# Patient Record
Sex: MALE | Race: BLACK OR AFRICAN AMERICAN | Employment: OTHER | URBAN - METROPOLITAN AREA
[De-identification: names, ages, dates, MRNs, and addresses within clinical notes are randomized per-mention and may not be internally consistent; named-entity substitution may affect disease eponyms.]

---

## 2017-10-08 ENCOUNTER — ED HISTORICAL/CONVERTED ENCOUNTER (OUTPATIENT)
Dept: OTHER | Age: 62
End: 2017-10-08

## 2017-10-09 ENCOUNTER — OP HISTORICAL/CONVERTED ENCOUNTER (OUTPATIENT)
Dept: OTHER | Age: 62
End: 2017-10-09

## 2018-03-07 ENCOUNTER — IP HISTORICAL/CONVERTED ENCOUNTER (OUTPATIENT)
Dept: OTHER | Age: 63
End: 2018-03-07

## 2018-03-29 ENCOUNTER — OP HISTORICAL/CONVERTED ENCOUNTER (OUTPATIENT)
Dept: OTHER | Age: 63
End: 2018-03-29

## 2018-04-05 ENCOUNTER — OP HISTORICAL/CONVERTED ENCOUNTER (OUTPATIENT)
Dept: OTHER | Age: 63
End: 2018-04-05

## 2018-04-12 ENCOUNTER — OP HISTORICAL/CONVERTED ENCOUNTER (OUTPATIENT)
Dept: OTHER | Age: 63
End: 2018-04-12

## 2018-04-26 ENCOUNTER — OP HISTORICAL/CONVERTED ENCOUNTER (OUTPATIENT)
Dept: OTHER | Age: 63
End: 2018-04-26

## 2018-05-03 ENCOUNTER — OP HISTORICAL/CONVERTED ENCOUNTER (OUTPATIENT)
Dept: OTHER | Age: 63
End: 2018-05-03

## 2018-05-10 ENCOUNTER — OP HISTORICAL/CONVERTED ENCOUNTER (OUTPATIENT)
Dept: OTHER | Age: 63
End: 2018-05-10

## 2018-05-22 ENCOUNTER — OP HISTORICAL/CONVERTED ENCOUNTER (OUTPATIENT)
Dept: OTHER | Age: 63
End: 2018-05-22

## 2018-05-23 ENCOUNTER — OP HISTORICAL/CONVERTED ENCOUNTER (OUTPATIENT)
Dept: OTHER | Age: 63
End: 2018-05-23

## 2018-05-24 ENCOUNTER — OP HISTORICAL/CONVERTED ENCOUNTER (OUTPATIENT)
Dept: OTHER | Age: 63
End: 2018-05-24

## 2018-06-14 ENCOUNTER — OP HISTORICAL/CONVERTED ENCOUNTER (OUTPATIENT)
Dept: OTHER | Age: 63
End: 2018-06-14

## 2018-07-05 ENCOUNTER — OP HISTORICAL/CONVERTED ENCOUNTER (OUTPATIENT)
Dept: OTHER | Age: 63
End: 2018-07-05

## 2018-07-26 ENCOUNTER — OP HISTORICAL/CONVERTED ENCOUNTER (OUTPATIENT)
Dept: OTHER | Age: 63
End: 2018-07-26

## 2018-08-16 ENCOUNTER — OP HISTORICAL/CONVERTED ENCOUNTER (OUTPATIENT)
Dept: OTHER | Age: 63
End: 2018-08-16

## 2018-08-23 ENCOUNTER — OP HISTORICAL/CONVERTED ENCOUNTER (OUTPATIENT)
Dept: OTHER | Age: 63
End: 2018-08-23

## 2018-09-06 ENCOUNTER — OP HISTORICAL/CONVERTED ENCOUNTER (OUTPATIENT)
Dept: OTHER | Age: 63
End: 2018-09-06

## 2018-09-20 ENCOUNTER — OP HISTORICAL/CONVERTED ENCOUNTER (OUTPATIENT)
Dept: OTHER | Age: 63
End: 2018-09-20

## 2018-10-11 ENCOUNTER — OP HISTORICAL/CONVERTED ENCOUNTER (OUTPATIENT)
Dept: OTHER | Age: 63
End: 2018-10-11

## 2018-10-25 ENCOUNTER — OP HISTORICAL/CONVERTED ENCOUNTER (OUTPATIENT)
Dept: OTHER | Age: 63
End: 2018-10-25

## 2018-11-08 ENCOUNTER — OP HISTORICAL/CONVERTED ENCOUNTER (OUTPATIENT)
Dept: OTHER | Age: 63
End: 2018-11-08

## 2018-11-29 ENCOUNTER — OP HISTORICAL/CONVERTED ENCOUNTER (OUTPATIENT)
Dept: OTHER | Age: 63
End: 2018-11-29

## 2018-12-20 ENCOUNTER — OP HISTORICAL/CONVERTED ENCOUNTER (OUTPATIENT)
Dept: OTHER | Age: 63
End: 2018-12-20

## 2019-01-03 ENCOUNTER — OP HISTORICAL/CONVERTED ENCOUNTER (OUTPATIENT)
Dept: OTHER | Age: 64
End: 2019-01-03

## 2019-01-17 ENCOUNTER — OP HISTORICAL/CONVERTED ENCOUNTER (OUTPATIENT)
Dept: OTHER | Age: 64
End: 2019-01-17

## 2019-01-31 ENCOUNTER — OP HISTORICAL/CONVERTED ENCOUNTER (OUTPATIENT)
Dept: OTHER | Age: 64
End: 2019-01-31

## 2019-02-14 ENCOUNTER — OP HISTORICAL/CONVERTED ENCOUNTER (OUTPATIENT)
Dept: OTHER | Age: 64
End: 2019-02-14

## 2019-03-14 ENCOUNTER — OP HISTORICAL/CONVERTED ENCOUNTER (OUTPATIENT)
Dept: OTHER | Age: 64
End: 2019-03-14

## 2019-03-25 ENCOUNTER — OP HISTORICAL/CONVERTED ENCOUNTER (OUTPATIENT)
Dept: OTHER | Age: 64
End: 2019-03-25

## 2019-03-28 ENCOUNTER — OP HISTORICAL/CONVERTED ENCOUNTER (OUTPATIENT)
Dept: OTHER | Age: 64
End: 2019-03-28

## 2019-04-11 ENCOUNTER — OP HISTORICAL/CONVERTED ENCOUNTER (OUTPATIENT)
Dept: OTHER | Age: 64
End: 2019-04-11

## 2019-04-25 ENCOUNTER — OP HISTORICAL/CONVERTED ENCOUNTER (OUTPATIENT)
Dept: OTHER | Age: 64
End: 2019-04-25

## 2019-05-09 ENCOUNTER — OP HISTORICAL/CONVERTED ENCOUNTER (OUTPATIENT)
Dept: OTHER | Age: 64
End: 2019-05-09

## 2019-05-23 ENCOUNTER — OP HISTORICAL/CONVERTED ENCOUNTER (OUTPATIENT)
Dept: OTHER | Age: 64
End: 2019-05-23

## 2019-06-06 ENCOUNTER — OP HISTORICAL/CONVERTED ENCOUNTER (OUTPATIENT)
Dept: OTHER | Age: 64
End: 2019-06-06

## 2019-06-20 ENCOUNTER — OP HISTORICAL/CONVERTED ENCOUNTER (OUTPATIENT)
Dept: OTHER | Age: 64
End: 2019-06-20

## 2019-07-11 ENCOUNTER — OP HISTORICAL/CONVERTED ENCOUNTER (OUTPATIENT)
Dept: OTHER | Age: 64
End: 2019-07-11

## 2019-08-01 ENCOUNTER — OP HISTORICAL/CONVERTED ENCOUNTER (OUTPATIENT)
Dept: OTHER | Age: 64
End: 2019-08-01

## 2019-08-29 ENCOUNTER — OP HISTORICAL/CONVERTED ENCOUNTER (OUTPATIENT)
Dept: OTHER | Age: 64
End: 2019-08-29

## 2019-09-12 ENCOUNTER — OP HISTORICAL/CONVERTED ENCOUNTER (OUTPATIENT)
Dept: OTHER | Age: 64
End: 2019-09-12

## 2019-09-26 ENCOUNTER — OP HISTORICAL/CONVERTED ENCOUNTER (OUTPATIENT)
Dept: OTHER | Age: 64
End: 2019-09-26

## 2019-10-10 ENCOUNTER — OP HISTORICAL/CONVERTED ENCOUNTER (OUTPATIENT)
Dept: OTHER | Age: 64
End: 2019-10-10

## 2019-10-31 ENCOUNTER — OP HISTORICAL/CONVERTED ENCOUNTER (OUTPATIENT)
Dept: OTHER | Age: 64
End: 2019-10-31

## 2019-12-05 ENCOUNTER — OP HISTORICAL/CONVERTED ENCOUNTER (OUTPATIENT)
Dept: OTHER | Age: 64
End: 2019-12-05

## 2019-12-19 ENCOUNTER — OP HISTORICAL/CONVERTED ENCOUNTER (OUTPATIENT)
Dept: OTHER | Age: 64
End: 2019-12-19

## 2020-01-09 ENCOUNTER — OP HISTORICAL/CONVERTED ENCOUNTER (OUTPATIENT)
Dept: OTHER | Age: 65
End: 2020-01-09

## 2020-01-30 ENCOUNTER — OP HISTORICAL/CONVERTED ENCOUNTER (OUTPATIENT)
Dept: OTHER | Age: 65
End: 2020-01-30

## 2020-02-20 ENCOUNTER — OP HISTORICAL/CONVERTED ENCOUNTER (OUTPATIENT)
Dept: OTHER | Age: 65
End: 2020-02-20

## 2020-02-27 ENCOUNTER — OP HISTORICAL/CONVERTED ENCOUNTER (OUTPATIENT)
Dept: OTHER | Age: 65
End: 2020-02-27

## 2020-03-12 ENCOUNTER — OP HISTORICAL/CONVERTED ENCOUNTER (OUTPATIENT)
Dept: OTHER | Age: 65
End: 2020-03-12

## 2020-03-26 ENCOUNTER — OP HISTORICAL/CONVERTED ENCOUNTER (OUTPATIENT)
Dept: OTHER | Age: 65
End: 2020-03-26

## 2020-04-23 ENCOUNTER — OP HISTORICAL/CONVERTED ENCOUNTER (OUTPATIENT)
Dept: OTHER | Age: 65
End: 2020-04-23

## 2020-05-14 ENCOUNTER — OP HISTORICAL/CONVERTED ENCOUNTER (OUTPATIENT)
Dept: OTHER | Age: 65
End: 2020-05-14

## 2020-06-04 ENCOUNTER — OP HISTORICAL/CONVERTED ENCOUNTER (OUTPATIENT)
Dept: OTHER | Age: 65
End: 2020-06-04

## 2020-06-18 ENCOUNTER — OP HISTORICAL/CONVERTED ENCOUNTER (OUTPATIENT)
Dept: OTHER | Age: 65
End: 2020-06-18

## 2020-07-30 ENCOUNTER — OP HISTORICAL/CONVERTED ENCOUNTER (OUTPATIENT)
Dept: OTHER | Age: 65
End: 2020-07-30

## 2020-08-06 ENCOUNTER — OP HISTORICAL/CONVERTED ENCOUNTER (OUTPATIENT)
Dept: OTHER | Age: 65
End: 2020-08-06

## 2020-08-20 ENCOUNTER — OP HISTORICAL/CONVERTED ENCOUNTER (OUTPATIENT)
Dept: OTHER | Age: 65
End: 2020-08-20

## 2020-09-24 ENCOUNTER — HOSPITAL ENCOUNTER (OUTPATIENT)
Dept: WOUND CARE | Age: 65
Discharge: HOME | End: 2020-09-24
Payer: MEDICARE

## 2020-09-24 VITALS
HEIGHT: 67 IN | SYSTOLIC BLOOD PRESSURE: 164 MMHG | TEMPERATURE: 98.8 F | DIASTOLIC BLOOD PRESSURE: 73 MMHG | HEART RATE: 73 BPM | WEIGHT: 315 LBS | BODY MASS INDEX: 49.44 KG/M2

## 2020-09-24 PROBLEM — I70.242 ATHEROSCLEROSIS OF NATIVE ARTERY OF LEFT LOWER EXTREMITY WITH ULCERATION OF CALF (HCC): Status: ACTIVE | Noted: 2020-09-24

## 2020-09-24 PROBLEM — I26.90 SEPTIC PULMONARY EMBOLISM WITHOUT ACUTE COR PULMONALE (HCC): Status: ACTIVE | Noted: 2020-09-24

## 2020-09-24 PROBLEM — L97.212 NON-PRESSURE CHRONIC ULCER OF RIGHT CALF WITH FAT LAYER EXPOSED (HCC): Status: ACTIVE | Noted: 2020-09-24

## 2020-09-24 PROBLEM — I87.2 PERIPHERAL VENOUS INSUFFICIENCY: Status: ACTIVE | Noted: 2020-09-24

## 2020-09-24 PROBLEM — L97.222 NON-PRESSURE CHRONIC ULCER OF LEFT CALF WITH FAT LAYER EXPOSED (HCC): Status: ACTIVE | Noted: 2020-09-24

## 2020-09-24 PROBLEM — E66.01 MORBID OBESITY (HCC): Status: ACTIVE | Noted: 2020-09-24

## 2020-09-24 PROBLEM — I89.0 LYMPHEDEMA: Status: ACTIVE | Noted: 2020-09-24

## 2020-09-24 PROCEDURE — 11042 DBRDMT SUBQ TIS 1ST 20SQCM/<: CPT

## 2020-09-24 RX ORDER — METOPROLOL SUCCINATE 25 MG/1
25 TABLET, EXTENDED RELEASE ORAL DAILY
COMMUNITY
End: 2021-10-20

## 2020-09-24 RX ORDER — AMLODIPINE BESYLATE 10 MG/1
10 TABLET ORAL DAILY
COMMUNITY
End: 2021-10-20

## 2020-09-24 RX ORDER — FUROSEMIDE 40 MG/1
40 TABLET ORAL DAILY
COMMUNITY
End: 2021-10-12

## 2020-09-24 RX ORDER — BISMUTH SUBSALICYLATE 262 MG
1 TABLET,CHEWABLE ORAL DAILY
COMMUNITY
End: 2021-10-20

## 2020-09-24 RX ORDER — NAPROXEN 250 MG/1
250 TABLET ORAL 2 TIMES DAILY WITH MEALS
COMMUNITY
End: 2021-10-12

## 2020-09-24 NOTE — DISCHARGE INSTRUCTIONS
Discharge Instructions for  [address] [place] [address], [facility]  Telephone: [phone]    NAME:  Stephanie Perla  YOB: 1955  MEDICAL RECORD NUMBER:  [id number]  DATE:  9/24/2020      Return Appointment:  [] Dressing supply provider:   [x] Home Healthcare:[facility]  [x] Return Appointment: 2Week(s)  [] Nurse Visit:  Week(s)    [] Ordered tests:   [] Referrals:     Wound Cleansing:   Do not scrub or use excessive force. Cleanse wound prior to applying a clean dressing with:  [x] Normal Saline   [x] Cleanse wound with Mild Soap & Water    [] Keep Wound Dry in Shower  [] Other:          Dressings:           Wound Location RIGHT AND LEFT LEGS   [x] Apply Primary Dressing:       [] MediHoney Gel    [] MediHoney Alginate               [] Calcium Alginate      [] Calcium Alginate with Silver   [] Collagen                   [] Collagen with Silver                [] Santyl with Moistened saline gauze              [] Hydrofera Blue (cut to size and moistened)  [] Hydrofera Blue Ready (Cut to size)   [] NS wet to dry    [] Betadine wet to dry              [] Hydrogel                [] Mepitel     [] Bactroban/Mupirocin               [x] Other:  MEPLEX TRANFER AG   [] Pack wound loosely with  [] Iodoform   [] Plain Packing       [] Other:     [] Cover and Secure with:     [x] Gauze [x] Tiffany [] Kerlix   [] Foam [] Super Absorbant  [] ABD     [] ACE Wrap            [] Other:    Avoid contact of tape with skin. [] Change dressing: [] Daily    [] Every Other Day [x] Once per week   [] Three times per week [] Do Not Change Dressing   [] Other:          Compression:  Apply: [x] Multilayer Compression Wrap Applied in Clinic: [x]RightLeg [x]Left Leg                                 []Profore  [x]Profore Lite             []Unna     [x] Do not get leg(s) with wrap wet. If wraps become too tight call the center or completely remove the wrap. [x] Elevate leg(s) above the level of the heart when sitting. [x] Avoid prolonged standing in one place. Electronically signed Rasheed Pizarro RN on 9/24/2020 at 0 [facility]: Should you experience any significant changes in your wound(s) or have questions about your wound care, please contact Dane Melgar 26 at [place] [phone] 8:00 am - 4:30. If you need help with your wound outside of these hours and cannot wait until we are again available, contact your PCP or go to the hospital emergency room. PLEASE NOTE: IF YOU ARE UNABLE TO OBTAIN WOUND SUPPLIES, CONTINUE TO USE THE SUPPLIES YOU HAVE AVAILABLE UNTIL YOU ARE ABLE TO REACH US. IT IS MOST IMPORTANT TO KEEP THE WOUND COVERED AT ALL TIMES.     [] Dr. Benjy Zelaya   [] Dr. Zhou Milian  [x] Wesley Tsai [facility]  [] Dr. Neymar Moses

## 2020-09-24 NOTE — WOUND CARE
NAME:  Raine Woodall YOB: 1955 MEDICAL RECORD NUMBER:  [id number] DATE:  9/24/2020 
  
  
Return Appointment: 
[]? Dressing supply provider:  
[x]? Home Healthcare:[facility] [x]? Return Appointment: 2Week(s) 
[]? Nurse Visit:  Week(s) 
  
[]? Ordered tests:  
[]? Referrals:  
  
Wound Cleansing: Do not scrub or use excessive force. Cleanse wound prior to applying a clean dressing with: 
[x]? Normal Saline [x]? Cleanse wound with Mild Soap & Water   
[]? Keep Wound Dry in Shower []? Other:   
  
             
Dressings:                  Wound Location RIGHT AND LEFT LEGS [x]? Apply Primary Dressing:                             
            []? MediHoney Gel         []? MediHoney Alginate        
            []? Calcium Alginate      []? Calcium Alginate with Silver 
            []? Collagen                   []? Collagen with Silver   
            []? Santyl with Moistened saline gauze 
            []? Hydrofera Blue (cut to size and moistened)  []? Hydrofera Blue Ready (Cut to size) []? NS wet to dry            []? Betadine wet to dry 
            []? Hydrogel                   []? Mepitel      
            []? Bactroban/Mupirocin          
            [x]? Other:  [address]  
[]? Pack wound loosely with   []? Iodoform   []? Plain Packing       []? Other:  
  
[]? Cover and Secure with:      
            [x]? Gauze        [x]? Stehekin Friedman           []? Kerlix []? Foam          []? Super Absorbant    []? ABD                         
            []? ACE Wrap            []? Other:  
            Avoid contact of tape with skin. 
  
[]? Change dressing:  []? Daily           []? Every Other Day    [x]? Once per week 
            []? Three times per week        []? Do Not Change Dressing    []? Other: 
             
             
  
Compression: 
Apply:  [x]? Multilayer Compression Wrap Applied in Clinic:   [x]? RightLeg      [x]? Left Leg 
 []?Profore            [x]? Profore Lite             []?Unna 
  
            [x]? Do not get leg(s) with wrap wet. If wraps become too tight call the center or completely remove the wrap. [x]? Elevate leg(s) above the level of the heart when sitting. [x]? Avoid prolonged standing in one place. 
  
  
             
         
  
 Electronically signed Florencio Merino RN on 9/24/2020 at [address]: Should you experience any significant changes in your wound(s) or have questions about your wound care, please contact Dane Melgar 26 at [place] [phone] 8:00 am - 4:30. If you need help with your wound outside of these hours and cannot wait until we are again available, contact your PCP or go to the hospital emergency room.  
  
PLEASE NOTE: IF YOU ARE UNABLE TO [address], CONTINUE TO USE THE SUPPLIES YOU HAVE AVAILABLE UNTIL YOU ARE ABLE TO [facility]. IT IS MOST IMPORTANT TO KEEP THE WOUND COVERED AT ALL TIMES. 
  
[]? Dr. Herson Cisse  
[]? Dr. Harsha Magallanes [x]? Toni Lucas [facility] 
[]?  Dr. Sandra Day

## 2020-09-24 NOTE — WOUND CARE
Multilayer Compression Wrap 
 (Not Unna) Below the Knee NAME:  Isaiah Snider YOB: 1955 MEDICAL RECORD NUMBER:  [id number] DATE:  9/24/2020 Applied primary and secondary dressing as ordered. Applied multilayered dressing below the knee to right lower leg. Applied multilayered dressing below the knee to left lower leg. Instructed patient/caregiver not to remove dressing and to keep it clean and dry. Instructed patient/caregiver on complications to report to provider, such as pain, numbness in toes, heavy drainage, and slippage of dressing. Instructed patient on purpose of compression dressing and on activity and exercise recommendations.  
 
 
Electronically signed by Emir Ledesma RN on 9/24/2020 at 12:48 PM

## 2020-09-24 NOTE — PROGRESS NOTES
[address]   Progress Note and Procedure Note     [facility] RECORD NUMBER:  [id number]  AGE: 77 y.o. RACE WHITE OR   BLACK OR   GENDER: male  : 1955  EPISODE DATE:  2020    Subjective: Patient states he has been doing much better. He states his PCP gave his some vitamins to help with his energy. He states he has no new complaints today      Chief Complaint   Patient presents with    Wound Check     BLE         HISTORY of PRESENT ILLNESS HPI    Regine Yu is a 77 y.o. male who presents today for wound/ulcer evaluation. History of Wound Context: bilateral lower extremity ulcerations  Wound/Ulcer Pain Timing/Severity: none  Quality of pain: none  Severity:  0 / 10   Modifying Factors: None  Associated Signs/Symptoms: edema    Ulcer Identification:  Ulcer Type: venous, diabetic and lymphedema    Contributing Factors: edema, venous stasis, lymphedema, diabetes and poor glucose control    Wound: to bilateral lower extremities          PAST MEDICAL HISTORY    Past Medical History:   Diagnosis Date    Arthritis     Chronic obstructive pulmonary disease (HCC)     Hypertension     Sleep apnea         PAST SURGICAL HISTORY    Past Surgical History:   Procedure Laterality Date    HX GI         FAMILY HISTORY    Family History   Problem Relation Age of Onset    Hypertension Child        SOCIAL HISTORY    Social History     Tobacco Use    Smoking status: Never Smoker    Smokeless tobacco: Never Used   Substance Use Topics    Alcohol use: Never     Frequency: Never    Drug use: Never       ALLERGIES    No Known Allergies    MEDICATIONS    Current Outpatient Medications on File Prior to Encounter   Medication Sig Dispense Refill    multivitamin (ONE A DAY) tablet Take 1 Tab by mouth daily.  amLODIPine (NORVASC) 10 mg tablet Take 10 mg by mouth daily.  metoprolol succinate (TOPROL-XL) 25 mg XL tablet Take 25 mg by mouth daily.  furosemide (LASIX) 40 mg tablet Take 40 mg by mouth daily.  naproxen (NAPROSYN) 250 mg tablet Take 250 mg by mouth two (2) times daily (with meals). No current facility-administered medications on file prior to encounter. REVIEW OF SYSTEMS    Pertinent items are noted in HPI. Objective:     Visit Vitals  BP (!) 164/73 (BP 1 Location: Left arm, BP Patient Position: Sitting)   Pulse 73   Temp 98.8 °F (37.1 °C)   Ht 5' 7\" (1.702 m)   Wt (!) 181.4 kg (400 lb)   BMI 62.65 kg/m²       Wt Readings from Last 3 Encounters:   09/24/20 (!) 181.4 kg (400 lb)       PHYSICAL EXAM    Constitutional: Patient is awake, ambulating with cane devices, no acute distress  Head: normocephalic, atraumatic. Musculoskeletal: bilateral lower extremity edema   Wound: to the left leg mild slough. No erythema, improving. Swelling improving wound to the right leg mild slough. Mild swelling. Improving. 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     Performed by: Li Cobian PA-C    Consent obtained: Yes    Time out taken: Yes    Debridement: Excisional    Using curette the wound(s)/ulcer(s) was/were sharply debrided down through and including the removal of    epidermis, dermis and subcutaneous tissue    Devitalized Tissue Debrided: biofilm and slough    Pre Debridement Measurements:  Are located in the [place]  Documentation Flow Sheet    Wound/Ulcer #: 1    Post Debridement Measurements:  Wound/Ulcer Descriptions are Pre Debridement except measurements:Non-Pressure ulcer, fat layer exposed    Wound Leg lower Left #1 LLL circumfirential (Active)   Dressing Status Removed 09/24/20 1157   Non-staged Wound Description Full thickness 09/24/20 1157   Wound Length (cm) 12 cm 09/24/20 1157   Wound Width (cm) 7 cm 09/24/20 1157   Wound Depth (cm) 0.1 cm 09/24/20 1157   Wound Surface Area (cm^2) 84 cm^2 09/24/20 1157   Wound Volume (cm^3) 8.4 cm^3 09/24/20 1157   Condition of Base Adipose exposed 09/24/20 1157   Condition of Edges Open 09/24/20 1157   Tissue Type Percent Pink 100 09/24/20 1157   Drainage Amount Moderate 09/24/20 1157   Drainage Color Serosanguinous 09/24/20 1157   Wound Odor None 09/24/20 1157   Betty-wound Assessment Dry 09/24/20 1157   Margins Defined edges 09/24/20 1157   Cleansing and Cleansing Agents  Normal saline 09/24/20 1157   Number of days: 0       Wound Leg lower Right #2 RLE circumfirential (Active)   Dressing Status Removed 09/24/20 1202   Non-staged Wound Description Full thickness 09/24/20 1202   Wound Length (cm) 5 cm 09/24/20 1202   Wound Width (cm) 2 cm 09/24/20 1202   Wound Depth (cm) 0.1 cm 09/24/20 1202   Wound Surface Area (cm^2) 10 cm^2 09/24/20 1202   Wound Volume (cm^3) 1 cm^3 09/24/20 1202   Condition of Base Adipose exposed 09/24/20 1202   Condition of Edges Open 09/24/20 1202   Assessment Dry 09/24/20 1202   Tissue Type Percent Pink 100 09/24/20 1202   Drainage Amount Moderate 09/24/20 1202   Drainage Color Serosanguinous 09/24/20 1202   Wound Odor None 09/24/20 1202   Betty-wound Assessment Dry 09/24/20 1202   Margins Defined edges 09/24/20 1202   Cleansing and Cleansing Agents  Normal saline 09/24/20 1202   Number of days: 0        Percent of Wound(s)/Ulcer(s) Debrided: 20%    Total Surface Area Debrided:  0.5 sq cm     Diabetic/Pressure/Non Pressure Ulcers only:  Ulcer:     Estimated Blood Loss:  Minimal Hemostasis Achieved: Pressure    Procedural Pain: 0 / 10     Post Procedural Pain: 0 / 10     Response to treatment: Well tolerated by patient       Debridement Wound Care        Problem List Items Addressed This Visit     None          Procedure Note  Indications:  Based on my examination of this patient's wound(s)/ulcer(s) today, debridement is required to promote healing and evaluate the wound base.     Performed by: Toin Lucas PA-C    Consent obtained: Yes    Time out taken: Yes    Debridement: Excisional    Using curette the wound(s)/ulcer(s) was/were sharply debrided down through and including the removal of    epidermis, dermis and subcutaneous tissue    Devitalized Tissue Debrided: biofilm and slough    Pre Debridement Measurements:  Are located in the Wound/Ulcer Documentation Flow Sheet    Wound/Ulcer #: 2    Post Debridement Measurements:  Wound/Ulcer Descriptions are Pre Debridement except measurements:Non-Pressure ulcer, fat layer exposed    Wound Leg lower Left #1 LLL circumfirential (Active)   Dressing Status Removed 09/24/20 1157   Non-staged Wound Description Full thickness 09/24/20 1157   Wound Length (cm) 12 cm 09/24/20 1157   Wound Width (cm) 7 cm 09/24/20 1157   Wound Depth (cm) 0.1 cm 09/24/20 1157   Wound Surface Area (cm^2) 84 cm^2 09/24/20 1157   Wound Volume (cm^3) 8.4 cm^3 09/24/20 1157   Condition of Base Adipose exposed 09/24/20 1157   Condition of Edges Open 09/24/20 1157   Tissue Type Percent Pink 100 09/24/20 1157   Drainage Amount Moderate 09/24/20 1157   Drainage Color Serosanguinous 09/24/20 1157   Wound Odor None 09/24/20 1157   Betty-wound Assessment Dry 09/24/20 1157   Margins Defined edges 09/24/20 1157   Cleansing and Cleansing Agents  Normal saline 09/24/20 1157   Number of days: 0       Wound Leg lower Right #2 RLE circumfirential (Active)   Dressing Status Removed 09/24/20 1202   Non-staged Wound Description Full thickness 09/24/20 1202   Wound Length (cm) 5 cm 09/24/20 1202   Wound Width (cm) 2 cm 09/24/20 1202   Wound Depth (cm) 0.1 cm 09/24/20 1202   Wound Surface Area (cm^2) 10 cm^2 09/24/20 1202   Wound Volume (cm^3) 1 cm^3 09/24/20 1202   Condition of Base Adipose exposed 09/24/20 1202   Condition of Edges Open 09/24/20 1202   Assessment Dry 09/24/20 1202   Tissue Type Percent Pink 100 09/24/20 1202   Drainage Amount Moderate 09/24/20 1202   Drainage Color Serosanguinous 09/24/20 1202   Wound Odor None 09/24/20 1202   Betty-wound Assessment Dry 09/24/20 1202   Margins Defined edges 09/24/20 1202   Cleansing and Cleansing Agents  Normal saline 09/24/20 1202   Number of days: 0        Percent of Wound(s)/Ulcer(s) Debrided: 20%    Total Surface Area Debrided:  0.6 sq cm     Diabetic/Pressure/Non Pressure Ulcers only:  Ulcer:     Estimated Blood Loss:  Minimal     Hemostasis Achieved: Pressure    Procedural Pain: 0 / 10     Post Procedural Pain: 0 / 10     Response to treatment: Well tolerated by patient         Plan:     Patient was instructed to continue elevating his legs and using his lymphedema pumps. Return Appointment:  []?? Dressing supply provider:   [x]? ? [facility] Healthcare:[facility]  [x]? ? Return Appointment: 2Week(s)  []? ? Nurse Visit:  Week(s)     []? ? Ordered tests:   []?? Referrals:      Wound Cleansing:   Do not scrub or use excessive force. Cleanse wound prior to applying a clean dressing with:  [x]? ? Normal Saline          [x]? ? Cleanse wound with Mild Soap & Water    []? ? Keep Wound Dry in Shower  []? ? Other:                     Dressings:                  Wound Location RIGHT AND LEFT LEGS              [x]? ? Apply Primary Dressing:                                          []? ? MediHoney Gel         []? ? MediHoney Alginate                     []? ? Calcium Alginate      []? ? Calcium Alginate with Silver              []? ?Mady Morales                   []? ? Collagen with Silver                []? ? Santyl with Moistened saline gauze              []? ?[facility] Blue (cut to size and moistened)  []?? Hydrofera Blue Ready (Cut to size)              []?? NS wet to dry            []? ? Betadine wet to dry              []? ? Hydrogel                   []? ? Mepitel                   []? ? Bactroban/Mupirocin                       [x]? ? Other: Aniya Goodpasture   []? ? Pack wound loosely with   []? ? Iodoform   []? ? Plain Packing       []? ? Other:      []? ? Cover and Secure with:                   [x]? ? Gauze        [x]? ? Tiffany           []? ? Kerlix              []? ? Foam          []? ? Super Absorbant    []? ? ABD                                      []? ? ACE Wrap            []? ? Other:               Avoid contact of tape with skin.     []? ? Change dressing:  []?? Daily           []? ? Every Other Day    [x]? ? Once per week              []? ? Three times per week        []? ? Do Not Change Dressing    []? ? Other:                                 Compression:  Apply:  [x]? ? Multilayer Compression Wrap Applied in Clinic:   [x]? ? RightLeg      [x]? ? Left Leg                                 []? ? Profore            [x]? ? Profore Lite             []? ? Unna                 [x]? ? Do not get leg(s) with wrap wet.  If wraps become too tight call the center or completely remove the wrap.                     [x]? ? Elevate leg(s) above the level of the heart when sitting.                [x]? ? Avoid prolonged standing in one place.       Treatment Note please see attached Discharge Instructions    Written patient dismissal instructions given to patient or POA.          Electronically signed by Kulwinder Adams PA-C on 9/24/2020 at 12:10 PM

## 2020-10-08 ENCOUNTER — HOSPITAL ENCOUNTER (OUTPATIENT)
Dept: WOUND CARE | Age: 65
Discharge: HOME | End: 2020-10-08
Payer: MEDICARE

## 2020-10-08 VITALS
HEART RATE: 79 BPM | DIASTOLIC BLOOD PRESSURE: 72 MMHG | SYSTOLIC BLOOD PRESSURE: 141 MMHG | RESPIRATION RATE: 18 BRPM | TEMPERATURE: 99 F

## 2020-10-08 DIAGNOSIS — L97.212 NON-PRESSURE CHRONIC ULCER OF RIGHT CALF WITH FAT LAYER EXPOSED (HCC): ICD-10-CM

## 2020-10-08 DIAGNOSIS — I70.242 ATHEROSCLEROSIS OF NATIVE ARTERY OF LEFT LOWER EXTREMITY WITH ULCERATION OF CALF (HCC): ICD-10-CM

## 2020-10-08 DIAGNOSIS — L97.222 NON-PRESSURE CHRONIC ULCER OF LEFT CALF WITH FAT LAYER EXPOSED (HCC): ICD-10-CM

## 2020-10-08 DIAGNOSIS — I87.2 PERIPHERAL VENOUS INSUFFICIENCY: ICD-10-CM

## 2020-10-08 DIAGNOSIS — E66.01 MORBID OBESITY (HCC): ICD-10-CM

## 2020-10-08 DIAGNOSIS — I89.0 LYMPHEDEMA: ICD-10-CM

## 2020-10-08 PROCEDURE — 11042 DBRDMT SUBQ TIS 1ST 20SQCM/<: CPT

## 2020-10-08 RX ORDER — LIDOCAINE HYDROCHLORIDE 20 MG/ML
15 SOLUTION OROPHARYNGEAL AS NEEDED
Status: DISCONTINUED | OUTPATIENT
Start: 2020-10-08 | End: 2020-10-10 | Stop reason: HOSPADM

## 2020-10-08 NOTE — PROGRESS NOTES
[address]   Progress Note and Procedure Note     12 [facility] RECORD NUMBER:  [id number]  AGE: 77 y.o. RACE WHITE OR   BLACK OR   GENDER: male  : 1955  EPISODE DATE:  10/8/2020    Subjective: Patient states he has been doing well at this time. He states his bilateral leg swelling is improving. Chief Complaint   Patient presents with    Wound Check     Right and left leg         HISTORY of PRESENT ILLNESS HPI    Bailey Tenorio is a 77 y.o. male who presents today for wound/ulcer evaluation. History of Wound Context: bilateral lower extremity ulcerations  Wound/Ulcer Pain Timing/Severity: none  Quality of pain: none  Severity:  0 / 10   Modifying Factors: None  Associated Signs/Symptoms: edema    Ulcer Identification:  Ulcer Type: venous, diabetic and lymphedema    Contributing Factors: edema, venous stasis, lymphedema, diabetes and poor glucose control    Wound: to bilateral lower extremities          PAST MEDICAL HISTORY    Past Medical History:   Diagnosis Date    Arthritis     Chronic obstructive pulmonary disease (HCC)     Hypertension     Sleep apnea         PAST SURGICAL HISTORY    Past Surgical History:   Procedure Laterality Date    HX GI         FAMILY HISTORY    Family History   Problem Relation Age of Onset    Hypertension Child        SOCIAL HISTORY    Social History     Tobacco Use    Smoking status: Never Smoker    Smokeless tobacco: Never Used   Substance Use Topics    Alcohol use: Never     Frequency: Never    Drug use: Never       ALLERGIES    No Known Allergies    MEDICATIONS    Current Outpatient Medications on File Prior to Encounter   Medication Sig Dispense Refill    multivitamin (ONE A DAY) tablet Take 1 Tab by mouth daily.  amLODIPine (NORVASC) 10 mg tablet Take 10 mg by mouth daily.  metoprolol succinate (TOPROL-XL) 25 mg XL tablet Take 25 mg by mouth daily.       furosemide (LASIX) 40 mg tablet Take 40 mg by mouth daily.  naproxen (NAPROSYN) 250 mg tablet Take 250 mg by mouth two (2) times daily (with meals). No current facility-administered medications on file prior to encounter. REVIEW OF SYSTEMS    Pertinent items are noted in HPI. Objective:     Visit Vitals  BP (!) 141/72 (BP 1 Location: Right arm, BP Patient Position: Sitting)   Pulse 79   Temp 99 °F (37.2 °C)   Resp 18       Wt Readings from Last 3 Encounters:   09/24/20 (!) 181.4 kg (400 lb)       PHYSICAL EXAM    Constitutional: Patient is awake, ambulating with cane devices, no acute distress  Head: normocephalic, atraumatic. Musculoskeletal: bilateral lower extremity edema but significantly improved from previous visit   Wound: to left lower extremity no periwound erythema, no odor mild drainage. Right lower extremity no periwound erythema no odor mild drainage.   Behavioral/Psych: patient is pleasant, cooperative, awake and alert    Assessment:      Problem List Items Addressed This Visit        Circulatory    Peripheral venous insufficiency    Atherosclerosis of native artery of left lower extremity with ulceration of calf (HCC)       Other    Morbid obesity (HCC)    Non-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 exposed ([address]    Non-pressure chronic ulcer of right calf with fat layer exposed ([address]    Lymphedema          Debridement Wound Care        Problem List Items Addressed This Visit        Circulatory    Peripheral venous insufficiency    Atherosclerosis of native artery of left lower extremity with ulceration of calf (HCC)       Other    Morbid obesity (HCC)    Non-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 exposed ([address]    Non-pressure chronic ulcer of right calf with fat layer exposed ([address]    Lymphedema              Debridement Wound Care        Problem List Items Addressed This Visit        Circulatory    Peripheral venous insufficiency    Atherosclerosis of native artery of left lower extremity with ulceration of calf (HCC)       Other    Morbid obesity ([address]    Non-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 exposed ([address]    Non-pressure chronic ulcer of right calf with fat layer exposed ([address]    Lymphedema          Debridement Wound Care        Problem List Items Addressed This Visit        Circulatory    Peripheral venous insufficiency    Atherosclerosis of native artery of left lower extremity with ulceration of calf (HCC)       Other    Morbid obesity (HCC)    Non-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 exposed ([address]    Non-pressure chronic ulcer of right calf with fat layer exposed ([address]    Lymphedema          Procedure Note  Indications:  Based on my examination of this patient's wound(s)/ulcer(s) today, debridement is required to promote healing and evaluate the wound base.     Performed by: Dion Richard PA-C    Consent obtained: Yes    Time out taken: Yes    Debridement: Excisional    Using curette the wound(s)/ulcer(s) was/were sharply debrided down through and including the removal of    epidermis, dermis and subcutaneous tissue    Devitalized Tissue Debrided: fibrin, biofilm and slough    Pre Debridement Measurements:  Are located in the [place]  Documentation Flow Sheet    Wound/Ulcer #: 1    Post Debridement Measurements:  Wound/Ulcer Descriptions are Pre Debridement except measurements:Diabetic ulcer, fat layer exposed    Wound Leg lower Left #1 LLL circumfirential (Active)   Dressing Status Removed 09/24/20 1157   Non-staged Wound Description Full thickness 10/08/20 1058   Wound Length (cm) 5 cm 10/08/20 1058   Wound Width (cm) 7 cm 10/08/20 1058   Wound Depth (cm) 0.1 cm 10/08/20 1058   Wound Surface Area (cm^2) 35 cm^2 10/08/20 1058   Wound Volume (cm^3) 3.5 cm^3 10/08/20 1058   Change in Wound Size % 58.33 10/08/20 1058   Condition of Base Granulation;Slough 10/08/20 1058   Condition of Edges Open 10/08/20 1058   Tissue Type Percent Pink 100 09/24/20 1157 Drainage Amount Moderate 09/24/20 1157   Drainage Color Serosanguinous 09/24/20 1157   Wound Odor None 10/08/20 1058   Betty-wound Assessment Dry 10/08/20 1058   Margins Defined edges 10/08/20 1058   Cleansing and Cleansing Agents  Normal saline 10/08/20 1058   Dressing Changed Changed/New 09/24/20 1228   Number of days: 14       Wound Leg lower Right #2 RLE circumfirential (Active)   Dressing Status Removed 09/24/20 1202   Non-staged Wound Description Full thickness 10/08/20 1058   Wound Length (cm) 0.6 cm 10/08/20 1058   Wound Width (cm) 0.5 cm 10/08/20 1058   Wound Depth (cm) 0.1 cm 10/08/20 1058   Wound Surface Area (cm^2) 0.3 cm^2 10/08/20 1058   Wound Volume (cm^3) 0.03 cm^3 10/08/20 1058   Change in Wound Size % 97 10/08/20 1058   Condition of Base Granulation;Slough 10/08/20 1058   Condition of Edges Closed 10/08/20 1058   Assessment Dry 09/24/20 1202   Tissue Type Percent Pink 100 09/24/20 1202   Drainage Amount Moderate 09/24/20 1202   Drainage Color Serosanguinous 09/24/20 1202   Wound Odor None 09/24/20 1202   Betty-wound Assessment Dry 10/08/20 1058   Margins Attached edges 10/08/20 1058   Cleansing and Cleansing Agents  Normal saline 10/08/20 1058   Dressing Changed Changed/New 09/24/20 1229   Number of days: 14        Percent of Wound(s)/Ulcer(s) Debrided: 60%    Total Surface Area Debrided:  4.5 sq cm     Diabetic/Pressure/Non Pressure Ulcers only:  Ulcer:     Estimated Blood Loss:  Minimal     Hemostasis Achieved: Pressure    Procedural Pain: 0 / 10     Post Procedural Pain: 0 / 10     Response to treatment: Well tolerated by patient         Debridement Wound Care        Problem List Items Addressed This Visit        Circulatory    Peripheral venous insufficiency    Atherosclerosis of native artery of left lower extremity with ulceration of calf (HCC)       Other    Morbid obesity (HCC)    Non-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 exposed ([address]    Non-pressure chronic ulcer of right calf with fat layer exposed [facility]    Lymphedema          Procedure Note  Indications:  Based on my examination of this patient's wound(s)/ulcer(s) today, debridement is required to promote healing and evaluate the wound base.     Performed by: Amanda Lara PA-C    Consent obtained: Yes    Time out taken: Yes    Debridement: Excisional    Using curette the wound(s)/ulcer(s) was/were sharply debrided down through and including the removal of    epidermis, dermis and subcutaneous tissue    Devitalized Tissue Debrided: fibrin, biofilm and slough    Pre Debridement Measurements:  Are located in the Wound/Ulcer Documentation Flow Sheet    Wound/Ulcer #: 2    Post Debridement Measurements:  Wound/Ulcer Descriptions are Pre Debridement except measurements:Diabetic ulcer, fat layer exposed    Wound Leg lower Left #1 LLL circumfirential (Active)   Dressing Status Removed 09/24/20 1157   Non-staged Wound Description Full thickness 10/08/20 1058   Wound Length (cm) 5 cm 10/08/20 1058   Wound Width (cm) 7 cm 10/08/20 1058   Wound Depth (cm) 0.1 cm 10/08/20 1058   Wound Surface Area (cm^2) 35 cm^2 10/08/20 1058   Wound Volume (cm^3) 3.5 cm^3 10/08/20 1058   Change in Wound Size % 58.33 10/08/20 1058   Condition of Base Granulation;Slough 10/08/20 1058   Condition of Edges Open 10/08/20 1058   Tissue Type Percent Pink 100 09/24/20 1157   Drainage Amount Moderate 09/24/20 1157   Drainage Color Serosanguinous 09/24/20 1157   Wound Odor None 10/08/20 1058   Betty-wound Assessment Dry 10/08/20 1058   Margins Defined edges 10/08/20 1058   Cleansing and Cleansing Agents  Normal saline 10/08/20 1058   Dressing Changed Changed/New 09/24/20 1228   Number of days: 14       Wound Leg lower Right #2 RLE circumfirential (Active)   Dressing Status Removed 09/24/20 1202   Non-staged Wound Description Full thickness 10/08/20 1058   Wound Length (cm) 0.6 cm 10/08/20 1058   Wound Width (cm) 0.5 cm 10/08/20 1058   Wound Depth (cm) 0.1 cm 10/08/20 1058   Wound Surface Area (cm^2) 0.3 cm^2 10/08/20 1058   Wound Volume (cm^3) 0.03 cm^3 10/08/20 1058   Change in Wound Size % 97 10/08/20 1058   Condition of Base Granulation;Slough 10/08/20 1058   Condition of Edges Closed 10/08/20 1058   Assessment Dry 09/24/20 1202   Tissue Type Percent Pink 100 09/24/20 1202   Drainage Amount Moderate 09/24/20 1202   Drainage Color Serosanguinous 09/24/20 1202   Wound Odor None 09/24/20 1202   Betty-wound Assessment Dry 10/08/20 1058   Margins Attached edges 10/08/20 1058   Cleansing and Cleansing Agents  Normal saline 10/08/20 1058   Dressing Changed Changed/New 09/24/20 1229   Number of days: 14        Percent of Wound(s)/Ulcer(s) Debrided: 100%    Total Surface Area Debrided:  0.56 sq cm     Diabetic/Pressure/Non Pressure Ulcers only:  Ulcer:     Estimated Blood Loss:  Minimal     Hemostasis Achieved: Pressure    Procedural Pain: 0 / 10     Post Procedural Pain: 0 / 10     Response to treatment: Well tolerated by patient         Plan:     Patient was instructed to continue elevating his legs and using his lymphedema pumps.      Return Appointment:  []?? Dressing supply provider:   [x]? ?[address] [facility]: [facility] -   [x]? ? Return Appointment: 2 Week(s)  []? ? Nurse Visit:   Week(s)  []? ? Discharge from [facility]     []? ? Ordered tests:   []?? Referrals:      Wound Cleansing:   Do not scrub or use excessive force. Cleanse wound prior to applying a clean dressing with:  [x]? ? Normal Saline          [x]? ?[address] [place] [address] & Water    []? ? Keep Wound Dry in Shower  []? ? Other:                     Dressings:                  Wound Location RIGHT AND LEFT LEG     [x]? ? Apply Primary Dressing:                                          []? ? MediHoney Gel         []? ? MediHoney Alginate                     []? ? Calcium Alginate      []? ? Calcium Alginate with Silver              []? ?Manuelita Loge                   []? ? Collagen with Silver                []? ? Santyl with Moistened saline gauze              []? ? Hydrofera Blue (cut to size and moistened)  []?? Hydrofera Blue Ready (Cut to size)              []?? NS wet to dry            []? ? Betadine wet to dry              []? ? Hydrogel                   []? ? Mepitel                   []? ? Bactroban/Mupirocin                       [x]? ? Other:  MEPILEX TRANSFER     []? ? Pack wound loosely with   []? ? Iodoform   []? ? Plain Packing       []? ? Other:      []? ? Cover and Secure with:                   []? ? Gauze        []? ? Tiffany           []? ? Kerlix              []? ? Foam          []? ? Super Absorbant    []? ? ABD                                      []? ? ACE Wrap            []? ? Other:               Avoid contact of tape with skin.     [x]? ? Change dressing:  []?? Daily           []? ? Every Other Day    []? ? Once per week              []? ? Three times per week        []? ? Do Not Change Dressing    [x]? ? Other: TWICE A WEEK EXPECT THE ON THE WEEK PATIENT HAS AN APPOINTMENT AT THE WOUND CENTER          Compression:  Apply:  [x]? ? Multilayer Compression Wrap:      [x]? ? RightLeg      [x]? ? Left Leg                                 []? ? Profore            [x]? ? Profore Lite             []? ? Unna                 [x]? ? Do not get leg(s) with wrap wet.  If wraps become too tight call the center or completely remove the wrap.                     [x]? ? Elevate leg(s) above the level of the heart when sitting.                [x]? ? Avoid prolonged standing in one place.         Activity:  [x]? ? Activity as tolerated:    []? ? Patient has no activity restrictions       []? ? Strict Bedrest:          []? ? Remain off Work:       []? ? May return to full duty work:                                               []? ? Return to work with restrictions:                  Treatment Note please see attached Discharge Instructions    Written patient dismissal instructions given to patient or POA.          Electronically signed by Rosanne Regan PA-C on 10/8/2020 at 12:10 PM

## 2020-10-08 NOTE — DISCHARGE INSTRUCTIONS
Discharge Instructions for  [address] [place] [address], [facility]  Telephone: [phone]    NAME:  Molly Patel  YOB: 1955  MEDICAL RECORD NUMBER:  [id number]  DATE:  10/8/2020      Return Appointment:  [] Dressing supply provider:   [x] Home Healthcare: [facility] -   [x] Return Appointment: 2 Week(s)  [] Nurse Visit:   Week(s)  [] Discharge from [facility]    [] Ordered tests:   [] Referrals:     Wound Cleansing:   Do not scrub or use excessive force. Cleanse wound prior to applying a clean dressing with:  [x] Normal Saline   [x] Mild Soap & Water    [] Keep Wound Dry in Shower  [] Other:      Topical Treatments:  Do not apply lotions, creams, or ointments to wound bed unless directed. [] Apply moisturizing lotion to skin surrounding the wound prior to dressing change.  [] Apply antifungal ointment to skin surrounding the wound prior to dressing change.  [] Apply thin film of moisture barrier ointment to skin immediately around wound. [] Other:       Dressings:           Wound Location RIGHT AND LEFT LEG   [x] Apply Primary Dressing:       [] MediHoney Gel    [] MediHoney Alginate               [] Calcium Alginate      [] Calcium Alginate with Silver   [] Collagen                   [] Collagen with Silver                [] Santyl with Moistened saline gauze              [] Hydrofera Blue (cut to size and moistened)  [] Hydrofera Blue Ready (Cut to size)   [] NS wet to dry    [] Betadine wet to dry              [] Hydrogel                [] Mepitel     [] Bactroban/Mupirocin               [x] Other:  MEPILEX TRANSFER    [] Pack wound loosely with  [] Iodoform   [] Plain Packing       [] Other:     [] Cover and Secure with:     [] Gauze [] Tiffany [] Kerlix   [] Foam [] Super Absorbant [] ABD     [] ACE Wrap            [] Other:    Avoid contact of tape with skin.     [x] Change dressing: [] Daily    [] Every Other Day [] Once per week   [] Three times per week [] Do Not Change Dressing   [x] Other: TWICE A WEEK EXPECT THE ON THE WEEK PATIENT HAS AN APPOINTMENT AT THE WOUND CENTER     Negative Pressure:           Wound Location:   [] Pressure @           mm/Hg  []Continuous []Intermittent   [] Black  [] White Foam              [] Bridge:        Pressure Relief:  [] When sitting, shift position or do seat lifts every 15 minutes.  [] Wheelchair cushion [] Specialty Bed/Mattress  [] Turn every 2 hours when in bed. Avoid position directing pressure on wound site. Limit side lying to 30 degree tilt. Limit HOB elevation to 30 degrees. Edema Control:  Apply: [] Compression Stocking []Right Leg []Left Leg   [] Tubigrip []Right Leg Double Layer []Left Leg Double Layer      []Right Leg Single Layer []Left Leg Single Layer     [] Elevate leg(s) above the level of the heart when sitting. [] Avoid prolonged standing in one place. Compression:  Apply: [x] Multilayer Compression Wrap: [x]RightLeg [x]Left Leg                                 []Profore  [x]Profore Lite             []Unna     [x] Do not get leg(s) with wrap wet. If wraps become too tight call the center or completely remove the wrap. [x] Elevate leg(s) above the level of the heart when sitting. [x] Avoid prolonged standing in one place. Off-Loading:   [] Off-loading when [] walking  [] in bed [] sitting  [] Total non-weight bearing  [] Right Leg  [] Left Leg   [] Assistive Device [] Walker [] Cane      [] Wheelchair  [] Crutches   [] Surgical shoe    [] Podus Boot(s)   [] Foam Boot(s)  [] Roll About    [] Cast Boot [] CROW Boot  [] Other:    Contact Cast:  Apply: [] Total Contact Cast Applied in Clinic []RightLeg []Left Leg   [] Do not get cast wet. Contact center or go to emergency room if there is a foul odor or becomes uncomfortable due to feeling tight or swelling. Do not use objects inside of cast to scratch.       Dietary:  [] Diet as tolerated: [] Calorie Diabetic Diet: [x] No Added Salt:  [] Increase Protein: [] Other:     Activity:  [x] Activity as tolerated:    [] Patient has no activity restrictions       [] Strict Bedrest:   [] Remain off Work:       [] May return to full duty work:                                     [] Return to work with restrictions:                Electronically signed Gigi Adams RN on 10/8/2020 at 11:29 AM     [address]: Should you experience any significant changes in your wound(s) or have questions about your wound care, please contact Dane [facility] [address] at [place] [phone] 8:00 am - 4:30. If you need help with your wound outside of these hours and cannot wait until we are again available, contact your PCP or go to the hospital emergency room. PLEASE NOTE: IF YOU ARE UNABLE TO OBTAIN WOUND SUPPLIES, CONTINUE TO USE THE SUPPLIES YOU HAVE AVAILABLE UNTIL YOU ARE ABLE TO REACH US. IT IS MOST IMPORTANT TO KEEP THE WOUND COVERED AT ALL TIMES.     [] Dr. Patrice Carson   [] Dr. Annie Donaldson  [x] Amanda [facility]  [] Dr. Brisa Siegel

## 2020-10-08 NOTE — WOUND CARE
Multilayer Compression Wrap 
 (Not Unna) Below the Knee NAME:  Kory Castro YOB: 1955 MEDICAL RECORD NUMBER:  [id number] DATE:  10/8/2020 Removed old Multilayer wrap if indicated and wash leg with mild soap/water. Applied moisturizing agent to dry skin as needed. Applied primary and secondary dressing as ordered. Applied multilayered dressing below the knee to right lower leg. Applied multilayered dressing below the knee to left lower leg. Instructed patient/caregiver not to remove dressing and to keep it clean and dry. Instructed patient/caregiver on complications to report to provider, such as pain, numbness in toes, heavy drainage, and slippage of dressing. Instructed patient on purpose of compression dressing and on activity and exercise recommendations. Profore Lite BLE Electronically signed by Sharad Marcus RN on 10/8/2020 at 2:22 PM

## 2020-10-08 NOTE — WOUND CARE
Discharge Instructions for [facility] [address] [facility], [address] [facility] Telephone: [phone] 
  
NAME:  Joy Ulloa YOB: 1955 MEDICAL RECORD NUMBER:  [id number] DATE:  10/8/2020 
  
  
Return Appointment: 
[]? Dressing supply provider:  
[x]? Home Healthcare: [facility] -  
[x]? Return Appointment: 2 Week(s) 
[]? Nurse Visit:   Week(s) 
[]? Discharge from [facility] 
  
[]? Ordered tests:  
[]? Referrals:  
  
Wound Cleansing: Do not scrub or use excessive force. Cleanse wound prior to applying a clean dressing with: 
[x]? Normal Saline [x]? Mild Soap & Water   
[]? Keep Wound Dry in Shower []? Other:   
  
Topical Treatments: Do not apply lotions, creams, or ointments to wound bed unless directed. []? Apply moisturizing lotion to skin surrounding the wound prior to dressing change. []? Apply antifungal ointment to skin surrounding the wound prior to dressing change. []? Apply thin film of moisture barrier ointment to skin immediately around wound. []? Other:   
             
Dressings:                  Wound Location RIGHT AND LEFT LEG [x]? Apply Primary Dressing:                             
            []? MediHoney Gel         []? MediHoney Alginate        
            []? Calcium Alginate      []? Calcium Alginate with Silver 
            []? Collagen                   []? Collagen with Silver   
            []? Santyl with Moistened saline gauze 
            []? Hydrofera Blue (cut to size and moistened)  []? Hydrofera Blue Ready (Cut to size) []? NS wet to dry            []? Betadine wet to dry 
            []? Hydrogel                   []? Mepitel      
            []? Bactroban/Mupirocin          
            [x]? Other:  MEPILEX TRANSFER 
  
[]? Pack wound loosely with   []? Iodoform   []? Plain Packing       []? Other:  
  
[]? Cover and Secure with:      
            []? Gauze        []? Deshiraa Cj           []? Kerlix []? Foam          []? Super Absorbant    []? ABD                         
            []? ACE Wrap            []? Other:  
            Avoid contact of tape with skin. 
  
[x]? Change dressing:  []? Daily           []? Every Other Day    []? Once per week 
            []? Three times per week        []? Do Not Change Dressing    [x]? Other: TWICE A WEEK EXPECT THE ON THE WEEK PATIENT HAS AN APPOINTMENT AT THE WOUND CENTER   
  
Negative Pressure:           Wound Location:  
[]? Pressure @                      mm/Hg              []?Continuous  []? Intermittent []? Black          []? White Foam 
            []? Bridge:        
  
  
Pressure Relief: 
[]? When sitting, shift position or do seat lifts every 15 minutes. []? Wheelchair cushion           []? Specialty Bed/Mattress 
[]? Turn every 2 hours when in bed. Avoid position directing pressure on wound site. Limit side lying to 30 degree tilt. Limit HOB elevation to 30 degrees. Edema Control: 
Apply:  []? Compression Stocking      []? Right Leg     []? Left Leg 
            []? Tubigrip      []? Right Leg Double Layer      []? Left Leg Double Layer []?Right Leg Single Layer       []? Left Leg Single Layer 
  
            []? Elevate leg(s) above the level of the heart when sitting. []? Avoid prolonged standing in one place.     
  
Compression: 
Apply:  [x]? Multilayer Compression Wrap:      [x]? RightLeg      [x]? Left Leg 
                               []?Profore            [x]? Profore Lite             []?Unna 
  
            [x]? Do not get leg(s) with wrap wet. If wraps become too tight call the center or completely remove the wrap. [x]? Elevate leg(s) above the level of the heart when sitting. [x]? Avoid prolonged standing in one place. 
  
Off-Loading:  
[]? Off-loading when   []? walking       []?  in bed []? sitting 
[]? Total non-weight bearing  []? Right Leg  []? Left Leg        
[]? Assistive Device    []? Ferna Meo        []? Cane      []? Wheelchair      []? Crutches 
            []? Surgical shoe    []? Podus Boot(s)   []? Foam Boot(s)  []? Roll About 
            []? Cast Boot   []? CROW Boot  []? Other: 
  
Contact Cast: Apply:  []? Total Contact Cast Applied in Clinic          []? RightLeg      []? Left Leg 
            []? Do not get cast wet. Contact center or go to emergency room if there is a foul odor or becomes uncomfortable due to feeling tight or swelling. Do not use objects inside of cast to scratch.              
  
Dietary: 
[]? Diet as tolerated:   []? Calorie Diabetic Diet:         [x]? No Added Salt: 
[]? Increase Protein:   []? Other:          
  
Activity: 
[x]? Activity as tolerated:   
[]? Patient has no activity restrictions []? Strict Bedrest:         
[]? Remain off Work:      
[]? May return to full duty work:                                              
[]? Return to work with restrictions:     
[address] [place] [address] Information: Should you experience any significant changes in your wound(s) or have questions about your wound care, please contact Dane Melgar 26 at [place] [id number] 8:00 am - 4:30. If you need help with your wound outside of these hours and cannot wait until we are again available, contact your PCP or go to the hospital emergency room.  
  
PLEASE NOTE: IF [facility], CONTINUE TO USE THE SUPPLIES YOU HAVE AVAILABLE UNTIL YOU ARE ABLE TO [facility]. IT IS MOST IMPORTANT TO KEEP THE WOUND COVERED AT ALL TIMES. 
  
[]? Dr. Je Castorena  
[]? Dr. Jocy Hernández [x]? [facility] 
[]? Dr. Esa Mayer

## 2020-10-29 ENCOUNTER — HOSPITAL ENCOUNTER (OUTPATIENT)
Dept: WOUND CARE | Age: 65
Discharge: HOME | End: 2020-10-29
Payer: MEDICARE

## 2020-10-29 VITALS
TEMPERATURE: 98.9 F | SYSTOLIC BLOOD PRESSURE: 157 MMHG | DIASTOLIC BLOOD PRESSURE: 97 MMHG | HEART RATE: 73 BPM | RESPIRATION RATE: 18 BRPM

## 2020-10-29 DIAGNOSIS — E66.01 MORBID OBESITY (HCC): ICD-10-CM

## 2020-10-29 DIAGNOSIS — L97.212 NON-PRESSURE CHRONIC ULCER OF RIGHT CALF WITH FAT LAYER EXPOSED (HCC): ICD-10-CM

## 2020-10-29 DIAGNOSIS — I87.2 PERIPHERAL VENOUS INSUFFICIENCY: ICD-10-CM

## 2020-10-29 DIAGNOSIS — I89.0 LYMPHEDEMA: ICD-10-CM

## 2020-10-29 DIAGNOSIS — L97.222 NON-PRESSURE CHRONIC ULCER OF LEFT CALF WITH FAT LAYER EXPOSED (HCC): ICD-10-CM

## 2020-10-29 DIAGNOSIS — I70.242 ATHEROSCLEROSIS OF NATIVE ARTERY OF LEFT LOWER EXTREMITY WITH ULCERATION OF CALF (HCC): ICD-10-CM

## 2020-10-29 PROCEDURE — 74011000250 HC RX REV CODE- 250: Performed by: PHYSICIAN ASSISTANT

## 2020-10-29 PROCEDURE — 11042 DBRDMT SUBQ TIS 1ST 20SQCM/<: CPT

## 2020-10-29 RX ORDER — LIDOCAINE HYDROCHLORIDE 20 MG/ML
15 SOLUTION OROPHARYNGEAL AS NEEDED
Status: DISCONTINUED | OUTPATIENT
Start: 2020-10-29 | End: 2020-10-31 | Stop reason: HOSPADM

## 2020-10-29 NOTE — DISCHARGE INSTRUCTIONS
Discharge Instructions for  [address] [facility], [address] [facility]  Telephone: [phone]     FAX [phone]     NAME: Fadia Lovelace OF BIRTH:  1955  MEDICAL RECORD NUMBER:  [id number]  DATE:  10/29/2020        Return Appointment:  []?? Dressing supply provider:   [x]? ?[address] [facility]: [facility] -   [x]? ? Return Appointment: 2 Week(s)  []? ? Nurse Visit:   Week(s)  []? ? Discharge from [facility]     []? ? Ordered tests:   []?? Referrals:      Wound Cleansing:   Do not scrub or use excessive force. Cleanse wound prior to applying a clean dressing with:  [x]? ? Normal Saline          [x]? ?[address] [place] [address] & Water    []? ? Keep Wound Dry in Shower  []? ? Other:       Topical Treatments:  Do not apply lotions, creams, or ointments to wound bed unless directed. []?? Apply moisturizing lotion to skin surrounding the wound prior to dressing change. []?? Apply antifungal ointment to skin surrounding the wound prior to dressing change. []?? Apply thin film of moisture barrier ointment to skin immediately around wound. []?? Other:                  Dressings:                  Wound Location RIGHT AND LEFT LEG     [x]? ? Apply Primary Dressing:                                          []? ? MediHoney Gel         []? ? MediHoney Alginate                     []? ? Calcium Alginate      []? ? Calcium Alginate with Silver              []? ?Redia Jasen                   []? ? Collagen with Silver                []? ? Santyl with Moistened saline gauze              []? ? Hydrofera Blue (cut to size and moistened)  []?? Hydrofera Blue Ready (Cut to size)              []?? NS wet to dry            []? ? Betadine wet to dry              []? ? Hydrogel                   []? ? Mepitel                   []? ? Bactroban/Mupirocin                       [x]? ? Other:  MEPILEX TRANSFER     []? ? Pack wound loosely with   []? ? Iodoform   []? ? Plain Packing       []? ? Other:      []? ? Cover and Secure with:                   []? ? Gauze        []? ? Tiffany           []? ? Kerlix              []? ? Foam          []? ? Super Absorbant    []? ? ABD                                      []? ? ACE Wrap            []? ? Other:               Avoid contact of tape with skin.     [x]? ? Change dressing:  []?? Daily           []? ? Every Other Day    []? ? Once per week              []? ? Three times per week        []? ? Do Not Change Dressing    [x]? ? Other: TWICE A WEEK EXPECT THE ON THE WEEK PATIENT HAS AN APPOINTMENT AT THE WOUND CENTER       Negative Pressure:           Wound Location:   []?? Pressure @                      mm/Hg              []? ? Continuous  []? ?Intermittent              []? ? Black          []? ? White Foam              []? ? Bridge:               Pressure Relief:  []?? When sitting, shift position or do seat lifts every 15 minutes. []?? Wheelchair cushion           []? ? Specialty Bed/Mattress  []? ? Turn every 2 hours when in bed.  Avoid position directing pressure on wound site.  Limit side lying to 30 degree tilt.  Limit HOB elevation to 30 degrees.               Edema Control:  Apply:  []?? Compression Stocking      []? ? Right Leg     []? ? Left Leg              []? ? Tubigrip      []? ? Right Leg Double Layer      []? ? Left Leg Double Layer                                      []? ? Right Leg Single Layer       []? ? Left Leg Single Layer                 []? ? Elevate leg(s) above the level of the heart when sitting.                []? ? Avoid prolonged standing in one place.         Compression:  Apply:  [x]? ? Multilayer Compression Wrap:      [x]? ? RightLeg      [x]? ? Left Leg                                 []? ? Profore            [x]? ? Profore Lite             []? ? Unna                 [x]? ? Do not get leg(s) with wrap wet.  If wraps become too tight call the center or completely remove the wrap.                     [x]? ? Elevate leg(s) above the level of the heart when sitting.                [x]? ? Avoid prolonged standing in one place.     Off-Loading:   []?? Off-loading when   []? ? walking       []? ? in bed         []? ? sitting  []? ? Total non-weight bearing  []? ? Right Leg  []? ? Left Leg         []? ?Yaz Mtz Device    []? ? Walker        []? ? Cane      []? ? Wheelchair      []? ? Crutches              []? ? Surgical shoe    []? ? Podus Boot(s)   []? ? Foam Boot(s)  []? ? Roll About              []? ? Cast Boot   []? ?[employer]  []? ? Other:     Contact Cast:  Apply:  []? ? Total Contact Cast Applied in Clinic          []? ? RightLeg      []? ? Left Leg              []? ? Do not get cast wet.  Contact center or go to emergency room if there is a foul odor or becomes uncomfortable due to feeling tight or swelling.  Do not use objects inside of cast to scratch.                  Dietary:  []?? Diet as tolerated:   []? ? Calorie Diabetic Diet:         [x]? ? No Added Salt:  []?? Increase Protein:   []? ? Other:              Activity:  [x]? ? Activity as tolerated:    []? ? Patient has no activity restrictions       []? ? Strict Bedrest:          []? ? Remain off Work:       []? ? May return to full duty work:                                               []? ? Return to work with [address] Dominguez Said: Should you experience any significant changes in your wound(s) or have questions about your wound care, please contact Dane Melgar 26 at [place] [address] 8:00 am - 4:30.  If you need help with your wound outside of these hours and cannot wait until we are again available, contact your PCP or go to the hospital emergency room.      PLEASE NOTE: IF YOU ARE UNABLE TO OBTAIN WOUND SUPPLIES, CONTINUE TO USE THE SUPPLIES YOU HAVE AVAILABLE UNTIL YOU ARE ABLE TO [facility]. IT IS MOST IMPORTANT TO KEEP THE WOUND COVERED AT ALL TIMES.     []?? Dr. Ibrahim Coil   []?? Dr. Annie Donaldson  [x]? ?Amanda Lara PAC  []?? Dr. Brisa Siegel

## 2020-10-29 NOTE — WOUND CARE
Discharge Instructions for [place] [address] [facility], [facility] Telephone: [phone] 
  
NAME:  Rosalia Engel YOB: 1955 MEDICAL RECORD NUMBER:  [id number] DATE:  10/29/2020 
  
  
Return Appointment: 
[]? Dressing supply provider:  
[x]? Home Healthcare: [facility] -  
[x]? Return Appointment: 2 Week(s) 
[]? Nurse Visit:   Week(s) 
[]? Discharge from [facility] 
  
[]? Ordered tests:  
[]? Referrals:  
  
Wound Cleansing: Do not scrub or use excessive force. Cleanse wound prior to applying a clean dressing with: 
[x]? Normal Saline [x]? Mild Soap & Water   
[]? Keep Wound Dry in Shower []? Other:   
  
Topical Treatments: Do not apply lotions, creams, or ointments to wound bed unless directed. []? Apply moisturizing lotion to skin surrounding the wound prior to dressing change. []? Apply antifungal ointment to skin surrounding the wound prior to dressing change. []? Apply thin film of moisture barrier ointment to skin immediately around wound. []? Other:   
             
Dressings:                  Wound Location RIGHT AND LEFT LEG [x]? Apply Primary Dressing:                             
            []? MediHoney Gel         []? MediHoney Alginate        
            []? Calcium Alginate      []? Calcium Alginate with Silver 
            []? Collagen                   []? Collagen with Silver   
            []? Santyl with Moistened saline gauze 
            []? Hydrofera Blue (cut to size and moistened)  []? Hydrofera Blue Ready (Cut to size) []? NS wet to dry            []? Betadine wet to dry 
            []? Hydrogel                   []? Mepitel      
            []? Bactroban/Mupirocin          
            [x]? Other:  MEPILEX TRANSFER 
  
[]? Pack wound loosely with   []? Iodoform   []? Plain Packing       []? Other:  
  
[]? Cover and Secure with:      
            []? Gauze        []? Gala Jeff           []? Kerlix []? Foam          []? Super Absorbant    []? ABD                         
            []? ACE Wrap            []? Other:  
            Avoid contact of tape with skin. 
  
[x]? Change dressing:  []? Daily           []? Every Other Day    []? Once per week 
            []? Three times per week        []? Do Not Change Dressing    [x]? Other: TWICE A WEEK EXPECT THE ON THE WEEK PATIENT HAS AN APPOINTMENT AT THE WOUND CENTER   
  
Negative Pressure:           Wound Location:  
[]? Pressure @                      mm/Hg              []?Continuous  []? Intermittent []? Black          []? White Foam 
            []? Bridge:        
  
  
Pressure Relief: 
[]? When sitting, shift position or do seat lifts every 15 minutes. []? Wheelchair cushion           []? Specialty Bed/Mattress 
[]? Turn every 2 hours when in bed. Avoid position directing pressure on wound site. Limit side lying to 30 degree tilt. Limit HOB elevation to 30 degrees. Edema Control: 
Apply:  []? Compression Stocking      []? Right Leg     []? Left Leg 
            []? Tubigrip      []? Right Leg Double Layer      []? Left Leg Double Layer []?Right Leg Single Layer       []? Left Leg Single Layer 
  
            []? Elevate leg(s) above the level of the heart when sitting. []? Avoid prolonged standing in one place.     
  
Compression: 
Apply:  [x]? Multilayer Compression Wrap:      [x]? RightLeg      [x]? Left Leg 
                               []?Profore            [x]? Profore Lite             []?Unna 
  
            [x]? Do not get leg(s) with wrap wet. If wraps become too tight call the center or completely remove the wrap. [x]? Elevate leg(s) above the level of the heart when sitting. [x]? Avoid prolonged standing in one place. 
  
Off-Loading:  
[]? Off-loading when   []? walking       []?  in bed []? sitting 
[]? Total non-weight bearing  []? Right Leg  []? Left Leg        
[]? Assistive Device    []? Lorilee Pipes        []? Cane      []? Wheelchair      []? Crutches 
            []? Surgical shoe    []? Podus Boot(s)   []? Foam Boot(s)  []? Roll About 
            []? Cast Boot   []? CROW Boot  []? Other: 
  
Contact Cast: Apply:  []? Total Contact Cast Applied in Clinic          []? RightLeg      []? Left Leg 
            []? Do not get cast wet. Contact center or go to emergency room if there is a foul odor or becomes uncomfortable due to feeling tight or swelling. Do not use objects inside of cast to scratch.              
  
Dietary: 
[]? Diet as tolerated:   []? Calorie Diabetic Diet:         [x]? No Added Salt: 
[]? Increase Protein:   []? Other:          
  
Activity: 
[x]? Activity as tolerated:   
[]? Patient has no activity restrictions []? Strict Bedrest:         
[]? Remain off Work:      
[]? May return to full duty work:                                              
[]? Return to work with restrictions:     
[address] [place] [address] Information: Should you experience any significant changes in your wound(s) or have questions about your wound care, please contact Dane Melgar 26 at [place] [address] 8:00 am - 4:30. If you need help with your wound outside of these hours and cannot wait until we are again available, contact your PCP or go to the hospital emergency room.  
  
PLEASE NOTE: [facility], CONTINUE TO USE THE SUPPLIES YOU HAVE AVAILABLE UNTIL YOU ARE ABLE TO [facility]. IT IS MOST IMPORTANT TO KEEP THE WOUND COVERED AT ALL TIMES. 
  
[]? Dr. Aaron York  
[]? Dr. Miguel Angel Magana [x]? [facility] 
[]? Dr. Cisco Buerger

## 2020-10-29 NOTE — WOUND CARE
Multilayer Compression Wrap 
 (Not Unna) Below the Knee NAME:  Lindy Waller YOB: 1955 MEDICAL RECORD NUMBER:  [id number] DATE:  10/29/2020 Removed old Multilayer wrap if indicated and wash leg with mild soap/water. Applied moisturizing agent to dry skin as needed. Applied primary and secondary dressing as ordered. Applied multilayered dressing below the knee to right lower leg. Applied multilayered dressing below the knee to left lower leg. Instructed patient/caregiver not to remove dressing and to keep it clean and dry. Instructed patient/caregiver on complications to report to provider, such as pain, numbness in toes, heavy drainage, and slippage of dressing. Instructed patient on purpose of compression dressing and on activity and exercise recommendations.  
 
 
Electronically signed by Amy Duong RN on 10/29/2020 at 11:56 AM

## 2020-10-29 NOTE — PROGRESS NOTES
[address]   Progress Note and Procedure Note     12 [facility] RECORD NUMBER:  [id number]  AGE: 77 y.o. RACE WHITE OR   BLACK OR   GENDER: male  : 1955  EPISODE DATE:  10/29/2020    Subjective: Patient states he has been doing well at this time. He states he has no issues. He states his swelling has improved. Chief Complaint   Patient presents with    Wound Check     BLE         HISTORY of PRESENT ILLNESS HPI    Paramjit Foster is a 77 y.o. male who presents today for wound/ulcer evaluation. History of Wound Context: bilateral lower extremity ulcerations  Wound/Ulcer Pain Timing/Severity: none  Quality of pain: none  Severity:  0 / 10   Modifying Factors: None  Associated Signs/Symptoms: edema    Ulcer Identification:  Ulcer Type: venous, diabetic and lymphedema    Contributing Factors: edema, venous stasis, lymphedema, diabetes and poor glucose control    Wound: to bilateral lower extremities          PAST MEDICAL HISTORY    Past Medical History:   Diagnosis Date    Arthritis     Chronic obstructive pulmonary disease (HCC)     Hypertension     Sleep apnea         PAST SURGICAL HISTORY    Past Surgical History:   Procedure Laterality Date    HX GI         FAMILY HISTORY    Family History   Problem Relation Age of Onset    Hypertension Child        SOCIAL HISTORY    Social History     Tobacco Use    Smoking status: Never Smoker    Smokeless tobacco: Never Used   Substance Use Topics    Alcohol use: Never     Frequency: Never    Drug use: Never       ALLERGIES    No Known Allergies    MEDICATIONS    Current Outpatient Medications on File Prior to Encounter   Medication Sig Dispense Refill    multivitamin (ONE A DAY) tablet Take 1 Tab by mouth daily.  amLODIPine (NORVASC) 10 mg tablet Take 10 mg by mouth daily.  metoprolol succinate (TOPROL-XL) 25 mg XL tablet Take 25 mg by mouth daily.       furosemide (LASIX) 40 mg tablet Take 40 mg by mouth daily.  naproxen (NAPROSYN) 250 mg tablet Take 250 mg by mouth two (2) times daily (with meals). No current facility-administered medications on file prior to encounter. REVIEW OF SYSTEMS    Pertinent items are noted in HPI. Objective: There were no vitals taken for this visit. Wt Readings from Last 3 Encounters:   09/24/20 (!) 181.4 kg (400 lb)       PHYSICAL EXAM    Constitutional: Patient is awake, ambulating with cane devices, no acute distress  Head: normocephalic, atraumatic. Musculoskeletal: bilateral edema note but improved from previous visit  Wound: to the posterior aspect of bilateral legs. No periwound erythema, no odor, mild drainage. Behavioral/Psych: patient is pleasant, cooperative, awake and alert    Assessment:      Problem List Items Addressed This Visit        Circulatory    Peripheral venous insufficiency    Atherosclerosis of native artery of left lower extremity with ulceration of calf (HCC)       Other    Morbid obesity (HCC)    Non-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 exposed ([address]    Non-pressure chronic ulcer of right calf with fat layer exposed ([address]    Lymphedema          Debridement Wound Care        Problem List Items Addressed This Visit        Circulatory    Peripheral venous insufficiency    Atherosclerosis of native artery of left lower extremity with ulceration of calf (HCC)       Other    Morbid obesity (HCC)    Non-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 exposed ([address]    Non-pressure chronic ulcer of right calf with fat layer exposed ([address]    Lymphedema          Procedure Note  Indications:  Based on my examination of this patient's wound(s)/ulcer(s) today, debridement is required to promote healing and evaluate the wound base.     Performed by: Aruna Avalos PA-C    Consent obtained: Yes    Time out taken: Yes    Debridement: Excisional    Using curette the wound(s)/ulcer(s) was/were sharply debrided down through and including the removal of    epidermis, dermis and subcutaneous tissue    Devitalized Tissue Debrided: fibrin, biofilm and slough    Pre Debridement Measurements:  Are located in the Wound/Ulcer Documentation Flow Sheet    Wound/Ulcer #: 1    Post Debridement Measurements:  Wound/Ulcer Descriptions are Pre Debridement except measurements:Non-Pressure ulcer, fat layer exposed    Wound Leg lower Left #1 LLL circumfirential (Active)   Dressing Status Removed 09/24/20 1157   Non-staged Wound Description Full thickness 10/29/20 1110   Wound Length (cm) 0.6 cm 10/29/20 1110   Wound Width (cm) 0.5 cm 10/29/20 1110   Wound Depth (cm) 0.1 cm 10/29/20 1110   Wound Surface Area (cm^2) 0.3 cm^2 10/29/20 1110   Wound Volume (cm^3) 0.03 cm^3 10/29/20 1110   Change in Wound Size % 99.64 10/29/20 1110   Condition of Base Granulation 10/29/20 1110   Condition of Edges Open 10/29/20 1110   Tissue Type Percent Pink 100 09/24/20 1157   Drainage Amount Small 10/29/20 1110   Drainage Color Serosanguinous 10/29/20 1110   Wound Odor None 10/29/20 1110   Betty-wound Assessment Intact;Dry;Clean 10/29/20 1110   Margins Defined edges 10/29/20 1110   Cleansing and Cleansing Agents  Normal saline 10/29/20 1110   Dressing Changed Changed/New 10/29/20 1122   Post-Procedure Length (cm) 5.2 cm 10/08/20 1134   Post-Procedure Width (cm) 7.2 cm 10/08/20 1134   Post-Procedure Depth (cm) 0.2 cm 10/08/20 1134   Post-Procedure Volume (cm^3) 7.49 cm^3 10/08/20 1134   Post-Procedure Surface Area (cm^2) 37.44 cm^2 10/08/20 1134   Number of days: 35       Wound Leg lower Right #2 RLE circumfirential (Active)   Dressing Status Removed 09/24/20 1202   Non-staged Wound Description Full thickness 10/29/20 1114   Wound Length (cm) 0 cm 10/29/20 1114   Wound Width (cm) 0 cm 10/29/20 1114   Wound Depth (cm) 0 cm 10/29/20 1114   Wound Surface Area (cm^2) 0 cm^2 10/29/20 1114   Wound Volume (cm^3) 0 cm^3 10/29/20 1114   Change in Wound Size % 100 10/29/20 1114   Condition of Base Epithelializing 10/29/20 1114   Condition of Edges Closed 10/29/20 1114   Assessment Dry 10/29/20 1114   Tissue Type Percent Pink 100 09/24/20 1202   Drainage Amount Moderate 09/24/20 1202   Drainage Color Serosanguinous 09/24/20 1202   Wound Odor None 09/24/20 1202   Betty-wound Assessment Dry 10/29/20 1114   Margins Attached edges 10/08/20 1058   Cleansing and Cleansing Agents  Normal saline 10/08/20 1058   Dressing Changed Changed/New 10/29/20 1123   Post-Procedure Length (cm) 0.8 cm 10/08/20 1134   Post-Procedure Width (cm) 0.7 cm 10/08/20 1134   Post-Procedure Depth (cm) 0.2 cm 10/08/20 1134   Post-Procedure Volume (cm^3) 0.11 cm^3 10/08/20 1134   Post-Procedure Surface Area (cm^2) 0.56 cm^2 10/08/20 1134   Number of days: 35        Percent of Wound(s)/Ulcer(s) Debrided: 100%    Total Surface Area Debrided:  0.56 sq cm     Diabetic/Pressure/Non Pressure Ulcers only:  Ulcer:     Estimated Blood Loss:  Minimal     Hemostasis Achieved: Pressure    Procedural Pain: 0 / 10     Post Procedural Pain: 0 / 10     Response to treatment: Well tolerated by patient       Debridement Wound Care        Problem List Items Addressed This Visit        Circulatory    Peripheral venous insufficiency    Atherosclerosis of native artery of left lower extremity with ulceration of calf (HCC)       Other    Morbid obesity (HCC)    Non-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 exposed ([address]    Non-pressure chronic ulcer of right calf with fat layer exposed ([address]    Lymphedema          Procedure Note  Indications:  Based on my examination of this patient's wound(s)/ulcer(s) today, debridement is required to promote healing and evaluate the wound base.     Performed by: Isabel Avila PA-C    Consent obtained: Yes    Time out taken: Yes    Debridement: Excisional    Using curette the wound(s)/ulcer(s) was/were sharply debrided down through and including the removal of    epidermis, dermis and subcutaneous tissue    Devitalized Tissue Debrided: fibrin, biofilm and slough    Pre Debridement Measurements:  Are located in the Wound/Ulcer Documentation Flow Sheet    Wound/Ulcer #: 2    Post Debridement Measurements:  Wound/Ulcer Descriptions are Pre Debridement except measurements:Non-Pressure ulcer, fat layer exposed    Wound Leg lower Left #1 LLL circumfirential (Active)   Dressing Status Removed 09/24/20 1157   Non-staged Wound Description Full thickness 10/29/20 1110   Wound Length (cm) 0.6 cm 10/29/20 1110   Wound Width (cm) 0.5 cm 10/29/20 1110   Wound Depth (cm) 0.1 cm 10/29/20 1110   Wound Surface Area (cm^2) 0.3 cm^2 10/29/20 1110   Wound Volume (cm^3) 0.03 cm^3 10/29/20 1110   Change in Wound Size % 99.64 10/29/20 1110   Condition of Base Granulation 10/29/20 1110   Condition of Edges Open 10/29/20 1110   Tissue Type Percent Pink 100 09/24/20 1157   Drainage Amount Small 10/29/20 1110   Drainage Color Serosanguinous 10/29/20 1110   Wound Odor None 10/29/20 1110   Betty-wound Assessment Intact;Dry;Clean 10/29/20 1110   Margins Defined edges 10/29/20 1110   Cleansing and Cleansing Agents  Normal saline 10/29/20 1110   Dressing Changed Changed/New 10/29/20 1122   Post-Procedure Length (cm) 5.2 cm 10/08/20 1134   Post-Procedure Width (cm) 7.2 cm 10/08/20 1134   Post-Procedure Depth (cm) 0.2 cm 10/08/20 1134   Post-Procedure Volume (cm^3) 7.49 cm^3 10/08/20 1134   Post-Procedure Surface Area (cm^2) 37.44 cm^2 10/08/20 1134   Number of days: 35       Wound Leg lower Right #2 RLE circumfirential (Active)   Dressing Status Removed 09/24/20 1202   Non-staged Wound Description Full thickness 10/29/20 1114   Wound Length (cm) 0 cm 10/29/20 1114   Wound Width (cm) 0 cm 10/29/20 1114   Wound Depth (cm) 0 cm 10/29/20 1114   Wound Surface Area (cm^2) 0 cm^2 10/29/20 1114   Wound Volume (cm^3) 0 cm^3 10/29/20 1114   Change in Wound Size % 100 10/29/20 1114   Condition of Base Epithelializing 10/29/20 1114 Condition of Edges Closed 10/29/20 1114   Assessment Dry 10/29/20 1114   Tissue Type Percent Pink 100 09/24/20 1202   Drainage Amount Moderate 09/24/20 1202   Drainage Color Serosanguinous 09/24/20 1202   Wound Odor None 09/24/20 1202   Betty-wound Assessment Dry 10/29/20 1114   Margins Attached edges 10/08/20 1058   Cleansing and Cleansing Agents  Normal saline 10/08/20 1058   Dressing Changed Changed/New 10/29/20 1123   Post-Procedure Length (cm) 0.8 cm 10/08/20 1134   Post-Procedure Width (cm) 0.7 cm 10/08/20 1134   Post-Procedure Depth (cm) 0.2 cm 10/08/20 1134   Post-Procedure Volume (cm^3) 0.11 cm^3 10/08/20 1134   Post-Procedure Surface Area (cm^2) 0.56 cm^2 10/08/20 1134   Number of days: 35        Percent of Wound(s)/Ulcer(s) Debrided: 100%    Total Surface Area Debrided:  1.0 sq cm     Diabetic/Pressure/Non Pressure Ulcers only:  Ulcer:     Estimated Blood Loss:  Minimal     Hemostasis Achieved: Pressure    Procedural Pain: 0 / 10     Post Procedural Pain: 0 / 10     Response to treatment: Well tolerated by patient         Plan:     Patient was instructed to continue elevating his legs and using his lymphedema pumps.           Return Appointment:  []?? Dressing supply provider:   [x]? ?[address] [facility]: [facility] -   [x]? ? Return Appointment: 2 Week(s)  []? ? Nurse Visit:   Week(s)  []? ? Discharge from [facility]     []? ? Ordered tests:   []?? Referrals:      Wound Cleansing:   Do not scrub or use excessive force. Cleanse wound prior to applying a clean dressing with:  [x]? ? Normal Saline          [x]? ?[address] [facility] & Water    []? ? Keep Wound Dry in Shower  []? ? Other:                    Dressings:                  Wound Location RIGHT AND LEFT LEG     [x]? ? Apply Primary Dressing:                                          []? ? MediHoney Gel         []? ? MediHoney Alginate                     []? ? Calcium Alginate      []? ? Calcium Alginate with Silver              []? ?Andrea Henriquez                   []? ? Collagen with Silver                []? ? Santyl with Moistened saline gauze              []? ? Hydrofera Blue (cut to size and moistened)  []?? Hydrofera Blue Ready (Cut to size)              []?? NS wet to dry            []? ? Betadine wet to dry              []? ? Hydrogel                   []? ? Mepitel                   []? ? Bactroban/Mupirocin                       [x]? ? Other:  MEPILEX TRANSFER     []? ? Pack wound loosely with   []? ? Iodoform   []? ? Plain Packing       []? ? Other:      []? ? Cover and Secure with:                   []? ? Gauze        []? ? Tiffany           []? ? Kerlix              []? ? Foam          []? ? Super Absorbant    []? ? ABD                                      []? ? ACE Wrap            []? ? Other:               Avoid contact of tape with skin.     [x]? ? Change dressing:  []?? Daily           []? ? Every Other Day    []? ? Once per week              []? ? Three times per week        []? ? Do Not Change Dressing    [x]? ? Other: TWICE A WEEK EXPECT THE ON THE WEEK PATIENT HAS AN APPOINTMENT AT THE WOUND CENTER          Compression:  Apply:  [x]? ? Multilayer Compression Wrap:      [x]? ? RightLeg      [x]? ? Left Leg                                 []? ? Profore            [x]? ? Profore Lite             []? ? Unna                 [x]? ? Do not get leg(s) with wrap wet.  If wraps become too tight call the center or completely remove the wrap.                     [x]? ? Elevate leg(s) above the level of the heart when sitting.                [x]? ? Avoid prolonged standing in one place.                      Dietary:  []?? Diet as tolerated:   []? ? Calorie Diabetic Diet:         [x]? ? No Added Salt:  []?? Increase Protein:   []? ? Other:              Activity:  [x]? ? Activity as tolerated:    []? ? Patient has no activity restrictions       []? ? Strict Bedrest:          []? ? Remain off Work:       []? ? May return to full duty work:                                               []? ? Return to work with restrictions:                  Treatment Note please see attached Discharge Instructions    Written patient dismissal instructions given to patient or POA.          Electronically signed by Alesia Key PA-C on 10/29/2020 at 12:10 PM

## 2020-11-12 ENCOUNTER — HOSPITAL ENCOUNTER (OUTPATIENT)
Dept: WOUND CARE | Age: 65
Discharge: HOME | End: 2020-11-12
Payer: MEDICARE

## 2020-11-12 VITALS
DIASTOLIC BLOOD PRESSURE: 78 MMHG | HEART RATE: 74 BPM | TEMPERATURE: 98.4 F | SYSTOLIC BLOOD PRESSURE: 151 MMHG | RESPIRATION RATE: 18 BRPM

## 2020-11-12 DIAGNOSIS — I89.0 LYMPHEDEMA: ICD-10-CM

## 2020-11-12 DIAGNOSIS — I70.242 ATHEROSCLEROSIS OF NATIVE ARTERY OF LEFT LOWER EXTREMITY WITH ULCERATION OF CALF (HCC): ICD-10-CM

## 2020-11-12 DIAGNOSIS — E66.01 MORBID OBESITY (HCC): ICD-10-CM

## 2020-11-12 DIAGNOSIS — L97.212 NON-PRESSURE CHRONIC ULCER OF RIGHT CALF WITH FAT LAYER EXPOSED (HCC): ICD-10-CM

## 2020-11-12 DIAGNOSIS — L97.222 NON-PRESSURE CHRONIC ULCER OF LEFT CALF WITH FAT LAYER EXPOSED (HCC): ICD-10-CM

## 2020-11-12 DIAGNOSIS — I87.2 PERIPHERAL VENOUS INSUFFICIENCY: ICD-10-CM

## 2020-11-12 PROCEDURE — 11042 DBRDMT SUBQ TIS 1ST 20SQCM/<: CPT

## 2020-11-12 NOTE — PROGRESS NOTES
[address]   Progress Note and Procedure Note     12 [facility] RECORD NUMBER:  [id number]  AGE: 77 y.o. RACE WHITE OR   BLACK OR   GENDER: male  : 1955  EPISODE DATE:  2020     Collaborating Physician: Jose Underwood MD    Subjective: Patient states he has been doing well at this time. He states he hit his leg on the car door and has a new wound. He has no other complaints at this time. Chief Complaint   Patient presents with    Wound Check     BLE         HISTORY of PRESENT ILLNESS HPI    Jake Carter is a 77 y.o. male who presents today for wound/ulcer evaluation. History of Wound Context: bilateral lower extremity ulcerations  Wound/Ulcer Pain Timing/Severity: none  Quality of pain: none  Severity:  0 / 10   Modifying Factors: None  Associated Signs/Symptoms: edema    Ulcer Identification:  Ulcer Type: venous, diabetic and lymphedema    Contributing Factors: edema, venous stasis, lymphedema, diabetes and poor glucose control    Wound: to bilateral lower extremities          PAST MEDICAL HISTORY    Past Medical History:   Diagnosis Date    Arthritis     Chronic obstructive pulmonary disease (HCC)     Hypertension     Sleep apnea         PAST SURGICAL HISTORY    Past Surgical History:   Procedure Laterality Date    HX GI         FAMILY HISTORY    Family History   Problem Relation Age of Onset    Hypertension Child        SOCIAL HISTORY    Social History     Tobacco Use    Smoking status: Never Smoker    Smokeless tobacco: Never Used   Substance Use Topics    Alcohol use: Never     Frequency: Never    Drug use: Never       ALLERGIES    No Known Allergies    MEDICATIONS    Current Outpatient Medications on File Prior to Encounter   Medication Sig Dispense Refill    multivitamin (ONE A DAY) tablet Take 1 Tab by mouth daily.  amLODIPine (NORVASC) 10 mg tablet Take 10 mg by mouth daily.       metoprolol succinate (TOPROL-XL) 25 mg XL tablet Take 25 mg by mouth daily.  furosemide (LASIX) 40 mg tablet Take 40 mg by mouth daily.  naproxen (NAPROSYN) 250 mg tablet Take 250 mg by mouth two (2) times daily (with meals). No current facility-administered medications on file prior to encounter. REVIEW OF SYSTEMS    Pertinent items are noted in HPI. Objective:     Visit Vitals  BP (!) 151/78   Pulse 74   Temp 98.4 °F (36.9 °C)   Resp 18       Wt Readings from Last 3 Encounters:   09/24/20 (!) 181.4 kg (400 lb)       PHYSICAL EXAM    Constitutional: Patient is awake, ambulating with cane devices, no acute distress  Head: normocephalic, atraumatic. Musculoskeletal: bilateral edema noted but improved from previous visit  Wound: to the right leg healed. Left leg new wound. Mild slough. . No periwound erythema. No odor, mild drainage. Swelling. Behavioral/Psych: patient is pleasant, cooperative, awake and alert    Assessment:      Problem List Items Addressed This Visit        Circulatory    Peripheral venous insufficiency    Atherosclerosis of native artery of left lower extremity with ulceration of calf (HCC)       Other    Morbid obesity (HCC)    Non-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 exposed ([address]    Non-pressure chronic ulcer of right calf with fat layer exposed ([address]    Lymphedema          Debridement Wound Care        Problem List Items Addressed This Visit        Circulatory    Peripheral venous insufficiency    Atherosclerosis of native artery of left lower extremity with ulceration of calf (HCC)       Other    Morbid obesity (HCC)    Non-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 exposed ([address]    Non-pressure chronic ulcer of right calf with fat layer exposed ([address]    Lymphedema          Procedure Note  Indications:  Based on my examination of this patient's wound(s)/ulcer(s) today, debridement is required to promote healing and evaluate the wound base.     Performed by: Eual Soulier LATESHA Fox    Consent obtained: Yes    Time out taken: Yes    Debridement: Excisional    Using curette the wound(s)/ulcer(s) was/were sharply debrided down through and including the removal of    epidermis, dermis and subcutaneous tissue    Devitalized Tissue Debrided: fibrin, biofilm and slough    Pre Debridement Measurements:  Are located in the Wound/Ulcer Documentation Flow Sheet    Wound/Ulcer #: 1    Post Debridement Measurements:  Wound/Ulcer Descriptions are Pre Debridement except measurements:Non-Pressure ulcer, fat layer exposed    Wound Leg lower Left #1 LLL circumfirential (Active)   Wound Etiology Venous 11/12/20 1123   Dressing Status Other (Comment) 11/12/20 1123   Wound Length (cm) 12 cm 11/12/20 1123   Wound Width (cm) 4.2 cm 11/12/20 1123   Wound Depth (cm) 0.2 cm 11/12/20 1123   Wound Surface Area (cm^2) 50.4 cm^2 11/12/20 1123   Change in Wound Size % 40 11/12/20 1123   Wound Volume (cm^3) 10.08 cm^3 11/12/20 1123   Wound Healing % -20 11/12/20 1123   Post-Procedure Length (cm) 12 cm 11/12/20 1152   Post-Procedure Width (cm) 4.2 cm 11/12/20 1152   Post-Procedure Depth (cm) 0.2 cm 11/12/20 1152   Post-Procedure Surface Area (cm^2) 50.4 cm^2 11/12/20 1152   Post-Procedure Volume (cm^3) 10.08 cm^3 11/12/20 1152   Drainage Amount Small 10/29/20 1110   Drainage Description Serosanguinous 10/29/20 1110   Wound Odor None 10/29/20 1110   Betty-Wound/Incision Assessment Intact;Dry;Clean 10/29/20 1110   Edges Attached edges 11/12/20 1123   Wound Thickness Description Full thickness 11/12/20 1123   Read-Only, Retired. Condition of Base Granulation 10/29/20 1110   Read-Only, Retired. Condition of Edges Open 10/29/20 1110   Read-Only, Retired. Tissue Type Percent Pink 100 09/24/20 1157   Read-Only, Retired.  Cleansing and Cleansing Agents Normal saline 10/29/20 1110   Number of days: 49        Percent of Wound(s)/Ulcer(s) Debrided: 30%    Total Surface Area Debrided:  12.6 sq cm     Diabetic/Pressure/Non Pressure Ulcers only:  Ulcer:     Estimated Blood Loss:  Minimal     Hemostasis Achieved: Pressure    Procedural Pain: 0 / 10     Post Procedural Pain: 0 / 10     Response to treatment: Well tolerated by patient         Plan:     Patient was instructed to continue elevating his legs and using his lymphedema pumps.      Return Appointment:  []?? Dressing supply provider:   [x]? ?[address]: [facility]  [x]? ?[address] Appointment: 3 Week(s)  []? ? Nurse Visit:   Week(s)  []? ? Discharge from [facility]     []? ? Ordered tests:   []?? Referrals:      Wound Cleansing:   Do not scrub or use excessive force. Cleanse wound prior to applying a clean dressing with:  []?? Normal Saline          [x]? ?[address] [place] [address] & Water    []? ? Keep Wound Dry in Shower  []? ? Other:       Topical Treatments:  Do not apply lotions, creams, or ointments to wound bed unless directed. [x]? ? Apply moisturizing lotion to skin surrounding the wound prior to dressing change. []?? Apply antifungal ointment to skin surrounding the wound prior to dressing change. []?? Apply thin film of moisture barrier ointment to skin immediately around wound. []?? Other:                  Dressings:                  Wound Location Left leg       [x]? ? Apply Primary Dressing:                                          []? ? MediHoney Gel         []? ? MediHoney Alginate                     []? ? Calcium Alginate      []? ? Calcium Alginate with Silver              []? ?Milbert Maha                   []? ? Collagen with Silver                []? ? Santyl with Moistened saline gauze              []? ? Hydrofera Blue (cut to size and moistened)  []?? Hydrofera Blue Ready (Cut to size)              []?? NS wet to dry            []? ? Betadine wet to dry              []? ? Hydrogel                   []? ? Mepitel                   []? ? Bactroban/Mupirocin                       [x]? ? Other:  Mepilex transfer AG     [x]? ? Cover and Secure with:                   [x]? ? Gauze        []? ? Tiffany           []? ? Kerlix              []? ? Foam          []? ? Super Absorbant    [x]? ? ABD                                      []? ? ACE Wrap            []? ? Other:               Avoid contact of tape with skin.     [x]? ? Change dressing:  []?? Daily           []? ? Every Other Day    [x]? ? Twice per week              []? ? Three times per week        []? ? Do Not Change Dressing    []? ? Other:                               Compression:  Apply:  [x]? ? Multilayer Compression Wrap:      []? ? RightLeg      [x]? ? Left Leg                                 []? ? Profore            [x]? ? Profore Lite             []? ? Unna                 [x]? ? Do not get leg(s) with wrap wet.  If wraps become too tight call the center or completely remove the wrap.                     [x]? ? Elevate leg(s) above the level of the heart when sitting.                [x]? ? Avoid prolonged standing in one place.        Dietary:  [x]? ? Diet as tolerated:   []? ? Calorie Diabetic Diet:         [x]? ? No Added Salt:  []?? Increase Protein:   []? ? Other:              Activity:  [x]? ? Activity as tolerated:    []? ? Patient has no activity restrictions       []? ? Strict Bedrest:          []? ? Remain off Work:       []? ? May return to full duty work:                                               []? ? Return to work with restrictions:                   Treatment Note please see attached Discharge Instructions    Written patient dismissal instructions given to patient or POA.          Electronically signed by Parul Wheatley PA-C on 11/12/2020 at 12:10 PM

## 2020-11-12 NOTE — WOUND CARE
Multilayer Compression Wrap 
 (Not Unna) Below the Knee NAME:  Ileana Arana YOB: 1955 MEDICAL RECORD NUMBER:  [id number] DATE:  11/12/2020 Removed old Multilayer wrap if indicated and wash leg with mild soap/water. Applied moisturizing agent to dry skin as needed. Applied primary and secondary dressing as ordered. Applied multilayered dressing below the knee to left lower leg. Instructed patient/caregiver not to remove dressing and to keep it clean and dry. Instructed patient/caregiver on complications to report to provider, such as pain, numbness in toes, heavy drainage, and slippage of dressing. Instructed patient on purpose of compression dressing and on activity and exercise recommendations. Response to treatment: Well tolerated by patient Electronically signed by Saranya Jeong RN on 11/12/2020 at 11:57 AM

## 2020-11-12 NOTE — WOUND CARE
Discharge Instructions for [facility], [address] [place] [address] Telephone: [phone] 
  
NAME:  Jeffery Flores YOB: 1955 MEDICAL RECORD NUMBER:  [id number] DATE:  11/12/2020 
  
  
Return Appointment: 
[]? Dressing supply provider:  
[x]? Home Healthcare: [place] [id number] [x]? Return Appointment: 3 Week(s) 
[]? Nurse Visit:   Week(s) 
[]? Discharge from [facility] 
  
[]? Ordered tests:  
[]? Referrals:  
  
Wound Cleansing: Do not scrub or use excessive force. Cleanse wound prior to applying a clean dressing with: 
[]? Normal Saline [x]? Mild Soap & Water   
[]? Keep Wound Dry in Shower []? Other:   
  
Topical Treatments: Do not apply lotions, creams, or ointments to wound bed unless directed. [x]? Apply moisturizing lotion to skin surrounding the wound prior to dressing change. []? Apply antifungal ointment to skin surrounding the wound prior to dressing change. []? Apply thin film of moisture barrier ointment to skin immediately around wound. []? Other:   
             
Dressings:                  Wound Location Left leg [x]? Apply Primary Dressing:                             
            []? MediHoney Gel         []? MediHoney Alginate        
            []? Calcium Alginate      []? Calcium Alginate with Silver 
            []? Collagen                   []? Collagen with Silver   
            []? Santyl with Moistened saline gauze 
            []? Hydrofera Blue (cut to size and moistened)  []? Hydrofera Blue Ready (Cut to size) []? NS wet to dry            []? Betadine wet to dry 
            []? Hydrogel                   []? Mepitel      
            []? Bactroban/Mupirocin          
            [x]? Other:  Mepilex transfer AG 
  
[x]? Cover and Secure with:      
            [x]? Gauze        []? Norma Norton           []? Kerlix []? Foam          []? Super Absorbant    [x]? ABD                         
            []? ACE Wrap            []? Other:  
            Avoid contact of tape with skin. 
  
[x]? Change dressing:  []? Daily           []? Every Other Day    [x]? Twice per week 
            []? Three times per week        []? Do Not Change Dressing    []? Other: 
                         
  
Compression: 
Apply:  [x]? Multilayer Compression Wrap:      []? RightLeg      [x]? Left Leg 
                               []?Profore            [x]? Profore Lite             []?Unna 
  
            [x]? Do not get leg(s) with wrap wet. If wraps become too tight call the center or completely remove the wrap. [x]? Elevate leg(s) above the level of the heart when sitting. [x]? Avoid prolonged standing in one place. 
  
  
Dietary: 
[x]? Diet as tolerated:   []? Calorie Diabetic Diet:         [x]? No Added Salt: 
[]? Increase Protein:   []? Other:          
  
Activity: 
[x]? Activity as tolerated:   
[]? Patient has no activity restrictions []? Strict Bedrest:         
[]? Remain off Work:      
[]? May return to full duty work:                                              
[]? Return to work with restrictions:     
[address] [place] [address] Information: Should you experience any significant changes in your wound(s) or have questions about your wound care, please contact Dane Melgar 26 at [place] [address] 8:00 am - 4:30. If you need help with your wound outside of these hours and cannot wait until we are again available, contact your PCP or go to the hospital emergency room.  
  
PLEASE NOTE: IF YOU ARE [place] [address], CONTINUE TO USE THE SUPPLIES YOU HAVE AVAILABLE UNTIL YOU ARE ABLE TO [facility]. IT IS MOST IMPORTANT TO KEEP THE WOUND COVERED AT ALL TIMES. 
  
[x]? Dr. Adam Silverman  
[]? Dr. Clarisse Boland []? Fan Cheney [facility] 
[]? Dr. Angelic Alex

## 2020-11-12 NOTE — DISCHARGE INSTRUCTIONS
Discharge Instructions for  [address] [place] [address], [address] [place] [address]  Telephone: [phone]    NAME:  Tessy Alba  YOB: 1955  MEDICAL RECORD NUMBER:  [id number]  DATE:  11/12/2020      Return Appointment:  [] Dressing supply provider:   [x] Home Healthcare: [facility]  [x] Return Appointment: 3 Week(s)  [] Nurse Visit: Darnell Nuno  [] Discharge from [facility]    [] Ordered tests:   [] Referrals:     Wound Cleansing:   Do not scrub or use excessive force. Cleanse wound prior to applying a clean dressing with:  [] Normal Saline   [x] Mild Soap & Water    [] Keep Wound Dry in Shower  [] Other:      Topical Treatments:  Do not apply lotions, creams, or ointments to wound bed unless directed. [x] Apply moisturizing lotion to skin surrounding the wound prior to dressing change.  [] Apply antifungal ointment to skin surrounding the wound prior to dressing change.  [] Apply thin film of moisture barrier ointment to skin immediately around wound. [] Other:       Dressings:           Wound Location Left leg   [x] Apply Primary Dressing:       [] MediHoney Gel    [] MediHoney Alginate               [] Calcium Alginate      [] Calcium Alginate with Silver   [] Collagen                   [] Collagen with Silver                [] Santyl with Moistened saline gauze              [] Hydrofera Blue (cut to size and moistened)  [] Hydrofera Blue Ready (Cut to size)   [] NS wet to dry    [] Betadine wet to dry              [] Hydrogel                [] Mepitel     [] Bactroban/Mupirocin               [x] Other:  Mepilex transfer AG    [x] Cover and Secure with:     [x] Gauze [] Tiffany [] Kerlix   [] Foam [] Super Absorbant [x] ABD     [] ACE Wrap            [] Other:    Avoid contact of tape with skin.     [x] Change dressing: [] Daily    [] Every Other Day [x] Twice per week   [] Three times per week [] Do Not Change Dressing   [] Other:        Compression:  Apply: [x] Multilayer Compression Wrap: []RightLeg [x]Left Leg                                 []Profore  [x]Profore Lite             []Unna     [x] Do not get leg(s) with wrap wet. If wraps become too tight call the center or completely remove the wrap. [x] Elevate leg(s) above the level of the heart when sitting. [x] Avoid prolonged standing in one place. Dietary:  [x] Diet as tolerated: [] Calorie Diabetic Diet: [x] No Added Salt:  [] Increase Protein: [] Other:     Activity:  [x] Activity as tolerated:    [] Patient has no activity restrictions       [] Strict Bedrest:   [] Remain off Work:       [] May return to full duty work:                                     [] Return to work with restrictions:                Electronically signed Wesley Milton RN on 11/12/2020 at 11:50 [address]: Should you experience any significant changes in your wound(s) or have questions about your wound care, please contact Dane Melgar 26 at [place] [address] 8:00 am - 4:30. If you need help with your wound outside of these hours and cannot wait until we are again available, contact your PCP or go to the hospital emergency room. PLEASE NOTE: IF YOU ARE UNABLE TO OBTAIN WOUND SUPPLIES, CONTINUE TO USE THE SUPPLIES YOU HAVE AVAILABLE UNTIL YOU ARE ABLE TO REACH US. IT IS MOST IMPORTANT TO KEEP THE WOUND COVERED AT ALL TIMES.     [x] Dr. Morales Weeks   [] Dr. Eusebia Cleveland  [] [facility]  [] Dr. Sanjeev Florence

## 2020-11-17 ENCOUNTER — HOSPITAL ENCOUNTER (EMERGENCY)
Age: 65
Discharge: HOME | End: 2020-11-17
Attending: FAMILY MEDICINE
Payer: MEDICARE

## 2020-11-17 VITALS
BODY MASS INDEX: 47.74 KG/M2 | HEART RATE: 76 BPM | SYSTOLIC BLOOD PRESSURE: 168 MMHG | WEIGHT: 315 LBS | RESPIRATION RATE: 20 BRPM | OXYGEN SATURATION: 96 % | DIASTOLIC BLOOD PRESSURE: 98 MMHG | HEIGHT: 68 IN | TEMPERATURE: 98.6 F

## 2020-11-17 DIAGNOSIS — S61.411A LACERATION OF RIGHT HAND WITHOUT FOREIGN BODY, INITIAL ENCOUNTER: Primary | ICD-10-CM

## 2020-11-17 PROCEDURE — 99283 EMERGENCY DEPT VISIT LOW MDM: CPT

## 2020-11-17 PROCEDURE — 96372 THER/PROPH/DIAG INJ SC/IM: CPT

## 2020-11-17 PROCEDURE — 75810000293 HC SIMP/SUPERF WND  RPR

## 2020-11-17 PROCEDURE — 74011250636 HC RX REV CODE- 250/636: Performed by: FAMILY MEDICINE

## 2020-11-17 PROCEDURE — 74011000250 HC RX REV CODE- 250: Performed by: FAMILY MEDICINE

## 2020-11-17 RX ORDER — BACITRACIN 500 UNIT/G
1 PACKET (EA) TOPICAL
Status: COMPLETED | OUTPATIENT
Start: 2020-11-17 | End: 2020-11-17

## 2020-11-17 RX ORDER — LIDOCAINE HYDROCHLORIDE AND EPINEPHRINE 10; 10 MG/ML; UG/ML
4.5 INJECTION, SOLUTION INFILTRATION; PERINEURAL ONCE
Status: COMPLETED | OUTPATIENT
Start: 2020-11-17 | End: 2020-11-17

## 2020-11-17 RX ORDER — CEPHALEXIN 500 MG/1
500 CAPSULE ORAL 2 TIMES DAILY
Qty: 14 CAP | Refills: 0 | Status: SHIPPED | OUTPATIENT
Start: 2020-11-17 | End: 2020-11-24

## 2020-11-17 RX ORDER — BACITRACIN 500 UNIT/G
1 PACKET (EA) TOPICAL
Status: DISCONTINUED | OUTPATIENT
Start: 2020-11-17 | End: 2020-11-17 | Stop reason: HOSPADM

## 2020-11-17 RX ADMIN — BACITRACIN 1 PACKET: 500 OINTMENT TOPICAL at 19:03

## 2020-11-17 RX ADMIN — LIDOCAINE HYDROCHLORIDE 250 MG: 10 INJECTION, SOLUTION INFILTRATION; PERINEURAL at 18:15

## 2020-11-17 RX ADMIN — LIDOCAINE HYDROCHLORIDE AND EPINEPHRINE 45 MG: 10; 10 INJECTION, SOLUTION INFILTRATION; PERINEURAL at 18:15

## 2020-11-17 NOTE — ED PROVIDER NOTES
EMERGENCY DEPARTMENT HISTORY AND PHYSICAL EXAM      Date: 11/17/2020  Patient Name: Elmer Lion    History of Presenting Illness     Chief Complaint   Patient presents with    Laceration       History Provided By: Patient    HPI: Elmer Lion, 77 y.o. male with a past medical history significant Extensive medical history presents to the ED with cc of right hand laceration. Occurred this morning. Patient was trying to grab onto the counter but he accidentally slammed his hand at the sharp edge of the counter causing a big laceration between the right thumb and right index finger. He kept the wound clean with hydrogen peroxide and covered however family member said that the laceration was too large to heal on its own and would need stitches. He last had a tetanus shot a year ago. He has full range of motion and sensation in the hand. No other complaints at this time. No head injury. There are no other complaints, changes, or physical findings at this time. PCP: Unknown, Provider    No current facility-administered medications on file prior to encounter. Current Outpatient Medications on File Prior to Encounter   Medication Sig Dispense Refill    multivitamin (ONE A DAY) tablet Take 1 Tab by mouth daily.  amLODIPine (NORVASC) 10 mg tablet Take 10 mg by mouth daily.  metoprolol succinate (TOPROL-XL) 25 mg XL tablet Take 25 mg by mouth daily.  furosemide (LASIX) 40 mg tablet Take 40 mg by mouth daily.  naproxen (NAPROSYN) 250 mg tablet Take 250 mg by mouth two (2) times daily (with meals).          Past History     Past Medical History:  Past Medical History:   Diagnosis Date    Arthritis     Chronic obstructive pulmonary disease ([facility] [address]     Hypertension     Sleep apnea        Past Surgical History:  Past Surgical History:   Procedure Laterality Date    HX GI         Family History:  Family History   Problem Relation Age of Onset    Hypertension Child        Social History:  Social History     Tobacco Use    Smoking status: Never Smoker    Smokeless tobacco: Never Used   Substance Use Topics    Alcohol use: Never     Frequency: Never    Drug use: Never       Allergies:  No Known Allergies      Review of Systems     Review of Systems   Constitutional: Negative for appetite change, fatigue and fever. HENT: Negative for congestion and sore throat. Respiratory: Negative for cough and shortness of breath. Cardiovascular: Negative for chest pain and palpitations. Gastrointestinal: Negative for abdominal pain, nausea and vomiting. Genitourinary: Negative for dysuria and flank pain. Musculoskeletal: Negative for arthralgias, gait problem and joint swelling. Skin: Positive for wound (right hand laceration). Negative for rash. Allergic/Immunologic: Negative for environmental allergies and food allergies. Neurological: Negative for dizziness, light-headedness and headaches. Physical Exam     Physical Exam  Constitutional:       Appearance: Normal appearance. He is obese. Cardiovascular:      Rate and Rhythm: Normal rate and regular rhythm. Pulses: Normal pulses. Heart sounds: Normal heart sounds. Pulmonary:      Effort: Pulmonary effort is normal.      Breath sounds: Normal breath sounds. Musculoskeletal: Normal range of motion. General: No swelling. Hands:       Comments: Tissue is healed on the edges of the laceration. Skin:     General: Skin is warm. Capillary Refill: Capillary refill takes less than 2 seconds. Neurological:      General: No focal deficit present. Mental Status: He is alert and oriented to person, place, and time. Psychiatric:         Mood and Affect: Mood normal.         Behavior: Behavior normal.         Lab and Diagnostic Study Results     Labs -   No results found for this or any previous visit (from the past 12 hour(s)).     Radiologic Studies -   @lastxrresult@  CT Results  (Last 50 hours)    None        CXR Results  (Last 48 hours)    None            Medical Decision Making   - I am the first provider for this patient. - I reviewed the vital signs, available nursing notes, past medical history, past surgical history, family history and social history. - Initial assessment performed. The patients presenting problems have been discussed, and they are in agreement with the care plan formulated and outlined with them. I have encouraged them to ask questions as they arise throughout their visit. Vital Signs-Reviewed the patient's vital signs. Patient Vitals for the past 12 hrs:   Temp Pulse Resp BP SpO2   11/17/20 1811 98.6 °F (37 °C) 76 20 (!) 168/98 96 %       Records Reviewed: Nursing Notes and Old Medical Records      ED Course:          Provider Notes (Medical Decision Making):     MDM  Number of Diagnoses or Management Options  Laceration of right hand without foreign body, initial encounter:   Diagnosis management comments: This is a laceration caused by a mechanical fall. Results reviewed with the patient. Wound was cleaned and dressed with bacitracin and dry gauze. Tetanus vaccine is up to date. Instructed to keep the wound covered while outdoors. I answered all questions, and there are no apparent barriers to comprehension or communication. The patient verbalizes agreement with the  diagnosis, treatment plan, and understanding of the follow-up instructions. The patient is appropriate for discharge; leaves the Emergency Department walking with a stable gait. Patient understands to return to the ED for any new or worsening symptoms or does not get expected timely resolution of symptoms on mediations prescribed.          Procedures     Medical Decision Makingedical Decision Making  PROCEDURES:  Wound Repair    Date/Time: 11/17/2020 7:02 PM  Performed by: Nav Gutierrez provider: Manuel Nguyen  Preparation: skin prepped with ChloraPrep  Pre-procedure re-eval: Immediately prior to the procedure, the patient was reevaluated and found suitable for the planned procedure and any planned medications. Location details: right hand  Wound length:12.6 - 20 cm  Anesthesia: local infiltration    Anesthesia:  Local Anesthetic: lidocaine 1% with epinephrine  Anesthetic total: 10 mL  Foreign bodies: no foreign bodies  Irrigation solution: saline  Irrigation method: tap  Debridement: none  Wound skin closure material used: 3-0 prolene. Number of sutures: 11  Technique: simple and horizontal mattress  Approximation: close  Dressing: 4x4, antibiotic ointment and gauze roll  Patient tolerance: Patient tolerated the procedure well with no immediate complications  My total time at bedside, performing this procedure was 46-60 minutes. Disposition   Disposition: Condition stable  DC- Adult Discharges: All of the diagnostic tests were reviewed and questions answered. Diagnosis, care plan and treatment options were discussed. The patient understands the instructions and will follow up as directed. The patients results have been reviewed with them. They have been counseled regarding their diagnosis. The patient verbally convey understanding and agreement of the signs, symptoms, diagnosis, treatment and prognosis and additionally agrees to follow up as recommended with their PCP in 24 - 48 hours. They also agree with the care-plan and convey that all of their questions have been answered. I have also put together some discharge instructions for them that include: 1) educational information regarding their diagnosis, 2) how to care for their diagnosis at home, as well a 3) list of reasons why they would want to return to the ED prior to their follow-up appointment, should their condition change. DISCHARGE PLAN:  1. Current Discharge Medication List      CONTINUE these medications which have NOT CHANGED    Details   multivitamin (ONE A DAY) tablet Take 1 Tab by mouth daily.       amLODIPine (NORVASC) 10 mg tablet Take 10 mg by mouth daily. metoprolol succinate (TOPROL-XL) 25 mg XL tablet Take 25 mg by mouth daily. furosemide (LASIX) 40 mg tablet Take 40 mg by mouth daily. naproxen (NAPROSYN) 250 mg tablet Take 250 mg by mouth two (2) times daily (with meals). 2.   Follow-up Information     Follow up With Specialties Details Why Contact Info    [facility] Emergency Medicine In 2 days For wound re-check [address] [place] [id number] [phone]        3. Return to ED if worse   4. Current Discharge Medication List      START taking these medications    Details   cephALEXin (Keflex) 500 mg capsule Take 1 Cap by mouth two (2) times a day for 7 days. Qty: 14 Cap, Refills: 0               Diagnosis     Clinical Impression:   1. Laceration of right hand without foreign body, initial encounter        Attestations:    Huber Duvall, DO    Please note that this dictation was completed with [employer], the computer voice recognition software. Quite often unanticipated grammatical, syntax, homophones, and other interpretive errors are inadvertently transcribed by the computer software. Please disregard these errors. Please excuse any errors that have escaped final proofreading. Thank you.

## 2020-11-20 ENCOUNTER — HOSPITAL ENCOUNTER (EMERGENCY)
Age: 65
Discharge: HOME | End: 2020-11-20
Attending: FAMILY MEDICINE
Payer: MEDICARE

## 2020-11-20 VITALS
RESPIRATION RATE: 16 BRPM | OXYGEN SATURATION: 96 % | HEIGHT: 67 IN | BODY MASS INDEX: 49.44 KG/M2 | TEMPERATURE: 98.3 F | DIASTOLIC BLOOD PRESSURE: 62 MMHG | SYSTOLIC BLOOD PRESSURE: 116 MMHG | WEIGHT: 315 LBS | HEART RATE: 80 BPM

## 2020-11-20 DIAGNOSIS — Z51.89 VISIT FOR WOUND CHECK: Primary | ICD-10-CM

## 2020-11-20 PROCEDURE — 99283 EMERGENCY DEPT VISIT LOW MDM: CPT

## 2020-11-20 RX ORDER — BACITRACIN 500 UNIT/G
1 PACKET (EA) TOPICAL
Status: DISCONTINUED | OUTPATIENT
Start: 2020-11-20 | End: 2020-11-20 | Stop reason: HOSPADM

## 2020-11-20 NOTE — ED PROVIDER NOTES
EMERGENCY DEPARTMENT HISTORY AND PHYSICAL EXAM      Date: 11/20/2020  Patient Name: Molly Patel    History of Presenting Illness     Chief Complaint   Patient presents with    Wound Check       History Provided By: Patient    HPI: Molly Patel, 77 y.o. male with a past medical history significant hypertension presents to the ED with cc of right hand wound check. He had a laceration on the right hand between the thumb and index finger that occurred 3 days ago. Suture that same day. He has no complications with the wound. He follows wound care as described and is still taking antibiotics as directed. No other complaints at this time. There are no other complaints, changes, or physical findings at this time. PCP: Unknown, Provider    No current facility-administered medications on file prior to encounter. Current Outpatient Medications on File Prior to Encounter   Medication Sig Dispense Refill    cephALEXin (Keflex) 500 mg capsule Take 1 Cap by mouth two (2) times a day for 7 days. 14 Cap 0    multivitamin (ONE A DAY) tablet Take 1 Tab by mouth daily.  amLODIPine (NORVASC) 10 mg tablet Take 10 mg by mouth daily.  metoprolol succinate (TOPROL-XL) 25 mg XL tablet Take 25 mg by mouth daily.  furosemide (LASIX) 40 mg tablet Take 40 mg by mouth daily.  naproxen (NAPROSYN) 250 mg tablet Take 250 mg by mouth two (2) times daily (with meals).          Past History     Past Medical History:  Past Medical History:   Diagnosis Date    Arthritis     Chronic obstructive pulmonary disease ([address]     Hypertension     Sleep apnea        Past Surgical History:  Past Surgical History:   Procedure Laterality Date    HX GI         Family History:  Family History   Problem Relation Age of Onset    Hypertension Child        Social History:  Social History     Tobacco Use    Smoking status: Never Smoker    Smokeless tobacco: Never Used   Substance Use Topics    Alcohol use: Never Frequency: Never    Drug use: Never       Allergies:  No Known Allergies      Review of Systems     Review of Systems   Constitutional: Negative for appetite change and fever. HENT: Negative for congestion and sore throat. Eyes: Negative for photophobia and visual disturbance. Respiratory: Negative for cough and shortness of breath. Cardiovascular: Negative for chest pain and palpitations. Gastrointestinal: Negative for nausea and vomiting. Genitourinary: Negative for dysuria and flank pain. Musculoskeletal: Negative for arthralgias, back pain and myalgias. Skin: Positive for wound (sutures in right hand). Negative for rash. Allergic/Immunologic: Negative for environmental allergies and food allergies. Neurological: Negative for light-headedness and headaches. All other systems reviewed and are negative. Physical Exam     Physical Exam  Vitals signs and nursing note reviewed. Constitutional:       Appearance: Normal appearance. Cardiovascular:      Rate and Rhythm: Normal rate and regular rhythm. Pulses: Normal pulses. Heart sounds: Normal heart sounds. Musculoskeletal:      Right hand: He exhibits tenderness (at the site of the wound) and laceration. He exhibits normal range of motion, no bony tenderness, normal capillary refill and no swelling. Normal sensation noted. Normal strength noted. Hands:    Neurological:      Mental Status: He is alert. Lab and Diagnostic Study Results     Labs -   No results found for this or any previous visit (from the past 12 hour(s)). Radiologic Studies -   @lastxrresult@  CT Results  (Last 48 hours)    None        CXR Results  (Last 48 hours)    None            Medical Decision Making   - I am the first provider for this patient. - I reviewed the vital signs, available nursing notes, past medical history, past surgical history, family history and social history. - Initial assessment performed.  The patients presenting problems have been discussed, and they are in agreement with the care plan formulated and outlined with them. I have encouraged them to ask questions as they arise throughout their visit. Vital Signs-Reviewed the patient's vital signs. Patient Vitals for the past 12 hrs:   Temp Pulse Resp BP SpO2   11/20/20 1510  80 16  96 %   11/20/20 1459 98.3 °F (36.8 °C)  18 116/62 94 %       Records Reviewed: Nursing Notes and Old Medical Records      ED Course:          Provider Notes (Medical Decision Making):     MDM  Number of Diagnoses or Management Options  Visit for wound check:   Diagnosis management comments: Patient is stable with no marked toxicity or distress. No significant findings on physical exam. Results reviewed with the patient. Instructed to return in 7 days for suture removal.  Provided wound care instructions. All questions were answered and there are no apparent barriers to comprehension or communication. The patient verbalizes agreement with the diagnosis, treatment plan, and understanding of the follow-up instructions. The patient is appropriate for discharge; leaves the Emergency Department walking with a stable gait. Patient understands to return to the ED in 12-24 hours for any new or worsening symptoms or no  expected timely resolution of symptoms on mediations prescribed. Disposition   Disposition: Condition stable  DC- Adult Discharges: All of the diagnostic tests were reviewed and questions answered. Diagnosis, care plan and treatment options were discussed. The patient understands the instructions and will follow up as directed. The patients results have been reviewed with them. They have been counseled regarding their diagnosis. The patient verbally convey understanding and agreement of the signs, symptoms, diagnosis, treatment and prognosis and additionally agrees to follow up as recommended with their PCP in 24 - 48 hours.   They also agree with the care-plan and convey that all of their questions have been answered. I have also put together some discharge instructions for them that include: 1) educational information regarding their diagnosis, 2) how to care for their diagnosis at home, as well a 3) list of reasons why they would want to return to the ED prior to their follow-up appointment, should their condition change. Discharged    DISCHARGE PLAN:  1. Current Discharge Medication List      CONTINUE these medications which have NOT CHANGED    Details   cephALEXin (Keflex) 500 mg capsule Take 1 Cap by mouth two (2) times a day for 7 days. Qty: 14 Cap, Refills: 0      multivitamin (ONE A DAY) tablet Take 1 Tab by mouth daily. amLODIPine (NORVASC) 10 mg tablet Take 10 mg by mouth daily. metoprolol succinate (TOPROL-XL) 25 mg XL tablet Take 25 mg by mouth daily. furosemide (LASIX) 40 mg tablet Take 40 mg by mouth daily. naproxen (NAPROSYN) 250 mg tablet Take 250 mg by mouth two (2) times daily (with meals). 2.   Follow-up Information     Follow up With Specialties Details Why Contact Info    [facility] Emergency Medicine In 1 week For suture removal [address] [place] [id number] [phone]        3. Return to ED if worse   4. Current Discharge Medication List            Diagnosis     Clinical Impression:   1. Visit for wound check        Attestations:    Kamlesh Mireles, DO    Please note that this dictation was completed with [employer], the computer voice recognition software. Quite often unanticipated grammatical, syntax, homophones, and other interpretive errors are inadvertently transcribed by the computer software. Please disregard these errors. Please excuse any errors that have escaped final proofreading. Thank you.

## 2020-11-27 ENCOUNTER — HOSPITAL ENCOUNTER (EMERGENCY)
Age: 65
Discharge: HOME | End: 2020-11-27
Attending: FAMILY MEDICINE
Payer: MEDICARE

## 2020-11-27 VITALS
HEART RATE: 83 BPM | SYSTOLIC BLOOD PRESSURE: 171 MMHG | RESPIRATION RATE: 18 BRPM | DIASTOLIC BLOOD PRESSURE: 78 MMHG | TEMPERATURE: 98.1 F | OXYGEN SATURATION: 95 %

## 2020-11-27 DIAGNOSIS — Z48.02 VISIT FOR SUTURE REMOVAL: ICD-10-CM

## 2020-11-27 DIAGNOSIS — Z51.89 VISIT FOR WOUND CHECK: Primary | ICD-10-CM

## 2020-11-27 PROCEDURE — 99282 EMERGENCY DEPT VISIT SF MDM: CPT

## 2020-11-27 RX ORDER — ASPIRIN 81 MG/1
TABLET ORAL
COMMUNITY
Start: 2020-11-13

## 2020-11-27 NOTE — ED TRIAGE NOTES
Pt here for suture removal from R hand s/p placement 10 days ago. Area is well healing and free of signs of infection.

## 2020-11-27 NOTE — ED PROVIDER NOTES
EMERGENCY DEPARTMENT HISTORY AND PHYSICAL EXAM      Date: 11/27/2020  Patient Name: Louis Martinez    History of Presenting Illness     Chief Complaint   Patient presents with    Suture Removal       History Provided By: Patient    HPI: Louis Martinez, 77 y.o. male with a past medical history significant as documented below presents to the ED with cc of suture removal.  11 sutures was placed 10 days ago by me. He followed up for wound check 2 days later that was also evaluated by me and wound was healing well. Since that last visit, he has been following wound care direction as directed and has no complaints of the wound. He is here today to have the sutures removed. There are no other complaints, changes, or physical findings at this time. PCP: Unknown, Provider    No current facility-administered medications on file prior to encounter. Current Outpatient Medications on File Prior to Encounter   Medication Sig Dispense Refill    aspirin delayed-release 81 mg tablet TAKE ONE TABLET BY MOUTH ONE TIME DAILY      multivitamin (ONE A DAY) tablet Take 1 Tab by mouth daily.  amLODIPine (NORVASC) 10 mg tablet Take 10 mg by mouth daily.  metoprolol succinate (TOPROL-XL) 25 mg XL tablet Take 25 mg by mouth daily.  furosemide (LASIX) 40 mg tablet Take 40 mg by mouth daily.  naproxen (NAPROSYN) 250 mg tablet Take 250 mg by mouth two (2) times daily (with meals).          Past History     Past Medical History:  Past Medical History:   Diagnosis Date    Arthritis     Chronic obstructive pulmonary disease ([address]     Hypertension     Sleep apnea        Past Surgical History:  Past Surgical History:   Procedure Laterality Date    HX GI         Family History:  Family History   Problem Relation Age of Onset    Hypertension Child        Social History:  Social History     Tobacco Use    Smoking status: Never Smoker    Smokeless tobacco: Never Used   Substance Use Topics    Alcohol use: Never     Frequency: Never    Drug use: Never       Allergies:  No Known Allergies      Review of Systems     Review of Systems   Constitutional: Negative for chills and fever. HENT: Negative for congestion and sore throat. Eyes: Negative for photophobia and visual disturbance. Respiratory: Negative for cough and shortness of breath. Cardiovascular: Negative for chest pain and palpitations. Gastrointestinal: Negative for nausea and vomiting. Genitourinary: Negative for dysuria and flank pain. Musculoskeletal: Negative for arthralgias, back pain and myalgias. Skin: Positive for wound (suture in right hand). Negative for rash. Allergic/Immunologic: Negative for environmental allergies and food allergies. Neurological: Negative for light-headedness and headaches. All other systems reviewed and are negative. Physical Exam     Physical Exam  Vitals signs and nursing note reviewed. Constitutional:       Appearance: Normal appearance. HENT:      Head: Normocephalic and atraumatic. Skin:     General: Skin is warm. Capillary Refill: Capillary refill takes less than 2 seconds. Comments: 11 sutures in place in the right hand. No active bleeding or signs of infection. Neurological:      General: No focal deficit present. Mental Status: He is alert and oriented to person, place, and time. Psychiatric:         Mood and Affect: Mood normal.         Behavior: Behavior normal.         Lab and Diagnostic Study Results     Labs -   No results found for this or any previous visit (from the past 12 hour(s)). Radiologic Studies -   @lastxrresult@  CT Results  (Last 48 hours)    None        CXR Results  (Last 48 hours)    None            Medical Decision Making   - I am the first provider for this patient. - I reviewed the vital signs, available nursing notes, past medical history, past surgical history, family history and social history. - Initial assessment performed.  The patients presenting problems have been discussed, and they are in agreement with the care plan formulated and outlined with them. I have encouraged them to ask questions as they arise throughout their visit. Vital Signs-Reviewed the patient's vital signs. Patient Vitals for the past 12 hrs:   Temp Pulse Resp BP SpO2   11/27/20 1153 98.1 °F (36.7 °C) 83 18 (!) 171/78 95 %       Records Reviewed: Nursing Notes and Old Medical Records      ED Course:          Provider Notes (Medical Decision Making):     MDM  Number of Diagnoses or Management Options  Visit for suture removal:   Visit for wound check:   Diagnosis management comments: The wound has not fully closed. 5 sutures were left in place in the areas where there is still an opening. Discussed with patient to return in 3 to 4 days for wound check and the possibility of the rest of the suture to be removed. Instructed to keep the wound covered while at work. I answered all questions, and there are no apparent barriers to comprehension or communication. The patient verbalizes agreement with the  diagnosis, treatment plan, and understanding of the follow-up instructions. The patient is appropriate for discharge; leaves the Emergency Department walking with a stable gait. Patient understands to return to the ED for any new or worsening symptoms. Procedures     Medical Decision Makingedical Decision Making  PROCEDURES:  Suture/Staple Removal    Date/Time: 11/27/2020 12:09 PM  Performed by: Ovidio Velazquez DO  Authorized by: Ovidio Velazquez DO     Consent:     Consent obtained:  Verbal    Consent given by:  Patient    Risks discussed:  Wound separation    Alternatives discussed:  Delayed treatment  Location:     Location:  Upper extremity    Upper extremity location:  Hand    Hand location: right hand in between thumb and index.   Procedure details:     Wound appearance:  No signs of infection, good wound healing and clean    Number of sutures removed: 6 (5 remain)  Post-procedure details:     Post-removal:  No dressing applied    Patient tolerance of procedure: Tolerated well, no immediate complications           Disposition   Disposition: Condition stable  DC- Adult Discharges: All of the diagnostic tests were reviewed and questions answered. Diagnosis, care plan and treatment options were discussed. The patient understands the instructions and will follow up as directed. The patients results have been reviewed with them. They have been counseled regarding their diagnosis. The patient verbally convey understanding and agreement of the signs, symptoms, diagnosis, treatment and prognosis and additionally agrees to follow up as recommended with their PCP in 24 - 48 hours. They also agree with the care-plan and convey that all of their questions have been answered. I have also put together some discharge instructions for them that include: 1) educational information regarding their diagnosis, 2) how to care for their diagnosis at home, as well a 3) list of reasons why they would want to return to the ED prior to their follow-up appointment, should their condition change. Discharged    DISCHARGE PLAN:  1. Current Discharge Medication List      CONTINUE these medications which have NOT CHANGED    Details   multivitamin (ONE A DAY) tablet Take 1 Tab by mouth daily. amLODIPine (NORVASC) 10 mg tablet Take 10 mg by mouth daily. metoprolol succinate (TOPROL-XL) 25 mg XL tablet Take 25 mg by mouth daily. furosemide (LASIX) 40 mg tablet Take 40 mg by mouth daily. naproxen (NAPROSYN) 250 mg tablet Take 250 mg by mouth two (2) times daily (with meals). 2.   Follow-up Information     Follow up With Specialties Details Why Contact Info    [address] [place] [address] Emergency Medicine In 3 days For wound re-check; possible suture removal [address] [place] [id number] [phone]        3.   Return to ED if worse 4.   Current Discharge Medication List            Diagnosis     Clinical Impression:   1. Visit for wound check    2. Visit for suture removal        Attestations:    Jessenia Persaud, DO    Please note that this dictation was completed with [employer], the computer voice recognition software. Quite often unanticipated grammatical, syntax, homophones, and other interpretive errors are inadvertently transcribed by the computer software. Please disregard these errors. Please excuse any errors that have escaped final proofreading. Thank you.

## 2020-12-03 ENCOUNTER — HOSPITAL ENCOUNTER (OUTPATIENT)
Dept: WOUND CARE | Age: 65
Discharge: HOME | End: 2020-12-03
Payer: MEDICARE

## 2020-12-03 DIAGNOSIS — L97.212 NON-PRESSURE CHRONIC ULCER OF RIGHT CALF WITH FAT LAYER EXPOSED (HCC): ICD-10-CM

## 2020-12-03 DIAGNOSIS — I87.2 PERIPHERAL VENOUS INSUFFICIENCY: ICD-10-CM

## 2020-12-03 DIAGNOSIS — I89.0 LYMPHEDEMA: ICD-10-CM

## 2020-12-03 DIAGNOSIS — E66.01 MORBID OBESITY (HCC): ICD-10-CM

## 2020-12-03 DIAGNOSIS — I70.242 ATHEROSCLEROSIS OF NATIVE ARTERY OF LEFT LOWER EXTREMITY WITH ULCERATION OF CALF (HCC): ICD-10-CM

## 2020-12-03 DIAGNOSIS — L97.222 NON-PRESSURE CHRONIC ULCER OF LEFT CALF WITH FAT LAYER EXPOSED (HCC): ICD-10-CM

## 2020-12-03 PROCEDURE — 99212 OFFICE O/P EST SF 10 MIN: CPT

## 2020-12-03 NOTE — DISCHARGE INSTRUCTIONS
Discharge Instructions for  [address] [place] [address]  [address], [address] [facility]  Telephone: [phone]     FAX [phone]     NAME: Albino Peabody  DATE OF BIRTH:  1955  MEDICAL RECORD NUMBER:  [id number]  DATE:  12/3/2020        Return Appointment:  []?? Dressing supply provider:   [x]? ?[address]: [address]  []? ?[address] Appointment: 3 Week(s)  []? ? Nurse Visit:   Week(s)  [x]? ? Discharge from [facility]     []? ? Ordered tests:   [x]? ? Referrals: Use lymphedema pumps for 60 minutes three times a day   Wound Cleansing:   Do not scrub or use excessive force. Cleanse wound prior to applying a clean dressing with:  []?? Normal Saline          []? ?[address] [place] [address] & Water    []? ? Keep Wound Dry in Shower  []? ? Other:       Topical Treatments:  Do not apply lotions, creams, or ointments to wound bed unless directed. []?? Apply moisturizing lotion to skin surrounding the wound prior to dressing change. []?? Apply antifungal ointment to skin surrounding the wound prior to dressing change. []?? Apply thin film of moisture barrier ointment to skin immediately around wound. []?? Other:                  Dressings:                  Wound Location Left leg       []? ? Apply Primary Dressing:                                          []? ? MediHoney Gel         []? ? MediHoney Alginate                     []? ? Calcium Alginate      []? ? Calcium Alginate with Silver              []? ?Hany Hearing                   []? ? Collagen with Silver                []? ? Santyl with Moistened saline gauze              []? ? Hydrofera Blue (cut to size and moistened)  []?? Hydrofera Blue Ready (Cut to size)              []?? NS wet to dry            []? ? Betadine wet to dry              []? ? Hydrogel                   []? ? Mepitel                   []? ? Bactroban/Mupirocin                       []? ? Other:  Mepilex transfer AG     []? ? Cover and Secure with:                   []? ? Gauze        []? ? Tiffany           []? ? Kerlix              []? ? Foam          []? ? Super Absorbant    [x]? ? ABD                                      []? ? ACE Wrap            []? ? Other:               Avoid contact of tape with skin.     []? ? Change dressing:  []?? Daily           []? ? Every Other Day    []? ? Twice per week              []? ? Three times per week        []? ? Do Not Change Dressing    []? ? Other:                               Compression:  Apply:  []?? Multilayer Compression Wrap:      []? ? RightLeg      []? ? Left Leg                                 []? ? Profore            []? ? Profore Lite             []? ? Unna                 []? ? Do not get leg(s) with wrap wet.  If wraps become too tight call the center or completely remove the wrap.                     [x]? ? Elevate leg(s) above the level of the heart when sitting.                [x]? ? Avoid prolonged standing in one place.        Dietary:  []?? Diet as tolerated:   []? ? Calorie Diabetic Diet:         [x]? ? No Added Salt:  []?? Increase Protein:   []? ? Other:              Activity:  [x]? ? Activity as tolerated:    []? ? Patient has no activity restrictions       []? ? Strict Bedrest:          []? ? Remain off Work:       []? ? May return to full duty work:                                               []? ? [facility] Information: Should you experience any significant changes in your wound(s) or have questions about your wound care, please [facility] 8:00 am - 4:30.  If you need help with your wound outside of these hours and cannot wait until we are again available, contact your PCP or go to the hospital emergency room.      PLEASE NOTE: IF YOU ARE UNABLE TO OBTAIN WOUND SUPPLIES, CONTINUE TO USE THE SUPPLIES YOU HAVE AVAILABLE UNTIL YOU ARE ABLE TO [address].  IT IS MOST IMPORTANT TO KEEP THE WOUND COVERED AT ALL TIMES.     []?? Dr. Radonna Fabry   []?? Dr. Darryl Giraldo  [x]? ?Loulou Celaya [facility]  []?? Dr. Max Wright

## 2020-12-03 NOTE — WOUND CARE
Discharge Instructions for  [address] [place] [address], [address] [place] [address]  Telephone: [phone]     NAME:  Raeann Mathias  YOB: 1955  MEDICAL RECORD NUMBER:  [id number]  DATE:  12/3/2020        Return Appointment:  []? Dressing supply provider:   [x]? Home Healthcare: [facility]  []? Return Appointment: 3 Week(s)  []? Nurse Visit:   Week(s)  [x]? Discharge from [facility] ([facility]     []? Ordered tests:   [x]? Referrals: Use lymphedema pumps for 60 minutes three times a day   Wound Cleansing:   Do not scrub or use excessive force. Cleanse wound prior to applying a clean dressing with:  []? Normal Saline          []? Mild Soap & Water    []? Keep Wound Dry in Shower  []? Other:       Topical Treatments:  Do not apply lotions, creams, or ointments to wound bed unless directed. []? Apply moisturizing lotion to skin surrounding the wound prior to dressing change. []? Apply antifungal ointment to skin surrounding the wound prior to dressing change. []? Apply thin film of moisture barrier ointment to skin immediately around wound. []? Other:                  Dressings:                  Wound Location Left leg       []? Apply Primary Dressing:                                          []? MediHoney Gel         []? MediHoney Alginate                     []? Calcium Alginate      []? Calcium Alginate with Silver              []? Collagen                   []? Collagen with Silver                []? Santyl with Moistened saline gauze              []? Hydrofera Blue (cut to size and moistened)  []? Hydrofera Blue Ready (Cut to size)              []? NS wet to dry            []? Betadine wet to dry              []? Hydrogel                   []? Mepitel                   []? Bactroban/Mupirocin                       []? Other:  Mepilex transfer AG     []? Cover and Secure with:                   []? Gauze        []? Ocampo Prophet           []?  Kerlix              []? Foam          []? Super Absorbant    [x]? ABD                                      []? ACE Wrap            []? Other:               Avoid contact of tape with skin.     []? Change dressing:  []? Daily           []? Every Other Day    []? Twice per week              []? Three times per week        []? Do Not Change Dressing    []? Other:                               Compression:  Apply:  []? Multilayer Compression Wrap:      []? RightLeg      []? Left Leg                                 []?Profore            []? Profore Lite             []?Unna                 []? Do not get leg(s) with wrap wet. If wraps become too tight call the center or completely remove the wrap. [x]? Elevate leg(s) above the level of the heart when sitting. [x]? Avoid prolonged standing in one place.        Dietary:  []? Diet as tolerated:   []? Calorie Diabetic Diet:         [x]? No Added Salt:  []? Increase Protein:   []? Other:              Activity:  [x]? Activity as tolerated:    []? Patient has no activity restrictions       []? Strict Bedrest:          []? Remain off Work:       []? May return to full duty work:                                               []? Return to work with restrictions:      Susan Jones [address] Information: Should you experience any significant changes in your wound(s) or have questions about your wound care, please contact Dane Melgar 26 at [place] [address] 8:00 am - 4:30. If you need help with your wound outside of these hours and cannot wait until we are again available, contact your PCP or go to the hospital emergency room.      PLEASE NOTE: IF [facility], CONTINUE TO USE THE SUPPLIES YOU HAVE AVAILABLE UNTIL YOU ARE ABLE TO [facility]. IT IS MOST IMPORTANT TO KEEP THE WOUND COVERED AT ALL TIMES.     [x]? Dr. Bethany Meier   []? Dr. Jo Parker  []? Shanda Foster [facility]  []?  Dr. Magdi Tony

## 2020-12-03 NOTE — PROGRESS NOTES
[address]   Progress Note and Procedure Note     [facility] RECORD NUMBER:  [id number]  AGE: 77 y.o. RACE WHITE OR   BLACK OR   GENDER: male  : 1955  EPISODE DATE:  12/3/2020     Collaborating Physician: Tori Fan MD    Subjective: Patient states he has been doing well at this time. He states he has no complaints. Chief Complaint   Patient presents with    Wound Check     LT Lower leg         HISTORY of PRESENT ILLNESS HPI    Nahed Faria is a 77 y.o. male who presents today for wound/ulcer evaluation. History of Wound Context: bilateral lower extremity ulcerations. h  Wound/Ulcer Pain Timing/Severity: none  Quality of pain: none  Severity:  0 / 10   Modifying Factors: None  Associated Signs/Symptoms: edema    Ulcer Identification:  Ulcer Type: venous, diabetic and lymphedema    Contributing Factors: edema, venous stasis, lymphedema, diabetes and poor glucose control    Wound: healed at this time. PAST MEDICAL HISTORY    Past Medical History:   Diagnosis Date    Arthritis     Chronic obstructive pulmonary disease ([address]     Hypertension     Sleep apnea         PAST SURGICAL HISTORY    Past Surgical History:   Procedure Laterality Date    HX GI         FAMILY HISTORY    Family History   Problem Relation Age of Onset    Hypertension Child        SOCIAL HISTORY    Social History     Tobacco Use    Smoking status: Never Smoker    Smokeless tobacco: Never Used   Substance Use Topics    Alcohol use: Never     Frequency: Never    Drug use: Never       ALLERGIES    No Known Allergies    MEDICATIONS    Current Outpatient Medications on File Prior to Encounter   Medication Sig Dispense Refill    aspirin delayed-release 81 mg tablet TAKE ONE TABLET BY MOUTH ONE TIME DAILY      multivitamin (ONE A DAY) tablet Take 1 Tab by mouth daily.  amLODIPine (NORVASC) 10 mg tablet Take 10 mg by mouth daily.       metoprolol succinate (TOPROL-XL) 25 mg XL tablet Take 25 mg by mouth daily.  furosemide (LASIX) 40 mg tablet Take 40 mg by mouth daily.  naproxen (NAPROSYN) 250 mg tablet Take 250 mg by mouth two (2) times daily (with meals). No current facility-administered medications on file prior to encounter. REVIEW OF SYSTEMS    Pertinent items are noted in HPI. Objective: There were no vitals taken for this visit. Wt Readings from Last 3 Encounters:   11/20/20 (!) 181.4 kg (400 lb)   11/17/20 (!) 181.4 kg (400 lb)   09/24/20 (!) 181.4 kg (400 lb)       PHYSICAL EXAM    Constitutional: Patient is awake, ambulating with cane devices, no acute distress  Head: normocephalic, atraumatic. Musculoskeletal: bilateral edema noted but improved from previous visit  Wound: to the right leg healed. Left leg healed. Behavioral/Psych: patient is pleasant, cooperative, awake and alert    Assessment:      Problem List Items Addressed This Visit        Circulatory    Peripheral venous insufficiency    Atherosclerosis of native artery of left lower extremity with ulceration of calf (HCC)       Other    Morbid obesity (HCC)    Lymphedema    RESOLVED: Non-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 exposed ([address]    RESOLVED: Non-pressure chronic ulcer of right calf with fat layer exposed ([address]            Plan:     Patient was instructed to continue elevating his legs and using his lymphedema pumps. Patient discharged from the wound healing center at this time. He was instructed to do daily foot and leg checks. He was instructed to call if any new wounds arise. All questions were answered at this visit.      Treatment Note please see attached Discharge Instructions    Written patient dismissal instructions given to patient or POA.          Electronically signed by Andreia Clayton PA-C on 12/3/2020 at 12:10 PM

## 2020-12-09 ENCOUNTER — HOSPITAL ENCOUNTER (EMERGENCY)
Age: 65
Discharge: HOME | End: 2020-12-09
Attending: EMERGENCY MEDICINE
Payer: MEDICARE

## 2020-12-09 VITALS
HEIGHT: 67 IN | SYSTOLIC BLOOD PRESSURE: 139 MMHG | DIASTOLIC BLOOD PRESSURE: 87 MMHG | WEIGHT: 315 LBS | TEMPERATURE: 97.8 F | HEART RATE: 89 BPM | BODY MASS INDEX: 49.44 KG/M2 | OXYGEN SATURATION: 96 % | RESPIRATION RATE: 22 BRPM

## 2020-12-09 DIAGNOSIS — Z48.02 ENCOUNTER FOR REMOVAL OF SUTURES: Primary | ICD-10-CM

## 2020-12-09 PROCEDURE — 75810000275 HC EMERGENCY DEPT VISIT NO LEVEL OF CARE

## 2020-12-09 NOTE — ED TRIAGE NOTES
Here for suture removal for sutures in the right hand; states that he had transportation issues and was to have them out 3 days ago

## 2020-12-09 NOTE — DISCHARGE INSTRUCTIONS
Return to emergency room for any new or worsening symptoms.   Apply some Neosporin to the area after the sutures have been removed for the next 5 days

## 2020-12-09 NOTE — ED PROVIDER NOTES
EMERGENCY DEPARTMENT HISTORY AND PHYSICAL EXAM      Date: 12/9/2020  Patient Name: Colton Teixeira    History of Presenting Illness     Chief Complaint   Patient presents with    Suture Removal       History Provided By: Patient    HPI: Colton Teixeira, 77 y.o. male with a past medical history significant hypertension, obesity, osteoarthritis and COPD presents to the ED with cc of suture removal from right palm of his hand. No complications. Sutures have been been present for approximately 10 days. There are no other complaints, changes, or physical findings at this time. PCP: Other, MD Palomo    No current facility-administered medications on file prior to encounter. Current Outpatient Medications on File Prior to Encounter   Medication Sig Dispense Refill    aspirin delayed-release 81 mg tablet TAKE ONE TABLET BY MOUTH ONE TIME DAILY      multivitamin (ONE A DAY) tablet Take 1 Tab by mouth daily.  amLODIPine (NORVASC) 10 mg tablet Take 10 mg by mouth daily.  metoprolol succinate (TOPROL-XL) 25 mg XL tablet Take 25 mg by mouth daily.  furosemide (LASIX) 40 mg tablet Take 40 mg by mouth daily.  naproxen (NAPROSYN) 250 mg tablet Take 250 mg by mouth two (2) times daily (with meals). Past History     Past Medical History:  Past Medical History:   Diagnosis Date    Arthritis     Chronic obstructive pulmonary disease ([address]     Hypertension     Sleep apnea        Past Surgical History:  Past Surgical History:   Procedure Laterality Date    HX GI         Family History:  Family History   Problem Relation Age of Onset    Hypertension Child        Social History:  Social History     Tobacco Use    Smoking status: Never Smoker    Smokeless tobacco: Never Used   Substance Use Topics    Alcohol use: Never     Frequency: Never    Drug use: Never       Allergies:  No Known Allergies      Review of Systems     Review of Systems   Constitutional: Negative. HENT: Negative. Eyes: Negative. Respiratory: Negative. Cardiovascular: Negative. Gastrointestinal: Negative. Endocrine: Negative. Genitourinary: Negative. Musculoskeletal: Negative. Skin:        Healing wound palm of right hand   Allergic/Immunologic: Negative. Neurological: Negative. Hematological: Negative. Psychiatric/Behavioral: Negative. All other systems reviewed and are negative. Physical Exam     Physical Exam  Vitals signs and nursing note reviewed. Constitutional:       General: He is not in acute distress. Appearance: He is well-developed. He is not toxic-appearing or diaphoretic. HENT:      Head: Normocephalic and atraumatic. Nose: Nose normal.      Mouth/Throat:      Mouth: Mucous membranes are moist.      Pharynx: Oropharynx is clear. Eyes:      Extraocular Movements: Extraocular movements intact. Pupils: Pupils are equal, round, and reactive to light. Neck:      Musculoskeletal: Normal range of motion and neck supple. Cardiovascular:      Rate and Rhythm: Normal rate and regular rhythm. Heart sounds: Normal heart sounds. Pulmonary:      Effort: Pulmonary effort is normal. No respiratory distress. Breath sounds: Normal breath sounds. Chest:      Chest wall: No mass or tenderness. Abdominal:      General: Bowel sounds are normal. There is no abdominal bruit. Palpations: Abdomen is soft. There is no hepatomegaly. Tenderness: There is no abdominal tenderness. There is no rebound. Musculoskeletal: Normal range of motion. Right lower leg: He exhibits no tenderness. No edema. Left lower leg: He exhibits no tenderness. No edema. Skin:     General: Skin is warm and dry. Capillary Refill: Capillary refill takes less than 2 seconds. Comments: Healing wound right hand, with sutures present. Neurological:      General: No focal deficit present. Mental Status: He is alert and oriented to person, place, and time. Psychiatric:         Mood and Affect: Mood normal.         Behavior: Behavior normal.         Lab and Diagnostic Study Results     Labs -   No results found for this or any previous visit (from the past 12 hour(s)). Radiologic Studies -     CT Results  (Last 48 hours)    None        CXR Results  (Last 48 hours)    None            Medical Decision Making     - I am the first provider for this patient. - I reviewed the vital signs, available nursing notes, past medical history, past surgical history, family history and social history. - Initial assessment performed. The patients presenting problems have been discussed, and they are in agreement with the care plan formulated and outlined with them. I have encouraged them to ask questions as they arise throughout their visit. Vital Signs-Reviewed the patient's vital signs. Patient Vitals for the past 12 hrs:   Temp Pulse Resp BP SpO2   12/09/20 1601 97.8 °F (36.6 °C) 89 22 139/87 96 %       Records Reviewed: Nursing Notes    ED Course/Provider Notes (Medical Decision Making): Uneventful ED course, clinical improvement with therapy, patient will be discharged to followup with PCP as directed    Disposition     Disposition: Condition stable and improved  DC- Adult Discharges: All of the diagnostic tests were reviewed and questions answered. Diagnosis, care plan and treatment options were discussed. The patient understands the instructions and will follow up as directed. The patients results have been reviewed with them. They have been counseled regarding their diagnosis. The patient verbally convey understanding and agreement of the signs, symptoms, diagnosis, treatment and prognosis and additionally agrees to follow up as recommended with their PCP in 24 - 48 hours. They also agree with the care-plan and convey that all of their questions have been answered.   I have also put together some discharge instructions for them that include: 1) educational information regarding their diagnosis, 2) how to care for their diagnosis at home, as well a 3) list of reasons why they would want to return to the ED prior to their follow-up appointment, should their condition change. Discharged    DISCHARGE PLAN:  1. Current Discharge Medication List      CONTINUE these medications which have NOT CHANGED    Details   aspirin delayed-release 81 mg tablet TAKE ONE TABLET BY MOUTH ONE TIME DAILY      multivitamin (ONE A DAY) tablet Take 1 Tab by mouth daily. amLODIPine (NORVASC) 10 mg tablet Take 10 mg by mouth daily. metoprolol succinate (TOPROL-XL) 25 mg XL tablet Take 25 mg by mouth daily. furosemide (LASIX) 40 mg tablet Take 40 mg by mouth daily. naproxen (NAPROSYN) 250 mg tablet Take 250 mg by mouth two (2) times daily (with meals). 2.   Follow-up Information     Follow up With Specialties Details Why Contact Info    Follow-up with PCP of your choice as needed   As needed         3. Return to ED if worse   4. Discharge Medication List as of 12/9/2020  4:27 PM            Diagnosis     Clinical Impression:   1. Encounter for removal of sutures        Attestations:    Mike Lopez MD    Please note that this dictation was completed with [employer], the [employer] voice recognition software. Quite often unanticipated grammatical, syntax, homophones, and other interpretive errors are inadvertently transcribed by the computer software. Please disregard these errors. Please excuse any errors that have escaped final proofreading. Thank you.

## 2021-10-11 ENCOUNTER — APPOINTMENT (OUTPATIENT)
Dept: CT IMAGING | Age: 66
DRG: 871 | End: 2021-10-11
Attending: FAMILY MEDICINE
Payer: MEDICARE

## 2021-10-11 ENCOUNTER — APPOINTMENT (OUTPATIENT)
Dept: GENERAL RADIOLOGY | Age: 66
DRG: 871 | End: 2021-10-11
Attending: FAMILY MEDICINE
Payer: MEDICARE

## 2021-10-11 ENCOUNTER — HOSPITAL ENCOUNTER (INPATIENT)
Age: 66
LOS: 8 days | Discharge: NURSING, REHAB OR LONG-TERM CARE | DRG: 871 | End: 2021-10-20
Attending: FAMILY MEDICINE | Admitting: HOSPITALIST
Payer: MEDICARE

## 2021-10-11 DIAGNOSIS — I48.91 ATRIAL FIBRILLATION WITH RVR (HCC): ICD-10-CM

## 2021-10-11 DIAGNOSIS — R78.81 COAG NEGATIVE STAPHYLOCOCCUS BACTEREMIA: ICD-10-CM

## 2021-10-11 DIAGNOSIS — J96.01 ACUTE RESPIRATORY FAILURE WITH HYPOXIA (HCC): ICD-10-CM

## 2021-10-11 DIAGNOSIS — R65.10 SIRS (SYSTEMIC INFLAMMATORY RESPONSE SYNDROME) (HCC): ICD-10-CM

## 2021-10-11 DIAGNOSIS — L03.115 BILATERAL LOWER LEG CELLULITIS: ICD-10-CM

## 2021-10-11 DIAGNOSIS — B95.7 COAG NEGATIVE STAPHYLOCOCCUS BACTEREMIA: ICD-10-CM

## 2021-10-11 DIAGNOSIS — A41.9 SEPSIS, DUE TO UNSPECIFIED ORGANISM, UNSPECIFIED WHETHER ACUTE ORGAN DYSFUNCTION PRESENT (HCC): Primary | ICD-10-CM

## 2021-10-11 DIAGNOSIS — L03.116 BILATERAL LOWER LEG CELLULITIS: ICD-10-CM

## 2021-10-11 DIAGNOSIS — E87.6 HYPOKALEMIA: ICD-10-CM

## 2021-10-11 DIAGNOSIS — E83.42 HYPOMAGNESEMIA: ICD-10-CM

## 2021-10-11 DIAGNOSIS — R41.82 ALTERED MENTAL STATUS, UNSPECIFIED ALTERED MENTAL STATUS TYPE: ICD-10-CM

## 2021-10-11 DIAGNOSIS — D72.829 LEUKOCYTOSIS, UNSPECIFIED TYPE: ICD-10-CM

## 2021-10-11 DIAGNOSIS — I21.4 NSTEMI (NON-ST ELEVATED MYOCARDIAL INFARCTION) (HCC): ICD-10-CM

## 2021-10-11 LAB
ALBUMIN SERPL-MCNC: 2.6 G/DL (ref 3.5–5)
ALBUMIN/GLOB SERPL: 0.5 {RATIO} (ref 1.1–2.2)
ALP SERPL-CCNC: 82 U/L (ref 45–117)
ALT SERPL-CCNC: 27 U/L (ref 12–78)
ANION GAP SERPL CALC-SCNC: 14 MMOL/L (ref 5–15)
AST SERPL W P-5'-P-CCNC: 32 U/L (ref 15–37)
BILIRUB SERPL-MCNC: 0.7 MG/DL (ref 0.2–1)
BUN SERPL-MCNC: 17 MG/DL (ref 6–20)
BUN/CREAT SERPL: 13 (ref 12–20)
CA-I BLD-MCNC: 8.2 MG/DL (ref 8.5–10.1)
CHLORIDE SERPL-SCNC: 95 MMOL/L (ref 97–108)
CO2 SERPL-SCNC: 24 MMOL/L (ref 21–32)
CREAT SERPL-MCNC: 1.32 MG/DL (ref 0.7–1.3)
ETHANOL SERPL-MCNC: <4 MG/DL
GLOBULIN SER CALC-MCNC: 5.1 G/DL (ref 2–4)
GLUCOSE SERPL-MCNC: 146 MG/DL (ref 65–100)
LACTATE SERPL-SCNC: 8 MMOL/L (ref 0.4–2)
MAGNESIUM SERPL-MCNC: 1.2 MG/DL (ref 1.6–2.4)
POTASSIUM SERPL-SCNC: 2.9 MMOL/L (ref 3.5–5.1)
PROT SERPL-MCNC: 7.7 G/DL (ref 6.4–8.2)
SODIUM SERPL-SCNC: 133 MMOL/L (ref 136–145)

## 2021-10-11 PROCEDURE — 72125 CT NECK SPINE W/O DYE: CPT

## 2021-10-11 PROCEDURE — 84484 ASSAY OF TROPONIN QUANT: CPT

## 2021-10-11 PROCEDURE — 93005 ELECTROCARDIOGRAM TRACING: CPT

## 2021-10-11 PROCEDURE — 87040 BLOOD CULTURE FOR BACTERIA: CPT

## 2021-10-11 PROCEDURE — 83605 ASSAY OF LACTIC ACID: CPT

## 2021-10-11 PROCEDURE — 84145 PROCALCITONIN (PCT): CPT

## 2021-10-11 PROCEDURE — 83735 ASSAY OF MAGNESIUM: CPT

## 2021-10-11 PROCEDURE — 74011250636 HC RX REV CODE- 250/636: Performed by: FAMILY MEDICINE

## 2021-10-11 PROCEDURE — 74011250637 HC RX REV CODE- 250/637: Performed by: FAMILY MEDICINE

## 2021-10-11 PROCEDURE — 82077 ASSAY SPEC XCP UR&BREATH IA: CPT

## 2021-10-11 PROCEDURE — 83880 ASSAY OF NATRIURETIC PEPTIDE: CPT

## 2021-10-11 PROCEDURE — 82803 BLOOD GASES ANY COMBINATION: CPT

## 2021-10-11 PROCEDURE — 70450 CT HEAD/BRAIN W/O DYE: CPT

## 2021-10-11 PROCEDURE — 96361 HYDRATE IV INFUSION ADD-ON: CPT

## 2021-10-11 PROCEDURE — 51702 INSERT TEMP BLADDER CATH: CPT

## 2021-10-11 PROCEDURE — 99285 EMERGENCY DEPT VISIT HI MDM: CPT

## 2021-10-11 PROCEDURE — 96372 THER/PROPH/DIAG INJ SC/IM: CPT

## 2021-10-11 PROCEDURE — 80053 COMPREHEN METABOLIC PANEL: CPT

## 2021-10-11 PROCEDURE — 71045 X-RAY EXAM CHEST 1 VIEW: CPT

## 2021-10-11 PROCEDURE — 85025 COMPLETE CBC W/AUTO DIFF WBC: CPT

## 2021-10-11 PROCEDURE — 96374 THER/PROPH/DIAG INJ IV PUSH: CPT

## 2021-10-11 PROCEDURE — 36415 COLL VENOUS BLD VENIPUNCTURE: CPT

## 2021-10-11 RX ORDER — ACETAMINOPHEN 500 MG
1000 TABLET ORAL
Status: COMPLETED | OUTPATIENT
Start: 2021-10-11 | End: 2021-10-11

## 2021-10-11 RX ORDER — SODIUM CHLORIDE 9 MG/ML
1000 INJECTION, SOLUTION INTRAVENOUS CONTINUOUS
Status: DISCONTINUED | OUTPATIENT
Start: 2021-10-11 | End: 2021-10-12

## 2021-10-11 RX ORDER — LORAZEPAM 2 MG/ML
2 INJECTION INTRAMUSCULAR ONCE
Status: COMPLETED | OUTPATIENT
Start: 2021-10-11 | End: 2021-10-11

## 2021-10-11 RX ORDER — HALOPERIDOL 5 MG/ML
5 INJECTION INTRAMUSCULAR ONCE
Status: COMPLETED | OUTPATIENT
Start: 2021-10-11 | End: 2021-10-11

## 2021-10-11 RX ORDER — ACETAMINOPHEN 650 MG/1
975 SUPPOSITORY RECTAL
Status: DISCONTINUED | OUTPATIENT
Start: 2021-10-11 | End: 2021-10-11

## 2021-10-11 RX ORDER — ACETAMINOPHEN 500 MG
1000 TABLET ORAL
Status: DISCONTINUED | OUTPATIENT
Start: 2021-10-11 | End: 2021-10-11

## 2021-10-11 RX ORDER — ACETAMINOPHEN 650 MG/1
650 SUPPOSITORY RECTAL
Status: DISCONTINUED | OUTPATIENT
Start: 2021-10-11 | End: 2021-10-11

## 2021-10-11 RX ADMIN — ACETAMINOPHEN 1000 MG: 500 TABLET ORAL at 22:57

## 2021-10-11 RX ADMIN — HALOPERIDOL LACTATE 5 MG: 5 INJECTION, SOLUTION INTRAMUSCULAR at 22:49

## 2021-10-11 RX ADMIN — LORAZEPAM 2 MG: 2 INJECTION INTRAMUSCULAR; INTRAVENOUS at 22:29

## 2021-10-11 RX ADMIN — SODIUM CHLORIDE 1000 ML/HR: 9 INJECTION, SOLUTION INTRAVENOUS at 22:40

## 2021-10-11 NOTE — Clinical Note
TRANSFER - OUT REPORT:     Verbal report given to: Camilo Sigala, (at bedside). Report consisted of patient's Situation, Background, Assessment and   Recommendations(SBAR). Opportunity for questions and clarification was provided. Patient transported with a Registered Nurse. Patient transported to: PACU.

## 2021-10-11 NOTE — Clinical Note
Sheath #1: Sheath: inserted. Sheath inserted/placed in the right radial  artery.  6fr glidesheath slender

## 2021-10-11 NOTE — Clinical Note
Single view of the obtained using power injection. Total volume = 25 mL. Rate = 10 mL/sec. Pressure = 500 PSI.  LV gram

## 2021-10-12 ENCOUNTER — APPOINTMENT (OUTPATIENT)
Dept: CT IMAGING | Age: 66
DRG: 871 | End: 2021-10-12
Attending: HOSPITALIST
Payer: MEDICARE

## 2021-10-12 PROBLEM — G93.41 METABOLIC ENCEPHALOPATHY: Status: ACTIVE | Noted: 2021-10-12

## 2021-10-12 PROBLEM — J18.9 PNEUMONIA: Status: ACTIVE | Noted: 2021-10-12

## 2021-10-12 PROBLEM — A41.9 SEPSIS (HCC): Status: ACTIVE | Noted: 2021-10-12

## 2021-10-12 LAB
ALBUMIN SERPL-MCNC: 2.5 G/DL (ref 3.5–5)
ALBUMIN/GLOB SERPL: 0.5 {RATIO} (ref 1.1–2.2)
ALP SERPL-CCNC: 71 U/L (ref 45–117)
ALT SERPL-CCNC: 42 U/L (ref 12–78)
ANION GAP SERPL CALC-SCNC: 11 MMOL/L (ref 5–15)
APPEARANCE UR: ABNORMAL
APTT PPP: 30.8 SEC (ref 21.2–34.1)
APTT PPP: 39 SEC (ref 21.2–34.1)
AST SERPL W P-5'-P-CCNC: 132 U/L (ref 15–37)
ATRIAL RATE: 120 BPM
BACTERIA URNS QL MICRO: NEGATIVE /HPF
BACTERIA URNS QL MICRO: NEGATIVE /HPF
BASE EXCESS BLDA CALC-SCNC: 2 MMOL/L (ref 0–2)
BASOPHILS # BLD: 0 K/UL (ref 0–0.1)
BASOPHILS # BLD: 0 K/UL (ref 0–0.1)
BASOPHILS NFR BLD: 0 % (ref 0–1)
BASOPHILS NFR BLD: 0 % (ref 0–1)
BILIRUB SERPL-MCNC: 0.7 MG/DL (ref 0.2–1)
BILIRUB UR QL: NEGATIVE
BNP SERPL-MCNC: 1812 PG/ML
BUN SERPL-MCNC: 17 MG/DL (ref 6–20)
BUN/CREAT SERPL: 15 (ref 12–20)
CA-I BLD-MCNC: 7.9 MG/DL (ref 8.5–10.1)
CALCULATED R AXIS, ECG10: 69 DEGREES
CALCULATED T AXIS, ECG11: 13 DEGREES
CHLORIDE SERPL-SCNC: 100 MMOL/L (ref 97–108)
CHOLEST SERPL-MCNC: 104 MG/DL
CK SERPL-CCNC: 5590 U/L (ref 39–308)
CO2 SERPL-SCNC: 25 MMOL/L (ref 21–32)
COLOR UR: ABNORMAL
COVID-19 RAPID TEST, COVR: NOT DETECTED
CREAT SERPL-MCNC: 1.17 MG/DL (ref 0.7–1.3)
CRP SERPL-MCNC: 10.1 MG/DL (ref 0–0.6)
DIAGNOSIS, 93000: NORMAL
DIFFERENTIAL METHOD BLD: ABNORMAL
DIFFERENTIAL METHOD BLD: ABNORMAL
EOSINOPHIL # BLD: 0 K/UL (ref 0–0.4)
EOSINOPHIL # BLD: 0 K/UL (ref 0–0.4)
EOSINOPHIL NFR BLD: 0 % (ref 0–7)
EOSINOPHIL NFR BLD: 0 % (ref 0–7)
ERYTHROCYTE [DISTWIDTH] IN BLOOD BY AUTOMATED COUNT: 15.2 % (ref 11.5–14.5)
ERYTHROCYTE [DISTWIDTH] IN BLOOD BY AUTOMATED COUNT: 15.5 % (ref 11.5–14.5)
FIBRINOGEN PPP-MCNC: 733 MG/DL (ref 220–535)
FIO2 ON VENT: 28 %
GLOBULIN SER CALC-MCNC: 4.6 G/DL (ref 2–4)
GLUCOSE BLD STRIP.AUTO-MCNC: 106 MG/DL (ref 65–117)
GLUCOSE BLD STRIP.AUTO-MCNC: 118 MG/DL (ref 65–117)
GLUCOSE BLD STRIP.AUTO-MCNC: 122 MG/DL (ref 65–117)
GLUCOSE BLD STRIP.AUTO-MCNC: 129 MG/DL (ref 65–117)
GLUCOSE SERPL-MCNC: 122 MG/DL (ref 65–100)
GLUCOSE UR STRIP.AUTO-MCNC: NEGATIVE MG/DL
HCO3 BLDA-SCNC: 26 MMOL/L (ref 22–26)
HCT VFR BLD AUTO: 36.4 % (ref 36.6–50.3)
HCT VFR BLD AUTO: 36.4 % (ref 36.6–50.3)
HDLC SERPL-MCNC: 54 MG/DL
HDLC SERPL: 1.9 {RATIO} (ref 0–5)
HGB BLD-MCNC: 12.6 G/DL (ref 12.1–17)
HGB BLD-MCNC: 12.6 G/DL (ref 12.1–17)
HGB UR QL STRIP: ABNORMAL
IMM GRANULOCYTES # BLD AUTO: 0 K/UL
IMM GRANULOCYTES # BLD AUTO: 0 K/UL
IMM GRANULOCYTES NFR BLD AUTO: 0 %
IMM GRANULOCYTES NFR BLD AUTO: 0 %
INR PPP: 1.5 (ref 0.9–1.1)
KETONES UR QL STRIP.AUTO: 20 MG/DL
LACTATE SERPL-SCNC: 2.4 MMOL/L (ref 0.4–2)
LACTATE SERPL-SCNC: 3.2 MMOL/L (ref 0.4–2)
LDLC SERPL CALC-MCNC: 35.8 MG/DL (ref 0–100)
LEUKOCYTE ESTERASE UR QL STRIP.AUTO: NEGATIVE
LIPID PROFILE,FLP: NORMAL
LYMPHOCYTES # BLD: 0.3 K/UL (ref 0.8–3.5)
LYMPHOCYTES # BLD: 1.2 K/UL (ref 0.8–3.5)
LYMPHOCYTES NFR BLD: 1 % (ref 12–49)
LYMPHOCYTES NFR BLD: 4 % (ref 12–49)
MAGNESIUM SERPL-MCNC: 1.8 MG/DL (ref 1.6–2.4)
MCH RBC QN AUTO: 26.4 PG (ref 26–34)
MCH RBC QN AUTO: 26.6 PG (ref 26–34)
MCHC RBC AUTO-ENTMCNC: 34.6 G/DL (ref 30–36.5)
MCHC RBC AUTO-ENTMCNC: 34.6 G/DL (ref 30–36.5)
MCV RBC AUTO: 76.3 FL (ref 80–99)
MCV RBC AUTO: 77 FL (ref 80–99)
MONOCYTES # BLD: 0.9 K/UL (ref 0–1)
MONOCYTES # BLD: 2.3 K/UL (ref 0–1)
MONOCYTES NFR BLD: 3 % (ref 5–13)
MONOCYTES NFR BLD: 8 % (ref 5–13)
MUCOUS THREADS URNS QL MICRO: ABNORMAL /LPF
NEUTS BAND NFR BLD MANUAL: 14 % (ref 0–6)
NEUTS BAND NFR BLD MANUAL: 2 % (ref 0–6)
NEUTS SEG # BLD: 26.2 K/UL (ref 1.8–8)
NEUTS SEG # BLD: 28.9 K/UL (ref 1.8–8)
NEUTS SEG NFR BLD: 77 % (ref 32–75)
NEUTS SEG NFR BLD: 91 % (ref 32–75)
NITRITE UR QL STRIP.AUTO: NEGATIVE
NRBC # BLD: 0 K/UL (ref 0–0.01)
NRBC # BLD: 0 K/UL (ref 0–0.01)
NRBC BLD-RTO: 0 PER 100 WBC
NRBC BLD-RTO: 0 PER 100 WBC
PCO2 BLDA: 39 MMHG (ref 35–45)
PERFORMED BY, TECHID: ABNORMAL
PERFORMED BY, TECHID: NORMAL
PH BLDA: 7.43 [PH] (ref 7.35–7.45)
PH UR STRIP: 5 [PH] (ref 5–8)
PHOSPHATE SERPL-MCNC: 2.9 MG/DL (ref 2.6–4.7)
PLATELET # BLD AUTO: 206 K/UL (ref 150–400)
PLATELET # BLD AUTO: 249 K/UL (ref 150–400)
PMV BLD AUTO: 9.8 FL (ref 8.9–12.9)
PMV BLD AUTO: 9.9 FL (ref 8.9–12.9)
PO2 BLDA: 91 MMHG (ref 75–100)
POTASSIUM SERPL-SCNC: 3.3 MMOL/L (ref 3.5–5.1)
PROCALCITONIN SERPL-MCNC: 15.16 NG/ML
PROT SERPL-MCNC: 7.1 G/DL (ref 6.4–8.2)
PROT UR STRIP-MCNC: 100 MG/DL
PROTHROMBIN TIME: 17.3 SEC (ref 11.9–14.7)
Q-T INTERVAL, ECG07: 352 MS
QRS DURATION, ECG06: 106 MS
QTC CALCULATION (BEZET), ECG08: 525 MS
RBC # BLD AUTO: 4.73 M/UL (ref 4.1–5.7)
RBC # BLD AUTO: 4.77 M/UL (ref 4.1–5.7)
RBC #/AREA URNS HPF: ABNORMAL /HPF (ref 0–5)
RBC #/AREA URNS HPF: NORMAL /HPF (ref 0–5)
RBC MORPH BLD: ABNORMAL
RBC MORPH BLD: ABNORMAL
SAO2 % BLD: 97 %
SODIUM SERPL-SCNC: 136 MMOL/L (ref 136–145)
SP GR UR REFRACTOMETRY: 1.02 (ref 1–1.03)
SPECIMEN SOURCE: NORMAL
THERAPEUTIC RANGE,PTTT: ABNORMAL SEC (ref 82–109)
THERAPEUTIC RANGE,PTTT: NORMAL SEC (ref 82–109)
TRIGL SERPL-MCNC: 71 MG/DL (ref ?–150)
TROPONIN-HIGH SENSITIVITY: 1774 NG/L (ref 0–76)
TROPONIN-HIGH SENSITIVITY: 2325 NG/L (ref 0–76)
TROPONIN-HIGH SENSITIVITY: 263 NG/L (ref 0–76)
TSH SERPL DL<=0.05 MIU/L-ACNC: 2.01 UIU/ML (ref 0.36–3.74)
URATE SERPL-MCNC: 5.6 MG/DL (ref 3.5–7.2)
UROBILINOGEN UR QL STRIP.AUTO: 0.1 EU/DL (ref 0.1–1)
VENTRICULAR RATE, ECG03: 134 BPM
VLDLC SERPL CALC-MCNC: 14.2 MG/DL
WBC # BLD AUTO: 28.8 K/UL (ref 4.1–11.1)
WBC # BLD AUTO: 31 K/UL (ref 4.1–11.1)
WBC URNS QL MICRO: ABNORMAL /HPF (ref 0–4)
WBC URNS QL MICRO: NORMAL /HPF (ref 0–4)

## 2021-10-12 PROCEDURE — 82550 ASSAY OF CK (CPK): CPT

## 2021-10-12 PROCEDURE — 86140 C-REACTIVE PROTEIN: CPT

## 2021-10-12 PROCEDURE — 85610 PROTHROMBIN TIME: CPT

## 2021-10-12 PROCEDURE — 74011000258 HC RX REV CODE- 258: Performed by: FAMILY MEDICINE

## 2021-10-12 PROCEDURE — 85730 THROMBOPLASTIN TIME PARTIAL: CPT

## 2021-10-12 PROCEDURE — 83605 ASSAY OF LACTIC ACID: CPT

## 2021-10-12 PROCEDURE — 84100 ASSAY OF PHOSPHORUS: CPT

## 2021-10-12 PROCEDURE — 85384 FIBRINOGEN ACTIVITY: CPT

## 2021-10-12 PROCEDURE — 87641 MR-STAPH DNA AMP PROBE: CPT

## 2021-10-12 PROCEDURE — 84443 ASSAY THYROID STIM HORMONE: CPT

## 2021-10-12 PROCEDURE — 74011250637 HC RX REV CODE- 250/637: Performed by: HOSPITALIST

## 2021-10-12 PROCEDURE — 85025 COMPLETE CBC W/AUTO DIFF WBC: CPT

## 2021-10-12 PROCEDURE — 71250 CT THORAX DX C-: CPT

## 2021-10-12 PROCEDURE — 87635 SARS-COV-2 COVID-19 AMP PRB: CPT

## 2021-10-12 PROCEDURE — 83735 ASSAY OF MAGNESIUM: CPT

## 2021-10-12 PROCEDURE — 65610000006 HC RM INTENSIVE CARE

## 2021-10-12 PROCEDURE — 77010033678 HC OXYGEN DAILY

## 2021-10-12 PROCEDURE — 74011250636 HC RX REV CODE- 250/636: Performed by: INTERNAL MEDICINE

## 2021-10-12 PROCEDURE — 74011250636 HC RX REV CODE- 250/636: Performed by: HOSPITALIST

## 2021-10-12 PROCEDURE — 82962 GLUCOSE BLOOD TEST: CPT

## 2021-10-12 PROCEDURE — 81001 URINALYSIS AUTO W/SCOPE: CPT

## 2021-10-12 PROCEDURE — 84550 ASSAY OF BLOOD/URIC ACID: CPT

## 2021-10-12 PROCEDURE — 74011250636 HC RX REV CODE- 250/636: Performed by: FAMILY MEDICINE

## 2021-10-12 PROCEDURE — 74011000258 HC RX REV CODE- 258: Performed by: HOSPITALIST

## 2021-10-12 PROCEDURE — 36415 COLL VENOUS BLD VENIPUNCTURE: CPT

## 2021-10-12 PROCEDURE — 80061 LIPID PANEL: CPT

## 2021-10-12 PROCEDURE — 74011250637 HC RX REV CODE- 250/637: Performed by: INTERNAL MEDICINE

## 2021-10-12 PROCEDURE — 80053 COMPREHEN METABOLIC PANEL: CPT

## 2021-10-12 RX ORDER — POTASSIUM CHLORIDE 7.45 MG/ML
10 INJECTION INTRAVENOUS ONCE
Status: COMPLETED | OUTPATIENT
Start: 2021-10-12 | End: 2021-10-12

## 2021-10-12 RX ORDER — INSULIN LISPRO 100 [IU]/ML
INJECTION, SOLUTION INTRAVENOUS; SUBCUTANEOUS
Status: DISCONTINUED | OUTPATIENT
Start: 2021-10-12 | End: 2021-10-20 | Stop reason: HOSPADM

## 2021-10-12 RX ORDER — MAGNESIUM SULFATE 100 %
4 CRYSTALS MISCELLANEOUS AS NEEDED
Status: DISCONTINUED | OUTPATIENT
Start: 2021-10-12 | End: 2021-10-20 | Stop reason: HOSPADM

## 2021-10-12 RX ORDER — HEPARIN SODIUM 10000 [USP'U]/100ML
12-25 INJECTION, SOLUTION INTRAVENOUS
Status: DISCONTINUED | OUTPATIENT
Start: 2021-10-12 | End: 2021-10-14

## 2021-10-12 RX ORDER — ATORVASTATIN CALCIUM 40 MG/1
80 TABLET, FILM COATED ORAL DAILY
Status: DISCONTINUED | OUTPATIENT
Start: 2021-10-12 | End: 2021-10-20 | Stop reason: HOSPADM

## 2021-10-12 RX ORDER — ATORVASTATIN CALCIUM 20 MG/1
20 TABLET, FILM COATED ORAL
COMMUNITY
Start: 2021-08-09

## 2021-10-12 RX ORDER — DAPAGLIFLOZIN 10 MG/1
10 TABLET, FILM COATED ORAL DAILY
COMMUNITY
Start: 2021-08-09 | End: 2021-10-20

## 2021-10-12 RX ORDER — HEPARIN SODIUM 1000 [USP'U]/ML
4000 INJECTION, SOLUTION INTRAVENOUS; SUBCUTANEOUS AS NEEDED
Status: DISCONTINUED | OUTPATIENT
Start: 2021-10-12 | End: 2021-10-14

## 2021-10-12 RX ORDER — MAGNESIUM SULFATE HEPTAHYDRATE 40 MG/ML
2 INJECTION, SOLUTION INTRAVENOUS
Status: COMPLETED | OUTPATIENT
Start: 2021-10-12 | End: 2021-10-12

## 2021-10-12 RX ORDER — ENOXAPARIN SODIUM 100 MG/ML
40 INJECTION SUBCUTANEOUS DAILY
Status: DISCONTINUED | OUTPATIENT
Start: 2021-10-12 | End: 2021-10-12

## 2021-10-12 RX ORDER — ACETAMINOPHEN 650 MG/1
650 SUPPOSITORY RECTAL
Status: DISCONTINUED | OUTPATIENT
Start: 2021-10-12 | End: 2021-10-20 | Stop reason: HOSPADM

## 2021-10-12 RX ORDER — DEXTROSE 50 % IN WATER (D50W) INTRAVENOUS SYRINGE
25-50 AS NEEDED
Status: DISCONTINUED | OUTPATIENT
Start: 2021-10-12 | End: 2021-10-20 | Stop reason: HOSPADM

## 2021-10-12 RX ORDER — ONDANSETRON 4 MG/1
4 TABLET, ORALLY DISINTEGRATING ORAL
Status: DISCONTINUED | OUTPATIENT
Start: 2021-10-12 | End: 2021-10-20 | Stop reason: HOSPADM

## 2021-10-12 RX ORDER — HEPARIN SODIUM 5000 [USP'U]/ML
4000 INJECTION, SOLUTION INTRAVENOUS; SUBCUTANEOUS ONCE
Status: COMPLETED | OUTPATIENT
Start: 2021-10-12 | End: 2021-10-12

## 2021-10-12 RX ORDER — HEPARIN SODIUM 1000 [USP'U]/ML
2000 INJECTION, SOLUTION INTRAVENOUS; SUBCUTANEOUS AS NEEDED
Status: DISCONTINUED | OUTPATIENT
Start: 2021-10-12 | End: 2021-10-14

## 2021-10-12 RX ORDER — PANTOPRAZOLE SODIUM 40 MG/1
40 TABLET, DELAYED RELEASE ORAL
Status: DISCONTINUED | OUTPATIENT
Start: 2021-10-12 | End: 2021-10-20 | Stop reason: HOSPADM

## 2021-10-12 RX ORDER — SODIUM CHLORIDE 0.9 % (FLUSH) 0.9 %
5-40 SYRINGE (ML) INJECTION AS NEEDED
Status: DISCONTINUED | OUTPATIENT
Start: 2021-10-12 | End: 2021-10-20 | Stop reason: HOSPADM

## 2021-10-12 RX ORDER — ASPIRIN 325 MG
325 TABLET ORAL ONCE
Status: COMPLETED | OUTPATIENT
Start: 2021-10-12 | End: 2021-10-12

## 2021-10-12 RX ORDER — ACETAMINOPHEN 325 MG/1
650 TABLET ORAL
Status: DISCONTINUED | OUTPATIENT
Start: 2021-10-12 | End: 2021-10-20 | Stop reason: HOSPADM

## 2021-10-12 RX ORDER — ASPIRIN 81 MG/1
81 TABLET ORAL DAILY
Status: DISCONTINUED | OUTPATIENT
Start: 2021-10-12 | End: 2021-10-20 | Stop reason: HOSPADM

## 2021-10-12 RX ORDER — ONDANSETRON 2 MG/ML
4 INJECTION INTRAMUSCULAR; INTRAVENOUS
Status: DISCONTINUED | OUTPATIENT
Start: 2021-10-12 | End: 2021-10-20 | Stop reason: HOSPADM

## 2021-10-12 RX ORDER — SODIUM CHLORIDE, SODIUM LACTATE, POTASSIUM CHLORIDE, CALCIUM CHLORIDE 600; 310; 30; 20 MG/100ML; MG/100ML; MG/100ML; MG/100ML
125 INJECTION, SOLUTION INTRAVENOUS CONTINUOUS
Status: DISPENSED | OUTPATIENT
Start: 2021-10-12 | End: 2021-10-12

## 2021-10-12 RX ORDER — HYDROCHLOROTHIAZIDE 25 MG/1
25 TABLET ORAL DAILY
COMMUNITY
Start: 2021-07-09 | End: 2021-10-20

## 2021-10-12 RX ORDER — SODIUM CHLORIDE 0.9 % (FLUSH) 0.9 %
5-40 SYRINGE (ML) INJECTION EVERY 8 HOURS
Status: DISCONTINUED | OUTPATIENT
Start: 2021-10-12 | End: 2021-10-20 | Stop reason: HOSPADM

## 2021-10-12 RX ADMIN — ACETAMINOPHEN 650 MG: 325 TABLET ORAL at 10:03

## 2021-10-12 RX ADMIN — POTASSIUM CHLORIDE 10 MEQ: 10 INJECTION, SOLUTION INTRAVENOUS at 03:36

## 2021-10-12 RX ADMIN — PIPERACILLIN AND TAZOBACTAM 3.38 G: 3; .375 INJECTION, POWDER, LYOPHILIZED, FOR SOLUTION INTRAVENOUS at 13:26

## 2021-10-12 RX ADMIN — SODIUM CHLORIDE, POTASSIUM CHLORIDE, SODIUM LACTATE AND CALCIUM CHLORIDE 1000 ML: 600; 310; 30; 20 INJECTION, SOLUTION INTRAVENOUS at 01:49

## 2021-10-12 RX ADMIN — PANTOPRAZOLE SODIUM 40 MG: 40 TABLET, DELAYED RELEASE ORAL at 08:19

## 2021-10-12 RX ADMIN — SODIUM CHLORIDE, POTASSIUM CHLORIDE, SODIUM LACTATE AND CALCIUM CHLORIDE 125 ML/HR: 600; 310; 30; 20 INJECTION, SOLUTION INTRAVENOUS at 09:42

## 2021-10-12 RX ADMIN — Medication 10 ML: at 08:21

## 2021-10-12 RX ADMIN — PIPERACILLIN SODIUM AND TAZOBACTAM SODIUM 4.5 G: 4; .5 INJECTION, POWDER, LYOPHILIZED, FOR SOLUTION INTRAVENOUS at 01:41

## 2021-10-12 RX ADMIN — HEPARIN SODIUM 2000 UNITS: 1000 INJECTION, SOLUTION INTRAVENOUS; SUBCUTANEOUS at 18:05

## 2021-10-12 RX ADMIN — ASPIRIN 325 MG ORAL TABLET 325 MG: 325 PILL ORAL at 06:30

## 2021-10-12 RX ADMIN — MAGNESIUM SULFATE HEPTAHYDRATE 2 G: 40 INJECTION, SOLUTION INTRAVENOUS at 01:44

## 2021-10-12 RX ADMIN — SODIUM CHLORIDE 1000 ML/HR: 9 INJECTION, SOLUTION INTRAVENOUS at 00:07

## 2021-10-12 RX ADMIN — SODIUM CHLORIDE, POTASSIUM CHLORIDE, SODIUM LACTATE AND CALCIUM CHLORIDE 125 ML/HR: 600; 310; 30; 20 INJECTION, SOLUTION INTRAVENOUS at 16:39

## 2021-10-12 RX ADMIN — POTASSIUM CHLORIDE 10 MEQ: 10 INJECTION, SOLUTION INTRAVENOUS at 01:44

## 2021-10-12 RX ADMIN — HEPARIN SODIUM 4000 UNITS: 5000 INJECTION INTRAVENOUS; SUBCUTANEOUS at 06:06

## 2021-10-12 RX ADMIN — ASPIRIN 81 MG: 81 TABLET, COATED ORAL at 08:19

## 2021-10-12 RX ADMIN — HEPARIN SODIUM AND DEXTROSE 12 UNITS/KG/HR: 10000; 5 INJECTION INTRAVENOUS at 09:53

## 2021-10-12 RX ADMIN — VANCOMYCIN HYDROCHLORIDE 1500 MG: 10 INJECTION, POWDER, LYOPHILIZED, FOR SOLUTION INTRAVENOUS at 06:31

## 2021-10-12 RX ADMIN — PIPERACILLIN AND TAZOBACTAM 3.38 G: 3; .375 INJECTION, POWDER, LYOPHILIZED, FOR SOLUTION INTRAVENOUS at 20:42

## 2021-10-12 RX ADMIN — Medication 10 ML: at 13:22

## 2021-10-12 RX ADMIN — ATORVASTATIN CALCIUM 80 MG: 40 TABLET, FILM COATED ORAL at 08:20

## 2021-10-12 NOTE — PROGRESS NOTES
10/12/21. PCP is Dr. Nina Dykes, X - seen a few mos ago. D/C Plan @ this time is home & a family member will transport home upon discharge.

## 2021-10-12 NOTE — PROGRESS NOTES
Consult for Vancomycin Dosing by Pharmacy by Dr. Beverley Gunderson provided for this 74y.o. year old male , for indication of Pneumonia (CAP)    Day of Therapy: 1    Goal of Level(s): 15 - 20 mcg/dL    Other Current Antibiotics: Zosyn      Serum Creatinine Creatinine   Date Value Ref Range Status   10/11/2021 1.32 (H) 0.70 - 1.30 mg/dL Final   10/29/2009 0.8 0.6 - 1.3 MG/DL Final      Creatinine Clearance Estimated Creatinine Clearance: 85 mL/min (A) (based on SCr of 1.32 mg/dL (H)). BUN Lab Results   Component Value Date/Time    BUN 17 10/11/2021 11:07 PM    BUN (POC) 18 10/28/2009 03:19 PM      WBC Lab Results   Component Value Date/Time    WBC 31.0 (H) 10/11/2021 11:07 PM      Temp Temp Readings from Last 1 Encounters:   10/12/21 (!) 100.8 °F (38.2 °C)        Last Level: No results found for: VANCT No results found for: VANCR    Ht Readings from Last 1 Encounters:   10/12/21 170.2 cm (67\")        Wt Readings from Last 1 Encounters:   10/12/21 (!) 181.4 kg (400 lb)     Ideal body weight: 66.1 kg (145 lb 11.6 oz)  Adjusted ideal body weight: 112.2 kg (247 lb 7 oz)     Previous Regimen: New Start    New Regimen:     Start Vancomycin therapy, with a loading dose of 1500 mg iv x 1 and follow up with a maintenance dose of 1000 mg iv q 18 hrs. A trough will be ordered prior to the 3rd dose    Pharmacy to follow daily and will make changes to dose and/or frequency based on clinical status.   _________________________________     Pharmacist [address] [place] [address], PHARMD

## 2021-10-12 NOTE — ED NOTES
TRANSFER - OUT REPORT:    Verbal report given to Joanne Gowers on Cooper Sosa  being transferred to room 277 for routine progression of care       Report consisted of patients Situation, Background, Assessment and   Recommendations(SBAR). Information from the following report(s) SBAR, ED Summary, Intake/Output, MAR and Recent Results was reviewed with the receiving nurse. Lines:   Peripheral IV 10/11/21 Right;Upper Forearm (Active)       Peripheral IV 10/12/21 Posterior;Right Hand (Active)   Site Assessment Clean, dry, & intact 10/12/21 0725   Phlebitis Assessment 0 10/12/21 0725   Infiltration Assessment 0 10/12/21 0725   Dressing Status Clean, dry, & intact 10/12/21 0725   Dressing Type Transparent 10/12/21 0725   Hub Color/Line Status Flushed;Patent 10/12/21 0725       Peripheral IV 10/12/21 Right Antecubital (Active)   Site Assessment Clean, dry, & intact 10/12/21 0727   Phlebitis Assessment 0 10/12/21 0727   Infiltration Assessment 0 10/12/21 0727   Dressing Status Clean, dry, & intact 10/12/21 0727   Dressing Type Transparent 10/12/21 0727   Hub Color/Line Status Pink;Flushed;Patent 10/12/21 0380        Opportunity for questions and clarification was provided.       Patient transported with:   Monitor  O2 @ 2 liters  Tech

## 2021-10-12 NOTE — PROGRESS NOTES
Reason for Admission:  NSTEMI/Sepsis                     RUR Score:  15%                   Plan for utilizing home health:    None @ this time/uses cane. PCP: First and Last name:  Nat Melissa     Name of Practice:    Are you a current patient: Yes/No: Yes   Approximate date of last visit: Seen a few mos ago. Can you participate in a virtual visit with your PCP: Yes/Call                    Current Advanced Directive/Advance Care Plan: Full Code      Healthcare Decision Maker:                Primary Decision Maker: Christina Villareal - [facility] - [phone]                  Transition of Care Plan:  D/C Plan is home with family & a family member will transport home upon discharge.

## 2021-10-12 NOTE — PROGRESS NOTES
TRANSFER - IN REPORT:    Verbal report received from [facility], [address] on [facility]  being received from ED for routine progression of care      Report consisted of patients Situation, Background, Assessment and   Recommendations(SBAR). Information from the following report(s) SBAR, Kardex, ED Summary, Intake/Output, MAR, Recent Results and Cardiac Rhythm NSR was reviewed with the receiving nurse. Opportunity for questions and clarification was provided. Assessment completed upon patients arrival to unit and care assumed.

## 2021-10-12 NOTE — ED PROVIDER NOTES
HPI     61-year-old with past medical history of arthritis, COPD, hypertension, sleep apnea who is here today with chief complaint of altered mental status and respiratory distress per EMS. EMS got a call for a fall. Apparently patient was at his baseline mental status before the fall. When they arrived his O2 saturation was in the high 80s on room air, he was in significant respiratory depression, he was on the floor with altered mental status. He has not been following instructions and has been combative. Rate was approximately 120 on route. End-tidal 41 on route. Blood sugar 237.     Past Medical History:   Diagnosis Date    Arthritis     Chronic obstructive pulmonary disease ([facility] [address]     Hypertension     Sleep apnea        Past Surgical History:   Procedure Laterality Date    HX GI           Family History:   Problem Relation Age of Onset    Hypertension Child        Social History     Socioeconomic History    Marital status:      Spouse name: Not on file    Number of children: Not on file    Years of education: Not on file    Highest education level: Not on file   Occupational History    Not on file   Tobacco Use    Smoking status: Never Smoker    Smokeless tobacco: Never Used   Substance and Sexual Activity    Alcohol use: Never    Drug use: Never    Sexual activity: Not on file   Other Topics Concern     Service Not Asked    Blood Transfusions Not Asked    Caffeine Concern Not Asked    Occupational Exposure Not Asked    Hobby Hazards Not Asked    Sleep Concern Not Asked    Stress Concern Not Asked    Weight Concern Not Asked    Special Diet Not Asked    Back Care Not Asked    Exercise Not Asked    Bike Helmet Not Asked   [address] Not Asked    Self-Exams Not Asked   Social History Narrative    Not on file     Social Determinants of Health     Financial Resource Strain:     Difficulty of Paying Living Expenses:    Food Insecurity:     Worried About Running Out of Food in the Last Year:    [address] in the Last Year:    Transportation Needs:     Lack of Transportation (Medical):  Lack of Transportation (Non-Medical):    Physical Activity:     Days of Exercise per Week:     Minutes of Exercise per Session:    Stress:     Feeling of Stress :    Social Connections:     Frequency of Communication with Friends and Family:     Frequency of Social Gatherings with Friends and Family:     Attends Rastafarian Services:     Active Member of Clubs or Organizations:     Attends Club or Organization Meetings:     Marital Status:    Intimate Partner Violence:     Fear of Current or Ex-Partner:     Emotionally Abused:     Physically Abused:     Sexually Abused: ALLERGIES: Patient has no known allergies. Review of Systems   Unable to perform ROS: Mental status change       Vitals:    10/11/21 2207 10/11/21 2221   BP:  126/74   Temp: (!) 102.1 °F (38.9 °C) (!) 103 °F (39.4 °C)   SpO2:  92%            Physical Exam  Vitals and nursing note reviewed. Constitutional:       Appearance: Normal appearance. Comments: Elderly obese, chronically and acutely ill-appearing, nonverbal, not following instructions, mildly combative, mild respiratory distress   HENT:      Head: Normocephalic and atraumatic. Right Ear: External ear normal.      Left Ear: External ear normal.      Nose: Nose normal. No congestion or rhinorrhea. Mouth/Throat:      Mouth: Mucous membranes are moist.      Pharynx: No oropharyngeal exudate or posterior oropharyngeal erythema. Eyes:      Extraocular Movements: Extraocular movements intact. Conjunctiva/sclera: Conjunctivae normal.      Pupils: Pupils are equal, round, and reactive to light. Neck:      Comments: Appears atraumatic, no midline tenderness  Cardiovascular:      Rate and Rhythm: Tachycardia present. Rhythm irregular. Pulses: Normal pulses. Heart sounds: Normal heart sounds. No murmur heard. No friction rub. No gallop. Pulmonary:      Effort: Pulmonary effort is normal. No respiratory distress. Breath sounds: Normal breath sounds. No wheezing, rhonchi or rales. Abdominal:      General: Abdomen is flat. Bowel sounds are normal.      Palpations: Abdomen is soft. Musculoskeletal:         General: No deformity or signs of injury. Cervical back: No tenderness. Comments: +1 edema below the knees bilaterally which is symmetrical   Skin:     Capillary Refill: Capillary refill takes less than 2 seconds. Comments: Hot To touch, diaphoretic   Neurological:      General: No focal deficit present. Mental Status: He is alert. He is confused. Comments: moving limbs symmetrically, nonverbal          EKG: A. fib with RVR, normal axis, incomplete right bundle branch block, heart rate 134. Reevaluation 0015: Improved. Admitting to hospitalist at this time.

## 2021-10-12 NOTE — H&P
History and Physical              Subjective :   Chief Complaint : Altered mental status and respiratory distress. Source of information : Patient, ED provider. History of present illness:   58-year-old male with history of diabetes, hypertension, COPD is brought to the emergency room as he was found with altered mental status and lethargy by spouse at home. EMS was called and they found patient in significant respiratory distress and not responding. He is with a temperature of 102.1, on nasal cannula saturating fairly well. He is started on IV fluids, showing much improvement in his alertness, he is more alert asking what happened and how he come here. He is not able to give any information of how long he been sick or if any complaints. Admits cough, but cannot tell if there is a sputum production. Chest x-ray with suspected pneumonia bilateral, and he is in significant distress on arrival which is much better with the treatment. He continued to have elevated temperature started on blood cultures and treatment for sepsis. Past Medical History:   Diagnosis Date    Arthritis     [facility] obstructive pulmonary disease ([facility] [address]     Diabetes ([facility] [address]     Hypertension     Sleep apnea      Past Surgical History:   Procedure Laterality Date    HX GI      HX HERNIA REPAIR       Family History   Problem Relation Age of Onset    Hypertension Child       Social History     Tobacco Use    Smoking status: Never Smoker    Smokeless tobacco: Never Used   Substance Use Topics    Alcohol use: Never       Prior to Admission medications    Medication Sig Start Date End Date Taking? Authorizing Provider   Farxiga 10 mg tab tablet Take 10 mg by mouth daily. 8/9/21   Provider, Historical   atorvastatin (LIPITOR) 20 mg tablet Take 20 mg by mouth nightly. 8/9/21   Provider, Historical   hydroCHLOROthiazide (HYDRODIURIL) 25 mg tablet Take 25 mg by mouth daily.  7/9/21   Provider, Historical   aspirin delayed-release 81 mg tablet TAKE ONE TABLET BY MOUTH ONE TIME DAILY 11/13/20   Other, MD Palomo   multivitamin (ONE A DAY) tablet Take 1 Tab by mouth daily. Provider, Historical   amLODIPine (NORVASC) 10 mg tablet Take 10 mg by mouth daily. Provider, Historical   metoprolol succinate (TOPROL-XL) 25 mg XL tablet Take 25 mg by mouth daily. Provider, Historical     No Known Allergies          Review of Systems:  Constitutional: Appetite is good, denies weight loss. Eye: No recent visual disturbances, no discharge, no double vision. Ear/nose/mouth/throat : No hearing disturbance, no ear pain, no nasal congestion, no sore throat, no trouble swallowing. Respiratory : ++ trouble breathing, ++ cough, +++ shortness of breath, no hemoptysis, no wheezing. Cardiovascular : No chest pain, no palpitation,  no orthopnea,  no peripheral edema. Gastrointestinal : ++ nausea, no vomiting,  No abdominal pain. Genitourinary : + dysuria, no hematuria,   Lymphatics : He is unable to answer   Endocrine : He is unable to answer  Immunologic : No hives, urticaria, No seasonal allergies. Musculoskeletal : Admits arthritis and knee joint. Integumentary : Unable to answer  Hematology : Unable to answer  Neurology : He is with altered mental status on arrival, denies any headache now. Psychiatric : He is unable to answer    Vitals:     Patient Vitals for the past 12 hrs:   Temp Pulse Resp BP SpO2   10/12/21 0010 (!) 102.2 °F (39 °C) (!) 110 17 92/64 97 %   10/11/21 2225 (!) 102.9 °F (39.4 °C) (!) 132 21 126/74 100 %   10/11/21 2221 (!) 103 °F (39.4 °C)   126/74 92 %   10/11/21 2207 (!) 102.1 °F (38.9 °C)           Physical Exam:   General : Morbidly obese male, appears very tired. HEENT : PERRLA, normal oral mucosa, atraumatic normocephalic, Normal ear and nose. Neck : Supple, no JVD, no masses noted, no carotid bruit. Lungs : Breath sounds with moderate air entry bilaterally, rhonchi present.   Not taking deep breaths and not using accessory muscles to breathe. CVS : Rhythm rate regular, S1+, S2+, no murmur or gallop. Abdomen : Soft, nontender, morbidly obese abdomen. Bowel sounds active. Extremities : 3+ chronic edema noted bilateral lower extremity. Musculoskeletal : Decreased range of motion, no joint swelling or effusion, muscle tone and power appears fair. Skin : Dry, poor skin turgor. No pathological rash. Lymphatic : No cervical lymphadenopathy. Neurological : Awake, alert, oriented to place person. No focal deficits on gross examination. Psychiatric : Mood and affect appears appropriate to the situation. Data Review:   Recent Results (from the past 24 hour(s))   CBC WITH AUTOMATED DIFF    Collection Time: 10/11/21 11:07 PM   Result Value Ref Range    WBC 31.0 (H) 4.1 - 11.1 K/uL    RBC 4.77 4.10 - 5.70 M/uL    HGB 12.6 12.1 - 17.0 g/dL    HCT 36.4 (L) 36.6 - 50.3 %    MCV 76.3 (L) 80.0 - 99.0 FL    MCH 26.4 26.0 - 34.0 PG    MCHC 34.6 30.0 - 36.5 g/dL    RDW 15.2 (H) 11.5 - 14.5 %    PLATELET 304 579 - 983 K/uL    MPV 9.9 8.9 - 12.9 FL    NRBC 0.0 0.0  WBC    ABSOLUTE NRBC 0.00 0.00 - 0.01 K/uL    NEUTROPHILS PENDING %    LYMPHOCYTES PENDING %    MONOCYTES PENDING %    EOSINOPHILS PENDING %    BASOPHILS PENDING %    IMMATURE GRANULOCYTES PENDING %    ABS. NEUTROPHILS PENDING K/UL    ABS. LYMPHOCYTES PENDING K/UL    ABS. MONOCYTES PENDING K/UL    ABS. EOSINOPHILS PENDING K/UL    ABS. BASOPHILS PENDING K/UL    ABS. IMM. GRANS.  PENDING K/UL    DF PENDING    METABOLIC PANEL, COMPREHENSIVE    Collection Time: 10/11/21 11:07 PM   Result Value Ref Range    Sodium 133 (L) 136 - 145 mmol/L    Potassium 2.9 (L) 3.5 - 5.1 mmol/L    Chloride 95 (L) 97 - 108 mmol/L    CO2 24 21 - 32 mmol/L    Anion gap 14 5 - 15 mmol/L    Glucose 146 (H) 65 - 100 mg/dL    BUN 17 6 - 20 mg/dL    Creatinine 1.32 (H) 0.70 - 1.30 mg/dL    BUN/Creatinine ratio 13 12 - 20      GFR est AA >60 >60 ml/min/1.73m2    GFR est non-AA 54 (L) >60 ml/min/1.73m2    Calcium 8.2 (L) 8.5 - 10.1 mg/dL    Bilirubin, total 0.7 0.2 - 1.0 mg/dL    AST (SGOT) 32 15 - 37 U/L    ALT (SGPT) 27 12 - 78 U/L    Alk. phosphatase 82 45 - 117 U/L    Protein, total 7.7 6.4 - 8.2 g/dL    Albumin 2.6 (L) 3.5 - 5.0 g/dL    Globulin 5.1 (H) 2.0 - 4.0 g/dL    A-G Ratio 0.5 (L) 1.1 - 2.2     LACTIC ACID    Collection Time: 10/11/21 11:07 PM   Result Value Ref Range    Lactic acid 8.0 (HH) 0.4 - 2.0 mmol/L   ETHYL ALCOHOL    Collection Time: 10/11/21 11:07 PM   Result Value Ref Range    ALCOHOL(ETHYL),SERUM <4 <10 mg/dL   MAGNESIUM    Collection Time: 10/11/21 11:07 PM   Result Value Ref Range    Magnesium 1.2 (L) 1.6 - 2.4 mg/dL   NT-PRO BNP    Collection Time: 10/11/21 11:07 PM   Result Value Ref Range    NT pro-BNP 1,812 (H) <125 pg/mL       Radiologic Studies :   CT Results  (Last 48 hours)               10/11/21 2345  CT HEAD WO CONT Final result    Impression:  1. No acute intracranial hemorrhage or mass effect. 2.  No acute fracture or subluxation of the cervical spine. Narrative:  CT HEAD WITHOUT IV CONTRAST   CT CERVICAL SPINE WITHOUT IV CONTRAST       CLINICAL INDICATION: Altered mental status       TECHNIQUE: Routine axial images were obtained through the brain and cervical   spine without the use of IV contrast. Sagittal and coronal reformatted images   were performed at the CT console. Dose reduction: Per department policy, all CT   scans at this facility are performed using dose reduction optimization   techniques as appropriate to a performed examination including the following:   Automated exposure control, adjustments of the mA and/or KV according to patient   size, or use of iterative reconstruction technique. COMPARISON: None       FINDINGS:    No evidence of acute intracranial hemorrhage or mass effect. Parenchymal   attenuation is normal for patient age. Ventricles and extra-axial spaces are   normal in size and configuration. No hydrocephalus. No abnormal extra-axial   fluid collections. Paranasal sinuses and tympanomastoid cavities are   predominantly clear. Calcified intracranial atherosclerosis. No acute calvarial   abnormality. Study of the cervical spine is degraded by motion artifact. Vertebral body   heights are normal. Intervertebral disc height loss and degenerative endplate   changes at O6-6. Prominent anterolateral osteophytes at C4-5 and C5-6. Significant left facet arthrosis at C3-4. No acute fracture or subluxation. No   aggressive osseous lesions. Included lung apices are clear. Soft tissues of the   neck are grossly unremarkable. 10/11/21 2345  CT SPINE CERV WO CONT Final result    Impression:  1. No acute intracranial hemorrhage or mass effect. 2.  No acute fracture or subluxation of the cervical spine. Narrative:  CT HEAD WITHOUT IV CONTRAST   CT CERVICAL SPINE WITHOUT IV CONTRAST       CLINICAL INDICATION: Altered mental status       TECHNIQUE: Routine axial images were obtained through the brain and cervical   spine without the use of IV contrast. Sagittal and coronal reformatted images   were performed at the CT console. Dose reduction: Per department policy, all CT   scans at this facility are performed using dose reduction optimization   techniques as appropriate to a performed examination including the following:   Automated exposure control, adjustments of the mA and/or KV according to patient   size, or use of iterative reconstruction technique. COMPARISON: None       FINDINGS:    No evidence of acute intracranial hemorrhage or mass effect. Parenchymal   attenuation is normal for patient age. Ventricles and extra-axial spaces are   normal in size and configuration. No hydrocephalus. No abnormal extra-axial   fluid collections. Paranasal sinuses and tympanomastoid cavities are   predominantly clear. Calcified intracranial atherosclerosis. No acute calvarial   abnormality.        Study of the cervical spine is degraded by motion artifact. Vertebral body   heights are normal. Intervertebral disc height loss and degenerative endplate   changes at V2-4. Prominent anterolateral osteophytes at C4-5 and C5-6. Significant left facet arthrosis at C3-4. No acute fracture or subluxation. No   aggressive osseous lesions. Included lung apices are clear. Soft tissues of the   neck are grossly unremarkable. CXR Results  (Last 48 hours)               10/11/21 2310  XR CHEST PORT Final result    Impression:  Bilateral perihilar airspace disease, suggestive of edema however pneumonia or   atelectasis are not excluded in the appropriate clinical setting. Enlarged   cardiopericardial silhouette. Narrative:  Radiograph of the chest, one view       INDICATION: Shortness breath       COMPARISON: None       FINDINGS:   Significant bilateral perihilar opacities are present in an edema pattern. Prominent vascular congestion is noted. Left lung base is poorly visualized, may   be from overlying soft tissues. Superimposed left basilar airspace disease would   be difficult to exclude. Cardiopericardial silhouette appears mildly enlarged. There is no pneumothorax or definite pleural effusion however left costophrenic   sulcus has been excluded from the field-of-view. Assessment and Plan :     Sepsis syndrome: Looks like infectious etiology, seems pneumonia bilateral.    Extensive pneumonia bilateral but mostly perihilar, we will order CT of the chest for further evaluation when he is stable. Elevated troponin: Most likely demand ischemia from sepsis, we will repeat troponin again and follow-up on it. If continues to elevate we will consult cardiology. Hypokalemia: Supplementation ordered, will follow up results in the morning    Urine analysis shows large blood with only 20 RBCm , Likely Hematuria. Will order CK to check if has any rhabdomyolysis.      Benign essential hypertension: Currently with hypertension, will hold all the medications, restart as condition dictates    Diabetes mellitus type 2: He is only on [place], will hold it at this time. Keep him on Accu-Cheks with a sliding scale insulin    Morbid obesity    Admitted to ICU as he seems very unstable at this time due to sepsis with mild hypotension, elevated troponin. He is full CODE STATUS, home medications reviewed with external Rx history unable to verify. Start on DVT prophylaxis with Lovenox. Has no advance medical directives. CC : Matheus, MD Palomo  Signed By: Christie Puentes MD     October 12, 2021      This dictation was done by dragon, computer voice recognition software. Often unanticipated grammatical, syntax, [place] phones and other interpretive errors are inadvertently transcribed. Please excuse errors that have escaped final proofreading.

## 2021-10-12 NOTE — PROGRESS NOTES
Hospitalist Progress Note               Daily Progress Note: 10/12/2021  53-year-old male with history of diabetes, hypertension, COPD is brought to the emergency room as he was found with altered mental status and lethargy by spouse at home. EMS was called and they found patient in significant respiratory distress and not responding. Temp to 103 in ED. CXR suspect b/l pna. Sepsitic, wbc 31K, lactic acid 8.0-->3.2, crp 10.10. troponin hs elevated and ck elevated c/w rhabdomyolysis. Mentation improved with hydration. Cardio following 2/2 nstemi, echo pending, heparin low dose protocol and trending troponin. Subjective: The patient is seen for follow  up. He is easily awoken. Appears in nad.   Denies recent chest pain but has had exertional sob for a few days though states acutely ill this am.  Tm 103      Problem List:  Problem List as of 10/12/2021 Date Reviewed: 10/12/2021        Codes Class Noted - Resolved    Sepsis ([facility] ICD-10-CM: A41.9  ICD-9-CM: 038.9, 995.91  10/12/2021 - Present        Metabolic encephalopathy RJL-39-AH: G93.41  ICD-9-CM: 348.31  10/12/2021 - Present        Pneumonia ICD-10-CM: J18.9  ICD-9-CM: 211  10/12/2021 - Present        Peripheral venous insufficiency ICD-10-CM: I87.2  ICD-9-CM: 459.81  9/24/2020 - Present        Morbid obesity ([facility] ICD-10-CM: E66.01  ICD-9-CM: 278.01  9/24/2020 - Present        Atherosclerosis of native artery of left lower extremity with ulceration of calf ([facility] ICD-10-CM: Y82.911  ICD-9-CM: 440.23, 707.12  9/24/2020 - Present        Septic pulmonary embolism without acute cor pulmonale ([facility] ICD-10-CM: I26.90  ICD-9-CM: 415.12  9/24/2020 - Present        Lymphedema ICD-10-CM: I89.0  ICD-9-CM: 457.1  9/24/2020 - Present        RESOLVED: Non-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 exposed ([facility] ICD-10-CM: K30.124  ICD-9-CM: 707.12  9/24/2020 - 12/3/2020        RESOLVED: Non-pressure chronic ulcer of right calf with fat layer exposed ([address] ICD-10-CM: O24.353  ICD-9-CM: 707.12  9/24/2020 - 12/3/2020              Medications reviewed  Current Facility-Administered Medications   Medication Dose Route Frequency    aspirin delayed-release tablet 81 mg  81 mg Oral DAILY    insulin lispro (HUMALOG) injection   SubCUTAneous AC&HS    glucose chewable tablet 16 g  4 Tablet Oral PRN    dextrose (D50W) injection syrg 12.5-25 g  25-50 mL IntraVENous PRN    glucagon (GLUCAGEN) injection 1 mg  1 mg IntraMUSCular PRN    sodium chloride (NS) flush 5-40 mL  5-40 mL IntraVENous Q8H    sodium chloride (NS) flush 5-40 mL  5-40 mL IntraVENous PRN    acetaminophen (TYLENOL) tablet 650 mg  650 mg Oral Q6H PRN    Or    acetaminophen (TYLENOL) suppository 650 mg  650 mg Rectal Q6H PRN    ondansetron (ZOFRAN ODT) tablet 4 mg  4 mg Oral Q8H PRN    Or    ondansetron (ZOFRAN) injection 4 mg  4 mg IntraVENous Q6H PRN    piperacillin-tazobactam (ZOSYN) 3.375 g in 0.9% sodium chloride (MBP/ADV) 100 mL MBP  3.375 g IntraVENous Q8H    lactated Ringers infusion  125 mL/hr IntraVENous CONTINUOUS    VANCOMYCIN INFORMATION NOTE 1 Each  1 Each Other Rx Dosing/Monitoring    pantoprazole (PROTONIX) tablet 40 mg  40 mg Oral ACB    heparin (porcine) injection 4,000 Units  4,000 Units IntraVENous ONCE    heparin 25,000 units in D5W 250 ml infusion  12-25 Units/kg/hr (Adjusted) IntraVENous TITRATE    heparin (porcine) 1,000 unit/mL injection 4,000 Units  4,000 Units IntraVENous PRN    Or    heparin (porcine) 1,000 unit/mL injection 2,000 Units  2,000 Units IntraVENous PRN    [START ON 10/13/2021] vancomycin (VANCOCIN) 1,000 mg in 0.9% sodium chloride 250 mL (VIAL-MATE)  1,000 mg IntraVENous Q18H    [START ON 10/13/2021] VANCOMYCIN INFORMATION NOTE   Other ONCE    atorvastatin (LIPITOR) tablet 80 mg  80 mg Oral DAILY     Current Outpatient Medications   Medication Sig    Farxiga 10 mg tab tablet Take 10 mg by mouth daily.     atorvastatin (LIPITOR) 20 mg tablet Take 20 mg by mouth nightly.  hydroCHLOROthiazide (HYDRODIURIL) 25 mg tablet Take 25 mg by mouth daily.  aspirin delayed-release 81 mg tablet TAKE ONE TABLET BY MOUTH ONE TIME DAILY    multivitamin (ONE A DAY) tablet Take 1 Tab by mouth daily.  amLODIPine (NORVASC) 10 mg tablet Take 10 mg by mouth daily.  metoprolol succinate (TOPROL-XL) 25 mg XL tablet Take 25 mg by mouth daily. Review of Systems:   Review of Systems   Constitutional: Positive for chills, fever and malaise/fatigue. HENT: Negative. Eyes: Negative. Respiratory: Positive for shortness of breath. Negative for cough, hemoptysis, sputum production and wheezing. Cardiovascular: Negative for chest pain, palpitations and leg swelling. Gastrointestinal: Negative for abdominal pain, blood in stool, constipation, diarrhea, heartburn, melena, nausea and vomiting. Genitourinary: Negative. Musculoskeletal: Negative. Negative for falls. Skin: Negative. Neurological: Negative. Endo/Heme/Allergies: Negative. Psychiatric/Behavioral: Negative. Objective:   Physical Exam:     Visit Vitals  /76 (BP Patient Position: Semi fowlers)   Pulse 97   Temp 99.2 °F (37.3 °C)   Resp 24   Ht 5' 7\" (1.702 m)   Wt (!) 181.4 kg (400 lb)   SpO2 94%   BMI 62.65 kg/m²    O2 Flow Rate (L/min): 2 l/min O2 Device: None (Room air)    Temp (24hrs), Av.6 °F (38.7 °C), Min:99.2 °F (37.3 °C), Max:103 °F (39.4 °C)    No intake/output data recorded. 10/10 1901 - 10/12 0700  In: -   Out: 100 [Urine:100]    General:  Alert, cooperative, no distress, weak appearing, aao x 3   Lungs:   diminished diffusely, not labored, distant creps at bases. Chest wall:  No tenderness or deformity. Heart:  Regular rate and rhythm, S1, S2 normal, no murmur, click, rub or gallop. Abdomen:   Soft, non-tender. Bowel sounds normal. No masses,  No organomegaly. Extremities: Extremities normal, atraumatic, no cyanosis. 2+edema.    Pulses: 2+ and symmetric all extremities. Skin: Warm/dry, decreased turgor   Neurologic: CNII-XII intact. No gross sensory or motor deficits     Data Review:       Recent Days:  Recent Labs     10/12/21  0501 10/11/21  2307   WBC 28.8* 31.0*   HGB 12.6 12.6   HCT 36.4* 36.4*    249     Recent Labs     10/12/21  0501 10/11/21  2307    133*   K 3.3* 2.9*    95*   CO2 25 24   * 146*   BUN 17 17   CREA 1.17 1.32*   CA 7.9* 8.2*   MG 1.8 1.2*   PHOS 2.9  --    ALB 2.5* 2.6*   TBILI 0.7 0.7   ALT 42 27   INR 1.5*  --      Recent Labs     10/11/21  2215   PH 7.43   PCO2 39   PO2 91   HCO3 26   FIO2 28       24 Hour Results:  Recent Results (from the past 24 hour(s))   BLOOD GAS, ARTERIAL    Collection Time: 10/11/21 10:15 PM   Result Value Ref Range    pH 7.43 7.35 - 7.45      PCO2 39 35 - 45 mmHg    PO2 91 75 - 100 mmHg    O2 SAT 97 >95 %    BICARBONATE 26 22 - 26 mmol/L    BASE EXCESS 2.0 0 - 2 mmol/L    FIO2 28 %   CBC WITH AUTOMATED DIFF    Collection Time: 10/11/21 11:07 PM   Result Value Ref Range    WBC 31.0 (H) 4.1 - 11.1 K/uL    RBC 4.77 4.10 - 5.70 M/uL    HGB 12.6 12.1 - 17.0 g/dL    HCT 36.4 (L) 36.6 - 50.3 %    MCV 76.3 (L) 80.0 - 99.0 FL    MCH 26.4 26.0 - 34.0 PG    MCHC 34.6 30.0 - 36.5 g/dL    RDW 15.2 (H) 11.5 - 14.5 %    PLATELET 099 465 - 410 K/uL    MPV 9.9 8.9 - 12.9 FL    NRBC 0.0 0.0  WBC    ABSOLUTE NRBC 0.00 0.00 - 0.01 K/uL    NEUTROPHILS 91 (H) 32 - 75 %    BAND NEUTROPHILS 2 0 - 6 %    LYMPHOCYTES 4 (L) 12 - 49 %    MONOCYTES 3 (L) 5 - 13 %    EOSINOPHILS 0 0 - 7 %    BASOPHILS 0 0 - 1 %    IMMATURE GRANULOCYTES 0 %    ABS. NEUTROPHILS 28.9 (H) 1.8 - 8.0 K/UL    ABS. LYMPHOCYTES 1.2 0.8 - 3.5 K/UL    ABS. MONOCYTES 0.9 0.0 - 1.0 K/UL    ABS. EOSINOPHILS 0.0 0.0 - 0.4 K/UL    ABS. BASOPHILS 0.0 0.0 - 0.1 K/UL    ABS. IMM.  GRANS. 0.0 K/UL    DF Manual      RBC COMMENTS Normocytic, Normochromic     METABOLIC PANEL, COMPREHENSIVE    Collection Time: 10/11/21 11:07 PM   Result Value Ref Range Sodium 133 (L) 136 - 145 mmol/L    Potassium 2.9 (L) 3.5 - 5.1 mmol/L    Chloride 95 (L) 97 - 108 mmol/L    CO2 24 21 - 32 mmol/L    Anion gap 14 5 - 15 mmol/L    Glucose 146 (H) 65 - 100 mg/dL    BUN 17 6 - 20 mg/dL    Creatinine 1.32 (H) 0.70 - 1.30 mg/dL    BUN/Creatinine ratio 13 12 - 20      GFR est AA >60 >60 ml/min/1.73m2    GFR est non-AA 54 (L) >60 ml/min/1.73m2    Calcium 8.2 (L) 8.5 - 10.1 mg/dL    Bilirubin, total 0.7 0.2 - 1.0 mg/dL    AST (SGOT) 32 15 - 37 U/L    ALT (SGPT) 27 12 - 78 U/L    Alk.  phosphatase 82 45 - 117 U/L    Protein, total 7.7 6.4 - 8.2 g/dL    Albumin 2.6 (L) 3.5 - 5.0 g/dL    Globulin 5.1 (H) 2.0 - 4.0 g/dL    A-G Ratio 0.5 (L) 1.1 - 2.2     LACTIC ACID    Collection Time: 10/11/21 11:07 PM   Result Value Ref Range    Lactic acid 8.0 (HH) 0.4 - 2.0 mmol/L   TROPONIN-HIGH SENSITIVITY    Collection Time: 10/11/21 11:07 PM   Result Value Ref Range    Troponin-High Sensitivity 263 (HH) 0 - 76 ng/L   ETHYL ALCOHOL    Collection Time: 10/11/21 11:07 PM   Result Value Ref Range    ALCOHOL(ETHYL),SERUM <4 <10 mg/dL   MAGNESIUM    Collection Time: 10/11/21 11:07 PM   Result Value Ref Range    Magnesium 1.2 (L) 1.6 - 2.4 mg/dL   NT-PRO BNP    Collection Time: 10/11/21 11:07 PM   Result Value Ref Range    NT pro-BNP 1,812 (H) <125 pg/mL   PROCALCITONIN    Collection Time: 10/11/21 11:07 PM   Result Value Ref Range    Procalcitonin 15.16 (H) 0 ng/mL   URINALYSIS W/ RFLX MICROSCOPIC    Collection Time: 10/12/21 12:38 AM   Result Value Ref Range    Color Yellow/Straw      Appearance Turbid (A) Clear      Specific gravity 1.018 1.003 - 1.030      pH (UA) 5.0 5.0 - 8.0      Protein 100 (A) Negative mg/dL    Glucose Negative Negative mg/dL    Ketone 20 (A) Negative mg/dL    Bilirubin Negative Negative      Blood Large (A) Negative      Urobilinogen 0.1 0.1 - 1.0 EU/dL    Nitrites Negative Negative      Leukocyte Esterase Negative Negative      WBC 10-20 0 - 4 /hpf    RBC 20-50 0 - 5 /hpf Bacteria Negative Negative /hpf    Mucus Trace /lpf   URINE MICROSCOPIC    Collection Time: 10/12/21 12:38 AM   Result Value Ref Range    WBC 10-20 0 - 4 /hpf    RBC 20-50 0 - 5 /hpf    Bacteria Negative Negative /hpf   COVID-19 RAPID TEST    Collection Time: 10/12/21  2:02 AM   Result Value Ref Range    Specimen source Nasopharyngeal      COVID-19 rapid test Not Detected Not Detected     PROTHROMBIN TIME + INR    Collection Time: 10/12/21  5:01 AM   Result Value Ref Range    Prothrombin time 17.3 (H) 11.9 - 14.7 sec    INR 1.5 (H) 0.9 - 1.1     TROPONIN-HIGH SENSITIVITY    Collection Time: 10/12/21  5:01 AM   Result Value Ref Range    Troponin-High Sensitivity 2,325 (HH) 0 - 76 ng/L   PTT    Collection Time: 10/12/21  5:01 AM   Result Value Ref Range    aPTT 30.8 21.2 - 34.1 sec    aPTT, therapeutic range   82 - 109 sec   CBC WITH AUTOMATED DIFF    Collection Time: 10/12/21  5:01 AM   Result Value Ref Range    WBC 28.8 (H) 4.1 - 11.1 K/uL    RBC 4.73 4.10 - 5.70 M/uL    HGB 12.6 12.1 - 17.0 g/dL    HCT 36.4 (L) 36.6 - 50.3 %    MCV 77.0 (L) 80.0 - 99.0 FL    MCH 26.6 26.0 - 34.0 PG    MCHC 34.6 30.0 - 36.5 g/dL    RDW 15.5 (H) 11.5 - 14.5 %    PLATELET 888 183 - 818 K/uL    MPV 9.8 8.9 - 12.9 FL    NRBC 0.0 0.0  WBC    ABSOLUTE NRBC 0.00 0.00 - 0.01 K/uL    NEUTROPHILS 77 (H) 32 - 75 %    BAND NEUTROPHILS 14 (H) 0 - 6 %    LYMPHOCYTES 1 (L) 12 - 49 %    MONOCYTES 8 5 - 13 %    EOSINOPHILS 0 0 - 7 %    BASOPHILS 0 0 - 1 %    IMMATURE GRANULOCYTES 0 %    ABS. NEUTROPHILS 26.2 (H) 1.8 - 8.0 K/UL    ABS. LYMPHOCYTES 0.3 (L) 0.8 - 3.5 K/UL    ABS. MONOCYTES 2.3 (H) 0.0 - 1.0 K/UL    ABS. EOSINOPHILS 0.0 0.0 - 0.4 K/UL    ABS. BASOPHILS 0.0 0.0 - 0.1 K/UL    ABS. IMM.  GRANS. 0.0 K/UL    DF Manual      RBC COMMENTS Normocytic, Normochromic     TSH 3RD GENERATION    Collection Time: 10/12/21  5:01 AM   Result Value Ref Range    TSH 2.01 0.36 - 3.74 uIU/mL   URIC ACID    Collection Time: 10/12/21  5:01 AM   Result Value Ref Range    Uric acid 5.6 3.5 - 7.2 mg/dL   C REACTIVE PROTEIN, QT    Collection Time: 10/12/21  5:01 AM   Result Value Ref Range    C-Reactive protein 10.10 (H) 0.00 - 0.60 mg/dL   MAGNESIUM    Collection Time: 10/12/21  5:01 AM   Result Value Ref Range    Magnesium 1.8 1.6 - 2.4 mg/dL   METABOLIC PANEL, COMPREHENSIVE    Collection Time: 10/12/21  5:01 AM   Result Value Ref Range    Sodium 136 136 - 145 mmol/L    Potassium 3.3 (L) 3.5 - 5.1 mmol/L    Chloride 100 97 - 108 mmol/L    CO2 25 21 - 32 mmol/L    Anion gap 11 5 - 15 mmol/L    Glucose 122 (H) 65 - 100 mg/dL    BUN 17 6 - 20 mg/dL    Creatinine 1.17 0.70 - 1.30 mg/dL    BUN/Creatinine ratio 15 12 - 20      GFR est AA >60 >60 ml/min/1.73m2    GFR est non-AA >60 >60 ml/min/1.73m2    Calcium 7.9 (L) 8.5 - 10.1 mg/dL    Bilirubin, total 0.7 0.2 - 1.0 mg/dL    AST (SGOT) 132 (H) 15 - 37 U/L    ALT (SGPT) 42 12 - 78 U/L    Alk.  phosphatase 71 45 - 117 U/L    Protein, total 7.1 6.4 - 8.2 g/dL    Albumin 2.5 (L) 3.5 - 5.0 g/dL    Globulin 4.6 (H) 2.0 - 4.0 g/dL    A-G Ratio 0.5 (L) 1.1 - 2.2     PHOSPHORUS    Collection Time: 10/12/21  5:01 AM   Result Value Ref Range    Phosphorus 2.9 2.6 - 4.7 mg/dL   FIBRINOGEN    Collection Time: 10/12/21  5:01 AM   Result Value Ref Range    Fibrinogen 733 (H) 220 - 535 mg/dL   CK    Collection Time: 10/12/21  5:01 AM   Result Value Ref Range    CK 5,590 (H) 39 - 308 U/L   LACTIC ACID    Collection Time: 10/12/21  5:01 AM   Result Value Ref Range    Lactic acid 3.2 (HH) 0.4 - 2.0 mmol/L   GLUCOSE, POC    Collection Time: 10/12/21  8:02 AM   Result Value Ref Range    Glucose (POC) 129 (H) 65 - 117 mg/dL    Performed by Markos Salgado            Assessment/     Patient Active Problem List   Diagnosis Code    Peripheral venous insufficiency I87.2    Morbid obesity ([facility]) E66.01    Atherosclerosis of native artery of left lower extremity with ulceration of calf ([facility]) I70.242    Septic pulmonary embolism without acute cor pulmonale (HCC) I26.90    Lymphedema I89.0    Sepsis ([facility]) I35.0    Metabolic encephalopathy H42.52    Pneumonia J18.9         NSTEMI  Sepsis  Presumed pneumonia/ cap  Rhabdomyolysis  DM2  HPTN      Plan:  Sepsis syndrome: wbc 31-->28K, lactic acidosis, tm 103, cxr suggests pna. procalcitonin ~15, crp ~ 10. Vanco/zosyn started empirically.      Presumed pneumonia bilateral but mostly perihilar,though ct suggests atelectasis vs early pna.     NSTEMI:asa, statin, low dose heparin, follow troponin trend. Echo pending. Cardio appreciated.      Hypokalemia: replete and follow     Rhabdomyolysis: ck ~5500. Renal fxn preserved, continue judicious hydration. Benign essential hypertension: Low nl, holding hctz and amlodipine, resume metoprolol.     Diabetes mellitus type 2: He is only on Farxiga, holding. Continue ssi/hypoglycemia protocol  Keep him on Accu-Cheks with a sliding scale insulin     Morbid obesity     Vtep: heparin low dose protocol 2/2 nstemi. Full code  Gip: ppi  nok:  Name Relation Home Work [place] Spouse [phone]     Dispo: pending course. Care Plan discussed with: Patient/Family, Nurse and Consultant . Total time spent with patient: 30 minutes. This dictation was done by dragon, computer voice recognition software. Often unanticipated grammatical, syntax, phones and other interpretive errors are inadvertently transcribed. Please excuse errors that have escaped final proofreading.     Kandis Bolivar MD

## 2021-10-12 NOTE — ED TRIAGE NOTES
EMS called for a fall; when arrived patient in resp distress and altered pupils dilated and non responsive

## 2021-10-12 NOTE — CONSULTS
Consult    Patient: Custer Barthel MRN: [id number]  SSN: [id number]    YOB: 1955  Age: 74 y.o. Sex: male      Subjective:      Custer Barthel is a 74 y.o. male who is being seen for non-STEMI. Patient is a 66-year-old -American male with history of diabetes mellitus, hypertension, COPD, obesity who was brought by EMS with altered mental status. Patient had significant shortness of breath upon EMS arrival.  Temperature was 102. 1. He is not a good historian. Anyhow at this point denied having any chest pain or shortness of breath or nausea or vomiting or excessive sweating. He feels cold. His lower extremities swell up sometimes. His weight has been steady recently. Denies any previous cardiac history. He received 2 Covid vaccination and he is awaiting his booster dose. His troponin was elevated hence cardiology was consulted.     Past Medical History:   Diagnosis Date    Arthritis     [facility] ([facility]     Diabetes ([facility]     Hypertension     Sleep apnea      Past Surgical History:   Procedure Laterality Date    HX GI      HX HERNIA REPAIR        Family History   Problem Relation Age of Onset    Hypertension Child      Social History     Tobacco Use    Smoking status: Never Smoker    Smokeless tobacco: Never Used   Substance Use Topics    Alcohol use: Never      Current Facility-Administered Medications   Medication Dose Route Frequency Provider Last Rate Last Admin    aspirin delayed-release tablet 81 mg  81 mg Oral DAILY Mary Christian MD        insulin lispro (HUMALOG) injection   SubCUTAneous AC&HS Mary Christian MD        glucose chewable tablet 16 g  4 Tablet Oral PRN Mary Christian MD        dextrose (D50W) injection syrg 12.5-25 g  25-50 mL IntraVENous PRN Mary Christian MD        glucagon (GLUCAGEN) injection 1 mg  1 mg IntraMUSCular PRN Mary Christian MD        sodium chloride (NS) flush 5-40 mL  5-40 mL IntraVENous Q8H Jessica Morales MD        sodium chloride (NS) flush 5-40 mL  5-40 mL IntraVENous PRN Jessica Morales MD        acetaminophen (TYLENOL) tablet 650 mg  650 mg Oral Q6H PRN Jessica Morales MD        Or   [facility] acetaminophen (TYLENOL) suppository 650 mg  650 mg Rectal Q6H PRN Jessica Morales MD        ondansetron (ZOFRAN ODT) tablet 4 mg  4 mg Oral Q8H PRN Jessica Morales MD        Or    [facility] injection 4 mg  4 mg IntraVENous Q6H PRN Jessica Morales MD        piperacillin-tazobactam (ZOSYN) 3.375 g in 0.9% sodium chloride (MBP/ADV) 100 mL MBP  3.375 g IntraVENous Q8H Jessica Morales MD        lactated Ringers infusion  125 mL/hr IntraVENous CONTINUOUS Jessica Morales MD        vancomycin (VANCOCIN) 1,500 mg in 0.9% sodium chloride 500 mL IVPB  1,500 mg IntraVENous Jessica Needle, Madeline Day .3 mL/hr at 10/12/21 0631 1,500 mg at 10/12/21 0631    [START ON 10/13/2021] VANCOMYCIN INFORMATION NOTE   Other Kin Jessica MD        VANCOMYCIN INFORMATION NOTE 1 Each  1 Each Other Rx Dosing/Monitoring Jessica Morales MD        pantoprazole (PROTONIX) tablet 40 mg  40 mg Oral ACB Jessica Morales MD        heparin (porcine) injection 4,000 Units  4,000 Units IntraVENous ONCE Bin Salinas MD        heparin 25,000 units in D5W 250 ml infusion  12-25 Units/kg/hr (Adjusted) IntraVENous TITRATE Nathalia Rahman MD        heparin (porcine) 1,000 unit/mL injection 4,000 Units  4,000 Units IntraVENous PRN Nathalia Rahman MD        Or    heparin (porcine) 1,000 unit/mL injection 2,000 Units  2,000 Units IntraVENous PRN Nathalia Rahman MD        [START ON 10/13/2021] vancomycin (VANCOCIN) 1,000 mg in 0.9% sodium chloride 250 mL (VIAL-MATE)  1,000 mg IntraVENous Q18H Jessica Morales MD         Current Outpatient Medications   Medication Sig Dispense Refill    Farxiga 10 mg tab tablet Take 10 mg by mouth daily.  atorvastatin (LIPITOR) 20 mg tablet Take 20 mg by mouth nightly.  hydroCHLOROthiazide (HYDRODIURIL) 25 mg tablet Take 25 mg by mouth daily.  aspirin delayed-release 81 mg tablet TAKE ONE TABLET BY MOUTH ONE TIME DAILY      multivitamin (ONE A DAY) tablet Take 1 Tab by mouth daily.  amLODIPine (NORVASC) 10 mg tablet Take 10 mg by mouth daily.  metoprolol succinate (TOPROL-XL) 25 mg XL tablet Take 25 mg by mouth daily. No Known Allergies    Review of Systems:  A comprehensive review of systems was negative except for that written in the History of Present Illness. Objective:     Vitals:    10/11/21 2225 10/12/21 0010 10/12/21 0203 10/12/21 0242   BP: 126/74 92/64     Pulse: (!) 132 (!) 110     Resp: 21 17     Temp: (!) 102.9 °F (39.4 °C) (!) 102.2 °F (39 °C) (!) 100.8 °F (38.2 °C)    SpO2: 100% 97%     Weight:    (!) 181.4 kg (400 lb)   Height:    5' 7\" (1.702 m)        Physical Exam:  General:  Alert, cooperative, no distress, appears stated age. Eyes:  Conjunctivae/corneas clear. PERRL, EOMs intact. Fundi benign   Ears:  Normal TMs and external ear canals both ears. Nose: Nares normal. Septum midline. Mucosa normal. No drainage or sinus tenderness. Mouth/Throat: Lips, mucosa, and tongue normal. Teeth and gums normal.   Neck: Supple, symmetrical, trachea midline, no adenopathy, thyroid: no enlargment/tenderness/nodules, no carotid bruit and no JVD. Back:   Symmetric, no curvature. ROM normal. No CVA tenderness. Lungs:    Reduced bilateral air entry. Heart:   Tachycardic. Abdomen:    Obese. Extremities: Extremities normal, atraumatic, no cyanosis or edema. Pulses: 2+ and symmetric all extremities. Skin: Skin color, texture, turgor normal. No rashes or lesions   Lymph nodes: Cervical, supraclavicular, and axillary nodes normal.   Neurologic: CNII-XII intact. Normal strength, sensation and reflexes throughout. Assessment:     Hospital Problems  Date Reviewed: 10/12/2021        Codes Class Noted POA    Sepsis ([facility] [address] ICD-10-CM: A41.9  ICD-9-CM: 038.9, 995.91  10/12/2021 Yes        Metabolic encephalopathy YBP-88-SQ: G93.41  ICD-9-CM: 348.31  10/12/2021 Unknown        Pneumonia ICD-10-CM: J18.9  ICD-9-CM: 779  10/12/2021 Unknown            Patient is a 26-year-old -American male with:  1. Non-STEMI  2. Sepsis  3. Morbid obesity  4. Diabetes mellitus  5. Hypertension  6. Hypokalemia  7. Elevated AST  Plan:     EKG showed sinus tachycardia, nonspecific ST-T wave changes without ischemic changes. Denied having any chest pain. His white count were 29,000. Hemoglobin 12.6, platelet 526. INR was 1.5. Fibrinogen was 733. Potassium 3.3, sodium 136, BUN 17, creatinine 1.1. ALT and AST are elevated. CPK was greater than 5500. High-sensitivity troponin was greater than 2300. Denied having any chest pain. CT chest is pending. CT head was nonacute. Chest x-ray showed bilateral perihilar airspace disease. Differential includes type II non-STEMI, myocarditis. Anyhow he is satting 96% on 1 to 2 L per nasal cannula. His breathing is back to normal and denied having any chest pain or nausea or any anginal equivalent symptoms at this point. 1.  Replete potassium  2. Obtain echocardiogram  3. Continue to trend troponin  4. Follow-up on CT scan results  5. Patient currently on aspirin, pantoprazole. 6.  Start high-dose atorvastatin and check lipid profile  7. Low-dose heparin protocol  8. His CPK is elevated and I wonder if there is some component of rhabdomyolysis. Thank you  For involving me in Mr. Carin rivas. I will follow.     Signed By: Heide Merritt MD     October 12, 2021

## 2021-10-13 ENCOUNTER — APPOINTMENT (OUTPATIENT)
Dept: NON INVASIVE DIAGNOSTICS | Age: 66
DRG: 871 | End: 2021-10-13
Attending: HOSPITALIST
Payer: MEDICARE

## 2021-10-13 ENCOUNTER — APPOINTMENT (OUTPATIENT)
Dept: GENERAL RADIOLOGY | Age: 66
DRG: 871 | End: 2021-10-13
Attending: INTERNAL MEDICINE
Payer: MEDICARE

## 2021-10-13 LAB
ALBUMIN SERPL-MCNC: 2.1 G/DL (ref 3.5–5)
ALBUMIN/GLOB SERPL: 0.6 {RATIO} (ref 1.1–2.2)
ALP SERPL-CCNC: 66 U/L (ref 45–117)
ALT SERPL-CCNC: 65 U/L (ref 12–78)
ANION GAP SERPL CALC-SCNC: 6 MMOL/L (ref 5–15)
APTT PPP: 49.8 SEC (ref 21.2–34.1)
APTT PPP: 57.6 SEC (ref 21.2–34.1)
APTT PPP: 67.4 SEC (ref 21.2–34.1)
APTT PPP: 92 SEC (ref 21.2–34.1)
AST SERPL W P-5'-P-CCNC: 217 U/L (ref 15–37)
BASOPHILS # BLD: 0.1 K/UL (ref 0–0.1)
BASOPHILS NFR BLD: 0 % (ref 0–1)
BILIRUB SERPL-MCNC: 0.5 MG/DL (ref 0.2–1)
BUN SERPL-MCNC: 12 MG/DL (ref 6–20)
BUN/CREAT SERPL: 15 (ref 12–20)
CA-I BLD-MCNC: 7.5 MG/DL (ref 8.5–10.1)
CHLORIDE SERPL-SCNC: 102 MMOL/L (ref 97–108)
CK SERPL-CCNC: 6947 U/L (ref 39–308)
CO2 SERPL-SCNC: 29 MMOL/L (ref 21–32)
CREAT SERPL-MCNC: 0.78 MG/DL (ref 0.7–1.3)
CRP SERPL-MCNC: 11.5 MG/DL (ref 0–0.6)
CRP SERPL-MCNC: 11.7 MG/DL (ref 0–0.6)
DATE LAST DOSE: ABNORMAL
DIFFERENTIAL METHOD BLD: ABNORMAL
ECHO AO ROOT DIAM: 3.4 CM
ECHO AV PEAK GRADIENT: 9 MMHG
ECHO AV PEAK VELOCITY: 154 CM/S
ECHO EST RA PRESSURE: 15 MMHG
ECHO LA AREA 4C: 19.36 CM2
ECHO LA MAJOR AXIS: 3.5 CM
ECHO LA MINOR AXIS: 1.29 CM
ECHO LV E' SEPTAL VELOCITY: 7.12 CM/S
ECHO LV EDV A2C: 91.1 CM3
ECHO LV EDV A4C: 103 CM3
ECHO LV EJECTION FRACTION A4C: 54 %
ECHO LV EJECTION FRACTION BIPLANE: 61.5 % (ref 55–100)
ECHO LV ESV A2C: 27.5 CM3
ECHO LV ESV A4C: 47 CM3
ECHO LV INTERNAL DIMENSION DIASTOLIC: 4.5 CM (ref 4.2–5.9)
ECHO LV POSTERIOR WALL DIASTOLIC: 1.49 CM (ref 0.6–1)
ECHO LVOT PEAK GRADIENT: 3 MMHG
ECHO LVOT PEAK VELOCITY: 83.3 CM/S
ECHO MV A VELOCITY: 91.9 CM/S
ECHO MV AREA PHT: 3.1 CM2
ECHO MV E DECELERATION TIME (DT): 261 MS
ECHO MV E VELOCITY: 77.1 CM/S
ECHO MV E/A RATIO: 0.84
ECHO MV E/E' SEPTAL: 10.83
ECHO MV PRESSURE HALF TIME (PHT): 71 MS
ECHO PV MAX VELOCITY: 127 CM/S
ECHO PV PEAK INSTANTANEOUS GRADIENT SYSTOLIC: 6 MMHG
ECHO RA AREA 4C: 18.63 CM2
ECHO RIGHT VENTRICULAR SYSTOLIC PRESSURE (RVSP): 44 MMHG
ECHO RV INTERNAL DIMENSION: 4.04 CM
ECHO TV MAX VELOCITY: 271 CM/S
ECHO TV REGURGITANT PEAK GRADIENT: 29 MMHG
EOSINOPHIL # BLD: 0 K/UL (ref 0–0.4)
EOSINOPHIL NFR BLD: 0 % (ref 0–7)
ERYTHROCYTE [DISTWIDTH] IN BLOOD BY AUTOMATED COUNT: 15.7 % (ref 11.5–14.5)
GLOBULIN SER CALC-MCNC: 3.8 G/DL (ref 2–4)
GLUCOSE BLD STRIP.AUTO-MCNC: 124 MG/DL (ref 65–117)
GLUCOSE BLD STRIP.AUTO-MCNC: 124 MG/DL (ref 65–117)
GLUCOSE BLD STRIP.AUTO-MCNC: 134 MG/DL (ref 65–117)
GLUCOSE BLD STRIP.AUTO-MCNC: 140 MG/DL (ref 65–117)
GLUCOSE SERPL-MCNC: 104 MG/DL (ref 65–100)
HCT VFR BLD AUTO: 32.7 % (ref 36.6–50.3)
HGB BLD-MCNC: 11.2 G/DL (ref 12.1–17)
IMM GRANULOCYTES # BLD AUTO: 0.1 K/UL (ref 0–0.04)
IMM GRANULOCYTES NFR BLD AUTO: 1 % (ref 0–0.5)
LYMPHOCYTES # BLD: 0.9 K/UL (ref 0.8–3.5)
LYMPHOCYTES NFR BLD: 4 % (ref 12–49)
MAGNESIUM SERPL-MCNC: 1.8 MG/DL (ref 1.6–2.4)
MCH RBC QN AUTO: 26.2 PG (ref 26–34)
MCHC RBC AUTO-ENTMCNC: 34.3 G/DL (ref 30–36.5)
MCV RBC AUTO: 76.6 FL (ref 80–99)
MONOCYTES # BLD: 1.1 K/UL (ref 0–1)
MONOCYTES NFR BLD: 6 % (ref 5–13)
MRSA DNA SPEC QL NAA+PROBE: NOT DETECTED
MV DEC SLOPE: 3670 MM/S2
MV DEC SLOPE: 3670 MM/S2
NEUTS SEG # BLD: 17.9 K/UL (ref 1.8–8)
NEUTS SEG NFR BLD: 89 % (ref 32–75)
NRBC # BLD: 0 K/UL (ref 0–0.01)
NRBC BLD-RTO: 0 PER 100 WBC
PERFORMED BY, TECHID: ABNORMAL
PHOSPHATE SERPL-MCNC: 2.1 MG/DL (ref 2.6–4.7)
PLATELET # BLD AUTO: 185 K/UL (ref 150–400)
PMV BLD AUTO: 9.9 FL (ref 8.9–12.9)
POTASSIUM SERPL-SCNC: 3 MMOL/L (ref 3.5–5.1)
PROCALCITONIN SERPL-MCNC: 19.26 NG/ML
PROCALCITONIN SERPL-MCNC: 21.49 NG/ML
PROT SERPL-MCNC: 5.9 G/DL (ref 6.4–8.2)
RBC # BLD AUTO: 4.27 M/UL (ref 4.1–5.7)
REPORTED DOSE,DOSE: ABNORMAL UNITS
SODIUM SERPL-SCNC: 137 MMOL/L (ref 136–145)
THERAPEUTIC RANGE,PTTT: ABNORMAL SEC (ref 82–109)
TROPONIN-HIGH SENSITIVITY: 996 NG/L (ref 0–76)
VANCOMYCIN TROUGH SERPL-MCNC: 2.5 UG/ML (ref 5–10)
WBC # BLD AUTO: 20.1 K/UL (ref 4.1–11.1)

## 2021-10-13 PROCEDURE — 65610000006 HC RM INTENSIVE CARE

## 2021-10-13 PROCEDURE — 85730 THROMBOPLASTIN TIME PARTIAL: CPT

## 2021-10-13 PROCEDURE — 71045 X-RAY EXAM CHEST 1 VIEW: CPT

## 2021-10-13 PROCEDURE — 77010033678 HC OXYGEN DAILY

## 2021-10-13 PROCEDURE — 83735 ASSAY OF MAGNESIUM: CPT

## 2021-10-13 PROCEDURE — 84145 PROCALCITONIN (PCT): CPT

## 2021-10-13 PROCEDURE — 87070 CULTURE OTHR SPECIMN AEROBIC: CPT

## 2021-10-13 PROCEDURE — 74011250636 HC RX REV CODE- 250/636: Performed by: INTERNAL MEDICINE

## 2021-10-13 PROCEDURE — 82962 GLUCOSE BLOOD TEST: CPT

## 2021-10-13 PROCEDURE — 93306 TTE W/DOPPLER COMPLETE: CPT

## 2021-10-13 PROCEDURE — 84100 ASSAY OF PHOSPHORUS: CPT

## 2021-10-13 PROCEDURE — 80053 COMPREHEN METABOLIC PANEL: CPT

## 2021-10-13 PROCEDURE — 80202 ASSAY OF VANCOMYCIN: CPT

## 2021-10-13 PROCEDURE — 74011636637 HC RX REV CODE- 636/637: Performed by: HOSPITALIST

## 2021-10-13 PROCEDURE — 99222 1ST HOSP IP/OBS MODERATE 55: CPT | Performed by: INTERNAL MEDICINE

## 2021-10-13 PROCEDURE — 74011000258 HC RX REV CODE- 258: Performed by: HOSPITALIST

## 2021-10-13 PROCEDURE — 86140 C-REACTIVE PROTEIN: CPT

## 2021-10-13 PROCEDURE — 74011250637 HC RX REV CODE- 250/637: Performed by: INTERNAL MEDICINE

## 2021-10-13 PROCEDURE — 36415 COLL VENOUS BLD VENIPUNCTURE: CPT

## 2021-10-13 PROCEDURE — 74011250637 HC RX REV CODE- 250/637: Performed by: HOSPITALIST

## 2021-10-13 PROCEDURE — 84484 ASSAY OF TROPONIN QUANT: CPT

## 2021-10-13 PROCEDURE — 74011250636 HC RX REV CODE- 250/636

## 2021-10-13 PROCEDURE — 82550 ASSAY OF CK (CPK): CPT

## 2021-10-13 PROCEDURE — 74011250636 HC RX REV CODE- 250/636: Performed by: HOSPITALIST

## 2021-10-13 PROCEDURE — 87040 BLOOD CULTURE FOR BACTERIA: CPT

## 2021-10-13 PROCEDURE — 36410 VNPNXR 3YR/> PHY/QHP DX/THER: CPT

## 2021-10-13 PROCEDURE — 85025 COMPLETE CBC W/AUTO DIFF WBC: CPT

## 2021-10-13 RX ORDER — POTASSIUM CHLORIDE AND SODIUM CHLORIDE 450; 150 MG/100ML; MG/100ML
INJECTION, SOLUTION INTRAVENOUS CONTINUOUS
Status: DISCONTINUED | OUTPATIENT
Start: 2021-10-13 | End: 2021-10-14

## 2021-10-13 RX ORDER — POTASSIUM CHLORIDE 750 MG/1
40 TABLET, FILM COATED, EXTENDED RELEASE ORAL EVERY 4 HOURS
Status: COMPLETED | OUTPATIENT
Start: 2021-10-13 | End: 2021-10-13

## 2021-10-13 RX ADMIN — ASPIRIN 81 MG: 81 TABLET, COATED ORAL at 08:11

## 2021-10-13 RX ADMIN — POTASSIUM CHLORIDE AND SODIUM CHLORIDE: 450; 150 INJECTION, SOLUTION INTRAVENOUS at 11:15

## 2021-10-13 RX ADMIN — POTASSIUM CHLORIDE AND SODIUM CHLORIDE: 450; 150 INJECTION, SOLUTION INTRAVENOUS at 23:34

## 2021-10-13 RX ADMIN — HEPARIN SODIUM 2000 UNITS: 1000 INJECTION, SOLUTION INTRAVENOUS; SUBCUTANEOUS at 16:36

## 2021-10-13 RX ADMIN — PIPERACILLIN AND TAZOBACTAM 3.38 G: 3; .375 INJECTION, POWDER, LYOPHILIZED, FOR SOLUTION INTRAVENOUS at 21:11

## 2021-10-13 RX ADMIN — POTASSIUM CHLORIDE 40 MEQ: 750 TABLET, FILM COATED, EXTENDED RELEASE ORAL at 15:12

## 2021-10-13 RX ADMIN — Medication 10 ML: at 00:23

## 2021-10-13 RX ADMIN — VANCOMYCIN HYDROCHLORIDE 1000 MG: 1 INJECTION, POWDER, LYOPHILIZED, FOR SOLUTION INTRAVENOUS at 17:00

## 2021-10-13 RX ADMIN — HEPARIN SODIUM AND DEXTROSE 20 UNITS/KG/HR: 10000; 5 INJECTION INTRAVENOUS at 16:36

## 2021-10-13 RX ADMIN — INSULIN LISPRO 3 UNITS: 100 INJECTION, SOLUTION INTRAVENOUS; SUBCUTANEOUS at 11:26

## 2021-10-13 RX ADMIN — PERFLUTREN: 6.52 INJECTION, SUSPENSION INTRAVENOUS at 13:00

## 2021-10-13 RX ADMIN — Medication 10 ML: at 05:22

## 2021-10-13 RX ADMIN — HEPARIN SODIUM 2000 UNITS: 1000 INJECTION, SOLUTION INTRAVENOUS; SUBCUTANEOUS at 09:12

## 2021-10-13 RX ADMIN — VANCOMYCIN HYDROCHLORIDE 1000 MG: 1 INJECTION, POWDER, LYOPHILIZED, FOR SOLUTION INTRAVENOUS at 00:23

## 2021-10-13 RX ADMIN — PANTOPRAZOLE SODIUM 40 MG: 40 TABLET, DELAYED RELEASE ORAL at 08:10

## 2021-10-13 RX ADMIN — Medication 10 ML: at 21:11

## 2021-10-13 RX ADMIN — PIPERACILLIN AND TAZOBACTAM 3.38 G: 3; .375 INJECTION, POWDER, LYOPHILIZED, FOR SOLUTION INTRAVENOUS at 05:22

## 2021-10-13 RX ADMIN — ATORVASTATIN CALCIUM 80 MG: 40 TABLET, FILM COATED ORAL at 08:11

## 2021-10-13 RX ADMIN — HEPARIN SODIUM 2000 UNITS: 1000 INJECTION, SOLUTION INTRAVENOUS; SUBCUTANEOUS at 01:58

## 2021-10-13 RX ADMIN — POTASSIUM CHLORIDE 40 MEQ: 750 TABLET, FILM COATED, EXTENDED RELEASE ORAL at 11:15

## 2021-10-13 RX ADMIN — Medication 10 ML: at 13:25

## 2021-10-13 RX ADMIN — HEPARIN SODIUM AND DEXTROSE 16 UNITS/KG/HR: 10000; 5 INJECTION INTRAVENOUS at 01:58

## 2021-10-13 RX ADMIN — PIPERACILLIN AND TAZOBACTAM 3.38 G: 3; .375 INJECTION, POWDER, LYOPHILIZED, FOR SOLUTION INTRAVENOUS at 13:25

## 2021-10-13 NOTE — PROGRESS NOTES
Day #2 of Vancomycin  Consult provided for this 74 y.o. male for indication of pneumonia, bacteremia. Current regimen:  1000 mg q18h  Abx regimen:  Vancomycin + Zosyn  Concomitant nephrotoxic drugs: None  Frequency of BMP: Not scheduled    Recent Labs     10/13/21  0320 10/12/21  0501 10/11/21  2307   WBC 20.1* 28.8* 31.0*   CREA 0.78 1.17 1.32*   BUN 12 17 17     Est CrCl: >100 ml/min; UO: 0.8 ml/kg/hr  Temp (24hrs), Av.7 °F (37.1 °C), Min:97.9 °F (36.6 °C), Max:99.4 °F (37.4 °C)    Cultures:   10/11 Blood: coagulase-negative Staphylococcus sp.  In 2/4 bottles, GN bacillus in the anaerobic bottle, preliminary  10/13 Blood: NGTD, preliminary    MRSA Swab: Not detected    Target range: AUC/BEVERLEY 400-600    Recent level history:  Date/Time Dose & Interval Measured Level (mcg/mL) Associated AUC/BEVERLEY   10/13 @ 1550 1500 mg x 1, 1000 mg q18h 2.5 194        Impression/Plan:   SCr improved, lactic  acid, procal, CRP elevated, leukocytosis improving, last febrile morning of 10/12  Level significantly subtherapeutic, will increase dose to 1250 mg q8h for predicted AUC and trough of 546 and 12.4, respectively  Will check new trough prior to dose 10/15 @ 0900  Antimicrobial stop date TBD

## 2021-10-13 NOTE — CONSULTS
Infectious Disease Consult Note    Reason for Consult: Bacteremia    Date of Consultation: October 13, 2021  Date of Admission: 10/11/2021  Referring Physician: Hospitalist     HPI:  66-y.o BM admitted with AMS and respiratory distress on 10/12, ID consulted for positive blood Cx. His medical history is significant for COPD, DM, HTN, Ventral hernia and arthritis. He was reportedly found to be in severe respiratory distress and unresponsive by EMS PTP. He was febrile in the ED but has been afebrile since admission, currently maintaining O2 sats on 2 L. His admission CXR revealed hydrostatic edema and no focal infiltrates. CT chest on 10/12 showed RLLL infiltrates and atelectasis. His COVID Ag test on 10/12 was neg. Coag neg staph was isolated from 1/4 blts of blood Cx collected in the ED, repeat on 10/13 is pending. He had a leukocytosis of 31.0 initial labs which is trended down to 20.1 on today's labs, CRP is elevated 11.50, and Procol 19.20. He is on Vanc and Zosyn. Pt reported feeling much better since admission.      Review of Systems:     Gen: Negative for chills, fevers, weight loss, weight gain   CV:  Negative for chest pain, dyspnea on exertion, leg edema   Lungs:  shortness of breath, cough, wheezing   Abdomen: Negative for abdominal pain, nausea, vomiting, diarrhea, constipation   Genitourinary: Negative for genital pain or genital discharge     Neuro: Negative for headache, numbness, tingling, extremity weakness   Skin: Negative for rash, sores/open wounds   Musculoskeletal: Negative for joint pain, joint swelling, joint erythema    Psych: Negative for manic behavior       Past Medical History:  Past Medical History:   Diagnosis Date    Arthritis     Chronic obstructive [facility] disease ([facility]     Diabetes ([facility]     Hypertension     Sleep apnea        Past surgical history   Past Surgical History:   Procedure Laterality Date    HX GI      HX HERNIA REPAIR          Social History   Social History     Tobacco Use    Smoking status: Never Smoker    Smokeless tobacco: Never Used   Substance Use Topics    Alcohol use: Never    Drug use: Never        Family history   Family History   Problem Relation Age of Onset    Hypertension Child           Allergies:  No Known Allergies      Medications:  No current facility-administered medications on file prior to encounter. Current Outpatient Medications on File Prior to Encounter   Medication Sig Dispense Refill    atorvastatin (LIPITOR) 20 mg tablet Take 20 mg by mouth nightly.  hydroCHLOROthiazide (HYDRODIURIL) 25 mg tablet Take 25 mg by mouth daily.  multivitamin (ONE A DAY) tablet Take 1 Tab by mouth daily.  amLODIPine (NORVASC) 10 mg tablet Take 10 mg by mouth daily.  metoprolol succinate (TOPROL-XL) 25 mg XL tablet Take 25 mg by mouth daily.  Farxiga 10 mg tab tablet Take 10 mg by mouth daily.  (Patient not taking: Reported on 10/12/2021)      aspirin delayed-release 81 mg tablet TAKE ONE TABLET BY MOUTH ONE TIME DAILY (Patient not taking: Reported on 10/12/2021)             Physical Exam:    Vitals:   Patient Vitals for the past 24 hrs:   Temp Pulse Resp BP SpO2   10/13/21 1500  93 26 129/78 94 %   10/13/21 1400  90 23 122/80 95 %   10/13/21 1300  96 25 (!) 144/87 94 %   10/13/21 1200  97 26 127/73 94 %   10/13/21 1157  96      10/13/21 1100 97.9 °F (36.6 °C) 92 24 139/62 94 %   10/13/21 1000  91 22 120/70 98 %   10/13/21 0928     97 %   10/13/21 0900  92 24 136/71 95 %   10/13/21 0800  94 25 131/76 95 %   10/13/21 0751  95      10/13/21 0700 98.7 °F (37.1 °C) 89 23 (!) 143/53 97 %   10/13/21 0600  88 19 124/77 98 %   10/13/21 0510  83 22 117/76 96 %   10/13/21 0400  87  116/69 96 %   10/13/21 0300 98.7 °F (37.1 °C) 88  122/71 97 %   10/13/21 0200  88 23 119/67 97 %   10/13/21 0100  95   97 %   10/13/21 0000  97 28 118/63 94 %   10/12/21 2300 99.4 °F (37.4 °C) (!) 106 30 130/72 96 %   10/12/21 2200  98 26 136/74 95 %   10/12/21 2100  97 29 133/69 95 %   10/12/21 2039     94 %   10/12/21 2000  (!) 102 19 (!) 157/89 95 %   10/12/21 1932 99.2 °F (37.3 °C) (!) 108 29 98/81 95 %   10/12/21 1807  (!) 105 28 122/64 94 %   10/12/21 1703  (!) 105 28 (!) 128/91 96 %   10/12/21 1607  84 23 126/70 98 %   ·   · GEN: NAD, AAO x 4  · HEENT: NCAT, PERRLA  · HEART: S1, S2+, RRR, No murmur   · Lungs: CTA B/l, decreased at the bases, no wheeze/rhonchi   · Abdomen: soft, distended, healed mid abdominal scar, ventral hernia, reducible   · Genitourinary: no genital discharge, no ball  · Extremities: b/l leg edema, R>L   · Skin: No open wounds or ulcers     Labs:   Recent Labs     10/13/21  0320 10/12/21  0501 10/11/21  2307   WBC 20.1* 28.8* 31.0*   HGB 11.2* 12.6 12.6   HCT 32.7* 36.4* 36.4*    206 249     Recent Labs     10/13/21  0320 10/12/21  0501 10/11/21  2307   BUN 12 17 17   CREA 0.78 1.17 1.32*       Lab Results   Component Value Date/Time    C-Reactive protein 11.50 (H) 10/13/2021 09:05 AM      No results found for:        Microbiology Data:  - Blood Cx 10/11: Coag neg staph     Imaging:   CXR 10/13: Hydrostatic edema. No focal airspace consolidation definitively visualized. Assessment / Plan:     1. Coag neg staph bacteremia: Unclear significance but doubt reason for admission       Afebrile, sig leukocytosis on initial labs       Continue on Vanc pending repeat BCx       Routine labs in the morning     2. SIRS, febrile w a sig leukocytosis on admission, ? From hemoconcentration      Pt denies being on chronic steroid therapy as out pt       Not requiring pressor support, coag neg staph isolated from Bcx       WBC trending down on empiric Vanc and Zosyn     3. Hypoxic respiratory failure on admission: Edema and no focal airspace consolidation on admission CXR       COVID Ag neg, maintaining O2 sats on 2L. Underlying COPD       Denies productive cough. On Vanc and Zosyn     4.  Acute renal failure: Resolved, Cr at baseline     5.  Right leg swelling, some induration, chronic per pt      No warmth or erythema suggestive of acute infection     Radha Lee MD     10/13/2021

## 2021-10-13 NOTE — PROGRESS NOTES
Progress Note    Patient: Kalpana Metcalf MRN: [id number]  SSN: [id number]    YOB: 1955  Age: 74 y.o. Sex: male      Admit Date: 10/11/2021    LOS: 1 day     Subjective:     No acute events overnight  Objective:     Vitals:    10/13/21 0900 10/13/21 0928 10/13/21 1000 10/13/21 1157   BP: 136/71  120/70    Pulse: 92  91 96   Resp: 24  22    Temp:       SpO2: 95% 97% 98%    Weight:       Height:            Intake and Output:  Current Shift: 10/13 0701 - 10/13 1900  In: 691 [P.O.:600; I.V.:91]  Out: 730 [Urine:730]  Last three shifts: 10/11 1901 - 10/13 0700  In: 2489.4 [I.V.:2489.4]  Out: 3241 [Urine:3750]    Physical Exam:   General:  Alert, cooperative, no distress, appears stated age. Eyes:  Conjunctivae/corneas clear. PERRL, EOMs intact. Fundi benign   Ears:  Normal TMs and external ear canals both ears. Nose: Nares normal. Septum midline. Mucosa normal. No drainage or sinus tenderness. Mouth/Throat: Lips, mucosa, and tongue normal. Teeth and gums normal.   Neck: Supple, symmetrical, trachea midline, no adenopathy, thyroid: no enlargment/tenderness/nodules, no carotid bruit and no JVD. Back:   Symmetric, no curvature. ROM normal. No CVA tenderness. Lungs:   Clear to auscultation bilaterally. Heart:  Regular rate and rhythm, S1, S2 normal, no murmur, click, rub or gallop. Abdomen:   Soft, non-tender. Bowel sounds normal. No masses,  No organomegaly. Extremities: Extremities normal, atraumatic, no cyanosis or edema. Pulses: 2+ and symmetric all extremities. Skin: Skin color, texture, turgor normal. No rashes or lesions   Lymph nodes: Cervical, supraclavicular, and axillary nodes normal.   Neurologic: CNII-XII intact. Normal strength, sensation and reflexes throughout. Lab/Data Review: All lab results for the last 24 hours reviewed.          Assessment:     Active Problems:    Sepsis ([address] (82/45/8349)      Metabolic encephalopathy (63/69/9510)      Pneumonia (10/12/2021)    Patient is a 68-year-old -American male with:  1. Non-STEMI  2. Sepsis  3. Morbid obesity  4. Diabetes mellitus  5. Hypertension  6. Hypokalemia  7. Elevated AST    Plan:     He denied having any chest pain or shortness of breath. He is having his lunch. Okay to transfer out of the ICU from cardiac standpoint. Troponin is trending down. Echocardiogram pending. Currently on aspirin, atorvastatin.   Further recommendation depending on the results of the above-mentioned test    Signed By: Cristela Washington MD     October 13, 2021

## 2021-10-13 NOTE — PROGRESS NOTES
Hospitalist Progress Note               Daily Progress Note: 10/13/2021  78-year-old male with history of diabetes, hypertension, COPD is brought to the emergency room as he was found with altered mental status and lethargy by spouse at home. EMS was called and they found patient in significant respiratory distress and not responding. Temp to 103 in ED. CXR suspect b/l pna. Sepsitic, wbc 31K, lactic acid 8.0-->3.2, crp 10.10. troponin hs elevated and ck elevated c/w rhabdomyolysis. Mentation improved with hydration. Cardio following 2/2 nstemi, echo pending, heparin low dose protocol and trending troponin peaked ~2300, descending. Subjective: The patient is seen for follow up in icu. Relates feeling improved. Appears NAD. Afebrile/24 hrs. Remains on 2 l/m tolerating well spo2>94%  C/o weakness, no chest pain, minimal cough. Procal up 15-->20.4 and crp 10-->11.5. Wbc descending to 20K (28.8<--31)  K 3.0  Blcs: 2 of 4 growing gp clusters and 1 of 4 growing gram variable rods. Continues zosyn/vanco.  mrsa nasal swab pending as is sputum cs. ID consulted.      Trop descending, heparin drip continues,  Ck 5590-->6947, renal fxn preserved      Problem List:  Problem List as of 10/13/2021 Date Reviewed: 10/12/2021        Codes Class Noted - Resolved    Sepsis ([facility] ICD-10-CM: A41.9  ICD-9-CM: 038.9, 995.91  10/12/2021 - Present        Metabolic encephalopathy UXS-82-SF: G93.41  ICD-9-CM: 348.31  10/12/2021 - Present        Pneumonia ICD-10-CM: J18.9  ICD-9-CM: 486  10/12/2021 - Present        Peripheral venous insufficiency ICD-10-CM: I87.2  ICD-9-CM: 459.81  9/24/2020 - Present        Morbid obesity ([facility] ICD-10-CM: E66.01  ICD-9-CM: 278.01  9/24/2020 - Present        Atherosclerosis of native artery of left lower extremity with ulceration of calf ([facility] ICD-10-CM: X11.344  ICD-9-CM: 440.23, 707.12  9/24/2020 - Present        Septic pulmonary embolism without acute cor pulmonale ([facility] ICD-10-CM: I26.90  ICD-9-CM: 415.12  9/24/2020 - Present        Lymphedema ICD-10-CM: I89.0  ICD-9-CM: 457.1  9/24/2020 - Present        RESOLVED: Non-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 exposed ([facility] ICD-10-CM: B37.196  ICD-9-CM: 707.12  9/24/2020 - 12/3/2020        RESOLVED: Non-pressure chronic ulcer of right calf with fat layer exposed ([facility] ICD-10-CM: S04.067  ICD-9-CM: 707.12  9/24/2020 - 12/3/2020              Medications reviewed  Current Facility-Administered Medications   Medication Dose Route Frequency    aspirin delayed-release tablet 81 mg  81 mg Oral DAILY    insulin lispro (HUMALOG) injection   SubCUTAneous AC&HS    glucose chewable tablet 16 g  4 Tablet Oral PRN    dextrose (D50W) injection syrg 12.5-25 g  25-50 mL IntraVENous PRN    glucagon (GLUCAGEN) injection 1 mg  1 mg IntraMUSCular PRN    sodium chloride (NS) flush 5-40 mL  5-40 mL IntraVENous Q8H    sodium chloride (NS) flush 5-40 mL  5-40 mL IntraVENous PRN    acetaminophen (TYLENOL) tablet 650 mg  650 mg Oral Q6H PRN    Or    acetaminophen (TYLENOL) suppository 650 mg  650 mg Rectal Q6H PRN    ondansetron (ZOFRAN ODT) tablet 4 mg  4 mg Oral Q8H PRN    Or    ondansetron (ZOFRAN) injection 4 mg  4 mg IntraVENous Q6H PRN    piperacillin-tazobactam (ZOSYN) 3.375 g in 0.9% sodium chloride (MBP/ADV) 100 mL MBP  3.375 g IntraVENous Q8H    VANCOMYCIN INFORMATION NOTE 1 Each  1 Each Other Rx Dosing/Monitoring    pantoprazole (PROTONIX) tablet 40 mg  40 mg Oral ACB    heparin 25,000 units in D5W 250 ml infusion  12-25 Units/kg/hr (Adjusted) IntraVENous TITRATE    heparin (porcine) 1,000 unit/mL injection 4,000 Units  4,000 Units IntraVENous PRN    Or    heparin (porcine) 1,000 unit/mL injection 2,000 Units  2,000 Units IntraVENous PRN    vancomycin (VANCOCIN) 1,000 mg in 0.9% sodium chloride 250 mL (VIAL-MATE)  1,000 mg IntraVENous Q18H    VANCOMYCIN INFORMATION NOTE   Other ONCE    atorvastatin (LIPITOR) tablet 80 mg  80 mg Oral DAILY       Review of Systems:   Review of Systems   Constitutional: Positive for malaise/fatigue. Negative for chills and fever. HENT: Negative. Eyes: Negative. Respiratory: Positive for shortness of breath. Negative for cough, hemoptysis, sputum production and wheezing. Cardiovascular: Negative for chest pain, palpitations and leg swelling. Gastrointestinal: Negative for abdominal pain, blood in stool, constipation, diarrhea, heartburn, melena, nausea and vomiting. Genitourinary: Negative. Musculoskeletal: Negative. Negative for falls. Skin: Negative. Neurological: Positive for weakness. Endo/Heme/Allergies: Negative. Psychiatric/Behavioral: Negative. Objective:   Physical Exam:     Visit Vitals  /70   Pulse 91   Temp 98.7 °F (37.1 °C)   Resp 22   Ht 5' 7\" (1.702 m)   Wt (!) 181.4 kg (400 lb)   SpO2 98%   BMI 62.65 kg/m²    O2 Flow Rate (L/min): 1.5 l/min O2 Device: Nasal cannula    Temp (24hrs), Av.7 °F (37.1 °C), Min:98.1 °F (36.7 °C), Max:99.4 °F (37.4 °C)    10/13 0701 - 10/13 1900  In: 600 [P.O.:600]  Out: 200 [Urine:200]   10/11 1901 - 10/13 0700  In: 2489.4 [I.V.:2489.4]  Out:  [GQAMO:1517]    General:  Alert, cooperative, no distress, weak appearing, aao x 3, morbidly obese. Lungs:   diminished diffusely, not labored, distant creps at bases L>R   Chest wall:  No tenderness or deformity. Heart:  Regular rate and rhythm, S1, S2 normal, no murmur, click, rub or gallop. Abdomen:   Soft, non-tender. Bowel sounds normal. No masses,  No organomegaly. Extremities: Extremities normal, atraumatic, no cyanosis. 2+edema. Pulses: 2+ and symmetric all extremities. Skin: Warm/dry, decreased turgor   Neurologic: CNII-XII intact.  No gross sensory or motor deficits     Data Review:       Recent Days:  Recent Labs     10/13/21  0320 10/12/21  0501 10/11/21  2307   WBC 20.1* 28.8* 31.0*   HGB 11.2* 12.6 12.6   HCT 32.7* 36.4* 36.4*    206 249 Recent Labs     10/13/21  0320 10/12/21  0501 10/11/21  2307    136 133*   K 3.0* 3.3* 2.9*    100 95*   CO2 29 25 24   * 122* 146*   BUN 12 17 17   CREA 0.78 1.17 1.32*   CA 7.5* 7.9* 8.2*   MG 1.8 1.8 1.2*   PHOS 2.1* 2.9  --    ALB 2.1* 2.5* 2.6*   TBILI 0.5 0.7 0.7   ALT 65 42 27   INR  --  1.5*  --      Recent Labs     10/11/21  2215   PH 7.43   PCO2 39   PO2 91   HCO3 26   FIO2 28       24 Hour Results:  Recent Results (from the past 24 hour(s))   GLUCOSE, POC    Collection Time: 10/12/21  1:05 PM   Result Value Ref Range    Glucose (POC) 118 (H) 65 - 117 mg/dL    Performed by Madiha Luz    TROPONIN-HIGH SENSITIVITY    Collection Time: 10/12/21  1:12 PM   Result Value Ref Range    Troponin-High Sensitivity 1,774 (HH) 0 - 76 ng/L   LACTIC ACID    Collection Time: 10/12/21  1:13 PM   Result Value Ref Range    Lactic acid 2.4 (HH) 0.4 - 2.0 mmol/L   GLUCOSE, POC    Collection Time: 10/12/21  3:44 PM   Result Value Ref Range    Glucose (POC) 106 65 - 117 mg/dL    Performed by Madiha Luz    PTT    Collection Time: 10/12/21  4:33 PM   Result Value Ref Range    aPTT 39.0 (H) 21.2 - 34.1 sec    aPTT, therapeutic range   82 - 109 sec   GLUCOSE, POC    Collection Time: 10/12/21  8:23 PM   Result Value Ref Range    Glucose (POC) 122 (H) 65 - 117 mg/dL    Performed by [facility] MELISSA    PTT    Collection Time: 10/12/21 11:26 PM   Result Value Ref Range    aPTT 49.8 (H) 21.2 - 34.1 sec    aPTT, therapeutic range   82 - 023 sec   METABOLIC PANEL, COMPREHENSIVE    Collection Time: 10/13/21  3:20 AM   Result Value Ref Range    Sodium 137 136 - 145 mmol/L    Potassium 3.0 (L) 3.5 - 5.1 mmol/L    Chloride 102 97 - 108 mmol/L    CO2 29 21 - 32 mmol/L    Anion gap 6 5 - 15 mmol/L    Glucose 104 (H) 65 - 100 mg/dL    BUN 12 6 - 20 mg/dL    Creatinine 0.78 0.70 - 1.30 mg/dL    BUN/Creatinine ratio 15 12 - 20      GFR est AA >60 >60 ml/min/1.73m2    GFR est non-AA >60 >60 ml/min/1.73m2    Calcium 7.5 (L) 8.5 - 10.1 mg/dL    Bilirubin, total 0.5 0.2 - 1.0 mg/dL    AST (SGOT) 217 (H) 15 - 37 U/L    ALT (SGPT) 65 12 - 78 U/L    Alk. phosphatase 66 45 - 117 U/L    Protein, total 5.9 (L) 6.4 - 8.2 g/dL    Albumin 2.1 (L) 3.5 - 5.0 g/dL    Globulin 3.8 2.0 - 4.0 g/dL    A-G Ratio 0.6 (L) 1.1 - 2.2     MAGNESIUM    Collection Time: 10/13/21  3:20 AM   Result Value Ref Range    Magnesium 1.8 1.6 - 2.4 mg/dL   PHOSPHORUS    Collection Time: 10/13/21  3:20 AM   Result Value Ref Range    Phosphorus 2.1 (L) 2.6 - 4.7 mg/dL   CBC WITH AUTOMATED DIFF    Collection Time: 10/13/21  3:20 AM   Result Value Ref Range    WBC 20.1 (H) 4.1 - 11.1 K/uL    RBC 4.27 4. 10 - 5.70 M/uL    HGB 11.2 (L) 12.1 - 17.0 g/dL    HCT 32.7 (L) 36.6 - 50.3 %    MCV 76.6 (L) 80.0 - 99.0 FL    MCH 26.2 26.0 - 34.0 PG    MCHC 34.3 30.0 - 36.5 g/dL    RDW 15.7 (H) 11.5 - 14.5 %    PLATELET 716 634 - 938 K/uL    MPV 9.9 8.9 - 12.9 FL    NRBC 0.0 0.0  WBC    ABSOLUTE NRBC 0.00 0.00 - 0.01 K/uL    NEUTROPHILS 89 (H) 32 - 75 %    LYMPHOCYTES 4 (L) 12 - 49 %    MONOCYTES 6 5 - 13 %    EOSINOPHILS 0 0 - 7 %    BASOPHILS 0 0 - 1 %    IMMATURE GRANULOCYTES 1 (H) 0 - 0.5 %    ABS. NEUTROPHILS 17.9 (H) 1.8 - 8.0 K/UL    ABS. LYMPHOCYTES 0.9 0.8 - 3.5 K/UL    ABS. MONOCYTES 1.1 (H) 0.0 - 1.0 K/UL    ABS. EOSINOPHILS 0.0 0.0 - 0.4 K/UL    ABS. BASOPHILS 0.1 0.0 - 0.1 K/UL    ABS. IMM.  GRANS. 0.1 (H) 0.00 - 0.04 K/UL    DF AUTOMATED     CK    Collection Time: 10/13/21  3:20 AM   Result Value Ref Range    CK 6,947 (H) 39 - 308 U/L   C REACTIVE PROTEIN, QT    Collection Time: 10/13/21  3:20 AM   Result Value Ref Range    C-Reactive protein 11.70 (H) 0.00 - 0.60 mg/dL   TROPONIN-HIGH SENSITIVITY    Collection Time: 10/13/21  4:00 AM   Result Value Ref Range    Troponin-High Sensitivity 996 (HH) 0 - 76 ng/L   PROCALCITONIN    Collection Time: 10/13/21  5:50 AM   Result Value Ref Range    Procalcitonin 21.49 (H) 0 ng/mL   GLUCOSE, POC    Collection Time: 10/13/21  7:28 AM Result Value Ref Range    Glucose (POC) 134 (H) 65 - 117 mg/dL    Performed by Milena Christianson    PTT    Collection Time: 10/13/21  8:15 AM   Result Value Ref Range    aPTT 57.6 (H) 21.2 - 34.1 sec    aPTT, therapeutic range   82 - 109 sec   C REACTIVE PROTEIN, QT    Collection Time: 10/13/21  9:05 AM   Result Value Ref Range    C-Reactive protein 11.50 (H) 0.00 - 0.60 mg/dL           Assessment/     Patient Active Problem List   Diagnosis Code    Peripheral venous insufficiency I87.2    Morbid obesity ([facility]) E66.01    Atherosclerosis of native artery of left lower extremity with ulceration of calf ([facility]) I70.242    Septic pulmonary embolism without acute cor pulmonale ([facility]) I26.90    Lymphedema I89.0    Sepsis ([facility]) H91.0    Metabolic encephalopathy S80.34    Pneumonia J18.9         NSTEMI  Sepsis: wbc descending, inflammatory markers ascending. Presumed pneumonia/ cap  Bacteremia: 2 of 4 growing gpc, 1 of 4 growing gram variable rods. Rhabdomyolysis  DM2 well controlled  HPTN controlled  Hypokalemia   GERD    Plan:  Sepsis syndrome: initially wbc 31K, lactic acidosis, tm 103, cxr suggests pna. procalcitonin ~15, crp ~ 10. Vanco/zosyn day 3 now(10/13). Blood culture + gp clusters 2 of 4 & gram viable rods 1 of 4. CRP increased 10.1-->11.7-->11.5, Procal  up ~15-->21. ID cx'd. Sputum cs requested. MRSA nasal swab pending. Continue vanco/zosyn. Follow repeat blood cultures 10/13.     Presumed pneumonia bilateral apears mostly perihilar,though ct suggests atelectasis vs early pna. Repeat today reviewed by me appears showing improvement, rad read pending. Remains on 2 l/n nc tolerating well. Cont abx.     NSTEMI:asa, statin, low dose heparin continued, troponin hs peaked ~2300, descended. . Echo pending. Cardio appreciated.      Hypokalemia: replete and follow     Rhabdomyolysis: ck ~5500-->~6900. Renal fxn preserved, continue judicious hydration.     Benign essential hypertension: adequate control. holding hctz and amlodipine, resumed/continue metoprolol.     Diabetes mellitus type 2: He is only on Farxiga, holding. Continue ssi/hypoglycemia protocol Well controlled. Continue ssi + hypoglycemia protocol.        Morbid obesity complicated all aspects of care and weight loss plan deferred to pcp on follow up.      Vtep: heparin low dose protocol 2/2 nstemi. Full code  Gip: ppi  nok:  Name Relation Home Work Barbara Spouse [phone]     Dispo: pending course. Move out of icu, mobilize, pt/ot. ID cx'd and cardio following. Transfer to tele in lieu of nstemi on heparin drip. Care Plan discussed with: Patient/Family, Nurse and Consultant . Total time spent with patient: 35 minutes. This dictation was done by dragon, computer voice recognition software. Often unanticipated grammatical, syntax, phones and other interpretive errors are inadvertently transcribed. Please excuse errors that have escaped final proofreading.     Kandis Bolivar MD

## 2021-10-14 LAB
ANION GAP SERPL CALC-SCNC: 7 MMOL/L (ref 5–15)
APTT PPP: 35.8 SEC (ref 21.2–34.1)
APTT PPP: 37.4 SEC (ref 21.2–34.1)
APTT PPP: 72.9 SEC (ref 21.2–34.1)
BACTERIA SPEC CULT: ABNORMAL
BASOPHILS # BLD: 0.1 K/UL (ref 0–0.1)
BASOPHILS NFR BLD: 0 % (ref 0–1)
BNP SERPL-MCNC: 3711 PG/ML
BUN SERPL-MCNC: 9 MG/DL (ref 6–20)
BUN/CREAT SERPL: 14 (ref 12–20)
CA-I BLD-MCNC: 7.7 MG/DL (ref 8.5–10.1)
CHLORIDE SERPL-SCNC: 104 MMOL/L (ref 97–108)
CK SERPL-CCNC: 5297 U/L (ref 39–308)
CO2 SERPL-SCNC: 28 MMOL/L (ref 21–32)
CREAT SERPL-MCNC: 0.66 MG/DL (ref 0.7–1.3)
DIFFERENTIAL METHOD BLD: ABNORMAL
EOSINOPHIL # BLD: 0.1 K/UL (ref 0–0.4)
EOSINOPHIL NFR BLD: 0 % (ref 0–7)
ERYTHROCYTE [DISTWIDTH] IN BLOOD BY AUTOMATED COUNT: 15.6 % (ref 11.5–14.5)
GLUCOSE BLD STRIP.AUTO-MCNC: 101 MG/DL (ref 65–117)
GLUCOSE BLD STRIP.AUTO-MCNC: 114 MG/DL (ref 65–117)
GLUCOSE BLD STRIP.AUTO-MCNC: 148 MG/DL (ref 65–117)
GLUCOSE BLD STRIP.AUTO-MCNC: 150 MG/DL (ref 65–117)
GLUCOSE SERPL-MCNC: 93 MG/DL (ref 65–100)
HCT VFR BLD AUTO: 34.1 % (ref 36.6–50.3)
HGB BLD-MCNC: 11.7 G/DL (ref 12.1–17)
IMM GRANULOCYTES # BLD AUTO: 0.2 K/UL (ref 0–0.04)
IMM GRANULOCYTES NFR BLD AUTO: 1 % (ref 0–0.5)
LYMPHOCYTES # BLD: 1.3 K/UL (ref 0.8–3.5)
LYMPHOCYTES NFR BLD: 7 % (ref 12–49)
MCH RBC QN AUTO: 26.5 PG (ref 26–34)
MCHC RBC AUTO-ENTMCNC: 34.3 G/DL (ref 30–36.5)
MCV RBC AUTO: 77.3 FL (ref 80–99)
MONOCYTES # BLD: 1.8 K/UL (ref 0–1)
MONOCYTES NFR BLD: 10 % (ref 5–13)
NEUTS SEG # BLD: 14.4 K/UL (ref 1.8–8)
NEUTS SEG NFR BLD: 82 % (ref 32–75)
NRBC # BLD: 0 K/UL (ref 0–0.01)
NRBC BLD-RTO: 0 PER 100 WBC
PERFORMED BY, TECHID: ABNORMAL
PERFORMED BY, TECHID: ABNORMAL
PERFORMED BY, TECHID: NORMAL
PERFORMED BY, TECHID: NORMAL
PLATELET # BLD AUTO: 198 K/UL (ref 150–400)
PMV BLD AUTO: 10.2 FL (ref 8.9–12.9)
POTASSIUM SERPL-SCNC: 3.7 MMOL/L (ref 3.5–5.1)
RBC # BLD AUTO: 4.41 M/UL (ref 4.1–5.7)
SODIUM SERPL-SCNC: 139 MMOL/L (ref 136–145)
SPECIAL REQUESTS,SREQ: ABNORMAL
THERAPEUTIC RANGE,PTTT: ABNORMAL SEC
THERAPEUTIC RANGE,PTTT: ABNORMAL SEC (ref 82–109)
THERAPEUTIC RANGE,PTTT: ABNORMAL SEC (ref 82–109)
WBC # BLD AUTO: 17.8 K/UL (ref 4.1–11.1)

## 2021-10-14 PROCEDURE — 74011000258 HC RX REV CODE- 258: Performed by: HOSPITALIST

## 2021-10-14 PROCEDURE — 74011000636 HC RX REV CODE- 636: Performed by: INTERNAL MEDICINE

## 2021-10-14 PROCEDURE — B2151ZZ FLUOROSCOPY OF LEFT HEART USING LOW OSMOLAR CONTRAST: ICD-10-PCS | Performed by: INTERNAL MEDICINE

## 2021-10-14 PROCEDURE — 80048 BASIC METABOLIC PNL TOTAL CA: CPT

## 2021-10-14 PROCEDURE — 99152 MOD SED SAME PHYS/QHP 5/>YRS: CPT | Performed by: INTERNAL MEDICINE

## 2021-10-14 PROCEDURE — 85025 COMPLETE CBC W/AUTO DIFF WBC: CPT

## 2021-10-14 PROCEDURE — 77030019569 HC BND COMPR RAD TERU -B: Performed by: INTERNAL MEDICINE

## 2021-10-14 PROCEDURE — 77030003394 HC NDL ART COOK -A: Performed by: INTERNAL MEDICINE

## 2021-10-14 PROCEDURE — 85730 THROMBOPLASTIN TIME PARTIAL: CPT

## 2021-10-14 PROCEDURE — C1769 GUIDE WIRE: HCPCS | Performed by: INTERNAL MEDICINE

## 2021-10-14 PROCEDURE — 82962 GLUCOSE BLOOD TEST: CPT

## 2021-10-14 PROCEDURE — 74011250636 HC RX REV CODE- 250/636: Performed by: HOSPITALIST

## 2021-10-14 PROCEDURE — 36415 COLL VENOUS BLD VENIPUNCTURE: CPT

## 2021-10-14 PROCEDURE — 83880 ASSAY OF NATRIURETIC PEPTIDE: CPT

## 2021-10-14 PROCEDURE — 74011636637 HC RX REV CODE- 636/637: Performed by: HOSPITALIST

## 2021-10-14 PROCEDURE — 82550 ASSAY OF CK (CPK): CPT

## 2021-10-14 PROCEDURE — 77030016699 HC CATH ANGI DX INFN1 CARD -A: Performed by: INTERNAL MEDICINE

## 2021-10-14 PROCEDURE — 77030019698 HC SYR ANGI MDLON MRTM -A: Performed by: INTERNAL MEDICINE

## 2021-10-14 PROCEDURE — 99232 SBSQ HOSP IP/OBS MODERATE 35: CPT | Performed by: INTERNAL MEDICINE

## 2021-10-14 PROCEDURE — 93458 L HRT ARTERY/VENTRICLE ANGIO: CPT | Performed by: INTERNAL MEDICINE

## 2021-10-14 PROCEDURE — 74011250636 HC RX REV CODE- 250/636: Performed by: INTERNAL MEDICINE

## 2021-10-14 PROCEDURE — 4A023N7 MEASUREMENT OF CARDIAC SAMPLING AND PRESSURE, LEFT HEART, PERCUTANEOUS APPROACH: ICD-10-PCS | Performed by: INTERNAL MEDICINE

## 2021-10-14 PROCEDURE — 74011250637 HC RX REV CODE- 250/637: Performed by: HOSPITALIST

## 2021-10-14 PROCEDURE — 65270000029 HC RM PRIVATE

## 2021-10-14 PROCEDURE — C1894 INTRO/SHEATH, NON-LASER: HCPCS | Performed by: INTERNAL MEDICINE

## 2021-10-14 PROCEDURE — 76210000006 HC OR PH I REC 0.5 TO 1 HR: Performed by: INTERNAL MEDICINE

## 2021-10-14 PROCEDURE — 74011250637 HC RX REV CODE- 250/637: Performed by: INTERNAL MEDICINE

## 2021-10-14 PROCEDURE — B2111ZZ FLUOROSCOPY OF MULTIPLE CORONARY ARTERIES USING LOW OSMOLAR CONTRAST: ICD-10-PCS | Performed by: INTERNAL MEDICINE

## 2021-10-14 PROCEDURE — 74011000250 HC RX REV CODE- 250: Performed by: INTERNAL MEDICINE

## 2021-10-14 RX ORDER — HEPARIN SODIUM 200 [USP'U]/100ML
INJECTION, SOLUTION INTRAVENOUS
Status: COMPLETED | OUTPATIENT
Start: 2021-10-14 | End: 2021-10-14

## 2021-10-14 RX ORDER — HEPARIN SODIUM 1000 [USP'U]/ML
INJECTION, SOLUTION INTRAVENOUS; SUBCUTANEOUS AS NEEDED
Status: DISCONTINUED | OUTPATIENT
Start: 2021-10-14 | End: 2021-10-14 | Stop reason: HOSPADM

## 2021-10-14 RX ORDER — IPRATROPIUM BROMIDE AND ALBUTEROL SULFATE 2.5; .5 MG/3ML; MG/3ML
3 SOLUTION RESPIRATORY (INHALATION)
Status: DISCONTINUED | OUTPATIENT
Start: 2021-10-14 | End: 2021-10-20 | Stop reason: HOSPADM

## 2021-10-14 RX ORDER — LIDOCAINE HYDROCHLORIDE 10 MG/ML
INJECTION INFILTRATION; PERINEURAL AS NEEDED
Status: DISCONTINUED | OUTPATIENT
Start: 2021-10-14 | End: 2021-10-14 | Stop reason: HOSPADM

## 2021-10-14 RX ORDER — MIDAZOLAM HYDROCHLORIDE 1 MG/ML
INJECTION INTRAMUSCULAR; INTRAVENOUS AS NEEDED
Status: DISCONTINUED | OUTPATIENT
Start: 2021-10-14 | End: 2021-10-14 | Stop reason: HOSPADM

## 2021-10-14 RX ORDER — SODIUM CHLORIDE 0.9 % (FLUSH) 0.9 %
5-40 SYRINGE (ML) INJECTION AS NEEDED
Status: DISCONTINUED | OUTPATIENT
Start: 2021-10-14 | End: 2021-10-20 | Stop reason: HOSPADM

## 2021-10-14 RX ORDER — FUROSEMIDE 10 MG/ML
40 INJECTION INTRAMUSCULAR; INTRAVENOUS ONCE
Status: COMPLETED | OUTPATIENT
Start: 2021-10-14 | End: 2021-10-14

## 2021-10-14 RX ORDER — SODIUM CHLORIDE 0.9 % (FLUSH) 0.9 %
5-40 SYRINGE (ML) INJECTION EVERY 8 HOURS
Status: DISCONTINUED | OUTPATIENT
Start: 2021-10-14 | End: 2021-10-20 | Stop reason: HOSPADM

## 2021-10-14 RX ADMIN — VANCOMYCIN HYDROCHLORIDE 1250 MG: 1.25 INJECTION, POWDER, LYOPHILIZED, FOR SOLUTION INTRAVENOUS at 16:54

## 2021-10-14 RX ADMIN — Medication 10 ML: at 14:25

## 2021-10-14 RX ADMIN — Medication 10 ML: at 05:12

## 2021-10-14 RX ADMIN — Medication 10 ML: at 14:24

## 2021-10-14 RX ADMIN — Medication 10 ML: at 22:17

## 2021-10-14 RX ADMIN — FUROSEMIDE 40 MG: 10 INJECTION, SOLUTION INTRAMUSCULAR; INTRAVENOUS at 11:38

## 2021-10-14 RX ADMIN — HEPARIN SODIUM AND DEXTROSE 22 UNITS/KG/HR: 10000; 5 INJECTION INTRAVENOUS at 07:15

## 2021-10-14 RX ADMIN — ACETAMINOPHEN 650 MG: 325 TABLET ORAL at 20:34

## 2021-10-14 RX ADMIN — VANCOMYCIN HYDROCHLORIDE 1250 MG: 1.25 INJECTION, POWDER, LYOPHILIZED, FOR SOLUTION INTRAVENOUS at 11:34

## 2021-10-14 RX ADMIN — ASPIRIN 81 MG: 81 TABLET, COATED ORAL at 11:38

## 2021-10-14 RX ADMIN — PIPERACILLIN AND TAZOBACTAM 3.38 G: 3; .375 INJECTION, POWDER, LYOPHILIZED, FOR SOLUTION INTRAVENOUS at 14:24

## 2021-10-14 RX ADMIN — PIPERACILLIN AND TAZOBACTAM 3.38 G: 3; .375 INJECTION, POWDER, LYOPHILIZED, FOR SOLUTION INTRAVENOUS at 05:11

## 2021-10-14 RX ADMIN — ATORVASTATIN CALCIUM 80 MG: 40 TABLET, FILM COATED ORAL at 11:38

## 2021-10-14 RX ADMIN — PIPERACILLIN AND TAZOBACTAM 3.38 G: 3; .375 INJECTION, POWDER, LYOPHILIZED, FOR SOLUTION INTRAVENOUS at 20:34

## 2021-10-14 RX ADMIN — VANCOMYCIN HYDROCHLORIDE 1250 MG: 1.25 INJECTION, POWDER, LYOPHILIZED, FOR SOLUTION INTRAVENOUS at 01:26

## 2021-10-14 RX ADMIN — INSULIN LISPRO 3 UNITS: 100 INJECTION, SOLUTION INTRAVENOUS; SUBCUTANEOUS at 20:34

## 2021-10-14 RX ADMIN — HEPARIN SODIUM 2000 UNITS: 1000 INJECTION, SOLUTION INTRAVENOUS; SUBCUTANEOUS at 07:17

## 2021-10-14 RX ADMIN — HEPARIN SODIUM AND DEXTROSE 20 UNITS/KG/HR: 10000; 5 INJECTION INTRAVENOUS at 03:16

## 2021-10-14 NOTE — PROGRESS NOTES
Received patient via streched from ICU accompanied by 3 staffs members alert ad oriented x 4. Denies any pain nor discomfort at this time. Hob kept elevated with 2 via nasal cannula at 1 liter as per orders. Small pin point area on patient's rt.vikas foot noted. Made comfortable as much as possible. Will continue with care.

## 2021-10-14 NOTE — PROGRESS NOTES
Progress Note    Patient: Lucita Chamorro MRN: [id number]  SSN: [id number]    YOB: 1955  Age: 74 y.o. Sex: male      Admit Date: 10/11/2021    LOS: 2 days     Subjective:     No acute events overnight  Objective:     Vitals:    10/13/21 1922 10/13/21 2100 10/13/21 2300 10/14/21 0200   BP:  (!) 150/93 134/80 134/79   Pulse:  99 96 93   Resp:  27 29 25   Temp:   98.2 °F (36.8 °C)    SpO2: 94% 93% 93% 94%   Weight:       Height:            Intake and Output:  Current Shift: No intake/output data recorded. Last three shifts: 10/12 1901 - 10/14 0700  In: 3113 [P.O.:1320; I.V.:1793]  Out: 9932 [Urine:3375]    Physical Exam:   General:  Alert, cooperative, no distress, appears stated age. Eyes:  Conjunctivae/corneas clear. PERRL, EOMs intact. Fundi benign   Ears:  Normal TMs and external ear canals both ears. Nose: Nares normal. Septum midline. Mucosa normal. No drainage or sinus tenderness. Mouth/Throat: Lips, mucosa, and tongue normal. Teeth and gums normal.   Neck: Supple, symmetrical, trachea midline, no adenopathy, thyroid: no enlargment/tenderness/nodules, no carotid bruit and no JVD. Back:   Symmetric, no curvature. ROM normal. No CVA tenderness. Lungs:   Clear to auscultation bilaterally. Heart:  Regular rate and rhythm, S1, S2 normal, no murmur, click, rub or gallop. Abdomen:   Soft, non-tender. Bowel sounds normal. No masses,  No organomegaly. Extremities: Extremities normal, atraumatic, no cyanosis or edema. Pulses: 2+ and symmetric all extremities. Skin: Skin color, texture, turgor normal. No rashes or lesions   Lymph nodes: Cervical, supraclavicular, and axillary nodes normal.   Neurologic: CNII-XII intact. Normal strength, sensation and reflexes throughout. Lab/Data Review: All lab results for the last 24 hours reviewed.          Assessment:     Active Problems:    Sepsis ([address] (03/63/8325)      Metabolic encephalopathy (89/46/0981)      Pneumonia (10/12/2021)    Patient is a 75-year-old -American male with:  1. Non-STEMI  2. Sepsis  3. Morbid obesity  4. Diabetes mellitus  5. Hypertension  6. Hypokalemia  7. Elevated AST    Plan:     Patient denied having any chest pain. Continue to have coughing spells. His troponin has trended down. His echoShowed EF of 55 to 60%, RVSP 44 mmHg. I have explained to the patient indication, alternative, risk benefits and complications of left heart catheterization coronary angiogram and possible PCI he verbalized understanding and agreed to proceed with the procedure. Addendum:  Patient underwent cardiac catheterization via right radial artery. No significant coronary artery disease noted. Mild disease in the proximal RCA and the mid LAD. LV systolic function was normal.  Continue aggressive risk factor modification. Discontinue heparin GTT.     Signed By: Roly Murphy MD     October 14, 2021

## 2021-10-14 NOTE — PROGRESS NOTES
Infectious Disease Progress Note           Subjective:   Pt seen and examined at bedside. Remains, continues to do well clinically, denies new complaints. No acute events since last seen. Transferred out pt the ICU. Underwent cardiac cath earlier this morning   Objective:   Physical Exam:     Visit Vitals  BP (!) 162/97 (BP 1 Location: Right lower arm, BP Patient Position: Supine)   Pulse 66   Temp 98.1 °F (36.7 °C)   Resp 16   Ht 5' 7\" (1.702 m)   Wt (!) 400 lb (181.4 kg)   SpO2 94%   BMI 62.65 kg/m²    O2 Flow Rate (L/min): 2 l/min O2 Device: Nasal cannula    Temp (24hrs), Av.6 °F (37 °C), Min:98.1 °F (36.7 °C), Max:99.8 °F (37.7 °C)    No intake/output data recorded. 10/12 1901 - 10/14 0700  In: 3113 [P.O.:1320;  I.V.:1793]  Out: 9384 [Urine:3375]    General: NAD, alert, AAO x 4  HEENT: DOUG, Moist mucosa   Lungs: CTA b/l, decreased at the bases, no wheeze/rhonchi   Heart: S1S2+, RRR, no murmur  Abdo: Soft, NT, ND, +BS   : No ball cath   Exts: RLE swelling, no warmth or erythema   Skin: No wounds, No rashes or lesions    Data Review:       Recent Days:  Recent Labs     10/14/21  1145 10/13/21  0320 10/12/21  0501   WBC 17.8* 20.1* 28.8*   HGB 11.7* 11.2* 12.6   HCT 34.1* 32.7* 36.4*    185 206     Recent Labs     10/14/21  1145 10/13/21  0320 10/12/21  0501   BUN 9 12 17   CREA 0.66* 0.78 1.17       Lab Results   Component Value Date/Time    C-Reactive protein 11.50 (H) 10/13/2021 09:05 AM          Microbiology     Results     Procedure Component Value Units Date/Time    CULTURE, RESPIRATORY/SPUTUM/BRONCH Elsy Mulch STAIN [[id number]] Collected: 10/13/21 1045    Order Status: Completed Specimen: Sputum Updated: 10/14/21 1316     Special Requests: No Special Requests        GRAM STAIN No wbc's seen         no epithelial cells seen               Occasional Gram Negative Rods                  Occasional Gram Positive Cocci in pairs           Culture result: PENDING    CULTURE, BLOOD, PAIRED [[id number]] Collected: 10/13/21 0905    Order Status: Completed Specimen: Blood Updated: 10/14/21 0959     Special Requests: No Special Requests        Culture result: No growth after 23 hours       MRSA SCREEN - PCR (NASAL) [[id number]] Collected: 10/12/21 1313    Order Status: Canceled Specimen: Swab     MRSA SCREEN - PCR (NASAL) [[id number]] Collected: 10/12/21 0910    Order Status: Completed Specimen: Swab Updated: 10/13/21 1052     MRSA by PCR, Nasal Not Detected       COVID-19 RAPID TEST [[id number]] Collected: 10/12/21 0202    Order Status: Completed Specimen: Nasopharyngeal Updated: 10/12/21 0304     Specimen source Nasopharyngeal        COVID-19 rapid test Not Detected        Comment: Rapid Abbott ID Now   Rapid NAAT:  The specimen is NEGATIVE for SARS-CoV-2, the novel coronavirus associated with COVID-19. Negative results should be treated as presumptive and, if inconsistent with clinical signs and symptoms or necessary for patient management, should be tested with an alternative molecular assay. Negative results do not preclude SARS-CoV-2 infection and should not be used as the sole basis for patient management decisions. This test has been authorized by the FDA under   an Emergency Use Authorization (EUA) for use by authorized laboratories.  Fact sheet for Healthcare Providers: ConventionUpdate.co.nz Fact sheet for Patients: ConventionUpdate.co.nz   Methodology: Isothermal Nucleic Acid Amplification         CULTURE, BLOOD, PAIRED [[id number]]  (Abnormal) Collected: 10/11/21 2304    Order Status: Completed Specimen: Blood Updated: 10/14/21 0906     Special Requests: No Special Requests        Culture result:       Staphylococcus species, coagulase negative MULTIPLE COLONY TYPES  growing in 3 of 4 bottles drawn (NO SITES INDICATED)                  preliminary report of Gram Positive Cocci in clusters growing in 2 of 4 bottles drawn AND Gram variable rods growing in 1 of 4 bottles drawn CALLED TO AND READ BACK BY PHILIP SESAY RN [facility] SCAV AT 0006 ON 10/13/21. [facility]                  Bacillus species, not anthracis GROWING IN THE ANAEROBIC BOTTLE          CULTURE, BLOOD, PAIRED [[id number]]     Order Status: Canceled Specimen: Blood              Diagnostics   CXR Results  (Last 48 hours)               10/13/21 0958  XR CHEST PORT Final result    Impression:  Hydrostatic edema. No focal airspace consolidation definitively   visualized. Narrative:  Chest, frontal view, 10/13/2021       History: Sepsis. Pneumonia. Comparison: Including chest 10/12/2021. Findings: The cardiac silhouette is enlarged. The lungs are adequately   expanded. There is prominence of the pulmonary vasculature and mild hydrostatic   edema. The bandlike opacity in the right lower lobe on the CT chest 10/12/2021   is not definitively visualized. No pleural effusion or pneumothorax is evident. The osseous structures are stable. Assessment/Plan     1. Coag neg staph bacteremia: Unclear significance but doubt reason for admission       Repeat Bcx from 10/13 is neg. Afebrile, leukocytosis trending down on todays labs       Will leave on Vanc for now, likely d/c in AM       Routine labs in the morning        2. SIRS/Probable sepsis on admission, suspected aspiration PNA,       GNR and GPC isolated from sputum Cx and coag neg staph from blood       Afebrile, leukocytosis trending down       Continue on Vanc and Zosyn for now now, likely de-escalate coverage in the morning      3. Hypoxic respiratory failure on admission: Found unresponsive, suspected suspected aspiration       COVID Ag neg, maintaining O2 sats on 2L. Underlying COPD       No focal infiltrates on CXR, edema noted       Will repeat CXR in AM. Continue current antimicrobials     4. Acute renal failure: Resolved, Cr at baseline      5. Right leg swelling, stable e/o evidence of worsening     6.  NSTEMI: S/p cardiac cath earlier today, mild disease in proximal PCA and mid LAD     Jj Triana MD    10/14/2021

## 2021-10-14 NOTE — ADT AUTH CERT NOTES
Pneumonia - Care Day 3 (10/14/2021) by Vikas De Leon RN       Review Entered Review Status   10/14/2021 11:22 Completed      Criteria Review      Care Day: 3 Care Date: 10/14/2021 Level of Care: Telemetry    Guideline Day 3    Clinical Status    (X) * Hemodynamic stability    10/14/2021 11:22:47 EDT by Edison Parr      No temp    104   28   142/93   92% 2lnc    ( ) * Afebrile or temperature acceptable for next level of care    ( ) * Tachypnea absent    10/14/2021 11:22:47 EDT by Ashley Maya [email or web address]    ( ) * Hypoxemia absent    10/14/2021 11:22:47 EDT by Ashley Maya 92% 2lnc    (X) * Mental status at baseline    (X) * Antibiotic regimen acceptable for next level of care    10/14/2021 11:22:47 EDT by Ashley Maya zosyn 3.375g iv q8   vanc 1250mg iv q8    ( ) * Discharge plans and education understood    Activity    ( ) * Ambulatory or acceptable for next level of care    10/14/2021 11:22:47 EDT by Ashley Maya BR post card cath    Routes    (X) * Oral hydration    (X) * Oral medications or regimen acceptable for next level of care    10/14/2021 11:22:47 EDT by Edison Parr      asa 81mg po qd   Lipitor 80mg po qd   Lasix 40mg iv once   Humalog ss ac and hs   protonix 40mg po qd   zosyn 3.375g iv q8   vanc 1250mg iv q8   heparin titrated gtt    (X) * Oral diet or acceptable for next level of care    10/14/2021 11:22:47 EDT by Sahil Santoyo   after procedure to reg diet    Interventions    ( ) * Oxygen absent or at baseline need    10/14/2021 11:22:47 EDT by Edison Parr      92% 2lnc    (X) * Isolation not indicated, or is performable at next level of care    Medications    ( ) Oral antibiotics    10/14/2021 11:22:47 EDT by Ashley Maya zosyn 3.375g iv q8   vanc 1250mg iv q8    * Milestone   Additional Notes   10/14/21  inpt telem   No temp    104   28   142/93   92% 2lnc   No labs   Meds:  asa 81mg po qd   Lipitor 80mg po qd   Lasix 40mg iv once   Humalog ss ac and hs   protonix 40mg po qd   zosyn 3.375g iv q8   vanc 1250mg iv q8   heparin titrated gtt     Orders:  npo   PT/OT   BR            Hospitalist note   Assessment:   Acute non-STEMI       Systemic inflammatory response syndrome, without obvious source               -Inflammatory markers have been improving on Zosyn and vancomycin       Coag negative staph bacteremia, probably due to contamination - doubt source of sepsis       Acute exacerbation of COPD       Acute respiratory failure with hypoxia, probably due to heart failure and COPD               -Still on 2 L nasal cannula       Acute diastolic heart failure; his 2 chest x-rays have shown evidence of heart failure       Acute kidney injury, resolved (1.3-->-->1.17-->0.78)       Obstructive sleep apnea       Rhabdomyolysis       Generalized weakness           Plan:   Await repeat labs   Stop IV fluids in light of chest x-ray yesterday showing persistent hydrostatic edema   Continue Zosyn and vancomycin   Add DuoNeb treatments   Check BNP   Furosemide 40 mg IV now   PT and OT evaluations       Care Plan discussed with: Patient/Family       Disposition: Home 24 to 48 hours             Card note   Assessment:       Active Problems:     Sepsis ([facility] [address] (50/96/8428)         Metabolic encephalopathy (95/28/4185)         Pneumonia (10/12/2021)       Patient is a 57-year-old -American male with:   1.  Non-STEMI   2.  Sepsis   3.  Morbid obesity   4.  Diabetes mellitus   5.  Hypertension   6.  Hypokalemia   7.  Elevated AST       Plan:       Patient denied having any chest pain.  Continue to have coughing spells.  His troponin has trended down.  His echoShowed EF of 55 to 60%, RVSP 44 mmHg.  I have explained to the patient indication, alternative, risk benefits and complications of left heart catheterization coronary angiogram and possible PCI he verbalized understanding and agreed to proceed with the procedure.       Addendum:   Patient underwent cardiac catheterization via right radial artery.  No significant coronary artery disease noted.  Mild disease in the proximal RCA and the mid LAD.  LV systolic function was normal.   Continue aggressive risk factor modification.  Discontinue heparin GTT.              Pneumonia - Care Day 2 (10/13/2021) by Tish Framer       Review Entered Review Status   10/13/2021 13:42 Completed      Criteria Review      Care Day: 2 Care Date: 10/13/2021 Level of Care: Telemetry    Guideline Day 2    Level Of Care    (X) Floor    Clinical Status    (X) * No CO2 retention or acidosis    (X) * No requirement for mechanical ventilation    (X) * Hypotension absent    10/13/2021 13:42:27 EDT by Tish Farmer      120/70, 91, 22-28    (X) * Afebrile or fever improved    10/13/2021 13:42:27 EDT by Tish Farmer      98.7    ( ) * No hypoxia on room air or oxygenation improved    10/13/2021 13:42:27 EDT by Tish Farmer      O2 2LPM 95%    (X) * Mental status improved or at baseline    Activity    ( ) * Increased activity    Routes    ( ) Oral medications    10/13/2021 13:42:27 EDT by Tish Farmer      1/2 NS W/KCL 100ML/HR, HEPARIN IV DRIP,    (X) Usual diet    Interventions    (X) Pulse oximetry    (X) Possible oxygen    Medications    (X) IV or oral antibiotics    10/13/2021 13:42:27 EDT by Sreekanth Arias 3.375G IV Q8HR, VANCOMYCIN 1000MG IV [id number]    * Milestone   Additional Notes   10/13      CARDIO-   Patient is a 78-year-old -American male with:   1.  Non-STEMI   2.  Sepsis   3.  Morbid obesity   4.  Diabetes mellitus   5.  Hypertension   6.  Hypokalemia   7.  Elevated AST       Plan:       He denied having any chest pain or shortness of breath.  He is having his lunch.  Okay to transfer out of the ICU from cardiac standpoint.  Troponin is trending down.  Echocardiogram pending.  Currently on aspirin, atorvastatin.  Further recommendation depending on the results of the above-mentioned test            ATTENDING-      Subjective:    The patient is seen for follow up in icu. Relates feeling improved. Appears NAD. Afebrile/24 hrs. Remains on 2 l/m tolerating well spo2>94%   C/o weakness, no chest pain, minimal cough.       Procal up 15-->20.4 and crp 10-->11.5. Wbc descending to 20K (28.8<--31)   K 3.0   Blcs: 2 of 4 growing gp clusters and 1 of 4 growing gram variable rods. Continues zosyn/vanco.   mrsa nasal swab pending as is sputum cs. ID consulted.        Trop descending, heparin drip continues,   Ck 5590-->6947, renal fxn preserved      NSTEMI   Sepsis: wbc descending, inflammatory markers ascending. Presumed pneumonia/ cap   Bacteremia: 2 of 4 growing gpc, 1 of 4 growing gram variable rods.    Rhabdomyolysis   DM2 well controlled   HPTN controlled   Hypokalemia    GERD       Plan:   Sepsis syndrome: initially wbc 31K, lactic acidosis, tm 103, cxr suggests pna. procalcitonin ~15, crp ~ 10. Vanco/zosyn day 3 now(10/13).  Blood culture + gp clusters 2 of 4 & gram viable rods 1 of 4. CRP increased 10.1-->11.7-->11.5, Procal  up ~15-->21. ID cx'd. Sputum cs requested. MRSA nasal swab pending. Continue vanco/zosyn. Follow repeat blood cultures 10/13.       Presumed pneumonia bilateral apears mostly perihilar,though ct suggests atelectasis vs early pna. Repeat today reviewed by me appears showing improvement, rad read pending. Remains on 2 l/n nc tolerating well. Cont abx.       NSTEMI:asa, statin, low dose heparin continued, troponin hs peaked ~2300, descended. . Echo pending. Cardio appreciated.        Hypokalemia: replete and follow        Rhabdomyolysis: ck ~5500-->~6900. Renal fxn preserved, continue judicious hydration.       Benign essential hypertension: adequate control. holding hctz and amlodipine, resumed/continue metoprolol.       Diabetes mellitus type 2: He is only on Farxiga, holding. Continue ssi/hypoglycemia protocol Well controlled.  Continue ssi + hypoglycemia protocol.          Morbid obesity complicated all aspects of care and weight loss plan deferred to pcp on follow up.             10/13/2021 03:20   WBC: 20.1 (H)   NRBC: 0.0   RBC: 4.27   HGB: 11.2 (L)   HCT: 32.7 (L)   MCV: 76.6 (L)   MCH: 26.2   MCHC: 34.3   RDW: 15.7 (H)   PLATELET: 959   MPV: 9.9   NEUTROPHILS: 89 (H)   LYMPHOCYTES: 4 (L)   MONOCYTES: 6   EOSINOPHILS: 0   BASOPHILS: 0   IMMATURE GRANULOCYTES: 1 (H)   DF: AUTOMATED   ABSOLUTE NRBC: 0.00   ABS. NEUTROPHILS: 17.9 (H)   ABS. IMM. GRANS.: 0.1 (H)   ABS. LYMPHOCYTES: 0.9   ABS. MONOCYTES: 1.1 (H)   ABS. EOSINOPHILS: 0.0   ABS. BASOPHILS: 0.1         10/13/2021 08:15   aPTT: 57.6 (H)         CHEST XR-IMPRESSION   Hydrostatic edema.  No focal airspace consolidation definitively   Visualized. 10/13/2021 03:20   Sodium: 137   Potassium: 3.0 (L)   Chloride: 102   CO2: 29   Anion gap: 6   Glucose: 104 (H)   BUN: 12   Creatinine: 0.78   BUN/Creatinine ratio: 15   Calcium: 7.5 (L)   Phosphorus: 2.1 (L)   Magnesium: 1.8   GFR est non-AA: >60   GFR est AA: >60   Bilirubin, total: 0.5   Protein, total: 5.9 (L)   Albumin: 2.1 (L)   Globulin: 3.8   A-G Ratio: 0.6 (L)   ALT: 65   AST: 217 (H)   Alk.  phosphatase: 66   CK: 6,947 (H)   C-Reactive protein: 11.70 (H)      10/13/2021 04:00   Troponin-High Sensitivity: 996 (HH)      10/13/2021 05:50   Procalcitonin: 21.49 (H)      10/13/2021 09:05   Procalcitonin: 19.26 (H)   C-Reactive protein: 11.50 (H)

## 2021-10-14 NOTE — PROGRESS NOTES
Hospitalist Progress Note               Daily Progress Note: 10/14/2021      Subjective:   Hospital course to date: Patient is a 56-year-old male with history of diabetes, hypertension and COPD was admitted on 10/12 with altered mental status and lethargy. He was in respiratory distress on admission and had a temperature of 102. 1. Chest x-ray showed bilateral perihilar densities. White count was 31,000 and lactate was elevated. Patient was started on IV antibiotics. Mentation improved with hydration. Procalcitonin was 21    Patient was noted to have a troponin of 996. CPK was 6900    Echo showed normal LV function with mild pulmonary hypertension    Repeat chest x-ray on 10/13 showed hydrostatic edema but no focal airspace consolidation    CT of the chest showed only bandlike atelectasis in the right middle lobe    Blood cultures were positive for coag negative staph species with multiple colony types in 3 of 4 bottles    Patient has remained hypoxic and requiring oxygen.   She is noted to have wheezing but is not currently receiving steroids  or DuoNeb treatments that I can see    Problem List:  Problem List as of 10/14/2021 Date Reviewed: 10/12/2021        Codes Class Noted - Resolved    Sepsis ([facility] ICD-10-CM: A41.9  ICD-9-CM: 038.9, 995.91  10/12/2021 - Present        Metabolic encephalopathy GJY-77-DD: G93.41  ICD-9-CM: 348.31  10/12/2021 - Present        Pneumonia ICD-10-CM: J18.9  ICD-9-CM: 030  10/12/2021 - Present        Peripheral venous insufficiency ICD-10-CM: I87.2  ICD-9-CM: 459.81  9/24/2020 - Present        Morbid obesity ([facility] ICD-10-CM: E66.01  ICD-9-CM: 278.01  9/24/2020 - Present        Atherosclerosis of native artery of left lower extremity with ulceration of calf ([facility] ICD-10-CM: M92.774  ICD-9-CM: 440.23, 707.12  9/24/2020 - Present        Septic pulmonary embolism without acute cor pulmonale ([facility] ICD-10-CM: I26.90  ICD-9-CM: 415.12  9/24/2020 - Present        Lymphedema ICD-10-CM: I89.0  ICD-9-CM: 457.1  9/24/2020 - Present        RESOLVED: Non-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 exposed ([facility] ICD-10-CM: K64.431  ICD-9-CM: 707.12  9/24/2020 - 12/3/2020        RESOLVED: Non-pressure chronic ulcer of right calf with fat layer exposed ([facility] ICD-10-CM: Z86.854  ICD-9-CM: 707.12  9/24/2020 - 12/3/2020              Medications reviewed  Current Facility-Administered Medications   Medication Dose Route Frequency    sodium chloride (NS) flush 5-40 mL  5-40 mL IntraVENous Q8H    sodium chloride (NS) flush 5-40 mL  5-40 mL IntraVENous PRN    0.45% sodium chloride with KCl 20 mEq/L infusion   IntraVENous CONTINUOUS    vancomycin (VANCOCIN) 1,250 mg in 0.9% sodium chloride 250 mL (VIAL-MATE)  1,250 mg IntraVENous Q8H    [START ON 10/15/2021] Vancomycin - Please draw trough prior to dose 10/15 @ 0900   Other ONCE    aspirin delayed-release tablet 81 mg  81 mg Oral DAILY    insulin lispro (HUMALOG) injection   SubCUTAneous AC&HS    glucose chewable tablet 16 g  4 Tablet Oral PRN    dextrose (D50W) injection syrg 12.5-25 g  25-50 mL IntraVENous PRN    glucagon (GLUCAGEN) injection 1 mg  1 mg IntraMUSCular PRN    sodium chloride (NS) flush 5-40 mL  5-40 mL IntraVENous Q8H    sodium chloride (NS) flush 5-40 mL  5-40 mL IntraVENous PRN    acetaminophen (TYLENOL) tablet 650 mg  650 mg Oral Q6H PRN    Or    acetaminophen (TYLENOL) suppository 650 mg  650 mg Rectal Q6H PRN    ondansetron (ZOFRAN ODT) tablet 4 mg  4 mg Oral Q8H PRN    Or    ondansetron (ZOFRAN) injection 4 mg  4 mg IntraVENous Q6H PRN    piperacillin-tazobactam (ZOSYN) 3.375 g in 0.9% sodium chloride (MBP/ADV) 100 mL MBP  3.375 g IntraVENous Q8H    VANCOMYCIN INFORMATION NOTE 1 Each  1 Each Other Rx Dosing/Monitoring    pantoprazole (PROTONIX) tablet 40 mg  40 mg Oral ACB    atorvastatin (LIPITOR) tablet 80 mg  80 mg Oral DAILY       Review of Systems:   A comprehensive review of systems was negative except for that written in the HPI. Objective:   Physical Exam:     Visit Vitals  BP (!) 142/93   Pulse (!) 103   Temp 98.2 °F (36.8 °C)   Resp 25   Ht 5' 7\" (1.702 m)   Wt (!) 181.4 kg (400 lb)   SpO2 92%   BMI 62.65 kg/m²    O2 Flow Rate (L/min): 2 l/min O2 Device: Nasal cannula    Temp (24hrs), Av.1 °F (36.7 °C), Min:97.9 °F (36.6 °C), Max:98.2 °F (36.8 °C)    No intake/output data recorded. 10/12 1901 - 10/14 0700  In: 3113 [P.O.:1320; I.V.:1793]  Out:  [Urine:3375]    General:   Awake and alert. Morbidly obese   Lungs:    Mild expiratory wheezing bilateral   Chest wall:  No tenderness or deformity. Heart:  Regular rate and rhythm, S1, S2 normal, no murmur, click, rub or gallop. Abdomen:   Soft, non-tender. Bowel sounds normal. No masses,  No organomegaly. Extremities: Extremities normal, atraumatic, no cyanosis    2+ edema   Pulses: 2+ and symmetric all extremities. Skin: Skin color, texture, turgor normal. No rashes or lesions   Neurologic: CNII-XII intact.   No gross focal deficits         Data Review:       Recent Days:  Recent Labs     10/13/21  0320 10/12/21  0501 10/11/21  2307   WBC 20.1* 28.8* 31.0*   HGB 11.2* 12.6 12.6   HCT 32.7* 36.4* 36.4*    206 249     Recent Labs     10/13/21  0320 10/12/21  0501 10/11/21  2307    136 133*   K 3.0* 3.3* 2.9*    100 95*   CO2 29 25 24   * 122* 146*   BUN 12 17 17   CREA 0.78 1.17 1.32*   CA 7.5* 7.9* 8.2*   MG 1.8 1.8 1.2*   PHOS 2.1* 2.9  --    ALB 2.1* 2.5* 2.6*   TBILI 0.5 0.7 0.7   ALT 65 42 27   INR  --  1.5*  --      Recent Labs     10/11/21  2215   PH 7.43   PCO2 39   PO2 91   HCO3 26   FIO2 28       24 Hour Results:  Recent Results (from the past 24 hour(s))   GLUCOSE, POC    Collection Time: 10/13/21 11:21 AM   Result Value Ref Range    Glucose (POC) 140 (H) 65 - 117 mg/dL    Performed by Pooja ARGUETA    ECHO ADULT COMPLETE    Collection Time: 10/13/21  1:30 PM   Result Value Ref Range    LV ED Vol A4C 103.00 cm3    LV ED Vol A2C 91.10 cm3    LV ES Vol A4C 47.00 cm3    LV ES Vol A2C 27.50 cm3    LVIDd 4.50 4. 20 - 5.90 cm    LVOT Peak Velocity 83.30 cm/s    LVOT Peak Gradient 3.00 mmHg    LVPWd 1.49 (A) 0.60 - 1.00 cm    LV E' Septal Velocity 7.12 cm/s    E/E' septal 10.83     Aortic Valve Systolic Peak Velocity 052.86 cm/s    AoV PG 9.00 mmHg    Ao Root D 3.40 cm    Left Atrium Major Axis 3.50 cm    Mitral Valve Deceleration Carlton 3,670.00 mm/s2    Mitral Valve Deceleration Carlton 3,670.00 mm/s2    Mitral Valve E Wave Deceleration Time 261.00 ms    Mitral Valve Pressure Half-time 71.00 ms    MV A Grayson 91.90 cm/s    MV E Grayson 77.10 cm/s    MVA (PHT) 3.10 cm2    MV E/A 0.84     Pulmonic Valve Max Velocity 127.00 cm/s    Pulmonic Valve Systolic Peak Instantaneous Gradient 6.00 mmHg    Est. RA Pressure 15.00 mmHg    RVIDd 4.04 cm    RVSP 44.00 mmHg    Tricuspid Valve Max Velocity 271.00 cm/s    Triscuspid Valve Regurgitation Peak Gradient 29.00 mmHg    Right Atrial Area 4C 18.63 cm2    LA Area 4C 19.36 cm2    BP EF 61.5 55.0 - 100.0 %    LV Ejection Fraction MOD 4C 54.0 %    Left Atrium Minor Axis 1.29 cm   PTT    Collection Time: 10/13/21  3:15 PM   Result Value Ref Range    aPTT 67.4 (H) 21.2 - 34.1 sec    aPTT, therapeutic range   82 - 109 sec   GLUCOSE, POC    Collection Time: 10/13/21  3:47 PM   Result Value Ref Range    Glucose (POC) 124 (H) 65 - 117 mg/dL    Performed by Guillermina Starch, TROUGH    Collection Time: 10/13/21  3:50 PM   Result Value Ref Range    Vancomycin,trough 2.5 (L) 5.0 - 10.0 ug/mL    Reported dose date Dose Dependent      Reported dose: Dose Dependent Units   GLUCOSE, POC    Collection Time: 10/13/21  8:32 PM   Result Value Ref Range    Glucose (POC) 124 (H) 65 - 117 mg/dL    Performed by [facility]    PTT    Collection Time: 10/13/21 10:05 PM   Result Value Ref Range    aPTT 92.0 (H) 21.2 - 34.1 sec    aPTT, therapeutic range   82 - 109 sec   PTT    Collection Time: 10/14/21 4:28 AM   Result Value Ref Range    aPTT 72.9 (H) 21.2 - 34.1 sec    aPTT, therapeutic range   82 - 109 sec   GLUCOSE, POC    Collection Time: 10/14/21  9:11 AM   Result Value Ref Range    Glucose (POC) 114 65 - 117 mg/dL    Performed by [facility]        XR CHEST PORT   Final Result   Hydrostatic edema. No focal airspace consolidation definitively   visualized. CT CHEST WO CONT   Final Result   Bandlike opacity in the right lower lobe, probably atelectasis. Developing infection not excluded. Other findings as above. CT HEAD WO CONT   Final Result   1. No acute intracranial hemorrhage or mass effect. 2.  No acute fracture or subluxation of the cervical spine. CT SPINE CERV WO CONT   Final Result   1. No acute intracranial hemorrhage or mass effect. 2.  No acute fracture or subluxation of the cervical spine. XR CHEST PORT   Final Result   Bilateral perihilar airspace disease, suggestive of edema however pneumonia or   atelectasis are not excluded in the appropriate clinical setting. Enlarged   cardiopericardial silhouette.            Assessment:  Acute non-STEMI    Systemic inflammatory response syndrome, without obvious source   -Inflammatory markers have been improving on Zosyn and vancomycin    Coag negative staph bacteremia, probably due to contamination - doubt source of sepsis    Acute exacerbation of COPD    Acute respiratory failure with hypoxia, probably due to heart failure and COPD   -Still on 2 L nasal cannula    Acute diastolic heart failure; his 2 chest x-rays have shown evidence of heart failure    Acute kidney injury, resolved (1.3-->-->1.17-->0.78)    Obstructive sleep apnea    Rhabdomyolysis    Generalized weakness      Plan:  Await repeat labs  Stop IV fluids in light of chest x-ray yesterday showing persistent hydrostatic edema  Continue Zosyn and vancomycin  Add DuoNeb treatments  Check BNP  Furosemide 40 mg IV now  PT and OT evaluations    Care Plan discussed with: Patient/Family    Disposition: Home 24 to 48 hours    Total time spent with patient: 30 minutes.     oJse Urena MD

## 2021-10-14 NOTE — PROGRESS NOTES
OT eval order received and acknowledged. OT eval attempted at 0810 however pt off floor cath lab. Will continue to follow patient and attempt OT eval at a later time. Thank you.

## 2021-10-14 NOTE — PROGRESS NOTES
PT consult received and eval attempted. Pt is just getting back from having cardiac cath done. Per nursing, pt has to hold off on having pressure applied to distal R forearm for several hours. Pt uses a cane/walker at baseline. Will attempt PT eval again at a later time when pt is able to place weight through R UE.

## 2021-10-14 NOTE — PROGRESS NOTES
Report called to [facility]. Patient transferred to room on 1L of oxygen. Patient belongings were placed in patient closet. IV drips were confirmed with nurse.

## 2021-10-15 ENCOUNTER — APPOINTMENT (OUTPATIENT)
Dept: GENERAL RADIOLOGY | Age: 66
DRG: 871 | End: 2021-10-15
Attending: INTERNAL MEDICINE
Payer: MEDICARE

## 2021-10-15 LAB
ALBUMIN SERPL-MCNC: 2.2 G/DL (ref 3.5–5)
ANION GAP SERPL CALC-SCNC: 8 MMOL/L (ref 5–15)
ANION GAP SERPL CALC-SCNC: 9 MMOL/L (ref 5–15)
APTT PPP: 37.5 SEC (ref 21.2–34.1)
APTT PPP: 38.2 SEC (ref 21.2–34.1)
BACTERIA SPEC CULT: NORMAL
BASOPHILS # BLD: 0.1 K/UL (ref 0–0.1)
BASOPHILS NFR BLD: 0 % (ref 0–1)
BUN SERPL-MCNC: 11 MG/DL (ref 6–20)
BUN SERPL-MCNC: 11 MG/DL (ref 6–20)
BUN/CREAT SERPL: 17 (ref 12–20)
BUN/CREAT SERPL: 17 (ref 12–20)
CA-I BLD-MCNC: 8 MG/DL (ref 8.5–10.1)
CA-I BLD-MCNC: 8 MG/DL (ref 8.5–10.1)
CHLORIDE SERPL-SCNC: 101 MMOL/L (ref 97–108)
CHLORIDE SERPL-SCNC: 102 MMOL/L (ref 97–108)
CO2 SERPL-SCNC: 30 MMOL/L (ref 21–32)
CO2 SERPL-SCNC: 30 MMOL/L (ref 21–32)
CREAT SERPL-MCNC: 0.65 MG/DL (ref 0.7–1.3)
CREAT SERPL-MCNC: 0.65 MG/DL (ref 0.7–1.3)
DATE LAST DOSE: ABNORMAL
DIFFERENTIAL METHOD BLD: ABNORMAL
EOSINOPHIL # BLD: 0.1 K/UL (ref 0–0.4)
EOSINOPHIL NFR BLD: 0 % (ref 0–7)
ERYTHROCYTE [DISTWIDTH] IN BLOOD BY AUTOMATED COUNT: 15.5 % (ref 11.5–14.5)
GLUCOSE BLD STRIP.AUTO-MCNC: 104 MG/DL (ref 65–117)
GLUCOSE BLD STRIP.AUTO-MCNC: 146 MG/DL (ref 65–117)
GLUCOSE BLD STRIP.AUTO-MCNC: 149 MG/DL (ref 65–117)
GLUCOSE BLD STRIP.AUTO-MCNC: 152 MG/DL (ref 65–117)
GLUCOSE SERPL-MCNC: 94 MG/DL (ref 65–100)
GLUCOSE SERPL-MCNC: 98 MG/DL (ref 65–100)
GRAM STN SPEC: NORMAL
HCT VFR BLD AUTO: 35.3 % (ref 36.6–50.3)
HGB BLD-MCNC: 11.8 G/DL (ref 12.1–17)
IMM GRANULOCYTES # BLD AUTO: 0.3 K/UL (ref 0–0.04)
IMM GRANULOCYTES NFR BLD AUTO: 1 % (ref 0–0.5)
LYMPHOCYTES # BLD: 1.1 K/UL (ref 0.8–3.5)
LYMPHOCYTES NFR BLD: 5 % (ref 12–49)
MCH RBC QN AUTO: 25.9 PG (ref 26–34)
MCHC RBC AUTO-ENTMCNC: 33.4 G/DL (ref 30–36.5)
MCV RBC AUTO: 77.4 FL (ref 80–99)
MONOCYTES # BLD: 2.7 K/UL (ref 0–1)
MONOCYTES NFR BLD: 12 % (ref 5–13)
NEUTS SEG # BLD: 17.6 K/UL (ref 1.8–8)
NEUTS SEG NFR BLD: 82 % (ref 32–75)
NRBC # BLD: 0 K/UL (ref 0–0.01)
NRBC BLD-RTO: 0 PER 100 WBC
PERFORMED BY, TECHID: ABNORMAL
PERFORMED BY, TECHID: NORMAL
PHOSPHATE SERPL-MCNC: 2.8 MG/DL (ref 2.6–4.7)
PLATELET # BLD AUTO: 210 K/UL (ref 150–400)
PMV BLD AUTO: 10.5 FL (ref 8.9–12.9)
POTASSIUM SERPL-SCNC: 3.5 MMOL/L (ref 3.5–5.1)
POTASSIUM SERPL-SCNC: 3.5 MMOL/L (ref 3.5–5.1)
PROCALCITONIN SERPL-MCNC: 6.77 NG/ML
RBC # BLD AUTO: 4.56 M/UL (ref 4.1–5.7)
REPORTED DOSE,DOSE: ABNORMAL UNITS
SODIUM SERPL-SCNC: 140 MMOL/L (ref 136–145)
SODIUM SERPL-SCNC: 140 MMOL/L (ref 136–145)
SPECIAL REQUESTS,SREQ: NORMAL
THERAPEUTIC RANGE,PTTT: ABNORMAL SEC (ref 82–109)
THERAPEUTIC RANGE,PTTT: ABNORMAL SEC (ref 82–109)
VANCOMYCIN TROUGH SERPL-MCNC: 12.6 UG/ML (ref 5–10)
WBC # BLD AUTO: 21.8 K/UL (ref 4.1–11.1)

## 2021-10-15 PROCEDURE — 85730 THROMBOPLASTIN TIME PARTIAL: CPT

## 2021-10-15 PROCEDURE — 74011000250 HC RX REV CODE- 250: Performed by: INTERNAL MEDICINE

## 2021-10-15 PROCEDURE — 74011636637 HC RX REV CODE- 636/637: Performed by: HOSPITALIST

## 2021-10-15 PROCEDURE — 74011250636 HC RX REV CODE- 250/636: Performed by: HOSPITALIST

## 2021-10-15 PROCEDURE — 94640 AIRWAY INHALATION TREATMENT: CPT

## 2021-10-15 PROCEDURE — 74011250636 HC RX REV CODE- 250/636: Performed by: INTERNAL MEDICINE

## 2021-10-15 PROCEDURE — 36415 COLL VENOUS BLD VENIPUNCTURE: CPT

## 2021-10-15 PROCEDURE — 99232 SBSQ HOSP IP/OBS MODERATE 35: CPT | Performed by: INTERNAL MEDICINE

## 2021-10-15 PROCEDURE — 71045 X-RAY EXAM CHEST 1 VIEW: CPT

## 2021-10-15 PROCEDURE — 80069 RENAL FUNCTION PANEL: CPT

## 2021-10-15 PROCEDURE — 74011000258 HC RX REV CODE- 258: Performed by: HOSPITALIST

## 2021-10-15 PROCEDURE — 97530 THERAPEUTIC ACTIVITIES: CPT

## 2021-10-15 PROCEDURE — 94760 N-INVAS EAR/PLS OXIMETRY 1: CPT

## 2021-10-15 PROCEDURE — 97165 OT EVAL LOW COMPLEX 30 MIN: CPT

## 2021-10-15 PROCEDURE — 74011250637 HC RX REV CODE- 250/637: Performed by: INTERNAL MEDICINE

## 2021-10-15 PROCEDURE — 85025 COMPLETE CBC W/AUTO DIFF WBC: CPT

## 2021-10-15 PROCEDURE — 84145 PROCALCITONIN (PCT): CPT

## 2021-10-15 PROCEDURE — 74011250637 HC RX REV CODE- 250/637: Performed by: HOSPITALIST

## 2021-10-15 PROCEDURE — 80048 BASIC METABOLIC PNL TOTAL CA: CPT

## 2021-10-15 PROCEDURE — 74011636637 HC RX REV CODE- 636/637: Performed by: INTERNAL MEDICINE

## 2021-10-15 PROCEDURE — 65270000029 HC RM PRIVATE

## 2021-10-15 PROCEDURE — 77010033678 HC OXYGEN DAILY

## 2021-10-15 PROCEDURE — 82962 GLUCOSE BLOOD TEST: CPT

## 2021-10-15 PROCEDURE — 97161 PT EVAL LOW COMPLEX 20 MIN: CPT

## 2021-10-15 PROCEDURE — 80202 ASSAY OF VANCOMYCIN: CPT

## 2021-10-15 RX ORDER — PREDNISONE 20 MG/1
40 TABLET ORAL
Status: COMPLETED | OUTPATIENT
Start: 2021-10-15 | End: 2021-10-19

## 2021-10-15 RX ORDER — POTASSIUM CHLORIDE 1.5 G/1.77G
20 POWDER, FOR SOLUTION ORAL
Status: COMPLETED | OUTPATIENT
Start: 2021-10-15 | End: 2021-10-16

## 2021-10-15 RX ORDER — PREDNISONE 20 MG/1
40 TABLET ORAL
Status: DISCONTINUED | OUTPATIENT
Start: 2021-10-16 | End: 2021-10-15

## 2021-10-15 RX ORDER — FUROSEMIDE 10 MG/ML
60 INJECTION INTRAMUSCULAR; INTRAVENOUS 2 TIMES DAILY
Status: DISCONTINUED | OUTPATIENT
Start: 2021-10-15 | End: 2021-10-18

## 2021-10-15 RX ADMIN — PIPERACILLIN AND TAZOBACTAM 3.38 G: 3; .375 INJECTION, POWDER, LYOPHILIZED, FOR SOLUTION INTRAVENOUS at 22:15

## 2021-10-15 RX ADMIN — PIPERACILLIN AND TAZOBACTAM 3.38 G: 3; .375 INJECTION, POWDER, LYOPHILIZED, FOR SOLUTION INTRAVENOUS at 12:51

## 2021-10-15 RX ADMIN — VANCOMYCIN HYDROCHLORIDE 1250 MG: 1.25 INJECTION, POWDER, LYOPHILIZED, FOR SOLUTION INTRAVENOUS at 01:56

## 2021-10-15 RX ADMIN — INSULIN LISPRO 3 UNITS: 100 INJECTION, SOLUTION INTRAVENOUS; SUBCUTANEOUS at 12:49

## 2021-10-15 RX ADMIN — PIPERACILLIN AND TAZOBACTAM 3.38 G: 3; .375 INJECTION, POWDER, LYOPHILIZED, FOR SOLUTION INTRAVENOUS at 05:41

## 2021-10-15 RX ADMIN — Medication 10 ML: at 05:47

## 2021-10-15 RX ADMIN — INSULIN LISPRO 3 UNITS: 100 INJECTION, SOLUTION INTRAVENOUS; SUBCUTANEOUS at 17:19

## 2021-10-15 RX ADMIN — Medication 10 ML: at 16:38

## 2021-10-15 RX ADMIN — PREDNISONE 40 MG: 20 TABLET ORAL at 17:22

## 2021-10-15 RX ADMIN — POTASSIUM CHLORIDE 20 MEQ: 1.5 POWDER, FOR SOLUTION ORAL at 12:51

## 2021-10-15 RX ADMIN — VANCOMYCIN HYDROCHLORIDE 1250 MG: 1.25 INJECTION, POWDER, LYOPHILIZED, FOR SOLUTION INTRAVENOUS at 08:32

## 2021-10-15 RX ADMIN — ASPIRIN 81 MG: 81 TABLET, COATED ORAL at 08:39

## 2021-10-15 RX ADMIN — IPRATROPIUM BROMIDE AND ALBUTEROL SULFATE 3 ML: .5; 2.5 SOLUTION RESPIRATORY (INHALATION) at 13:07

## 2021-10-15 RX ADMIN — INSULIN LISPRO 3 UNITS: 100 INJECTION, SOLUTION INTRAVENOUS; SUBCUTANEOUS at 22:15

## 2021-10-15 RX ADMIN — FUROSEMIDE 60 MG: 10 INJECTION, SOLUTION INTRAMUSCULAR; INTRAVENOUS at 12:50

## 2021-10-15 RX ADMIN — FUROSEMIDE 60 MG: 10 INJECTION, SOLUTION INTRAMUSCULAR; INTRAVENOUS at 22:15

## 2021-10-15 RX ADMIN — Medication 10 ML: at 22:18

## 2021-10-15 RX ADMIN — ATORVASTATIN CALCIUM 80 MG: 40 TABLET, FILM COATED ORAL at 08:32

## 2021-10-15 RX ADMIN — PANTOPRAZOLE SODIUM 40 MG: 40 TABLET, DELAYED RELEASE ORAL at 08:32

## 2021-10-15 RX ADMIN — POTASSIUM CHLORIDE 20 MEQ: 1.5 POWDER, FOR SOLUTION ORAL at 17:20

## 2021-10-15 NOTE — PROGRESS NOTES
Progress Note    Patient: Custer Barthel MRN: [id number]  SSN: [id number]    YOB: 1955  Age: 74 y.o. Sex: male      Admit Date: 10/11/2021    LOS: 3 days     Subjective:     No acute events overnight. He remains short winded and having coughing spells and wheezes. Objective:     Vitals:    10/14/21 2220 10/15/21 0436 10/15/21 0745 10/15/21 0814   BP: 129/73 (!) 143/83 130/79    Pulse: (!) 54 96 100    Resp: 30 18 24    Temp: 98.8 °F (37.1 °C) 99.3 °F (37.4 °C) 98.1 °F (36.7 °C)    SpO2: 90% 94% 93% 92%   Weight:       Height:            Intake and Output:  Current Shift: No intake/output data recorded. Last three shifts: 10/13 1901 - 10/15 0700  In: -   Out: 445 [Urine:445]    Physical Exam:   General:  Alert, cooperative, no distress, appears stated age. Eyes:  Conjunctivae/corneas clear. PERRL, EOMs intact. Fundi benign   Ears:  Normal TMs and external ear canals both ears. Nose: Nares normal. Septum midline. Mucosa normal. No drainage or sinus tenderness. Mouth/Throat: Lips, mucosa, and tongue normal. Teeth and gums normal.   Neck: Supple, symmetrical, trachea midline, no adenopathy, thyroid: no enlargment/tenderness/nodules, no carotid bruit and no JVD. Back:   Symmetric, no curvature. ROM normal. No CVA tenderness. Lungs:    Coarse breath sounds, end expiratory wheezes. Heart:  Regular rate and rhythm, S1, S2 normal, no murmur, click, rub or gallop. Abdomen:   Soft, non-tender. Bowel sounds normal. No masses,  No organomegaly. Extremities: Extremities normal, atraumatic, no cyanosis or edema. Pulses: 2+ and symmetric all extremities. Skin: Skin color, texture, turgor normal. No rashes or lesions   Lymph nodes: Cervical, supraclavicular, and axillary nodes normal.   Neurologic: CNII-XII intact. Normal strength, sensation and reflexes throughout. Lab/Data Review: All lab results for the last 24 hours reviewed.          Assessment:     Active Problems:    Sepsis [facility]) (33/74/6299)      Metabolic encephalopathy (10/18/9869)      Pneumonia (10/12/2021)    Patient is a 40-year-old -American male with:  1. Non-STEMI  2. Sepsis  3. Morbid obesity  4. Diabetes mellitus  5. Hypertension  6. Hypokalemia  7. Elevated AST    Plan:     Right radial pulses +2 without hematoma. He underwent cardiac catheterization yesterday that showed mild coronary artery disease of the proximal RCA and mid LAD. Hyperdynamic LV systolic function. He was given IV Lasix. No accurate ins and outs. Blood pressure is okay. Rhythm is stable. White count has increased to 21,000. Hemoglobin is stable and platelet is 181. Kidney function stable. Replete potassium. Procalcitonin has come down. We will give him some more Lasix. Recheck BMP. Currently on baby aspirin, atorvastatin.     Signed By: Akbar Gilbert MD     October 15, 2021

## 2021-10-15 NOTE — PROGRESS NOTES
The R midline was broken accidentally. Dr Martini Organ was contacted and he ok for pt just to have one since we are alternating the zosyn and the vancomycin.

## 2021-10-15 NOTE — PROGRESS NOTES
Problem: Self Care Deficits Care Plan (Adult)  Goal: *Acute Goals and Plan of Care (Insert Text)  Description: Pt will be MI sup<->sit in prep for EOB ADL's  Pt will be MI  LB dressing EOB level  Pt will be MI  grooming EOB level  Pt will be MI  sit<-> prep for toilet transfer  Pt will be MI  toilet transfer with LRAD  Pt will be MI  toileting/cloth mgmt LRAD  Pt will be MI  grooming standing sink  Pt will be MI bathing sitting/standing sink LRAD  Pt will be MI dyan UE HEP in prep for self care tasks      Outcome: Not Met     OCCUPATIONAL THERAPY EVALUATION  Patient: Roberto Anderson (75 y.o. male)  Date: 10/15/2021  Primary Diagnosis: Sepsis ([address] [F00.8]  Metabolic encephalopathy [U17.09]  Pneumonia [J18.9]  Procedure(s) (LRB):  LEFT HEART CATH / CORONARY ANGIOGRAPHY (N/A) 1 Day Post-Op   Precautions: falls     ASSESSMENT  Patient is 73 y/o male came to [facility] with AMS, respiratory distress and adm 10/11/2021 for sepsis syndrome, extensive dyan pneumonia, elevated troponin, hypokalemia, NSTEMI s/p cardiac cath (10/14/2021). Pt has hx of arthritis, COPD, DM, HTN, sleep apnea, hernia repair. Pt received semi supine in bed A&Ox4 and agreeable for OT eval/tx. Per pt report, pt lives with spouse in one story home with 2 steps no rails to enter and is MI for self care and functional transfers/mobility using quad cane (also has RW). Pt currently presents with decreased balance, decreased activity tolerance, generalized weakness and increased need for assist with self care (SBA simple grooming simulated, total A LB dressing EOB, mod A UB dressing 2/2 line management) and functional transfers/mobility (mod Ax2 roll right, sup->sit, CGA scooting EOB, mod Ax2 sit<->stand and side step to recliner in prep for [facility] transfer with Rw and gait belt).  Pt received on 1L O2 via NC with Spo2 at aprox 84% stayig in 80's with pursed lip breathing thus O2 increased to 2L with SPO2 increasing to 88% thus increased again to 3L O2 in prep for transfers with SPO2 increasing to 91% and after activity pt maintaining in at 91% on 3L thus slowly decreased back to 1L. Pt left semi supine in recliner with SPO2 of 91% on 1L of O2 via NC. Pt educated on pursed lip breathing and dyan UE HEP in prep for self care tasks with pt demonstrating and verbalizing understanding. Pt would benefit from continued skilled OT services while at [facility] in order to increase safety and independence with self care and functional transfers/mobility. Recommend discharge to SNF when medically appropriate. Other factors to consider for discharge: time since onset, severity of deficits, PLOF        PLAN :  Recommendations and Planned Interventions: self care training, functional mobility training, therapeutic exercise, balance training, therapeutic activities, endurance activities, patient education, and home safety training    Frequency/Duration: Patient will be followed by occupational therapy 3-5x/week to address goals. Recommendation for discharge: (in order for the patient to meet his/her long term goals)  Evaristo Yeboah    This discharge recommendation:  Has been made in collaboration with the attending provider and/or case management    IF patient discharges home will need the following DME: TBD       SUBJECTIVE:   Patient stated it's good I'm coughing it up right.      OBJECTIVE DATA SUMMARY:   HISTORY:   Past Medical History:   Diagnosis Date    Arthritis     [facility]     Diabetes ([facility]     Hypertension     Sleep apnea      Past Surgical History:   Procedure Laterality Date    HX GI      HX HERNIA REPAIR         Expanded or extensive additional review of patient history:     Home Situation  Home Environment: Private residence  # Steps to Enter: 2  Rails to Enter: No  One/Two Story Residence: One story  Living Alone: No  Support Systems: Spouse/Significant Other  Patient Expects to be Discharged to[de-identified] House  Current DME Used/Available at Home: Cane, quad, Walker, rolling    PLOF: Pt MI for ADLS/IADLS, MI with mobility prior to admission. EXAMINATION OF PERFORMANCE DEFICITS:  Cognitive/Behavioral Status:  Neurologic State: Alert  Orientation Level: Oriented X4     Hearing: Auditory  Auditory Impairment: None    Range of Motion:  AROM: Generally decreased, functional     Strength:  Strength: Generally decreased, functional     Balance:  Sitting: Impaired  Sitting - Static: Fair (occasional)  Sitting - Dynamic: Poor (constant support)  Standing: Impaired  Standing - Static: Fair;Constant support  Standing - Dynamic : Poor;Constant support    Functional Mobility and Transfers for ADLs:  Bed Mobility:  Rolling: Moderate assistance;Assist x2  Supine to Sit: Moderate assistance;Assist x2  Scooting: Contact guard assistance    Transfers:  Sit to Stand: Moderate assistance;Assist x2  Stand to Sit: Moderate assistance;Assist x2  Bed to Chair: Moderate assistance;Assist x2  Assistive Device : Gait Belt;Walker, rolling    ADL Assessment:     Oral Facial Hygiene/Grooming: Stand-by assistance (seated in chair)    Upper Body Dressing: Moderate assistance    Lower Body Dressing: Total assistance (EOB 2/2 SOB)    ADL Intervention and task modifications:     Grooming  Grooming Assistance: Stand-by assistance (simulated)  Position Performed: Seated edge of bed  Washing Face: Stand-by assistance  Washing Hands: Stand-by assistance    Upper Body [address]: Moderate assistance    Lower Body Dressing Assistance  Socks: Total assistance (dependent)  Position Performed: Seated edge of bed    [address] [facility] [address] [address] AM-PACTM \"6 Clicks\"                                                       Daily Activity Inpatient Short Form  How much help from another person does the patient currently need. .. Total; A Lot A Little None   1. Putting on and taking off regular lower body clothing? [x]  1 []  2 []  3 []  4   2. Bathing (including washing, rinsing, drying)? []  1 [x]  2 []  3 []  4   3. Toileting, which includes using toilet, bedpan or urinal? [] 1 [x]  2 []  3 []  4   4. Putting on and taking off regular upper body clothing? []  1 [x]  2 []  3 []  4   5. Taking care of personal grooming such as brushing teeth? []  1 []  2 [x]  3 []  4   6. Eating meals? []  1 []  2 []  3 [x]  4   © 2007, Trustees of [facility] [employer], under license to [employer]. All rights reserved     Score: 14/24     Interpretation of Tool:  Represents clinically-significant functional categories (i.e. Activities of daily living). Percentage of Impairment CH    0%   CI    1-19% CJ    20-39% CK    40-59% CL    60-79% CM    80-99% CN     100%   [facility]  Score 6-24 24 23 20-22 15-19 10-14 7-9 6        Occupational Therapy Evaluation Charge Determination   History Examination Decision-Making   LOW Complexity : Brief history review  MEDIUM Complexity : 3-5 performance deficits relating to physical, cognitive , or psychosocial skils that result in activity limitations and / or participation restrictions MEDIUM Complexity : Patient may present with comorbidities that affect occupational performnce. Miniml to moderate modification of tasks or assistance (eg, physical or verbal ) with assesment(s) is necessary to enable patient to complete evaluation       Based on the above components, the patient evaluation is determined to be of the following complexity level: LOW   Pain Rating:  No pain reported at rest but moderate with coughing (5/10 faces scale)    Activity Tolerance:   Fair and requires rest breaks  Please refer to the flowsheet for vital signs taken during this treatment. After treatment patient left in no apparent distress:    Sitting in chair and Call bell within reach    COMMUNICATION/EDUCATION:   The patients plan of care was discussed with: Physical therapist and Registered nurse.    PT/OT sessions occurred together for increased safety of pt and clinician. Home safety education was provided and the patient/caregiver indicated understanding. and Patient/family have participated as able in goal setting and plan of care. This patients plan of care is appropriate for delegation to [facility].     Thank you for this referral.  Kaitlyn Thomas  Time Calculation: 36 mins

## 2021-10-15 NOTE — PROGRESS NOTES
Day #4 of Vancomycin  Consult provided for this 74 y.o. male for indication of pneumonia, bacteremia. Current regimen:  1250 mg Q8H  Abx regimen:  Vancomycin + Zosyn  Concomitant nephrotoxic drugs: Loop diuretics and Contrast agents  Frequency of BMP: Not scheduled    Recent Labs     10/15/21  0820 10/15/21  0641 10/14/21  1145 10/13/21  0320 10/13/21  0320   WBC  --  21.8* 17.8*  --  20.1*   CREA 0.65* 0.65* 0.66*   < > 0.78   BUN 11 11 9   < > 12    < > = values in this interval not displayed. Est CrCl: >100 ml/min  Temp (24hrs), Av.2 °F (37.3 °C), Min:98.1 °F (36.7 °C), Max:101.1 °F (38.4 °C)    Cultures:   10/11 Blood: coagulase-negative Staphylococcus sp in 3/4 bottles, GN bacillus in the anaerobic bottle, preliminary  10/13 Blood: NGTD, preliminary  10/13 Sputum: moderate normal respiratory jayde (GNR and GPC)    MRSA Swab: Not detected    Target range: Trough 10-15 mcg/mL and AUC/BEVERLEY <500    Recent level history:  Date/Time Dose & Interval Measured Level (mcg/mL) Associated AUC/BEVERLEY   10/13 @ 1550 1500 mg x 1, 1000 mg q18h 2.5 194   10/15 @ 0832 1250 mg Q8H 12.6 439        Impression/Plan:   Scr further improved, procal trending down but WBC rising   Updated target range per protocol for infection due to a non-MRSA pathogen. Level of 12.6 is associated with an AUC/BEVERLEY 439. Both within target range.   Will continue current regimen of 1250 mg Q8H and order next trough to be drawn 10/17 at 0700  Recommend discontinuing Vancomycin as MRSA was not isolated     JOANN Law

## 2021-10-15 NOTE — PROGRESS NOTES
Physician Progress Note      PATIENT:               Ludy Curran  [facility] #:                  [id number]  :                       1955  ADMIT DATE:       10/11/2021 10:04 PM  [address] [place] DATE:  RESPONDING  PROVIDER #:        Sebastian Henning MD          QUERY TEXT:    Patient admitted with increased SOB. Noted documentation of acute hypoxic respiratory failure in [facility] PN dated 10/14. Pt hasn't required more than 2L O2 and has shown no signs of struggling to breathe. In order to support the diagnosis of acute respiratory failure, please include additional clinical indicators in your documentation. Or please document if the diagnosis of acute respiratory failure has been ruled out after further study. The medical record reflects the following:  Risk Factors: CHF exacerbation, COPD    Clinical Indicators:    ED PN 10/11-  Effort: Pulmonary effort is normal. No respiratory distress. Breath sounds: Normal breath sounds. No wheezing, rhonchi or rales    [facility] PN 10/14-  Acute respiratory failure with hypoxia, probably due to heart failure and COPD    ABGs: pH 7.43, pCO2 39, pO2 91  2L NC >94% for majority of admission with RR 17 - 30    CXR- The lungs are adequately expanded. There is prominence of the pulmonary vasculature and mild hydrostatic  edema. Treatment: supplemental O2; monitor oximetry;     Acute Respiratory Failure Clinical Indicators per [employer] Training Guide and Quick Reference Guide:  pO2 < 60 mmHg or SpO2 (pulse oximetry) < 91% breathing room air  pCO2 > 50 and pH < 7.35  P/F ratio (pO2 / FIO2) < 300  pO2 decrease or pCO2 increase by 10 mmHg from baseline (if known)  Supplemental oxygen of 40% or more  Presence of respiratory distress, tachypnea, dyspnea, shortness of breath, wheezing  Unable to speak in complete sentences  Use of accessory muscles to breathe  Extreme anxiety and feeling of impending doom  Tripod position  Confusion/altered mental status/obtunded    Thank you,  Gustavus Scheuermann Isra Young RN, BSN, CCDS, [place], SMART  Clinical Documentation  Options provided:  -- Acute Respiratory Failure as evidenced by, Please document evidence. -- Acute Respiratory Failure ruled out after study  -- Other - I will add my own diagnosis  -- Disagree - Not applicable / Not valid  -- Disagree - Clinically unable to determine / Unknown  -- Refer to Clinical Documentation Reviewer    PROVIDER RESPONSE TEXT:    Acute Respiratory Failure has been ruled out after study. Query created by: Sreekanth Morrison on 10/14/2021 12:22 PM      QUERY TEXT:    Patient admitted with increasing SOB. Noted conflicting documentation with sepsis starting in H&P and SIRS in PN dated 10/14. If possible, please document in progress notes and discharge summary if you are evaluating and /or treating any of the following: The medical record reflects the following:  Risk Factors: pneumonia    Clinical Indicators:    WBC 31.0-> 28.8-> 20.1  Lactic acid 3.2-> 2.4  Procal 15.16-> 21.49-> 19.26  CRP 10.10-> 11.70-> 11.50  Temp 102.9-> 102.2    Staphylococcus species, coagulase negative MULTIPLE COLONY TYPES  growing in 3 of 4 bottles    H&P-  Sepsis syndrome: Looks like infectious etiology, seems pneumonia bilateral.    Michael Bella PN 10/12-  Sepsis syndrome: wbc 31-->28K, lactic acidosis, tm 103, cxr suggests pna. procalcitonin   15, crp    10. Vanco/zosyn started empirically.     Adriel PN 10/14-  Systemic inflammatory response syndrome, without obvious source  -Inflammatory markers have been improving on Zosyn and vancomycin  Coag negative staph bacteremia, probably due to contamination - doubt source of sepsis    Treatment: Zosyn 3.375g IV Q 8hrs; Vanc 1,250mg IV Q 8hrs; 0.9% NS 1L bolus; LR 125ml/hr cont drip    Thank you,  Carlos Yeung RN, BSN, CCDS, CRCR, SMART  Clinical Documentation  Options provided:  -- Sepsis confirmed, POA and SIRS ruled out  -- SIRS confirmed, POA and sepsis ruled out  -- Other - I will add my own diagnosis  -- Disagree - Not applicable / Not valid  -- Disagree - Clinically unable to determine / Unknown  -- Refer to Clinical Documentation Reviewer    PROVIDER RESPONSE TEXT:    After study, sepsis confirmed, POA and SIRS ruled out. Query created by: Adamaris Walters on 10/14/2021 12:37 PM      QUERY TEXT:    Pt admitted with increasing SOB. Pt noted to have pneumonia. If possible, please document in the progress notes and discharge summary if you are evaluating and/or treating any of the following:    Note: CAP and HCAP indicate where the pneumonia was acquired, not a specific type. The medical record reflects the following:  Risk Factors: COPD    Clinical Indicators:    CXR 10/11-  Bilateral perihilar airspace disease, suggestive of edema however pneumonia or atelectasis are not excluded    CT Chest 10/12-  Bandlike opacity in the right lower lobe, probably atelectasis. Developing infection    H&P-  Chest x-ray with suspected pneumonia bilateral, and he is in significant distress on arrival which is much better with the treatment    Chela Alamo PN 10/12-  Presumed pneumonia/ cap  Presumed pneumonia bilateral but mostly perihilar, though ct suggests atelectasis vs early pna    Treatment: Vanc 1.5g IV then 1,250mg IV Q 8hrs;  Zosyn 4.5g IV then 3.375g IV Q 8hrs; Thank you,  Jean Claude Lane RN, BSN, CCDS, CRCR, SMART  Clinical Documentation  Options provided:  -- Gram negative pneumonia  -- Gram positive pneumonia  -- MRSA pneumonia  -- MSSA pneumonia  -- Bacterial pneumonia  -- Viral pneumonia  -- Aspiration pneumonia  -- Hypostatic pneumonia  -- Other - I will add my own diagnosis  -- Disagree - Not applicable / Not valid  -- Disagree - Clinically unable to determine / Unknown  -- Refer to Clinical Documentation Reviewer    PROVIDER RESPONSE TEXT:    This patient has bacterial pneumonia.     Query created by: Adamaris Walters on 10/14/2021 12:53 PM      Electronically signed by:  Sheeba Noel MD 10/15/2021 10:41 AM

## 2021-10-15 NOTE — PROGRESS NOTES
Hospitalist Progress Note               Daily Progress Note: 10/15/2021      Subjective:   Hospital course to date: Patient is a 58-year-old male with history of diabetes, hypertension and COPD was admitted on 10/12 with altered mental status and lethargy. He was in respiratory distress on admission and had a temperature of 102. 1. Chest x-ray showed bilateral perihilar densities. White count was 31,000 and lactate was elevated. Patient was started on IV antibiotics. Mentation improved with hydration. Procalcitonin was 21    Patient was noted to have a troponin of 996. CPK descending slowly, 5297    Echo showed normal LV function with mild pulmonary hypertension. Cxr today with mild vascular congestion without overt pulmonary edema    CT of the chest showed only bandlike atelectasis in the right middle lobe    Blood cultures were positive for coag negative staph species with multiple colony types in 3 of 4 bottles, subsequent blcs no growth day 2. Patient has remained hypoxic and requiring supplemental oxygen. Wheezing today.    Problem List:  Problem List as of 10/15/2021 Date Reviewed: 10/12/2021        Codes Class Noted - Resolved    Sepsis ([facility] ICD-10-CM: A41.9  ICD-9-CM: 038.9, 995.91  10/12/2021 - Present        Metabolic encephalopathy [facility]-09-HL: G93.41  ICD-9-CM: 348.31  10/12/2021 - Present        Pneumonia ICD-10-CM: J18.9  ICD-9-CM: 484  10/12/2021 - Present        Peripheral venous insufficiency ICD-10-CM: I87.2  ICD-9-CM: 459.81  9/24/2020 - Present        Morbid obesity ([facility] ICD-10-CM: E66.01  ICD-9-CM: 278.01  9/24/2020 - Present        Atherosclerosis of native artery of left lower extremity with ulceration of calf ([facility] ICD-10-CM: B35.046  ICD-9-CM: 440.23, 707.12  9/24/2020 - Present        Septic pulmonary embolism without acute cor pulmonale (HCC) ICD-10-CM: I26.90  ICD-9-CM: 415.12  9/24/2020 - Present        Lymphedema ICD-10-CM: I89.0  ICD-9-CM: 457.1  9/24/2020 - Present RESOLVED: Non-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 exposed ([facility] ICD-10-CM: B29.814  ICD-9-CM: 707.12  9/24/2020 - 12/3/2020        RESOLVED: Non-pressure chronic ulcer of right calf with fat layer exposed ([facility] ICD-10-CM: P91.710  ICD-9-CM: 707.12  9/24/2020 - 12/3/2020              Medications reviewed  Current Facility-Administered Medications   Medication Dose Route Frequency    furosemide (LASIX) injection 60 mg  60 mg IntraVENous BID    potassium chloride (KLOR-CON) packet for solution 20 mEq  20 mEq Oral TID WITH MEALS    [START ON 10/17/2021] DUE: Vancomycin Trough 10/17 at 0700   Other ONCE    sodium chloride (NS) flush 5-40 mL  5-40 mL IntraVENous Q8H    sodium chloride (NS) flush 5-40 mL  5-40 mL IntraVENous PRN    albuterol-ipratropium (DUO-NEB) 2.5 MG-0.5 MG/3 ML  3 mL Nebulization Q6H PRN    vancomycin (VANCOCIN) 1,250 mg in 0.9% sodium chloride 250 mL (VIAL-MATE)  1,250 mg IntraVENous Q8H    aspirin delayed-release tablet 81 mg  81 mg Oral DAILY    insulin lispro (HUMALOG) injection   SubCUTAneous AC&HS    glucose chewable tablet 16 g  4 Tablet Oral PRN    dextrose (D50W) injection syrg 12.5-25 g  25-50 mL IntraVENous PRN    glucagon (GLUCAGEN) injection 1 mg  1 mg IntraMUSCular PRN    sodium chloride (NS) flush 5-40 mL  5-40 mL IntraVENous Q8H    sodium chloride (NS) flush 5-40 mL  5-40 mL IntraVENous PRN    acetaminophen (TYLENOL) tablet 650 mg  650 mg Oral Q6H PRN    Or    acetaminophen (TYLENOL) suppository 650 mg  650 mg Rectal Q6H PRN    ondansetron (ZOFRAN ODT) tablet 4 mg  4 mg Oral Q8H PRN    Or    ondansetron (ZOFRAN) injection 4 mg  4 mg IntraVENous Q6H PRN    piperacillin-tazobactam (ZOSYN) 3.375 g in 0.9% sodium chloride (MBP/ADV) 100 mL MBP  3.375 g IntraVENous Q8H    VANCOMYCIN INFORMATION NOTE 1 Each  1 Each Other Rx Dosing/Monitoring    pantoprazole (PROTONIX) tablet 40 mg  40 mg Oral ACB    atorvastatin (LIPITOR) tablet 80 mg  80 mg Oral DAILY       Review of Systems:   A comprehensive review of systems was negative except for that written in the HPI. Objective:   Physical Exam:     Visit Vitals  /74   Pulse (!) 107   Temp 98.1 °F (36.7 °C)   Resp (!) 32   Ht 5' 7\" (1.702 m)   Wt (!) 181.4 kg (400 lb)   SpO2 92%   BMI 62.65 kg/m²    O2 Flow Rate (L/min): 2 l/min O2 Device: Nasal cannula    Temp (24hrs), Av.1 °F (37.3 °C), Min:98.1 °F (36.7 °C), Max:101.1 °F (38.4 °C)    10/15 0701 - 10/15 1900  In: 360 [P.O.:360]  Out: 700 [Urine:700]   10/13 1901 - 10/15 07  In: -   Out: 445 [Urine:445]    General:   Awake and alert. Morbidly obese   Lungs:    Mild expiratory wheezing bilaterally, not labored   Chest wall:  No tenderness or deformity. Heart:  Regular rate and rhythm, S1, S2 normal, no murmur, click, rub or gallop. Abdomen:   Soft, non-tender. Bowel sounds normal. No masses,  No organomegaly. Extremities: Extremities normal, atraumatic, no cyanosis    2+ edema   Pulses: 2+ and symmetric all extremities. Skin: Skin color, texture, turgor normal. No rashes or lesions   Neurologic: CNII-XII intact. No gross focal deficits         Data Review:       Recent Days:  Recent Labs     10/15/21  0641 10/14/21  1145 10/13/21  0320   WBC 21.8* 17.8* 20.1*   HGB 11.8* 11.7* 11.2*   HCT 35.3* 34.1* 32.7*    198 185     Recent Labs     10/15/21  0820 10/15/21  0641 10/14/21  1145 10/13/21  0320 10/13/21  0320    140 139   < > 137   K 3.5 3.5 3.7   < > 3.0*    101 104   < > 102   CO2 30 30 28   < > 29   GLU 98 94 93   < > 104*   BUN 11 11 9   < > 12   CREA 0.65* 0.65* 0.66*   < > 0.78   CA 8.0* 8.0* 7.7*   < > 7.5*   MG  --   --   --   --  1.8   PHOS  --  2.8  --   --  2.1*   ALB  --  2.2*  --   --  2.1*   TBILI  --   --   --   --  0.5   ALT  --   --   --   --  65    < > = values in this interval not displayed. No results for input(s): PH, PCO2, PO2, HCO3, FIO2 in the last 72 hours.     24 Hour Results:  Recent Results (from the past 24 hour(s))   PTT    Collection Time: 10/14/21  4:34 PM   Result Value Ref Range    aPTT 35.8 (H) 21.2 - 34.1 sec    aPTT, therapeutic range   sec   GLUCOSE, POC    Collection Time: 10/14/21  6:05 PM   Result Value Ref Range    Glucose (POC) 148 (H) 65 - 117 mg/dL    Performed by Larene Sandhoff JASMINE    GLUCOSE, POC    Collection Time: 10/14/21  8:12 PM   Result Value Ref Range    Glucose (POC) 150 (H) 65 - 117 mg/dL    Performed by Sandra Duffy    PTT    Collection Time: 10/15/21 12:53 AM   Result Value Ref Range    aPTT 38.2 (H) 21.2 - 34.1 sec    aPTT, therapeutic range   82 - 109 sec   CBC WITH AUTOMATED DIFF    Collection Time: 10/15/21  6:41 AM   Result Value Ref Range    WBC 21.8 (H) 4.1 - 11.1 K/uL    RBC 4.56 4.10 - 5.70 M/uL    HGB 11.8 (L) 12.1 - 17.0 g/dL    HCT 35.3 (L) 36.6 - 50.3 %    MCV 77.4 (L) 80.0 - 99.0 FL    MCH 25.9 (L) 26.0 - 34.0 PG    MCHC 33.4 30.0 - 36.5 g/dL    RDW 15.5 (H) 11.5 - 14.5 %    PLATELET 674 369 - 953 K/uL    MPV 10.5 8.9 - 12.9 FL    NRBC 0.0 0.0  WBC    ABSOLUTE NRBC 0.00 0.00 - 0.01 K/uL    NEUTROPHILS 82 (H) 32 - 75 %    LYMPHOCYTES 5 (L) 12 - 49 %    MONOCYTES 12 5 - 13 %    EOSINOPHILS 0 0 - 7 %    BASOPHILS 0 0 - 1 %    IMMATURE GRANULOCYTES 1 (H) 0 - 0.5 %    ABS. NEUTROPHILS 17.6 (H) 1.8 - 8.0 K/UL    ABS. LYMPHOCYTES 1.1 0.8 - 3.5 K/UL    ABS. MONOCYTES 2.7 (H) 0.0 - 1.0 K/UL    ABS. EOSINOPHILS 0.1 0.0 - 0.4 K/UL    ABS. BASOPHILS 0.1 0.0 - 0.1 K/UL    ABS. IMM.  GRANS. 0.3 (H) 0.00 - 0.04 K/UL    DF AUTOMATED     RENAL FUNCTION PANEL    Collection Time: 10/15/21  6:41 AM   Result Value Ref Range    Sodium 140 136 - 145 mmol/L    Potassium 3.5 3.5 - 5.1 mmol/L    Chloride 101 97 - 108 mmol/L    CO2 30 21 - 32 mmol/L    Anion gap 9 5 - 15 mmol/L    Glucose 94 65 - 100 mg/dL    BUN 11 6 - 20 mg/dL    Creatinine 0.65 (L) 0.70 - 1.30 mg/dL    BUN/Creatinine ratio 17 12 - 20      GFR est AA >60 >60 ml/min/1.73m2    GFR est non-AA >60 >60 ml/min/1.73m2 Calcium 8.0 (L) 8.5 - 10.1 mg/dL    Phosphorus 2.8 2.6 - 4.7 mg/dL    Albumin 2.2 (L) 3.5 - 5.0 g/dL   PROCALCITONIN    Collection Time: 10/15/21  6:41 AM   Result Value Ref Range    Procalcitonin 6.77 (H) 0 ng/mL   GLUCOSE, POC    Collection Time: 10/15/21  7:48 AM   Result Value Ref Range    Glucose (POC) 104 65 - 117 mg/dL    Performed by [employer]    PTT    Collection Time: 10/15/21  8:20 AM   Result Value Ref Range    aPTT 37.5 (H) 21.2 - 34.1 sec    aPTT, therapeutic range   82 - 109 sec   VANCOMYCIN, TROUGH    Collection Time: 10/15/21  8:20 AM   Result Value Ref Range    Vancomycin,trough 12.6 (H) 5.0 - 10.0 ug/mL    Reported dose date Not provided      Reported dose: Not provided Units   METABOLIC PANEL, BASIC    Collection Time: 10/15/21  8:20 AM   Result Value Ref Range    Sodium 140 136 - 145 mmol/L    Potassium 3.5 3.5 - 5.1 mmol/L    Chloride 102 97 - 108 mmol/L    CO2 30 21 - 32 mmol/L    Anion gap 8 5 - 15 mmol/L    Glucose 98 65 - 100 mg/dL    BUN 11 6 - 20 mg/dL    Creatinine 0.65 (L) 0.70 - 1.30 mg/dL    BUN/Creatinine ratio 17 12 - 20      GFR est AA >60 >60 ml/min/1.73m2    GFR est non-AA >60 >60 ml/min/1.73m2    Calcium 8.0 (L) 8.5 - 10.1 mg/dL   GLUCOSE, POC    Collection Time: 10/15/21 12:37 PM   Result Value Ref Range    Glucose (POC) 149 (H) 65 - 117 mg/dL    Performed by [employer]        XR CHEST PORT   Final Result      XR CHEST PORT   Final Result   Hydrostatic edema. No focal airspace consolidation definitively   visualized. CT CHEST WO CONT   Final Result   Bandlike opacity in the right lower lobe, probably atelectasis. Developing infection not excluded. Other findings as above. CT HEAD WO CONT   Final Result   1. No acute intracranial hemorrhage or mass effect. 2.  No acute fracture or subluxation of the cervical spine. CT SPINE CERV WO CONT   Final Result   1. No acute intracranial hemorrhage or mass effect.    2.  No acute fracture or subluxation of the cervical spine. XR CHEST PORT   Final Result   Bilateral perihilar airspace disease, suggestive of edema however pneumonia or   atelectasis are not excluded in the appropriate clinical setting. Enlarged   cardiopericardial silhouette. Assessment:  Acute non-STEMI   -He underwent cardiac catheterization yesterday that showed mild coronary artery disease of the proximal RCA and mid LAD    Systemic inflammatory response syndrome, without obvious source   -Inflammatory markers have been improving on Zosyn and vancomycin   -procal descending. Coag negative staph bacteremia, probably due to contamination - doubt source of sepsis and subsequent blcs neg to date(d2)    Acute exacerbation of COPD, wheezing and requiring supplemental o2, 2 l/m. Acute respiratory failure with hypoxia, probably due to heart failure and COPD   -Still on 2 L nasal cannula    Acute diastolic heart failure; his 2 chest x-rays have shown evidence of heart failure, cxr 10/15 with vascular congestion without overt pulmonary edema. Acute kidney injury, resolved (1.3-->-->1.17-->0.78-->0.65)    Obstructive sleep apnea    Rhabdomyolysis, ck descending slowly, hydration continues though teetering on pulmonary edema with todays cxr showing vascular congestion w/o overt pulmonary edema. Generalized weakness      Plan:  Watch off ivf  Abx per id, uptick wbc to ~21K, vanco/zosyn currently  DuoNeb treatments  Add prednisone  Furosemide 60 mg IV bid,   follow cxr, ck, bmp/cbc am.  PT and OT evaluations appreciated, rec snf    Care Plan discussed with: Patient/Family    Disposition: home vs snf 2-3 days    Total time spent with patient: 30 minutes.     Kindra Aldana MD

## 2021-10-15 NOTE — PROGRESS NOTES
Hospitalist Problem: Mobility Impaired (Adult and Pediatric)  Goal: *Acute Goals and Plan of Care (Insert Text)  Description: Physical Therapy Goals  Initiated 10/15/21  1)  I with HEP in 7 days to improve overall functional mobility. 2)  Bed mobility and transfers with CGA in 7 days to prevent skin breakdown with O2 sats >90%  3)  Pt to amb 100ft with LRAD and sup in 7 days with O2 sats>90%. Pt. Goal:  Pt to be able to walk safely without falls. Outcome: Not Met   PHYSICAL THERAPY EVALUATION  Patient: Kajal Farah (89 y.o. male)  Date: 10/15/2021  Primary Diagnosis: Sepsis ([facility] [address] [V79.5]  Metabolic encephalopathy [W77.42]  Pneumonia [J18.9]  Procedure(s) (LRB):  LEFT HEART CATH / CORONARY ANGIOGRAPHY (N/A) 1 Day Post-Op   Precautions: fall       ASSESSMENT  Per OT note:  \"Patient is 73 y/o male came to [facility] with AMS, respiratory distress and adm 10/11/2021 for sepsis syndrome, extensive dyan pneumonia, elevated troponin, hypokalemia, NSTEMI s/p cardiac cath (10/14/2021). Pt has hx of arthritis, COPD, DM, HTN, sleep apnea, hernia repair. Pt received semi supine in bed A&Ox4 and agreeable for PT eval/tx. Per pt report, pt lives with spouse in one story home with 2 steps no rails to enter and is MI for self care and functional transfers/mobility using quad cane (also has RW). Pt currently presents with decreased LE strength, decreased activity tolerance, impaired balance, difficulty with functional transfers/mobility (mod Ax2 roll right, sup->sit, CGA scooting EOB, mod Ax2 sit<->stand) and difficulty with gait requiring RW and mod A x 2 to amb 2 ft from bed to chair with slow sapphire and decreased step length.   Pt received on 1L O2 via NC with Spo2 at aprox 84% stayig in 80's with pursed lip breathing thus O2 increased to 2L with SPO2 increasing to 88% thus increased again to 3L O2 in prep for transfers with SPO2 increasing to 91% and after activity pt maintaining in at 91% on 3L thus slowly decreased back to 1L. Pt left semi supine in recliner with SPO2 of 91% on 1L of O2 via NC. Pt educated on pursed lip breathing and B LE exercises to be performed throughout the day. Pt would benefit from continued skilled PT services to improve LE strength, balance, functional mobility and gait so pt can return home to [facility]. Pt would also benefit from an incentive spirometer for next visit. Recommend d/c to SNF. Other factors to consider for discharge: impaired mobility, level of assist     Patient will benefit from skilled therapy intervention to address the above noted impairments. PLAN :  Recommendations and Planned Interventions: bed mobility training, transfer training, gait training, therapeutic exercises, patient and family training/education, and therapeutic activities      Frequency/Duration: Patient will be followed by physical therapy:  3-5x/week to address goals. Recommendation for discharge: (in order for the patient to meet his/her long term goals)  Evaristo Yeboah    This discharge recommendation:  Has been made in collaboration with the attending provider and/or case management    IF patient discharges home will need the following DME: none         SUBJECTIVE:   Patient stated I don't know if I can do anything today because of my breathing.     OBJECTIVE DATA SUMMARY:   HISTORY:    Past Medical History:   Diagnosis Date    Arthritis     Chronic obstructive pulmonary disease ([facility] [address]     Diabetes ([facility] [address]     Hypertension     Sleep apnea      Past Surgical History:   Procedure Laterality Date    HX GI      HX HERNIA REPAIR         Personal factors and/or comorbidities impacting plan of care: impaired mobility, level of assist    Home Situation  Home Environment: Private residence  # Steps to Enter: 2  Rails to Enter: No  One/Two Story Residence: One story  Living Alone: No  Support Systems: Spouse/Significant Other  Patient Expects to be Discharged to[de-identified] [place]  Current DME Used/Available at Home: Cane, quad, Walker, rolling    PLOF: Pt MI for ADLS/IADLS, MI with mobility prior to admission. EXAMINATION/PRESENTATION/DECISION MAKING:   Critical Behavior:  Neurologic State: Alert  Orientation Level: Oriented X4  Cognition: Follows commands, Appropriate decision making     Hearing: Auditory  Auditory Impairment: None  Range Of Motion:  AROM: Generally decreased, functional                       Strength:    Strength: Generally decreased, functional    4-/5 throughout LE's                 Functional Mobility:  Bed Mobility:  Rolling: Moderate assistance;Assist x2  Supine to Sit: Moderate assistance;Assist x2     Scooting: Contact guard assistance  Transfers:  Sit to Stand: Moderate assistance;Assist x2  Stand to Sit: Moderate assistance;Assist x2        Bed to Chair: Moderate assistance;Assist x2              Balance:   Sitting: Impaired  Sitting - Static: Fair (occasional)  Sitting - Dynamic: Poor (constant support)  Standing: Impaired  Standing - Static: Fair;Constant support  Standing - Dynamic : Poor;Constant support  Ambulation/Gait Training:  Distance (ft): 2 Feet (ft)  Assistive Device: Walker, rolling;Gait belt  Ambulation - Level of Assistance: Moderate assistance;Assist x2     Gait Description (WDL): Exceptions to WDL                 Speed/Rosario: Slow;Shuffled  Step Length: Right shortened;Left shortened                        Therapeutic Exercises:   Instructed in ankle pumps and heel slides    Functional Measure:    [address] [facility] [address] [address] AM-PAC 6 Clicks         Basic Mobility Inpatient Short Form  How much difficulty does the patient currently have. .. Unable A Lot A Little None   1. Turning over in bed (including adjusting bedclothes, sheets and blankets)? [] 1   [x] 2   [] 3   [] 4   2. Sitting down on and standing up from a chair with arms ( e.g., wheelchair, bedside commode, etc.)   [] 1   [x] 2   [] 3   [] 4   3. Moving from lying on back to sitting on the side of the bed?    [] 1   [x] 2   [] 3   [] 4          How much help from another person does the patient currently need. .. Total A Lot A Little None   4. Moving to and from a bed to a chair (including a wheelchair)? [] 1   [x] 2   [] 3   [] 4   5. Need to walk in hospital room? [] 1   [x] 2   [] 3   [] 4   6. Climbing 3-5 steps with a railing? [] 1   [x] 2   [] 3   [] 4   © , Trustees of [facility] [employer], under license to [employer]. All rights reserved     Score:  Initial:12 Most Recent: X (Date: 10/15/21)   Interpretation of Tool:  Represents activities that are increasingly more difficult (i.e. Bed mobility, Transfers, Gait). Score 24 23 22-20 19-15 14-10 9-7 6   Modifier CH CI CJ CK CL CM CN          Physical Therapy Evaluation Charge Determination   History Examination Presentation Decision-Making   MEDIUM  Complexity : 1-2 comorbidities / personal factors will impact the outcome/ POC  MEDIUM Complexity : 3 Standardized tests and measures addressing body structure, function, activity limitation and / or participation in recreation  LOW Complexity : Stable, uncomplicated  Other Functional Measure [facility] 6 low      Based on the above components, the patient evaluation is determined to be of the following complexity level: LOW     Pain Ratin/10 in chest    Activity Tolerance:   Fair, desaturates with exertion and requires oxygen, and requires frequent rest breaks  Please refer to the flowsheet for vital signs taken during this treatment. After treatment patient left in no apparent distress:   Sitting in chair and Call bell within reach    COMMUNICATION/EDUCATION:   The patients plan of care was discussed with: Registered nurse. Patient/family agree to work toward stated goals and plan of care. PT/OT sessions occurred together for increased safety of pt and clinician.      Thank you for this referral.  Lisa Jovel

## 2021-10-15 NOTE — PROGRESS NOTES
Infectious Disease Progress Note           Subjective:   Stable, OOB to chair, Fever spike of 101.1F on 10/14, has been afebrile today. No acute events since last se  Objective:   Physical Exam:     Visit Vitals  BP 99/63   Pulse (!) 107   Temp 98.7 °F (37.1 °C)   Resp 22   Ht 5' 7\" (1.702 m)   Wt (!) 400 lb (181.4 kg)   SpO2 92%   BMI 62.65 kg/m²    O2 Flow Rate (L/min): 2 l/min O2 Device: Nasal cannula    Temp (24hrs), Av °F (37.2 °C), Min:98.1 °F (36.7 °C), Max:101.1 °F (38.4 °C)    10/15 0701 - 10/15 1900  In: 360 [P.O.:360]  Out: 700 [Urine:700]   10/13 1901 - 10/15 07  In: -   Out: 445 [Urine:445]    General: NAD, alert, AAO x 4, OOB to chair   HEENT: DOUG, Moist mucosa   Lungs: CTA b/l, decreased at the bases, exp wheezing   Heart: S1S2+, RRR, no murmur  Abdo: Soft, NT, ND, +BS   : No ball cath   Exts: RLE swelling, no warmth or erythema   Skin: No wounds, No rashes or lesions    Data Review:       Recent Days:  Recent Labs     10/15/21  0641 10/14/21  1145 10/13/21  0320   WBC 21.8* 17.8* 20.1*   HGB 11.8* 11.7* 11.2*   HCT 35.3* 34.1* 32.7*    198 185     Recent Labs     10/15/21  0820 10/15/21  0641 10/14/21  1145   BUN 11 11 9   CREA 0.65* 0.65* 0.66*       Lab Results   Component Value Date/Time    C-Reactive protein 11.50 (H) 10/13/2021 09:05 AM          Microbiology     Results     Procedure Component Value Units Date/Time    CULTURE, RESPIRATORY/SPUTUM/BRONCH Becca Alosa STAIN [[id number]] Collected: 10/13/21 1045    Order Status: Completed Specimen: Sputum Updated: 10/15/21 0841     Special Requests: No Special Requests        GRAM STAIN No wbc's seen         no epithelial cells seen               Occasional Gram Negative Rods                  Occasional Gram Positive Cocci in pairs           Culture result:        Moderate Normal respiratory jayde          CULTURE, BLOOD, PAIRED [[id number]] Collected: 10/13/21 0905    Order Status: Completed Specimen: Blood Updated: 10/15/21 0753     Special Requests: No Special Requests        Culture result: No growth 2 days       MRSA SCREEN - PCR (NASAL) [[id number]] Collected: 10/12/21 1313    Order Status: Canceled Specimen: Swab     MRSA SCREEN - PCR (NASAL) [[id number]] Collected: 10/12/21 0910    Order Status: Completed Specimen: Swab Updated: 10/13/21 1052     MRSA by PCR, Nasal Not Detected       COVID-19 RAPID TEST [[id number]] Collected: 10/12/21 0202    Order Status: Completed Specimen: Nasopharyngeal Updated: 10/12/21 0304     Specimen source Nasopharyngeal        COVID-19 rapid test Not Detected        Comment: Rapid Abbott ID Now   Rapid NAAT:  The specimen is NEGATIVE for SARS-CoV-2, the novel coronavirus associated with COVID-19. Negative results should be treated as presumptive and, if inconsistent with clinical signs and symptoms or necessary for patient management, should be tested with an alternative molecular assay. Negative results do not preclude SARS-CoV-2 infection and should not be used as the sole basis for patient management decisions. This test has been authorized by the FDA under   an Emergency Use Authorization (EUA) for use by authorized laboratories. Fact sheet for Healthcare Providers: ConventionUpdate.co.nz Fact sheet for Patients: ConventionUpdate.co.nz   Methodology: Isothermal Nucleic Acid Amplification         CULTURE, BLOOD, PAIRED [[id number]]  (Abnormal) Collected: 10/11/21 2304    Order Status: Completed Specimen: Blood Updated: 10/14/21 0906     Special Requests: No Special Requests        Culture result:       Staphylococcus species, coagulase negative MULTIPLE COLONY TYPES  growing in 3 of 4 bottles drawn (NO SITES INDICATED)                  preliminary report of Gram Positive Cocci in clusters growing in 2 of 4 bottles drawn AND Gram variable rods growing in 1 of 4 bottles drawn CALLED TO AND READ BACK BY PHILIP SESAY RN SMR SCAV AT 0006 ON 10/13/21.  Joo [id number] Bacillus species, not anthracis GROWING IN THE ANAEROBIC BOTTLE          CULTURE, BLOOD, PAIRED [[id number]]     Order Status: Canceled Specimen: Blood              Diagnostics   CXR Results  (Last 48 hours)               10/15/21 0939  XR CHEST PORT Final result    Narrative: Indication: Follow-up pulmonary edema. AP semiupright portable chest radiograph 0934 hours 10/15/2021. Comparison 13 October 2021. Mild pulmonary vascular congestion without overt pulmonary edema. Enlarged cardiopericardial silhouette unchanged. No pneumothorax or pleural effusion. Assessment/Plan     1. SIRS/Probable sepsis on admission, suspected aspiration PNA, r/o right leg cellulitis       GNR and GPC isolated from sputum Cx and coag neg staph from blood       Rising WBC on todays labs, Tmax of 101.1 on 10/14, afebrile today       Continue on Zosyn for now, will d/c Vanc      CBC, CRP and Procal w AM labs     2. Coag neg staph bacteremia:  Suspected skin contaminant       Repeat Bcx from 10/13 is neg, will d/c Vanc      3. Right leg swelling, w associated warmth: Chronic per pt, ? Cellulitis       Decreased warmth and swelling today, continue Zosyn as above     4. Hypoxic respiratory failure on admission: Found unresponsive, suspected aspiration       COVID Ag neg, maintaining O2 sats on 2L. Underlying COPD       Exp wheeze on exam, started on oral steroid therapy      5.  NSTEMI: S/p cardiac cath earlier today, mild disease in proximal PCA and mid LAD     Shaylee Matthews MD    10/15/2021

## 2021-10-15 NOTE — PROGRESS NOTES
Problem: Pressure Injury - Risk of  Goal: *Prevention of pressure injury  Description: Document Stefano Scale and appropriate interventions in the flowsheet.   Outcome: Progressing Towards Goal  Note: Pressure Injury Interventions:       Moisture Interventions: Absorbent underpads, Internal/External urinary devices, Check for incontinence Q2 hours and as needed    Activity Interventions: PT/OT evaluation, Increase time out of bed    Mobility Interventions: PT/OT evaluation, HOB 30 degrees or less    Nutrition Interventions: Document food/fluid/supplement intake    Friction and Shear Interventions: Apply protective barrier, creams and emollients

## 2021-10-16 ENCOUNTER — APPOINTMENT (OUTPATIENT)
Dept: GENERAL RADIOLOGY | Age: 66
DRG: 871 | End: 2021-10-16
Attending: INTERNAL MEDICINE
Payer: MEDICARE

## 2021-10-16 LAB
ANION GAP SERPL CALC-SCNC: 8 MMOL/L (ref 5–15)
APTT PPP: 27 SEC (ref 21.2–34.1)
APTT PPP: 38.3 SEC (ref 21.2–34.1)
APTT PPP: 39.7 SEC (ref 21.2–34.1)
BASOPHILS # BLD: 0 K/UL (ref 0–0.1)
BASOPHILS NFR BLD: 0 % (ref 0–1)
BUN SERPL-MCNC: 15 MG/DL (ref 6–20)
BUN/CREAT SERPL: 23 (ref 12–20)
CA-I BLD-MCNC: 8.2 MG/DL (ref 8.5–10.1)
CHLORIDE SERPL-SCNC: 99 MMOL/L (ref 97–108)
CK SERPL-CCNC: 1670 U/L (ref 39–308)
CO2 SERPL-SCNC: 31 MMOL/L (ref 21–32)
CREAT SERPL-MCNC: 0.66 MG/DL (ref 0.7–1.3)
CRP SERPL-MCNC: 12.5 MG/DL (ref 0–0.6)
DIFFERENTIAL METHOD BLD: ABNORMAL
EOSINOPHIL # BLD: 0 K/UL (ref 0–0.4)
EOSINOPHIL NFR BLD: 0 % (ref 0–7)
ERYTHROCYTE [DISTWIDTH] IN BLOOD BY AUTOMATED COUNT: 15.4 % (ref 11.5–14.5)
GLUCOSE BLD STRIP.AUTO-MCNC: 172 MG/DL (ref 65–117)
GLUCOSE SERPL-MCNC: 143 MG/DL (ref 65–100)
HCT VFR BLD AUTO: 35.5 % (ref 36.6–50.3)
HGB BLD-MCNC: 12.2 G/DL (ref 12.1–17)
IMM GRANULOCYTES # BLD AUTO: 0.8 K/UL (ref 0–0.04)
IMM GRANULOCYTES NFR BLD AUTO: 4 % (ref 0–0.5)
LYMPHOCYTES # BLD: 0.9 K/UL (ref 0.8–3.5)
LYMPHOCYTES NFR BLD: 4 % (ref 12–49)
MCH RBC QN AUTO: 26.4 PG (ref 26–34)
MCHC RBC AUTO-ENTMCNC: 34.4 G/DL (ref 30–36.5)
MCV RBC AUTO: 76.8 FL (ref 80–99)
MONOCYTES # BLD: 1.3 K/UL (ref 0–1)
MONOCYTES NFR BLD: 7 % (ref 5–13)
NEUTS SEG # BLD: 17.1 K/UL (ref 1.8–8)
NEUTS SEG NFR BLD: 85 % (ref 32–75)
NRBC # BLD: 0 K/UL (ref 0–0.01)
NRBC BLD-RTO: 0 PER 100 WBC
PERFORMED BY, TECHID: ABNORMAL
PLATELET # BLD AUTO: 222 K/UL (ref 150–400)
PMV BLD AUTO: 9.8 FL (ref 8.9–12.9)
POTASSIUM SERPL-SCNC: 3.9 MMOL/L (ref 3.5–5.1)
PROCALCITONIN SERPL-MCNC: 5.04 NG/ML
RBC # BLD AUTO: 4.62 M/UL (ref 4.1–5.7)
SODIUM SERPL-SCNC: 138 MMOL/L (ref 136–145)
THERAPEUTIC RANGE,PTTT: ABNORMAL SEC (ref 82–109)
THERAPEUTIC RANGE,PTTT: ABNORMAL SEC (ref 82–109)
THERAPEUTIC RANGE,PTTT: NORMAL SEC (ref 82–109)
WBC # BLD AUTO: 20.1 K/UL (ref 4.1–11.1)

## 2021-10-16 PROCEDURE — 85025 COMPLETE CBC W/AUTO DIFF WBC: CPT

## 2021-10-16 PROCEDURE — 77010033678 HC OXYGEN DAILY

## 2021-10-16 PROCEDURE — 74011636637 HC RX REV CODE- 636/637: Performed by: HOSPITALIST

## 2021-10-16 PROCEDURE — 74011250637 HC RX REV CODE- 250/637: Performed by: HOSPITALIST

## 2021-10-16 PROCEDURE — 71045 X-RAY EXAM CHEST 1 VIEW: CPT

## 2021-10-16 PROCEDURE — 86140 C-REACTIVE PROTEIN: CPT

## 2021-10-16 PROCEDURE — 82550 ASSAY OF CK (CPK): CPT

## 2021-10-16 PROCEDURE — 74011250636 HC RX REV CODE- 250/636: Performed by: INTERNAL MEDICINE

## 2021-10-16 PROCEDURE — 65270000029 HC RM PRIVATE

## 2021-10-16 PROCEDURE — 74011000258 HC RX REV CODE- 258: Performed by: HOSPITALIST

## 2021-10-16 PROCEDURE — 36415 COLL VENOUS BLD VENIPUNCTURE: CPT

## 2021-10-16 PROCEDURE — 74011250637 HC RX REV CODE- 250/637: Performed by: INTERNAL MEDICINE

## 2021-10-16 PROCEDURE — 85730 THROMBOPLASTIN TIME PARTIAL: CPT

## 2021-10-16 PROCEDURE — 74011250636 HC RX REV CODE- 250/636: Performed by: HOSPITALIST

## 2021-10-16 PROCEDURE — 80048 BASIC METABOLIC PNL TOTAL CA: CPT

## 2021-10-16 PROCEDURE — 82962 GLUCOSE BLOOD TEST: CPT

## 2021-10-16 PROCEDURE — 94760 N-INVAS EAR/PLS OXIMETRY 1: CPT

## 2021-10-16 PROCEDURE — 84145 PROCALCITONIN (PCT): CPT

## 2021-10-16 PROCEDURE — 74011636637 HC RX REV CODE- 636/637: Performed by: INTERNAL MEDICINE

## 2021-10-16 RX ADMIN — PANTOPRAZOLE SODIUM 40 MG: 40 TABLET, DELAYED RELEASE ORAL at 10:35

## 2021-10-16 RX ADMIN — Medication 10 ML: at 22:08

## 2021-10-16 RX ADMIN — Medication 10 ML: at 05:25

## 2021-10-16 RX ADMIN — FUROSEMIDE 60 MG: 10 INJECTION, SOLUTION INTRAMUSCULAR; INTRAVENOUS at 10:34

## 2021-10-16 RX ADMIN — INSULIN LISPRO 3 UNITS: 100 INJECTION, SOLUTION INTRAVENOUS; SUBCUTANEOUS at 22:05

## 2021-10-16 RX ADMIN — PIPERACILLIN AND TAZOBACTAM 3.38 G: 3; .375 INJECTION, POWDER, LYOPHILIZED, FOR SOLUTION INTRAVENOUS at 22:05

## 2021-10-16 RX ADMIN — Medication 10 ML: at 17:15

## 2021-10-16 RX ADMIN — ATORVASTATIN CALCIUM 80 MG: 40 TABLET, FILM COATED ORAL at 10:35

## 2021-10-16 RX ADMIN — ASPIRIN 81 MG: 81 TABLET, COATED ORAL at 10:35

## 2021-10-16 RX ADMIN — FUROSEMIDE 60 MG: 10 INJECTION, SOLUTION INTRAMUSCULAR; INTRAVENOUS at 22:05

## 2021-10-16 RX ADMIN — POTASSIUM CHLORIDE 20 MEQ: 1.5 POWDER, FOR SOLUTION ORAL at 10:35

## 2021-10-16 RX ADMIN — PREDNISONE 40 MG: 20 TABLET ORAL at 10:35

## 2021-10-16 RX ADMIN — PIPERACILLIN AND TAZOBACTAM 3.38 G: 3; .375 INJECTION, POWDER, LYOPHILIZED, FOR SOLUTION INTRAVENOUS at 17:14

## 2021-10-16 RX ADMIN — PIPERACILLIN AND TAZOBACTAM 3.38 G: 3; .375 INJECTION, POWDER, LYOPHILIZED, FOR SOLUTION INTRAVENOUS at 05:24

## 2021-10-16 NOTE — PROGRESS NOTES
Problem: Falls - Risk of  Goal: *Absence of Falls  Description: Document Bard Solares Fall Risk and appropriate interventions in the flowsheet. Outcome: Progressing Towards Goal  Note: Fall Risk Interventions:  Mobility Interventions: Utilize walker, cane, or other assistive device         Medication Interventions: Bed/chair exit alarm    Elimination Interventions: Call light in reach, Urinal in reach    History of Falls Interventions: Bed/chair exit alarm, Investigate reason for fall         Problem: Patient Education: Go to Patient Education Activity  Goal: Patient/Family Education  Outcome: Progressing Towards Goal     Problem: Pressure Injury - Risk of  Goal: *Prevention of pressure injury  Description: Document Stefano Scale and appropriate interventions in the flowsheet.   Outcome: Progressing Towards Goal  Note: Pressure Injury Interventions:       Moisture Interventions: Absorbent underpads, Apply protective barrier, creams and emollients, Minimize layers    Activity Interventions: Increase time out of bed    Mobility Interventions: Assess need for specialty bed, PT/OT evaluation    Nutrition Interventions: Document food/fluid/supplement intake    Friction and Shear Interventions: Apply protective barrier, creams and emollients                Problem: Patient Education: Go to Patient Education Activity  Goal: Patient/Family Education  Outcome: Progressing Towards Goal     Problem: Patient Education: Go to Patient Education Activity  Goal: Patient/Family Education  Outcome: Progressing Towards Goal     Problem: Patient Education: Go to Patient Education Activity  Goal: Patient/Family Education  Outcome: Progressing Towards Goal

## 2021-10-16 NOTE — PROGRESS NOTES
Hospitalist Progress Note               Daily Progress Note: 10/16/2021      Subjective:   Hospital course to date: Patient is a 78-year-old male with history of diabetes, hypertension and COPD was admitted on 10/12 with altered mental status and lethargy. He was in respiratory distress on admission and had a temperature of 102. 1. Chest x-ray showed bilateral perihilar densities. White count was 31,000 and lactate was elevated. Patient was started on IV antibiotics. Mentation improved with hydration. Procalcitonin was 21    Patient was noted to have a troponin of 996. CPK descending slowly, 5297    Echo showed normal LV function with mild pulmonary hypertension. Cxr today with mild vascular congestion without overt pulmonary edema    CT of the chest showed only bandlike atelectasis in the right middle lobe    Blood cultures were positive for coag negative staph species with multiple colony types in 3 of 4 bottles, anaerobic bottle growing bacillus species. subsequent blcs no growth day 3. Patient has remained hypoxic and requiring supplemental oxygen. Afebrile  Subjectively feels improving slowly.   Problem List:  Problem List as of 10/16/2021 Date Reviewed: 10/12/2021        Codes Class Noted - Resolved    Sepsis ([facility] ICD-10-CM: A41.9  ICD-9-CM: 038.9, 995.91  10/12/2021 - Present        Metabolic encephalopathy PNO-51-VD: G93.41  ICD-9-CM: 348.31  10/12/2021 - Present        Pneumonia ICD-10-CM: J18.9  ICD-9-CM: 486  10/12/2021 - Present        Peripheral venous insufficiency ICD-10-CM: I87.2  ICD-9-CM: 459.81  9/24/2020 - Present        Morbid obesity ([facility] ICD-10-CM: E66.01  ICD-9-CM: 278.01  9/24/2020 - Present        Atherosclerosis of native artery of left lower extremity with ulceration of calf ([facility] ICD-10-CM: Z21.605  ICD-9-CM: 440.23, 707.12  9/24/2020 - Present        Septic pulmonary embolism without acute cor pulmonale ([facility] ICD-10-CM: I26.90  ICD-9-CM: 415.12  9/24/2020 - Present        Lymphedema ICD-10-CM: I89.0  ICD-9-CM: 457.1  9/24/2020 - Present        RESOLVED: Non-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 exposed ([facility] ICD-10-CM: B50.944  ICD-9-CM: 707.12  9/24/2020 - 12/3/2020        RESOLVED: Non-pressure chronic ulcer of right calf with fat layer exposed ([facility] ICD-10-CM: C32.129  ICD-9-CM: 707.12  9/24/2020 - 12/3/2020              Medications reviewed  Current Facility-Administered Medications   Medication Dose Route Frequency    furosemide (LASIX) injection 60 mg  60 mg IntraVENous BID    predniSONE (DELTASONE) tablet 40 mg  40 mg Oral DAILY WITH BREAKFAST    sodium chloride (NS) flush 5-40 mL  5-40 mL IntraVENous Q8H    sodium chloride (NS) flush 5-40 mL  5-40 mL IntraVENous PRN    albuterol-ipratropium (DUO-NEB) 2.5 MG-0.5 MG/3 ML  3 mL Nebulization Q6H PRN    aspirin delayed-release tablet 81 mg  81 mg Oral DAILY    insulin lispro (HUMALOG) injection   SubCUTAneous AC&HS    glucose chewable tablet 16 g  4 Tablet Oral PRN    dextrose (D50W) injection syrg 12.5-25 g  25-50 mL IntraVENous PRN    glucagon (GLUCAGEN) injection 1 mg  1 mg IntraMUSCular PRN    sodium chloride (NS) flush 5-40 mL  5-40 mL IntraVENous Q8H    sodium chloride (NS) flush 5-40 mL  5-40 mL IntraVENous PRN    acetaminophen (TYLENOL) tablet 650 mg  650 mg Oral Q6H PRN    Or    acetaminophen (TYLENOL) suppository 650 mg  650 mg Rectal Q6H PRN    ondansetron (ZOFRAN ODT) tablet 4 mg  4 mg Oral Q8H PRN    Or    ondansetron (ZOFRAN) injection 4 mg  4 mg IntraVENous Q6H PRN    piperacillin-tazobactam (ZOSYN) 3.375 g in 0.9% sodium chloride (MBP/ADV) 100 mL MBP  3.375 g IntraVENous Q8H    pantoprazole (PROTONIX) tablet 40 mg  40 mg Oral ACB    atorvastatin (LIPITOR) tablet 80 mg  80 mg Oral DAILY       Review of Systems:   A comprehensive review of systems was negative except for that written in the HPI.     Objective:   Physical Exam:     Visit Vitals  /64 (BP Patient Position: Sitting;Reclining)   Pulse 74   Temp 97.4 °F (36.3 °C)   Resp 20   Ht 5' 7\" (1.702 m)   Wt (!) 181.4 kg (400 lb)   SpO2 91%   BMI 62.65 kg/m²    O2 Flow Rate (L/min): 3 l/min O2 Device: Nasal cannula    Temp (24hrs), Av.5 °F (36.4 °C), Min:97.1 °F (36.2 °C), Max:98.1 °F (36.7 °C)    10/16 0701 - 10/16 1900  In: -   Out: 750 [Urine:750]   10/14 1901 - 10/16 0700  In: 1787 [P.O.:1530]  Out: 5254 [Urine:3950]    General:   Awake and alert. Morbidly obese   Lungs:    Mild expiratory wheezing bilaterally, not labored   Chest wall:  No tenderness or deformity. Heart:  Regular rate and rhythm, S1, S2 normal, no murmur, click, rub or gallop. Abdomen:   Soft, non-tender. Bowel sounds normal. No masses,  No organomegaly. Extremities: Extremities normal, atraumatic, no cyanosis    2+ edema   Pulses: 2+ and symmetric all extremities. Skin: Skin color, texture, turgor normal. No rashes or lesions   Neurologic: CNII-XII intact. No gross focal deficits         Data Review:       Recent Days:  Recent Labs     10/16/21  0601 10/15/21  0641 10/14/21  1145   WBC 20.1* 21.8* 17.8*   HGB 12.2 11.8* 11.7*   HCT 35.5* 35.3* 34.1*    210 198     Recent Labs     10/16/21  0108 10/15/21  0820 10/15/21  0641    140 140   K 3.9 3.5 3.5   CL 99 102 101   CO2 31 30 30   * 98 94   BUN 15 11 11   CREA 0.66* 0.65* 0.65*   CA 8.2* 8.0* 8.0*   PHOS  --   --  2.8   ALB  --   --  2.2*     No results for input(s): PH, PCO2, PO2, HCO3, FIO2 in the last 72 hours.     24 Hour Results:  Recent Results (from the past 24 hour(s))   GLUCOSE, POC    Collection Time: 10/15/21  9:48 PM   Result Value Ref Range    Glucose (POC) 152 (H) 65 - 117 mg/dL    Performed by Lashonda Vasquez    PTT    Collection Time: 10/16/21  1:08 AM   Result Value Ref Range    aPTT 38.3 (H) 21.2 - 34.1 sec    aPTT, therapeutic range   82 - 151 sec   METABOLIC PANEL, BASIC    Collection Time: 10/16/21  1:08 AM   Result Value Ref Range Sodium 138 136 - 145 mmol/L    Potassium 3.9 3.5 - 5.1 mmol/L    Chloride 99 97 - 108 mmol/L    CO2 31 21 - 32 mmol/L    Anion gap 8 5 - 15 mmol/L    Glucose 143 (H) 65 - 100 mg/dL    BUN 15 6 - 20 mg/dL    Creatinine 0.66 (L) 0.70 - 1.30 mg/dL    BUN/Creatinine ratio 23 (H) 12 - 20      GFR est AA >60 >60 ml/min/1.73m2    GFR est non-AA >60 >60 ml/min/1.73m2    Calcium 8.2 (L) 8.5 - 10.1 mg/dL   CK    Collection Time: 10/16/21  6:01 AM   Result Value Ref Range    CK 1,670 (H) 39 - 308 U/L   CBC WITH AUTOMATED DIFF    Collection Time: 10/16/21  6:01 AM   Result Value Ref Range    WBC 20.1 (H) 4.1 - 11.1 K/uL    RBC 4.62 4.10 - 5.70 M/uL    HGB 12.2 12.1 - 17.0 g/dL    HCT 35.5 (L) 36.6 - 50.3 %    MCV 76.8 (L) 80.0 - 99.0 FL    MCH 26.4 26.0 - 34.0 PG    MCHC 34.4 30.0 - 36.5 g/dL    RDW 15.4 (H) 11.5 - 14.5 %    PLATELET 856 735 - 168 K/uL    MPV 9.8 8.9 - 12.9 FL    NRBC 0.0 0.0  WBC    ABSOLUTE NRBC 0.00 0.00 - 0.01 K/uL    NEUTROPHILS 85 (H) 32 - 75 %    LYMPHOCYTES 4 (L) 12 - 49 %    MONOCYTES 7 5 - 13 %    EOSINOPHILS 0 0 - 7 %    BASOPHILS 0 0 - 1 %    IMMATURE GRANULOCYTES 4 (H) 0 - 0.5 %    ABS. NEUTROPHILS 17.1 (H) 1.8 - 8.0 K/UL    ABS. LYMPHOCYTES 0.9 0.8 - 3.5 K/UL    ABS. MONOCYTES 1.3 (H) 0.0 - 1.0 K/UL    ABS. EOSINOPHILS 0.0 0.0 - 0.4 K/UL    ABS. BASOPHILS 0.0 0.0 - 0.1 K/UL    ABS. IMM. GRANS. 0.8 (H) 0.00 - 0.04 K/UL    DF AUTOMATED     C REACTIVE PROTEIN, QT    Collection Time: 10/16/21  6:01 AM   Result Value Ref Range    C-Reactive protein 12.50 (H) 0.00 - 0.60 mg/dL   PROCALCITONIN    Collection Time: 10/16/21  6:01 AM   Result Value Ref Range    Procalcitonin 5.04 (H) 0 ng/mL   PTT    Collection Time: 10/16/21 12:05 PM   Result Value Ref Range    aPTT 27.0 21.2 - 34.1 sec    aPTT, therapeutic range   82 - 109 sec       XR CHEST PORT   Final Result   Enlarged cardiac silhouette with central pulmonary vascular engorgement,   unchanged.  No overt parenchymal edema is evident radiographically. XR CHEST PORT   Final Result      XR CHEST PORT   Final Result   Hydrostatic edema. No focal airspace consolidation definitively   visualized. CT CHEST WO CONT   Final Result   Bandlike opacity in the right lower lobe, probably atelectasis. Developing infection not excluded. Other findings as above. CT HEAD WO CONT   Final Result   1. No acute intracranial hemorrhage or mass effect. 2.  No acute fracture or subluxation of the cervical spine. CT SPINE CERV WO CONT   Final Result   1. No acute intracranial hemorrhage or mass effect. 2.  No acute fracture or subluxation of the cervical spine. XR CHEST PORT   Final Result   Bilateral perihilar airspace disease, suggestive of edema however pneumonia or   atelectasis are not excluded in the appropriate clinical setting. Enlarged   cardiopericardial silhouette. Assessment:  Acute non-STEMI   -He underwent cardiac catheterization yesterday that showed mild coronary artery disease of the proximal RCA and mid LAD    Sepsis: pna +/- bacteremia   -Inflammatory markers have been improving on Zosyn and vancomycin   -procal descending. Crp marginally up. Coag negative staph bacteremia, probably due to contamination/ abacillus species in initial anaerobic bottle. Repeat blcs no growth day 3. Acute exacerbation of COPD, wheezing and requiring supplemental o2, 2 l/m. Acute respiratory failure with hypoxia, probably due to heart failure and COPD   -Still on 2 L nasal cannula    Acute diastolic heart failure; his 2 chest x-rays have shown evidence of heart failure, cxr 10/15 with vascular congestion without overt pulmonary edema. Acute kidney injury, resolved (1.3-->-->1.17-->0.78-->0.65)    Obstructive sleep apnea    Rhabdomyolysis, ck descending slowly to 1670. Held hydration d/t vascular congestion.      Generalized weakness      Plan:  Watch off ivf  Abx per id, leukocytosis persists ~ 20 K  DuoNeb treatments  Prednisone x 5d(10/15 started)  Furosemide 60 mg IV bid,     PT and OT evaluations appreciated, rec snf    Care Plan discussed with: Patient/Family    Disposition: home vs snf 2-3 days    Total time spent with patient: 30 minutes.     Panchito Patel MD

## 2021-10-17 ENCOUNTER — APPOINTMENT (OUTPATIENT)
Dept: GENERAL RADIOLOGY | Age: 66
DRG: 871 | End: 2021-10-17
Attending: INTERNAL MEDICINE
Payer: MEDICARE

## 2021-10-17 LAB
ANION GAP SERPL CALC-SCNC: 7 MMOL/L (ref 5–15)
APTT PPP: 33.1 SEC (ref 21.2–34.1)
APTT PPP: 33.9 SEC (ref 21.2–34.1)
APTT PPP: 37.4 SEC (ref 21.2–34.1)
BUN SERPL-MCNC: 25 MG/DL (ref 6–20)
BUN/CREAT SERPL: 32 (ref 12–20)
CA-I BLD-MCNC: 8.3 MG/DL (ref 8.5–10.1)
CHLORIDE SERPL-SCNC: 97 MMOL/L (ref 97–108)
CO2 SERPL-SCNC: 33 MMOL/L (ref 21–32)
CREAT SERPL-MCNC: 0.79 MG/DL (ref 0.7–1.3)
GLUCOSE BLD STRIP.AUTO-MCNC: 121 MG/DL (ref 65–117)
GLUCOSE BLD STRIP.AUTO-MCNC: 135 MG/DL (ref 65–117)
GLUCOSE BLD STRIP.AUTO-MCNC: 144 MG/DL (ref 65–117)
GLUCOSE BLD STRIP.AUTO-MCNC: 195 MG/DL (ref 65–117)
GLUCOSE SERPL-MCNC: 120 MG/DL (ref 65–100)
PERFORMED BY, TECHID: ABNORMAL
POTASSIUM SERPL-SCNC: 4 MMOL/L (ref 3.5–5.1)
PROCALCITONIN SERPL-MCNC: 3.05 NG/ML
SODIUM SERPL-SCNC: 137 MMOL/L (ref 136–145)
THERAPEUTIC RANGE,PTTT: ABNORMAL SEC (ref 82–109)
THERAPEUTIC RANGE,PTTT: NORMAL SEC
THERAPEUTIC RANGE,PTTT: NORMAL SEC (ref 82–109)

## 2021-10-17 PROCEDURE — 74011250637 HC RX REV CODE- 250/637: Performed by: INTERNAL MEDICINE

## 2021-10-17 PROCEDURE — 85730 THROMBOPLASTIN TIME PARTIAL: CPT

## 2021-10-17 PROCEDURE — 36415 COLL VENOUS BLD VENIPUNCTURE: CPT

## 2021-10-17 PROCEDURE — 94760 N-INVAS EAR/PLS OXIMETRY 1: CPT

## 2021-10-17 PROCEDURE — 82962 GLUCOSE BLOOD TEST: CPT

## 2021-10-17 PROCEDURE — 74011250637 HC RX REV CODE- 250/637: Performed by: HOSPITALIST

## 2021-10-17 PROCEDURE — 77010033678 HC OXYGEN DAILY

## 2021-10-17 PROCEDURE — 74011250636 HC RX REV CODE- 250/636: Performed by: HOSPITALIST

## 2021-10-17 PROCEDURE — 74011000258 HC RX REV CODE- 258: Performed by: HOSPITALIST

## 2021-10-17 PROCEDURE — 65270000029 HC RM PRIVATE

## 2021-10-17 PROCEDURE — 71045 X-RAY EXAM CHEST 1 VIEW: CPT

## 2021-10-17 PROCEDURE — 84145 PROCALCITONIN (PCT): CPT

## 2021-10-17 PROCEDURE — 97530 THERAPEUTIC ACTIVITIES: CPT

## 2021-10-17 PROCEDURE — 74011636637 HC RX REV CODE- 636/637: Performed by: HOSPITALIST

## 2021-10-17 PROCEDURE — 74011636637 HC RX REV CODE- 636/637: Performed by: INTERNAL MEDICINE

## 2021-10-17 PROCEDURE — 80048 BASIC METABOLIC PNL TOTAL CA: CPT

## 2021-10-17 PROCEDURE — 74011250636 HC RX REV CODE- 250/636: Performed by: INTERNAL MEDICINE

## 2021-10-17 RX ADMIN — FUROSEMIDE 60 MG: 10 INJECTION, SOLUTION INTRAMUSCULAR; INTRAVENOUS at 21:11

## 2021-10-17 RX ADMIN — FUROSEMIDE 60 MG: 10 INJECTION, SOLUTION INTRAMUSCULAR; INTRAVENOUS at 10:36

## 2021-10-17 RX ADMIN — Medication 10 ML: at 21:12

## 2021-10-17 RX ADMIN — Medication 10 ML: at 05:27

## 2021-10-17 RX ADMIN — PANTOPRAZOLE SODIUM 40 MG: 40 TABLET, DELAYED RELEASE ORAL at 10:36

## 2021-10-17 RX ADMIN — PIPERACILLIN AND TAZOBACTAM 3.38 G: 3; .375 INJECTION, POWDER, LYOPHILIZED, FOR SOLUTION INTRAVENOUS at 05:27

## 2021-10-17 RX ADMIN — INSULIN LISPRO 3 UNITS: 100 INJECTION, SOLUTION INTRAVENOUS; SUBCUTANEOUS at 10:37

## 2021-10-17 RX ADMIN — PIPERACILLIN AND TAZOBACTAM 3.38 G: 3; .375 INJECTION, POWDER, LYOPHILIZED, FOR SOLUTION INTRAVENOUS at 14:13

## 2021-10-17 RX ADMIN — PREDNISONE 40 MG: 20 TABLET ORAL at 10:36

## 2021-10-17 RX ADMIN — PIPERACILLIN AND TAZOBACTAM 3.38 G: 3; .375 INJECTION, POWDER, LYOPHILIZED, FOR SOLUTION INTRAVENOUS at 21:11

## 2021-10-17 RX ADMIN — Medication 10 ML: at 22:00

## 2021-10-17 RX ADMIN — Medication 10 ML: at 14:13

## 2021-10-17 RX ADMIN — ASPIRIN 81 MG: 81 TABLET, COATED ORAL at 10:36

## 2021-10-17 RX ADMIN — Medication 10 ML: at 14:15

## 2021-10-17 RX ADMIN — INSULIN LISPRO 3 UNITS: 100 INJECTION, SOLUTION INTRAVENOUS; SUBCUTANEOUS at 21:11

## 2021-10-17 RX ADMIN — ATORVASTATIN CALCIUM 80 MG: 40 TABLET, FILM COATED ORAL at 10:36

## 2021-10-17 NOTE — PROGRESS NOTES
Spiritual Care Assessment/Progress Note  [facility]      NAME: Sandra Lambert      MRN: [id number]  AGE: 74 y.o.  SEX: male  Anabaptist Affiliation: No Restorationist   Language: English     10/17/2021     Total Time (in minutes): 10     Spiritual Assessment begun in [address] through conversation with:         [x]Patient        [] Family    [] Friend(s)        Reason for Consult: Initial/Spiritual assessment, patient floor     Spiritual beliefs: (Please include comment if needed)     [x] Identifies with a nicky tradition:         [x] Supported by a nicky community:            [] Claims no spiritual orientation:           [] Seeking spiritual identity:                [] Adheres to an individual form of spirituality:           [] Not able to assess:                           Identified resources for coping:      [x] Prayer                               [] Music                  [] Guided Imagery     [x] Family/friends                 [] Pet visits     [x] Devotional reading                         [] Unknown     [] Other:                                               Interventions offered during this visit: (See comments for more details)    Patient Interventions: Affirmation of nicky, Catharsis/review of pertinent events in supportive environment, Coping skills reviewed/reinforced, Guidance concerning next steps/process to be expected, Iconic (affirming the presence of God/Higher Power), Initial/Spiritual assessment, patient floor, Prayer (actual), Anabaptist beliefs/image of God discussed           Plan of Care:     [] Support spiritual and/or cultural needs    [] Support AMD and/or advance care planning process      [] Support grieving process   [] Coordinate Rites and/or Rituals    [] Coordination with community clergy   [] No spiritual needs identified at this time   [] Detailed Plan of Care below (See Comments)  [] Make referral to Music Therapy  [] Make referral to Pet Therapy [] Make referral to Addiction services  [] Make referral to [facility]  [] Make referral to Spiritual Care Partner  [] No future visits requested        [x] Follow up upon further referrals     Comments: The purpose of the visit was to do a spiritual assessment on the patient. The patient was sitting in his recliner, as he had just finished P.T. He mentioned feeling hopeful that he is making progress but his breathing is still short. He shared that he is encouraged by his nicky and his relationship God strengthens him. 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 The  listened empathically and reflectively as the patient shared. [address] [facility] [place].    can be reached by calling the  at [facility]  [phone]

## 2021-10-17 NOTE — PROGRESS NOTES
Problem: Mobility Impaired (Adult and Pediatric)  Goal: *Acute Goals and Plan of Care (Insert Text)  Description: Physical Therapy Goals  Initiated 10/15/21  1)  I with HEP in 7 days to improve overall functional mobility. 2)  Bed mobility and transfers with CGA in 7 days to prevent skin breakdown with O2 sats >90%  3)  Pt to amb 100ft with LRAD and sup in 7 days with O2 sats>90%. Pt. Goal:  Pt to be able to walk safely without falls. Outcome: Progressing Towards Goal   PHYSICAL THERAPY TREATMENT  Patient: Tobias Gandara (52 y.o. male)  Date: 10/17/2021  Diagnosis: Sepsis ([facility] [V35.6]  Metabolic encephalopathy [X28.84]  Pneumonia [J18.9] <principal problem not specified>  Procedure(s) (LRB):  LEFT HEART CATH / CORONARY ANGIOGRAPHY (N/A) 3 Days Post-Op  Precautions:    Chart, physical therapy assessment, plan of care and goals were reviewed. ASSESSMENT  Patient continues with skilled PT services and is progressing towards goals. Upon entry into room, patient sitting in recliner and soaked in urine. He was agreeable to transfer to bed so therapist could assist him in cleaning up. Sit<>stand from recliner with Barry and CGA from bed as height was slightly increased. SPT bed<>recliner with CGA and incr timed as well as patient leaning forward on bed rails/arm rests to support himself while performing transfer and declined use of RW for transfer. Therapist wiped down recliner and changed linens as well as wiped patient down in sitting. HR incr with activity so patient rested on bed for a few minutes to decr HR before getting back into recliner. Sit<>stand from bed CGA and CGA for SPT back to recliner. Patient stood for ~1 min so therapist could assist in wiping him down again while in standing. Patient then transferred back to recliner to sit up. Retrieved incentive spirometer for patient and instructed in use; however patient says he has used one at home.   with patient seated back in recliner at end of session. Current Level of Function Impacting Discharge (mobility/balance): decr mobility, decr endurance    Other factors to consider for discharge: supplemental O2, morbidly obese, past medical hx          PLAN :  Patient continues to benefit from skilled intervention to address the above impairments. Continue treatment per established plan of care. to address goals. Recommendation for discharge: (in order for the patient to meet his/her long term goals)  Evaristo Yeboah    This discharge recommendation:  Has been made in collaboration with the attending provider and/or case management    IF patient discharges home will need the following DME: to be determined (TBD)       SUBJECTIVE:   Patient stated Chrissy Hines said they were going to come back shortly and help me get cleaned up.     OBJECTIVE DATA SUMMARY:   Critical Behavior:  Neurologic State: Alert  Orientation Level: Oriented X4  Cognition: Follows commands, Appropriate safety awareness     Functional Mobility Training:  Bed Mobility:     Patient doesn't like to be positioned in bed and instead sleeps in recliner      Transfers:  Sit to Stand: Minimum assistance  Stand to Sit: Contact guard assistance        Bed to Chair: Contact guard assistance  Increased time to perform with heavy UE assist through bed rails and arm rests of recliner      Balance:  Sitting: Intact  Sitting - Static: Good (unsupported)  Sitting - Dynamic: Fair (occasional)  Standing: Impaired; Without support  Standing - Static: Good;Constant support  Standing - Dynamic : Fair;Constant support  Ambulation/Gait Training:  Distance (ft): 2 Feet (ft)  Assistive Device: Gait belt  Ambulation - Level of Assistance: Contact guard assistance     Short distance recliner<>bed so patient could be cleaned up but his HR spiked with movement so keep gait distance at a minimum    Therapeutic Exercises:   Not today   Pain Rating:  Some pain behind L knee but no number given     Activity Tolerance:   Fair, desaturates with exertion and requires oxygen, requires frequent rest breaks, and observed SOB with activity  Please refer to the flowsheet for vital signs taken during this treatment. After treatment patient left in no apparent distress:   Sitting in chair, Call bell within reach, and nursing present at end of session and aware of PT assisting with cleaning patient and patient positioning at end of session. COMMUNICATION/COLLABORATION:   The patients plan of care was discussed with: Physical therapist and Registered nurse.      Niki Dickinson   Time Calculation: 27 mins

## 2021-10-17 NOTE — PROGRESS NOTES
Hospitalist Progress Note               Daily Progress Note: 10/17/2021      Subjective:   Hospital course to date: Patient is a 58-year-old male with history of diabetes, hypertension and COPD was admitted on 10/12 with altered mental status and lethargy. He was in respiratory distress on admission and had a temperature of 102. 1. Chest x-ray showed bilateral perihilar densities. White count was 31,000 and lactate was elevated. Patient was started on IV antibiotics. Mentation improved with hydration. Procalcitonin was 21    Patient was noted to have a troponin of 996. CPK descending slowly    Echo showed normal LV function with mild pulmonary hypertension. Cxr today with mild vascular congestion without overt pulmonary edema    CT of the chest showed only bandlike atelectasis in the right middle lobe    Blood cultures were positive for coag negative staph species with multiple colony types in 3 of 4 bottles, anaerobic bottle growing bacillus species. subsequent blcs no growth day 4. Patient has remained hypoxic and requiring supplemental oxygen. Now 3 L @ 93-93%  Afebrile  Subjectively feels improving slowly.   Problem List:  Problem List as of 10/17/2021 Date Reviewed: 10/12/2021        Codes Class Noted - Resolved    Sepsis ([facility].) ICD-10-CM: A41.9  ICD-9-CM: 038.9, 995.91  10/12/2021 - Present        Metabolic encephalopathy SAMM-[id number]: G93.41  ICD-9-CM: 348.31  10/12/2021 - Present        Pneumonia ICD-10-CM: J18.9  ICD-9-CM: 225  10/12/2021 - Present        Peripheral venous insufficiency ICD-10-CM: I87.2  ICD-9-CM: 459.81  9/24/2020 - Present        Morbid obesity ([facility] ICD-10-CM: E66.01  ICD-9-CM: 278.01  9/24/2020 - Present        Atherosclerosis of native artery of left lower extremity with ulceration of calf ([facility].) ICD-10-CM: H55.761  ICD-9-CM: 440.23, 707.12  9/24/2020 - Present        Septic pulmonary embolism without acute cor pulmonale (HCC) ICD-10-CM: I26.90  ICD-9-CM: 415.12 9/24/2020 - Present        Lymphedema ICD-10-CM: I89.0  ICD-9-CM: 457.1  9/24/2020 - Present        RESOLVED: Non-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 exposed ([facility].) ICD-10-CM: Q64.003  ICD-9-CM: 707.12  9/24/2020 - 12/3/2020        RESOLVED: Non-pressure chronic ulcer of right calf with fat layer exposed ([facility].) ICD-10-CM: J29.561  ICD-9-CM: 707.12  9/24/2020 - 12/3/2020              Medications reviewed  Current Facility-Administered Medications   Medication Dose Route Frequency    furosemide (LASIX) injection 60 mg  60 mg IntraVENous BID    predniSONE (DELTASONE) tablet 40 mg  40 mg Oral DAILY WITH BREAKFAST    sodium chloride (NS) flush 5-40 mL  5-40 mL IntraVENous Q8H    sodium chloride (NS) flush 5-40 mL  5-40 mL IntraVENous PRN    albuterol-ipratropium (DUO-NEB) 2.5 MG-0.5 MG/3 ML  3 mL Nebulization Q6H PRN    aspirin delayed-release tablet 81 mg  81 mg Oral DAILY    insulin lispro (HUMALOG) injection   SubCUTAneous AC&HS    glucose chewable tablet 16 g  4 Tablet Oral PRN    dextrose (D50W) injection syrg 12.5-25 g  25-50 mL IntraVENous PRN    glucagon (GLUCAGEN) injection 1 mg  1 mg IntraMUSCular PRN    sodium chloride (NS) flush 5-40 mL  5-40 mL IntraVENous Q8H    sodium chloride (NS) flush 5-40 mL  5-40 mL IntraVENous PRN    acetaminophen (TYLENOL) tablet 650 mg  650 mg Oral Q6H PRN    Or    acetaminophen (TYLENOL) suppository 650 mg  650 mg Rectal Q6H PRN    ondansetron (ZOFRAN ODT) tablet 4 mg  4 mg Oral Q8H PRN    Or    ondansetron (ZOFRAN) injection 4 mg  4 mg IntraVENous Q6H PRN    piperacillin-tazobactam (ZOSYN) 3.375 g in 0.9% sodium chloride (MBP/ADV) 100 mL MBP  3.375 g IntraVENous Q8H    pantoprazole (PROTONIX) tablet 40 mg  40 mg Oral ACB    atorvastatin (LIPITOR) tablet 80 mg  80 mg Oral DAILY       Review of Systems:   A comprehensive review of systems was negative except for that written in the HPI.     Objective:   Physical Exam:     Visit Vitals  /61 (BP 1 Location: Right upper arm, BP Patient Position: Reclining)   Pulse 68   Temp 97.7 °F (36.5 °C)   Resp 23   Ht 5' 7\" (1.702 m)   Wt (!) 181.4 kg (400 lb)   SpO2 93%   BMI 62.65 kg/m²    O2 Flow Rate (L/min): 3 l/min O2 Device: Nasal cannula    Temp (24hrs), Av.9 °F (36.6 °C), Min:97.7 °F (36.5 °C), Max:98.1 °F (36.7 °C)    10/17 0701 - 10/17 1900  In: -   Out: 1625 [PHIOP:4225]   10/15 1901 - 10/17 0700  In: 690 [P.O.:690]  Out: 9975 [Urine:4500]    General:   Awake and alert. Morbidly obese   Lungs:    Breath sounds diffusely, no wheeze and not labored   Chest wall:  No tenderness or deformity. Heart:  Regular rate and rhythm, S1, S2 normal, no murmur, click, rub or gallop. Abdomen:   Soft, non-tender. Bowel sounds normal. No masses,  No organomegaly. Extremities: Extremities normal, atraumatic, no cyanosis    2+ edema   Pulses: 2+ and symmetric all extremities. Skin: Skin color, texture, turgor normal. No rashes or lesions   Neurologic: CNII-XII intact. No gross focal deficits         Data Review:       Recent Days:  Recent Labs     10/16/21  0601 10/15/21  0641   WBC 20.1* 21.8*   HGB 12.2 11.8*   HCT 35.5* 35.3*    210     Recent Labs     10/17/21  0145 10/16/21  0108 10/15/21  0820 10/15/21  0641 10/15/21  0641    138 140   < > 140   K 4.0 3.9 3.5   < > 3.5   CL 97 99 102   < > 101   CO2 33* 31 30   < > 30   * 143* 98   < > 94   BUN 25* 15 11   < > 11   CREA 0.79 0.66* 0.65*   < > 0.65*   CA 8.3* 8.2* 8.0*   < > 8.0*   PHOS  --   --   --   --  2.8   ALB  --   --   --   --  2.2*    < > = values in this interval not displayed. No results for input(s): PH, PCO2, PO2, HCO3, FIO2 in the last 72 hours.     24 Hour Results:  Recent Results (from the past 24 hour(s))   GLUCOSE, POC    Collection Time: 10/16/21  8:16 PM   Result Value Ref Range    Glucose (POC) 172 (H) 65 - 117 mg/dL    Performed by Xavier Pozo    PTT    Collection Time: 10/16/21  8:49 PM   Result Value Ref Range aPTT 39.7 (H) 21.2 - 34.1 sec    aPTT, therapeutic range   82 - 109 sec   PTT    Collection Time: 10/17/21  1:45 AM   Result Value Ref Range    aPTT 37.4 (H) 21.2 - 34.1 sec    aPTT, therapeutic range   82 - 595 sec   METABOLIC PANEL, BASIC    Collection Time: 10/17/21  1:45 AM   Result Value Ref Range    Sodium 137 136 - 145 mmol/L    Potassium 4.0 3.5 - 5.1 mmol/L    Chloride 97 97 - 108 mmol/L    CO2 33 (H) 21 - 32 mmol/L    Anion gap 7 5 - 15 mmol/L    Glucose 120 (H) 65 - 100 mg/dL    BUN 25 (H) 6 - 20 mg/dL    Creatinine 0.79 0.70 - 1.30 mg/dL    BUN/Creatinine ratio 32 (H) 12 - 20      GFR est AA >60 >60 ml/min/1.73m2    GFR est non-AA >60 >60 ml/min/1.73m2    Calcium 8.3 (L) 8.5 - 10.1 mg/dL   PTT    Collection Time: 10/17/21  7:35 AM   Result Value Ref Range    aPTT 33.9 21.2 - 34.1 sec    aPTT, therapeutic range   82 - 109 sec   GLUCOSE, POC    Collection Time: 10/17/21  9:11 AM   Result Value Ref Range    Glucose (POC) 144 (H) 65 - 117 mg/dL    Performed by Celeste Damian, POC    Collection Time: 10/17/21  1:05 PM   Result Value Ref Range    Glucose (POC) 121 (H) 65 - 117 mg/dL    Performed by Celeste Damian, POC    Collection Time: 10/17/21  5:20 PM   Result Value Ref Range    Glucose (POC) 135 (H) 65 - 117 mg/dL    Performed by VERÓNICA SANFORD        XR CHEST PORT   Final Result   No acute cardiopulmonary process. XR CHEST PORT   Final Result   Enlarged cardiac silhouette with central pulmonary vascular engorgement,   unchanged. No overt parenchymal edema is evident radiographically. XR CHEST PORT   Final Result      XR CHEST PORT   Final Result   Hydrostatic edema. No focal airspace consolidation definitively   visualized. CT CHEST WO CONT   Final Result   Bandlike opacity in the right lower lobe, probably atelectasis. Developing infection not excluded. Other findings as above. CT HEAD WO CONT   Final Result   1.   No acute intracranial hemorrhage or mass effect. 2.  No acute fracture or subluxation of the cervical spine. CT SPINE CERV WO CONT   Final Result   1. No acute intracranial hemorrhage or mass effect. 2.  No acute fracture or subluxation of the cervical spine. XR CHEST PORT   Final Result   Bilateral perihilar airspace disease, suggestive of edema however pneumonia or   atelectasis are not excluded in the appropriate clinical setting. Enlarged   cardiopericardial silhouette. Assessment:  Acute non-STEMI   -He underwent cardiac catheterization 10/15 that showed mild coronary artery disease of the proximal RCA and mid LAD    Sepsis: pna +/- bacteremia   -Inflammatory markers have been improving on Zosyn and vancomycin   -procal descending. Crp marginally up. Coag negative staph bacteremia, probably due to contamination/ abacillus species in initial anaerobic bottle. Repeat blcs no growth day 4. Acute exacerbation of COPD, wheezing and requiring supplemental o2, 2 l/m., prednisone    Acute respiratory failure with hypoxia, probably due to heart failure and COPD   -Still on 2 L nasal cannula    Acute diastolic heart failure; his 2 chest x-rays have shown evidence of heart failure, cxr 10/15 with vascular congestion without overt pulmonary edema. Acute kidney injury, resolved (1.3-->-->1.17-->0.78-->0.65)    Obstructive sleep apnea    Rhabdomyolysis, ck descending slowly to 1670. Held hydration d/t vascular congestion. Generalized weakness      Plan:  Watch off ivf, continue lasix, cr bumped up slightly  Abx per id, leukocytosis persists ~ 20 K- wbc pending  DuoNeb treatments  Prednisone x 5d(10/15 started)  Furosemide 60 mg IV bid,     PT and OT evaluations appreciated, rec snf    Care Plan discussed with: Patient/Family    Disposition: home vs snf 2-3 days    Total time spent with patient: 30 minutes.     Gris Elena MD

## 2021-10-17 NOTE — PROGRESS NOTES
Hospitalist Progress Note               Daily Progress Note: 10/17/2021      Subjective:   Hospital course to date: Patient is a 53-year-old male with history of diabetes, hypertension and COPD was admitted on 10/12 with altered mental status and lethargy. He was in respiratory distress on admission and had a temperature of 102. 1. Chest x-ray showed bilateral perihilar densities. White count was 31,000 and lactate was elevated. Patient was started on IV antibiotics. Mentation improved with hydration. Procalcitonin was 21    Patient was noted to have a troponin of 996. CPK descending slowly, 5297    Echo showed normal LV function with mild pulmonary hypertension. Cxr today with mild vascular congestion without overt pulmonary edema    CT of the chest showed only bandlike atelectasis in the right middle lobe    Blood cultures were positive for coag negative staph species with multiple colony types in 3 of 4 bottles, anaerobic bottle growing bacillus species. subsequent blcs no growth day 3. Patient has remained hypoxic and requiring supplemental oxygen. Afebrile  Subjectively feels improving slowly.   Problem List:  Problem List as of 10/17/2021 Date Reviewed: 10/12/2021        Codes Class Noted - Resolved    Sepsis ([facility].) ICD-10-CM: A41.9  ICD-9-CM: 038.9, 995.91  10/12/2021 - Present        Metabolic encephalopathy HMJ-03-IO: G93.41  ICD-9-CM: 348.31  10/12/2021 - Present        Pneumonia ICD-10-CM: J18.9  ICD-9-CM: 937  10/12/2021 - Present        Peripheral venous insufficiency ICD-10-CM: I87.2  ICD-9-CM: 459.81  9/24/2020 - Present        Morbid obesity ([facility] ICD-10-CM: E66.01  ICD-9-CM: 278.01  9/24/2020 - Present        Atherosclerosis of native artery of left lower extremity with ulceration of calf ([facility] ICD-10-CM: Q46.919  ICD-9-CM: 440.23, 707.12  9/24/2020 - Present        Septic pulmonary embolism without acute cor pulmonale ([facility].) ICD-10-CM: I26.90  ICD-9-CM: 415.12  9/24/2020 - Present        Lymphedema ICD-10-CM: I89.0  ICD-9-CM: 457.1  9/24/2020 - Present        RESOLVED: Non-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 exposed ([facility] ICD-10-CM: M54.650  ICD-9-CM: 707.12  9/24/2020 - 12/3/2020        RESOLVED: Non-pressure chronic ulcer of right calf with fat layer exposed ([facility] ICD-10-CM: L36.861  ICD-9-CM: 707.12  9/24/2020 - 12/3/2020              Medications reviewed  Current Facility-Administered Medications   Medication Dose Route Frequency    furosemide (LASIX) injection 60 mg  60 mg IntraVENous BID    predniSONE (DELTASONE) tablet 40 mg  40 mg Oral DAILY WITH BREAKFAST    sodium chloride (NS) flush 5-40 mL  5-40 mL IntraVENous Q8H    sodium chloride (NS) flush 5-40 mL  5-40 mL IntraVENous PRN    albuterol-ipratropium (DUO-NEB) 2.5 MG-0.5 MG/3 ML  3 mL Nebulization Q6H PRN    aspirin delayed-release tablet 81 mg  81 mg Oral DAILY    insulin lispro (HUMALOG) injection   SubCUTAneous AC&HS    glucose chewable tablet 16 g  4 Tablet Oral PRN    dextrose (D50W) injection syrg 12.5-25 g  25-50 mL IntraVENous PRN    glucagon (GLUCAGEN) injection 1 mg  1 mg IntraMUSCular PRN    sodium chloride (NS) flush 5-40 mL  5-40 mL IntraVENous Q8H    sodium chloride (NS) flush 5-40 mL  5-40 mL IntraVENous PRN    acetaminophen (TYLENOL) tablet 650 mg  650 mg Oral Q6H PRN    Or    acetaminophen (TYLENOL) suppository 650 mg  650 mg Rectal Q6H PRN    ondansetron (ZOFRAN ODT) tablet 4 mg  4 mg Oral Q8H PRN    Or    ondansetron (ZOFRAN) injection 4 mg  4 mg IntraVENous Q6H PRN    piperacillin-tazobactam (ZOSYN) 3.375 g in 0.9% sodium chloride (MBP/ADV) 100 mL MBP  3.375 g IntraVENous Q8H    pantoprazole (PROTONIX) tablet 40 mg  40 mg Oral ACB    atorvastatin (LIPITOR) tablet 80 mg  80 mg Oral DAILY       Review of Systems:   A comprehensive review of systems was negative except for that written in the HPI.     Objective:   Physical Exam:     Visit Vitals  /61 (BP 1 Location: Right upper arm, BP Patient Position: Reclining)   Pulse 68   Temp 97.7 °F (36.5 °C)   Resp 23   Ht 5' 7\" (1.702 m)   Wt (!) 181.4 kg (400 lb)   SpO2 93%   BMI 62.65 kg/m²    O2 Flow Rate (L/min): 3 l/min O2 Device: Nasal cannula    Temp (24hrs), Av.9 °F (36.6 °C), Min:97.6 °F (36.4 °C), Max:98.1 °F (36.7 °C)    No intake/output data recorded. 10/15 1901 - 10/17 0700  In: 690 [P.O.:690]  Out: 4675 [Urine:4500]    General:   Awake and alert. Morbidly obese   Lungs:    Mild expiratory wheezing bilaterally, not labored   Chest wall:  No tenderness or deformity. Heart:  Regular rate and rhythm, S1, S2 normal, no murmur, click, rub or gallop. Abdomen:   Soft, non-tender. Bowel sounds normal. No masses,  No organomegaly. Extremities: Extremities normal, atraumatic, no cyanosis    2+ edema   Pulses: 2+ and symmetric all extremities. Skin: Skin color, texture, turgor normal. No rashes or lesions   Neurologic: CNII-XII intact. No gross focal deficits         Data Review:       Recent Days:  Recent Labs     10/16/21  0601 10/15/21  0641   WBC 20.1* 21.8*   HGB 12.2 11.8*   HCT 35.5* 35.3*    210     Recent Labs     10/17/21  0145 10/16/21  0108 10/15/21  0820 10/15/21  0641 10/15/21  0641    138 140   < > 140   K 4.0 3.9 3.5   < > 3.5   CL 97 99 102   < > 101   CO2 33* 31 30   < > 30   * 143* 98   < > 94   BUN 25* 15 11   < > 11   CREA 0.79 0.66* 0.65*   < > 0.65*   CA 8.3* 8.2* 8.0*   < > 8.0*   PHOS  --   --   --   --  2.8   ALB  --   --   --   --  2.2*    < > = values in this interval not displayed. No results for input(s): PH, PCO2, PO2, HCO3, FIO2 in the last 72 hours.     24 Hour Results:  Recent Results (from the past 24 hour(s))   GLUCOSE, POC    Collection Time: 10/16/21  8:16 PM   Result Value Ref Range    Glucose (POC) 172 (H) 65 - 117 mg/dL    Performed by Thanh Walker    PTT    Collection Time: 10/16/21  8:49 PM   Result Value Ref Range    aPTT 39.7 (H) 21.2 - 34.1 sec aPTT, therapeutic range   82 - 109 sec   PTT    Collection Time: 10/17/21  1:45 AM   Result Value Ref Range    aPTT 37.4 (H) 21.2 - 34.1 sec    aPTT, therapeutic range   82 - 037 sec   METABOLIC PANEL, BASIC    Collection Time: 10/17/21  1:45 AM   Result Value Ref Range    Sodium 137 136 - 145 mmol/L    Potassium 4.0 3.5 - 5.1 mmol/L    Chloride 97 97 - 108 mmol/L    CO2 33 (H) 21 - 32 mmol/L    Anion gap 7 5 - 15 mmol/L    Glucose 120 (H) 65 - 100 mg/dL    BUN 25 (H) 6 - 20 mg/dL    Creatinine 0.79 0.70 - 1.30 mg/dL    BUN/Creatinine ratio 32 (H) 12 - 20      GFR est AA >60 >60 ml/min/1.73m2    GFR est non-AA >60 >60 ml/min/1.73m2    Calcium 8.3 (L) 8.5 - 10.1 mg/dL   PTT    Collection Time: 10/17/21  7:35 AM   Result Value Ref Range    aPTT 33.9 21.2 - 34.1 sec    aPTT, therapeutic range   82 - 109 sec   GLUCOSE, POC    Collection Time: 10/17/21  9:11 AM   Result Value Ref Range    Glucose (POC) 144 (H) 65 - 117 mg/dL    Performed by Danielle Mendoza, POC    Collection Time: 10/17/21  1:05 PM   Result Value Ref Range    Glucose (POC) 121 (H) 65 - 117 mg/dL    Performed by Lucia Francois        XR CHEST PORT   Final Result   Enlarged cardiac silhouette with central pulmonary vascular engorgement,   unchanged. No overt parenchymal edema is evident radiographically. XR CHEST PORT   Final Result      XR CHEST PORT   Final Result   Hydrostatic edema. No focal airspace consolidation definitively   visualized. CT CHEST WO CONT   Final Result   Bandlike opacity in the right lower lobe, probably atelectasis. Developing infection not excluded. Other findings as above. CT HEAD WO CONT   Final Result   1. No acute intracranial hemorrhage or mass effect. 2.  No acute fracture or subluxation of the cervical spine. CT SPINE CERV WO CONT   Final Result   1. No acute intracranial hemorrhage or mass effect. 2.  No acute fracture or subluxation of the cervical spine.          XR CHEST PORT Final Result   Bilateral perihilar airspace disease, suggestive of edema however pneumonia or   atelectasis are not excluded in the appropriate clinical setting. Enlarged   cardiopericardial silhouette. Assessment:  Acute non-STEMI   -He underwent cardiac catheterization 10/15 that showed mild coronary artery disease of the proximal RCA and mid LAD    Sepsis: pna +/- bacteremia   -Inflammatory markers have been improving on Zosyn and vancomycin   -procal descending. Crp marginally up. Coag negative staph bacteremia, probably due to contamination/ abacillus species in initial anaerobic bottle. Repeat blcs no growth day 4. Acute exacerbation of COPD, wheezing and requiring supplemental o2, 2 l/m., prednisone    Acute respiratory failure with hypoxia, probably due to heart failure and COPD   -Still on 2 L nasal cannula    Acute diastolic heart failure; his 2 chest x-rays have shown evidence of heart failure, cxr 10/15 with vascular congestion without overt pulmonary edema. Acute kidney injury, resolved (1.3-->-->1.17-->0.78-->0.65)    Obstructive sleep apnea    Rhabdomyolysis, ck descending slowly to 1670. Held hydration d/t vascular congestion. Generalized weakness      Plan:  Watch off ivf, continue lasix, cr bumped up slightly  Abx per id, leukocytosis persists ~ 20 K  DuoNeb treatments  Prednisone x 5d(10/15 started)  Furosemide 60 mg IV bid,     PT and OT evaluations appreciated, rec snf    Care Plan discussed with: Patient/Family    Disposition: home vs snf 2-3 days    Total time spent with patient: 30 minutes.     George Moore MD

## 2021-10-18 LAB
ANION GAP SERPL CALC-SCNC: 5 MMOL/L (ref 5–15)
APTT PPP: 33.1 SEC (ref 21.2–34.1)
APTT PPP: 33.7 SEC (ref 21.2–34.1)
BASOPHILS # BLD: 0.1 K/UL (ref 0–0.1)
BASOPHILS NFR BLD: 0 % (ref 0–1)
BUN SERPL-MCNC: 25 MG/DL (ref 6–20)
BUN/CREAT SERPL: 29 (ref 12–20)
CA-I BLD-MCNC: 8.9 MG/DL (ref 8.5–10.1)
CHLORIDE SERPL-SCNC: 99 MMOL/L (ref 97–108)
CK SERPL-CCNC: 433 U/L (ref 39–308)
CO2 SERPL-SCNC: 36 MMOL/L (ref 21–32)
CREAT SERPL-MCNC: 0.86 MG/DL (ref 0.7–1.3)
CRP SERPL-MCNC: 3.9 MG/DL (ref 0–0.6)
DIFFERENTIAL METHOD BLD: ABNORMAL
EOSINOPHIL # BLD: 0 K/UL (ref 0–0.4)
EOSINOPHIL NFR BLD: 0 % (ref 0–7)
ERYTHROCYTE [DISTWIDTH] IN BLOOD BY AUTOMATED COUNT: 15.2 % (ref 11.5–14.5)
GLUCOSE BLD STRIP.AUTO-MCNC: 104 MG/DL (ref 65–117)
GLUCOSE BLD STRIP.AUTO-MCNC: 124 MG/DL (ref 65–117)
GLUCOSE BLD STRIP.AUTO-MCNC: 125 MG/DL (ref 65–117)
GLUCOSE BLD STRIP.AUTO-MCNC: 247 MG/DL (ref 65–117)
GLUCOSE SERPL-MCNC: 116 MG/DL (ref 65–100)
HCT VFR BLD AUTO: 36.6 % (ref 36.6–50.3)
HGB BLD-MCNC: 12.4 G/DL (ref 12.1–17)
IMM GRANULOCYTES # BLD AUTO: 0.2 K/UL (ref 0–0.04)
IMM GRANULOCYTES NFR BLD AUTO: 1 % (ref 0–0.5)
LYMPHOCYTES # BLD: 1.7 K/UL (ref 0.8–3.5)
LYMPHOCYTES NFR BLD: 9 % (ref 12–49)
MCH RBC QN AUTO: 26.3 PG (ref 26–34)
MCHC RBC AUTO-ENTMCNC: 33.9 G/DL (ref 30–36.5)
MCV RBC AUTO: 77.5 FL (ref 80–99)
MONOCYTES # BLD: 1.1 K/UL (ref 0–1)
MONOCYTES NFR BLD: 6 % (ref 5–13)
NEUTS SEG # BLD: 16 K/UL (ref 1.8–8)
NEUTS SEG NFR BLD: 84 % (ref 32–75)
NRBC # BLD: 0.02 K/UL (ref 0–0.01)
NRBC BLD-RTO: 0.1 PER 100 WBC
PERFORMED BY, TECHID: ABNORMAL
PERFORMED BY, TECHID: NORMAL
PLATELET # BLD AUTO: 381 K/UL (ref 150–400)
PMV BLD AUTO: 9.6 FL (ref 8.9–12.9)
POTASSIUM SERPL-SCNC: 3.5 MMOL/L (ref 3.5–5.1)
RBC # BLD AUTO: 4.72 M/UL (ref 4.1–5.7)
SODIUM SERPL-SCNC: 140 MMOL/L (ref 136–145)
THERAPEUTIC RANGE,PTTT: NORMAL SEC (ref 82–109)
THERAPEUTIC RANGE,PTTT: NORMAL SEC (ref 82–109)
WBC # BLD AUTO: 19.1 K/UL (ref 4.1–11.1)

## 2021-10-18 PROCEDURE — 82550 ASSAY OF CK (CPK): CPT

## 2021-10-18 PROCEDURE — 82962 GLUCOSE BLOOD TEST: CPT

## 2021-10-18 PROCEDURE — 94760 N-INVAS EAR/PLS OXIMETRY 1: CPT

## 2021-10-18 PROCEDURE — 85730 THROMBOPLASTIN TIME PARTIAL: CPT

## 2021-10-18 PROCEDURE — 77010033678 HC OXYGEN DAILY

## 2021-10-18 PROCEDURE — 74011000258 HC RX REV CODE- 258: Performed by: HOSPITALIST

## 2021-10-18 PROCEDURE — 85025 COMPLETE CBC W/AUTO DIFF WBC: CPT

## 2021-10-18 PROCEDURE — 74011636637 HC RX REV CODE- 636/637: Performed by: HOSPITALIST

## 2021-10-18 PROCEDURE — 74011250637 HC RX REV CODE- 250/637: Performed by: INTERNAL MEDICINE

## 2021-10-18 PROCEDURE — 86140 C-REACTIVE PROTEIN: CPT

## 2021-10-18 PROCEDURE — 74011250636 HC RX REV CODE- 250/636: Performed by: HOSPITALIST

## 2021-10-18 PROCEDURE — 74011250637 HC RX REV CODE- 250/637: Performed by: HOSPITALIST

## 2021-10-18 PROCEDURE — 65270000029 HC RM PRIVATE

## 2021-10-18 PROCEDURE — 36415 COLL VENOUS BLD VENIPUNCTURE: CPT

## 2021-10-18 PROCEDURE — 99232 SBSQ HOSP IP/OBS MODERATE 35: CPT | Performed by: INTERNAL MEDICINE

## 2021-10-18 PROCEDURE — 80048 BASIC METABOLIC PNL TOTAL CA: CPT

## 2021-10-18 PROCEDURE — 74011636637 HC RX REV CODE- 636/637: Performed by: INTERNAL MEDICINE

## 2021-10-18 RX ORDER — FUROSEMIDE 40 MG/1
40 TABLET ORAL DAILY
Status: DISCONTINUED | OUTPATIENT
Start: 2021-10-18 | End: 2021-10-20 | Stop reason: HOSPADM

## 2021-10-18 RX ADMIN — PANTOPRAZOLE SODIUM 40 MG: 40 TABLET, DELAYED RELEASE ORAL at 09:18

## 2021-10-18 RX ADMIN — ASPIRIN 81 MG: 81 TABLET, COATED ORAL at 09:19

## 2021-10-18 RX ADMIN — Medication 10 ML: at 05:04

## 2021-10-18 RX ADMIN — ATORVASTATIN CALCIUM 80 MG: 40 TABLET, FILM COATED ORAL at 09:19

## 2021-10-18 RX ADMIN — PREDNISONE 40 MG: 20 TABLET ORAL at 09:18

## 2021-10-18 RX ADMIN — INSULIN LISPRO 4 UNITS: 100 INJECTION, SOLUTION INTRAVENOUS; SUBCUTANEOUS at 21:52

## 2021-10-18 RX ADMIN — PIPERACILLIN AND TAZOBACTAM 3.38 G: 3; .375 INJECTION, POWDER, LYOPHILIZED, FOR SOLUTION INTRAVENOUS at 21:52

## 2021-10-18 RX ADMIN — Medication 5 ML: at 21:54

## 2021-10-18 RX ADMIN — FUROSEMIDE 40 MG: 40 TABLET ORAL at 09:26

## 2021-10-18 RX ADMIN — Medication 5 ML: at 21:53

## 2021-10-18 RX ADMIN — PIPERACILLIN AND TAZOBACTAM 3.38 G: 3; .375 INJECTION, POWDER, LYOPHILIZED, FOR SOLUTION INTRAVENOUS at 04:52

## 2021-10-18 RX ADMIN — Medication 10 ML: at 14:19

## 2021-10-18 RX ADMIN — PIPERACILLIN AND TAZOBACTAM 3.38 G: 3; .375 INJECTION, POWDER, LYOPHILIZED, FOR SOLUTION INTRAVENOUS at 14:06

## 2021-10-18 RX ADMIN — Medication 10 ML: at 14:20

## 2021-10-18 NOTE — ADT AUTH CERT NOTES
Pneumonia - Care Day 6 (10/17/2021) by Dale Morton       Review Entered Review Status   10/18/2021 15:59 Completed      Criteria Review      Care Day: 6 Care Date: 10/17/2021 Level of Care: Telemetry    Guideline Day 3    Clinical Status    (X) * Hemodynamic stability    10/18/2021 15:59:06 EDT by Dale Morton      74, 122/73    (X) * Afebrile or temperature acceptable for next level of care    10/18/2021 15:59:06 EDT by Sanaz Larson 97.7    ( ) * Tachypnea absent    10/18/2021 15:59:06 EDT by Dale Morton      22    ( ) * Hypoxemia absent    10/18/2021 15:59:06 EDT by Dale Morton      O2 3LPM 90-93%    (X) * Mental status at baseline    ( ) * Antibiotic regimen acceptable for next level of care    ( ) * Discharge plans and education understood    Activity    ( ) * Ambulatory or acceptable for next level of care    Routes    (X) * Oral hydration    ( ) * Oral medications or regimen acceptable for next level of care    10/18/2021 15:59:06 EDT by So Martinezer 3.375G IV Q8HR, LASIX 60MG IV BID    (X) * Oral diet or acceptable for next level of care    Interventions    ( ) * Oxygen absent or at baseline need    (X) * Isolation not indicated, or is performable at next level of care    * Milestone   Additional Notes   10/17      PHYSICAL THERAPY TREATMENT   Patient: Beulah Galo (74 y.o. male)   Date: 10/17/2021   Diagnosis: [facility]eps[facility] ([facility] [O38.3]   Metabolic encephalopathy [R28.27]   Pneumonia [J18.9] <principal problem not specified>   Procedure(s) (LRB):   LEFT HEART CATH / CORONARY ANGIOGRAPHY (N/A) 3 Days Post-Op   Precautions:     Chart, physical therapy assessment, plan of care and goals were reviewed.       ASSESSMENT   Patient continues with skilled PT services and is progressing towards goals. Upon entry into room, patient sitting in recliner and soaked in urine. He was agreeable to transfer to bed so therapist could assist him in cleaning up.  Sit<>stand from recliner with Barry and CGA from bed as height was slightly increased. SPT bed<>recliner with CGA and incr timed as well as patient leaning forward on bed rails/arm rests to support himself while performing transfer and declined use of RW for transfer. Therapist wiped down recliner and changed linens as well as wiped patient down in sitting. HR incr with activity so patient rested on bed for a few minutes to decr HR before getting back into recliner. Sit<>stand from bed CGA and CGA for SPT back to recliner. Patient stood for ~1 min so therapist could assist in wiping him down again while in standing. Patient then transferred back to recliner to sit up. Retrieved incentive spirometer for patient and instructed in use; however patient says he has used one at home.  with patient seated back in recliner at end of session.        Current Level of Function Impacting Discharge (mobility/balance): decr mobility, decr endurance       Other factors to consider for discharge: supplemental O2, morbidly obese, past medical hx             PLAN :   Patient continues to benefit from skilled intervention to address the above impairments.  Continue treatment per established plan of care. to address goals.       Recommendation for discharge: (in order for the patient to meet his/her long term goals)   [facility]-   Assessment:   Acute non-STEMI               -He underwent cardiac catheterization 10/15 that showed mild coronary artery disease of the proximal RCA and mid LAD       Sepsis: pna +/- bacteremia               -Inflammatory markers have been improving on Zosyn and vancomycin               -procal descending. Crp marginally up.                  Coag negative staph bacteremia, probably due to contamination/ abacillus species in initial anaerobic bottle.  Repeat blcs no growth day 4.        Acute exacerbation of COPD, wheezing and requiring supplemental o2, 2 l/m., prednisone       Acute respiratory failure with hypoxia, probably due to heart failure and COPD               -Still on 3 L nasal cannula       Acute diastolic heart failure; his 2 chest x-rays have shown evidence of heart failure, cxr 10/15 with vascular congestion without overt pulmonary edema.       Acute kidney injury, resolved (1.3-->-->1.17-->0.78-->0.65)       Obstructive sleep apnea       Rhabdomyolysis, ck descending slowly to 1670.  Held hydration d/t vascular congestion.        Generalized weakness           Plan:   Watch off ivf, continue lasix, cr bumped up slightly   Abx per id, leukocytosis persists ~ 20 K- wbc pending   DuoNeb treatments   Prednisone x 5d(10/15 started)   Furosemide 60 mg IV bid,        PT and OT evaluations appreciated, rec snf       Care Plan discussed with: Patient/Family       Disposition: home vs snf 2-3 days                   Pneumonia - Care Day 5 (10/16/2021) by Katie Nguyen       Review Entered Review Status   10/18/2021 15:54 Completed      Criteria Review      Care Day: 5 Care Date: 10/16/2021 Level of Care: Telemetry    Guideline Day 3    Clinical Status    (X) * Hemodynamic stability    10/18/2021 15:54:12 EDT by Katie Nguyen      76, 115/63    (X) * Afebrile or temperature acceptable for next level of care    10/18/2021 15:54:12 EDT by Katie Nguyen      98.1    ( ) * Tachypnea absent    10/18/2021 15:54:12 EDT by Katie Nguyen      20    ( ) * Hypoxemia absent    10/18/2021 15:54:12 EDT by Katie Nguyen      O2 3LPM SATS 91-95%    (X) * Mental status at baseline    ( ) * Antibiotic regimen acceptable for next level of care    ( ) * Discharge plans and education understood    Activity    ( ) * Ambulatory or acceptable for next level of care    Routes    (X) * Oral hydration    ( ) * Oral medications or regimen acceptable for next level of care    10/18/2021 15:54:12 EDT by Katie Nguyen      ZOSYN 3.375G IV Q8HR, LASIX 60MG IV BID    (X) * Oral diet or acceptable for next level of care    Interventions    ( ) * Oxygen absent or at baseline need    (X) * Isolation not indicated, or is performable at next level of care    * Milestone   Additional Notes   10/16      Assessment:   Acute non-STEMI               -He underwent cardiac catheterization yesterday that showed mild coronary artery disease of the proximal RCA and mid LAD       Sepsis: pna +/- bacteremia               -Inflammatory markers have been improving on Zosyn and vancomycin               -procal descending. Crp marginally up.       Coag negative staph bacteremia, probably due to contamination/ abacillus species in initial anaerobic bottle.  Repeat blcs no growth day 3.        Acute exacerbation of COPD, wheezing and requiring supplemental o2, 2 l/m.       Acute respiratory failure with hypoxia, probably due to heart failure and COPD               -Still on 2 L nasal cannula       Acute diastolic heart failure; his 2 chest x-rays have shown evidence of heart failure, cxr 10/15 with vascular congestion without overt pulmonary edema.       Acute kidney injury, resolved (1.3-->-->1.17-->0.78-->0.65)       Obstructive sleep apnea       Rhabdomyolysis, ck descending slowly to 1670.  Held hydration d/t vascular congestion.        Generalized weakness           Plan:   Watch off ivf   Abx per id, leukocytosis persists ~ 20 K   DuoNeb treatments   Prednisone x 5d(10/15 started)   Furosemide 60 mg IV bid,        PT and OT evaluations appreciated, rec snf             10/16/2021 01:08   Sodium: 138   Potassium: 3.9   Chloride: 99   CO2: 31   Anion gap: 8   Glucose: 143 (H)   BUN: 15   Creatinine: 0.66 (L)   BUN/Creatinine ratio: 23 (H)   Calcium: 8.2 (L)   GFR est non-AA: >60   GFR est AA: >60      10/16/2021 06:01   Procalcitonin: 5.04 (H)   CK: 1,670 (H)   C-Reactive protein: 12.50 (H)         10/16/2021 06:01   WBC: 20.1 (H)   NRBC: 0.0   RBC: 4.62   HGB: 12.2   HCT: 35.5 (L)   MCV: 76.8 (L)   MCH: 26.4   MCHC: 34.4   RDW: 15.4 (H)   PLATELET: 640   MPV: 9.8 NEUTROPHILS: 85 (H)   LYMPHOCYTES: 4 (L)   MONOCYTES: 7   EOSINOPHILS: 0   BASOPHILS: 0   IMMATURE GRANULOCYTES: 4 (H)   DF: AUTOMATED   ABSOLUTE NRBC: 0.00   ABS. NEUTROPHILS: 17.1 (H)   ABS. IMM. GRANS.: 0.8 (H)   ABS. LYMPHOCYTES: 0.9   ABS. MONOCYTES: 1.3 (H)   ABS. EOSINOPHILS: 0.0   ABS.  BASOPHILS: 0.0

## 2021-10-18 NOTE — PROGRESS NOTES
Infectious Disease Progress Note           Subjective:   Stable, subjectively better, no acute events over the wkend. Notes exertional dyspnea, denies fever/chills or productive cough   Objective:   Physical Exam:     Visit Vitals  /74 (BP 1 Location: Left upper arm, BP Patient Position: At rest;Sitting)   Pulse 79   Temp 97.6 °F (36.4 °C)   Resp 18   Ht 5' 7\" (1.702 m)   Wt (!) 400 lb (181.4 kg)   SpO2 100%   BMI 62.65 kg/m²    O2 Flow Rate (L/min): 3 l/min O2 Device: Nasal cannula    Temp (24hrs), Av.8 °F (36.6 °C), Min:97.6 °F (36.4 °C), Max:98 °F (36.7 °C)    No intake/output data recorded. 10/16 1901 - 10/18 0700  In: -   Out: 3075 [Urine:3075]    General: NAD, alert, AAO x 4, OOB to chair   HEENT: DOUG, Moist mucosa   Lungs: CTA b/l, decreased at the bases, exp wheezing   Heart: S1S2+, RRR, no murmur  Abdo: Soft, distended, + ventral hernia, + BS   : No ball cath   Exts:Sig decreased right leg swelling, no warmth or erythema   Skin: No wounds, No rashes or lesions    Data Review:       Recent Days:  Recent Labs     10/16/21  0601   WBC 20.1*   HGB 12.2   HCT 35.5*        Recent Labs     10/18/21  1054 10/17/21  0145 10/16/21  0108   BUN 25* 25* 15   CREA 0.86 0.79 0.66*       Lab Results   Component Value Date/Time    C-Reactive protein 3.90 (H) 10/18/2021 10:54 AM          Microbiology     Results     Procedure Component Value Units Date/Time    CULTURE, RESPIRATORY/SPUTUM/BRONCH [place] STAIN [[id number]] Collected: 10/13/21 1045    Order Status: Completed Specimen: Sputum Updated: 10/16/21 1248     Special Requests: No Special Requests        GRAM STAIN No wbc's seen         no epithelial cells seen               Occasional Gram Negative Rods                  Occasional Gram Positive Cocci in pairs           Culture result:        Moderate Normal respiratory jayde          CULTURE, BLOOD, PAIRED [[id number]] Collected: 10/13/21 0905    Order Status: Completed Specimen: Blood Updated: 10/18/21 1007     Special Requests: No Special Requests        Culture result: No growth 5 days       MRSA SCREEN - PCR (NASAL) [[id number]] Collected: 10/12/21 1313    Order Status: Canceled Specimen: Swab     MRSA SCREEN - PCR (NASAL) [[id number]] Collected: 10/12/21 0910    Order Status: Completed Specimen: Swab Updated: 10/13/21 1052     MRSA by PCR, Nasal Not Detected       COVID-19 RAPID TEST [[id number]] Collected: 10/12/21 0202    Order Status: Completed Specimen: Nasopharyngeal Updated: 10/12/21 0304     Specimen source Nasopharyngeal        COVID-19 rapid test Not Detected        Comment: Rapid Abbott ID Now   Rapid NAAT:  The specimen is NEGATIVE for SARS-CoV-2, the novel coronavirus associated with COVID-19. Negative results should be treated as presumptive and, if inconsistent with clinical signs and symptoms or necessary for patient management, should be tested with an alternative molecular assay. Negative results do not preclude SARS-CoV-2 infection and should not be used as the sole basis for patient management decisions. This test has been authorized by the FDA under   an Emergency Use Authorization (EUA) for use by authorized laboratories.  Fact sheet for Healthcare Providers: ConventionUpdate.co.nz Fact sheet for Patients: ConventionUpdate.co.nz   Methodology: Isothermal Nucleic Acid Amplification         CULTURE, BLOOD, PAIRED [[id number]]  (Abnormal) Collected: 10/11/21 2304    Order Status: Completed Specimen: Blood Updated: 10/14/21 0906     Special Requests: No Special Requests        Culture result:       Staphylococcus species, coagulase negative MULTIPLE COLONY TYPES  growing in 3 of 4 bottles drawn (NO SITES INDICATED)                  preliminary report of Gram Positive Cocci in clusters growing in 2 of 4 bottles drawn AND Gram variable rods growing in 1 of 4 bottles drawn CALLED TO AND READ BACK BY PHILIP SESAY RN SMR SCAV AT 0006 ON 10/13/21. [facility]                  Bacillus species, not anthracis GROWING IN THE ANAEROBIC BOTTLE          CULTURE, BLOOD, PAIRED [[id number]]     Order Status: Canceled Specimen: Blood              Diagnostics   CXR Results  (Last 48 hours)               10/17/21 1520  XR CHEST PORT Final result    Impression:  No acute cardiopulmonary process. Narrative:  AP portable chest, 1522 hours. Comparison: 10/16/2021. Findings: Monitoring leads overlie the chest. The heart is mildly enlarged. The   vascular clarity is improved. The lungs are well expanded without focal   consolidation, pleural effusion or pneumothorax. The osseous structures are   unremarkable. Assessment/Plan     1. SIRS/Probable sepsis on admission, suspected aspiration PNA, r/o right leg cellulitis       Coag neg staph from blood Cx and Normal Floral from sputum       WBC of 20.1 on most recent labs 10/16. Afebrile. CRP trending down on todays labs       On empiric Zosyn days # 8, may transition to Augmentin 500 mg BID upon d/c to complete 14 days of therapy       Routine labs in the morning if not discharged today     2. Coag neg staph bacteremia: Repeat Bcx from 10/13 is neg      Likely skin contaminant, no need for directed therapy     3. Right leg cellulitis: Sig decreased swelling, induration and warmth       On Zosyn as above w plans to transition to an oral antibiotic upon d/c     4. Hypoxic respiratory failure on admission: Likely from CHF/COPD exacerbation       Clear lungs on exam, remains on oral steroid therapy       CXR (10/17) neg for focal infiltrates or edema     5. NSTEMI: S/p cardiac cath on 10/14, mild disease in proximal PCA and mid LAD.  Denies chest pain     Lizeth Baker MD    10/18/2021

## 2021-10-18 NOTE — PROGRESS NOTES
Progress Note    Patient: Cooper Sosa MRN: [id number]  SSN: [id number]    YOB: 1955  Age: 74 y.o. Sex: male      Admit Date: 10/11/2021    LOS: 6 days     Subjective:     He feels much better. Breathing has improved. Objective:     Vitals:    10/18/21 0036 10/18/21 0356 10/18/21 0736 10/18/21 0816   BP: 130/72 (!) 141/77  (!) 140/72   Pulse: 71 62  75   Resp: 16 18  20   Temp: 97.9 °F (36.6 °C) 97.6 °F (36.4 °C)  97.7 °F (36.5 °C)   SpO2: 93% 99% 94% 100%   Weight:       Height:            Intake and Output:  Current Shift: No intake/output data recorded. Last three shifts: 10/16 1901 - 10/18 0700  In: -   Out: 3075 [Urine:3075]    Physical Exam:   General:  Alert, cooperative, no distress, appears stated age. Eyes:  Conjunctivae/corneas clear. PERRL, EOMs intact. Fundi benign   Ears:  Normal TMs and external ear canals both ears. Nose: Nares normal. Septum midline. Mucosa normal. No drainage or sinus tenderness. Mouth/Throat: Lips, mucosa, and tongue normal. Teeth and gums normal.   Neck: Supple, symmetrical, trachea midline, no adenopathy, thyroid: no enlargment/tenderness/nodules, no carotid bruit and no JVD. Back:   Symmetric, no curvature. ROM normal. No CVA tenderness. Lungs:    Coarse breath sounds, end expiratory wheezes. Heart:  Regular rate and rhythm, S1, S2 normal, no murmur, click, rub or gallop. Abdomen:   Soft, non-tender. Bowel sounds normal. No masses,  No organomegaly. Extremities: Extremities normal, atraumatic, no cyanosis or edema. Pulses: 2+ and symmetric all extremities. Skin: Skin color, texture, turgor normal. No rashes or lesions   Lymph nodes: Cervical, supraclavicular, and axillary nodes normal.   Neurologic: CNII-XII intact. Normal strength, sensation and reflexes throughout. Lab/Data Review: All lab results for the last 24 hours reviewed.          Assessment:     Active Problems:    Sepsis ([address] (22/11/1187)      Metabolic encephalopathy (10/12/2021)      Pneumonia (10/12/2021)    Patient is a 49-year-old -American male with:  1. Non-STEMI  2. Sepsis  3. Morbid obesity  4. Diabetes mellitus  5. Hypertension  6. Hypokalemia  7. Elevated AST    Plan:     Continue to work with physical therapy. Good urine output. His procalcitonin has come down. His creatinine was 0.79 and BUN has increased to 25. Lower extremity swelling improved tremendously. Currently on aspirin, atorvastatin, IV Lasix. We will switch IV to oral Lasix. Continue to work with physical therapy. Follow-up on [facility].     Signed By: Forrest Horne MD     October 18, 2021

## 2021-10-18 NOTE — PROGRESS NOTES
CM met with patient to discuss DCP. Patient is recc for SNF, patient agreeable, stated he had been to [facility] in the past and would be okay with returning but his first preference was [id number] [place]. Choice letter signed. CM offered to contact patient's wife, patient reported that he would speak with his wife and notify her of DCP tomorrow.

## 2021-10-19 LAB
ANION GAP SERPL CALC-SCNC: 5 MMOL/L (ref 5–15)
APTT PPP: 32.1 SEC (ref 21.2–34.1)
BACTERIA SPEC CULT: NORMAL
BASOPHILS # BLD: 0.1 K/UL (ref 0–0.1)
BASOPHILS NFR BLD: 0 % (ref 0–1)
BUN SERPL-MCNC: 20 MG/DL (ref 6–20)
BUN/CREAT SERPL: 28 (ref 12–20)
CA-I BLD-MCNC: 8.4 MG/DL (ref 8.5–10.1)
CHLORIDE SERPL-SCNC: 99 MMOL/L (ref 97–108)
CO2 SERPL-SCNC: 36 MMOL/L (ref 21–32)
CREAT SERPL-MCNC: 0.72 MG/DL (ref 0.7–1.3)
DIFFERENTIAL METHOD BLD: ABNORMAL
EOSINOPHIL # BLD: 0.1 K/UL (ref 0–0.4)
EOSINOPHIL NFR BLD: 0 % (ref 0–7)
ERYTHROCYTE [DISTWIDTH] IN BLOOD BY AUTOMATED COUNT: 15.2 % (ref 11.5–14.5)
GLUCOSE BLD STRIP.AUTO-MCNC: 100 MG/DL (ref 65–117)
GLUCOSE BLD STRIP.AUTO-MCNC: 127 MG/DL (ref 65–117)
GLUCOSE BLD STRIP.AUTO-MCNC: 136 MG/DL (ref 65–117)
GLUCOSE BLD STRIP.AUTO-MCNC: 143 MG/DL (ref 65–117)
GLUCOSE SERPL-MCNC: 124 MG/DL (ref 65–100)
HCT VFR BLD AUTO: 36.3 % (ref 36.6–50.3)
HGB BLD-MCNC: 12.2 G/DL (ref 12.1–17)
IMM GRANULOCYTES # BLD AUTO: 0.3 K/UL (ref 0–0.04)
IMM GRANULOCYTES NFR BLD AUTO: 1 % (ref 0–0.5)
LYMPHOCYTES # BLD: 1.4 K/UL (ref 0.8–3.5)
LYMPHOCYTES NFR BLD: 7 % (ref 12–49)
MCH RBC QN AUTO: 26.1 PG (ref 26–34)
MCHC RBC AUTO-ENTMCNC: 33.6 G/DL (ref 30–36.5)
MCV RBC AUTO: 77.7 FL (ref 80–99)
MONOCYTES # BLD: 1.2 K/UL (ref 0–1)
MONOCYTES NFR BLD: 6 % (ref 5–13)
NEUTS SEG # BLD: 17.5 K/UL (ref 1.8–8)
NEUTS SEG NFR BLD: 86 % (ref 32–75)
NRBC # BLD: 0.03 K/UL (ref 0–0.01)
NRBC BLD-RTO: 0.1 PER 100 WBC
PERFORMED BY, TECHID: ABNORMAL
PERFORMED BY, TECHID: NORMAL
PLATELET # BLD AUTO: 388 K/UL (ref 150–400)
PMV BLD AUTO: 9.9 FL (ref 8.9–12.9)
POTASSIUM SERPL-SCNC: 3.4 MMOL/L (ref 3.5–5.1)
RBC # BLD AUTO: 4.67 M/UL (ref 4.1–5.7)
SODIUM SERPL-SCNC: 140 MMOL/L (ref 136–145)
SPECIAL REQUESTS,SREQ: NORMAL
THERAPEUTIC RANGE,PTTT: NORMAL SEC (ref 82–109)
WBC # BLD AUTO: 20.4 K/UL (ref 4.1–11.1)

## 2021-10-19 PROCEDURE — 65270000029 HC RM PRIVATE

## 2021-10-19 PROCEDURE — 74011636637 HC RX REV CODE- 636/637: Performed by: INTERNAL MEDICINE

## 2021-10-19 PROCEDURE — 97530 THERAPEUTIC ACTIVITIES: CPT

## 2021-10-19 PROCEDURE — 36415 COLL VENOUS BLD VENIPUNCTURE: CPT

## 2021-10-19 PROCEDURE — 82962 GLUCOSE BLOOD TEST: CPT

## 2021-10-19 PROCEDURE — 74011636637 HC RX REV CODE- 636/637: Performed by: HOSPITALIST

## 2021-10-19 PROCEDURE — 77010033678 HC OXYGEN DAILY

## 2021-10-19 PROCEDURE — 74011250637 HC RX REV CODE- 250/637: Performed by: HOSPITALIST

## 2021-10-19 PROCEDURE — 94760 N-INVAS EAR/PLS OXIMETRY 1: CPT

## 2021-10-19 PROCEDURE — 80048 BASIC METABOLIC PNL TOTAL CA: CPT

## 2021-10-19 PROCEDURE — 85730 THROMBOPLASTIN TIME PARTIAL: CPT

## 2021-10-19 PROCEDURE — 99232 SBSQ HOSP IP/OBS MODERATE 35: CPT | Performed by: INTERNAL MEDICINE

## 2021-10-19 PROCEDURE — 74011250637 HC RX REV CODE- 250/637: Performed by: INTERNAL MEDICINE

## 2021-10-19 PROCEDURE — 85025 COMPLETE CBC W/AUTO DIFF WBC: CPT

## 2021-10-19 RX ORDER — AMOXICILLIN AND CLAVULANATE POTASSIUM 500; 125 MG/1; MG/1
1 TABLET, FILM COATED ORAL EVERY 8 HOURS
Qty: 15 TABLET | Refills: 0 | Status: SHIPPED | OUTPATIENT
Start: 2021-10-19 | End: 2022-03-28

## 2021-10-19 RX ORDER — FUROSEMIDE 40 MG/1
40 TABLET ORAL DAILY
Qty: 30 TABLET | Refills: 0 | Status: SHIPPED | OUTPATIENT
Start: 2021-10-19

## 2021-10-19 RX ORDER — PANTOPRAZOLE SODIUM 40 MG/1
40 TABLET, DELAYED RELEASE ORAL
Qty: 30 TABLET | Refills: 0 | Status: SHIPPED
Start: 2021-10-20 | End: 2022-03-28

## 2021-10-19 RX ORDER — INSULIN LISPRO 100 [IU]/ML
INJECTION, SOLUTION INTRAVENOUS; SUBCUTANEOUS
Qty: 1 EACH | Refills: 0 | Status: SHIPPED
Start: 2021-10-19 | End: 2022-03-28

## 2021-10-19 RX ADMIN — FUROSEMIDE 40 MG: 40 TABLET ORAL at 08:25

## 2021-10-19 RX ADMIN — Medication 10 ML: at 14:28

## 2021-10-19 RX ADMIN — Medication 5 ML: at 21:08

## 2021-10-19 RX ADMIN — PREDNISONE 40 MG: 20 TABLET ORAL at 08:25

## 2021-10-19 RX ADMIN — ASPIRIN 81 MG: 81 TABLET, COATED ORAL at 08:25

## 2021-10-19 RX ADMIN — Medication 5 ML: at 06:48

## 2021-10-19 RX ADMIN — INSULIN LISPRO 3 UNITS: 100 INJECTION, SOLUTION INTRAVENOUS; SUBCUTANEOUS at 17:18

## 2021-10-19 RX ADMIN — PANTOPRAZOLE SODIUM 40 MG: 40 TABLET, DELAYED RELEASE ORAL at 08:24

## 2021-10-19 RX ADMIN — ATORVASTATIN CALCIUM 80 MG: 40 TABLET, FILM COATED ORAL at 08:25

## 2021-10-19 NOTE — PROGRESS NOTES
Comprehensive Nutrition Assessment    Type and Reason for Visit: RD nutrition re-screen/LOS    Nutrition Recommendations/Plan:   Continue Regular SAAD diet  Add 4 CHO restriction    Adjust insulin to promote euglycemia    Document % intakes and BM in I/O's    Nutrition Assessment:  Admitted for AMS and respiratory distress. Found to have sepsis and pneumonia. Per pt he has a great appetite and consuming 100% of meals the past couple of days. Denies needing any food preferences or ONS at this time. Labs: 100-247, BUN 25, . Meds: statin, furosemide, PPI, IVF. Malnutrition Assessment:  Malnutrition Status:  No malnutrition    Context:  Acute illness       Nutrition Related Findings:  NFPE without acute findings. Denies c/s and n/v/d/c. Soft BM 10/17. Generalized 1+, BLE 1+ pitting.      Wounds:   None       Current Nutrition Therapies:  ADULT DIET Regular; Low Fat/Low Chol/High Fiber/SAAD    Anthropometric Measures:  · Height:  5' 7\" (170.2 cm)  · Current Body Wt:  181.4 kg (400 lb)   · Ideal Body Wt:  148 lbs:  270.3 %   · BMI Category:  Obese class 3 (BMI 40.0 or greater)       Nutrition Diagnosis:   No nutrition diagnosis at this time     Nutrition Interventions:   Food and/or Nutrient Delivery: Modify current diet  Nutrition Education and Counseling: No recommendations at this time  Coordination of Nutrition Care: Continue to monitor while inpatient    Nutrition Monitoring and Evaluation:   Behavioral-Environmental Outcomes: None identified  Food/Nutrient Intake Outcomes: Food and nutrient intake  Physical Signs/Symptoms Outcomes: Biochemical data, Weight    Discharge Planning:    No discharge needs at this time     Electronically signed by Kira Bolden RD on 10/19/2021 at 9:02 AM    Contact: 1973

## 2021-10-19 NOTE — PROGRESS NOTES
OCCUPATIONAL THERAPY TREATMENT  Patient: Corrina Escobedo (44 y.o. male)  Date: 10/19/2021  Diagnosis: Sepsis ([facility] [P17.7]  Metabolic encephalopathy [L50.05]  Pneumonia [J18.9] <principal problem not specified>  Procedure(s) (LRB):  LEFT HEART CATH / CORONARY ANGIOGRAPHY (N/A) 5 Days Post-Op  Precautions:    Chart, occupational therapy assessment, plan of care, and goals were reviewed. ASSESSMENT  Patient continues with skilled OT services and is progressing towards goals. Upon GUNTER arrival, pt sitting in chair and agreeable to tx session with encouragement. Pt attempted donning of socks, however required total A for completion. Pt completed sit>stand with CGA and completed seated rest break after ~2 minutes. Pt demonstrating good static standing balance and fair dynamic. Pt completed standing oral hygiene and face washing with setup A/CGA. Pt requesting urinal, completed standing bladder hygiene with CGA. Pt completed standing UE therex (see chart below). Pt unable to complete final standing UE therex and required seated rest break, pt able to complete therex while in seated position with verbal cueing. Pt tolerated session well. Will continue to follow pt throughout remainder of stay and progress towards OT goals. Recommending SNF at discharge when medically appropriate. Current Level of Function Impacting Discharge (ADLs): CGA sit>stand, setup/CGA standing grooming, total A socks    Other factors to consider for discharge: time since onset, severity of deficits, PLOF         PLAN :  Patient continues to benefit from skilled intervention to address the above impairments. Continue treatment per established plan of care. to address goals.     Recommend next OT session: standing grooming, standing tolerance    Recommendation for discharge: (in order for the patient to meet his/her long term goals)  Evaristo Yeboah    This discharge recommendation:  Has been made in collaboration with the attending provider and/or case management    IF patient discharges home will need the following DME: TBD       SUBJECTIVE:   Patient stated Isabell Cormier got up with the therapist earlier.     OBJECTIVE DATA SUMMARY:   Cognitive/Behavioral Status:  Neurologic State: Alert  Orientation Level: Oriented X4    Functional Mobility and Transfers for ADLs:    Transfers:  Sit to Stand: Contact guard assistance    Balance:  Sitting: Intact  Sitting - Static: Good (unsupported)  Sitting - Dynamic: Fair (occasional)  Standing: Impaired; Without support  Standing - Static: Constant support;Good  Standing - Dynamic : Constant support; Fair    ADL Intervention:    Grooming  Position Performed: Seated in chair;Standing  Washing Face: Set-up; Contact guard assistance  Brushing Teeth: Set-up; Contact guard assistance    Lower Body Dressing Assistance  Socks: Total assistance (dependent)    Toileting  Bladder Hygiene: Contact guard assistance    Therapeutic Exercises:   Exercise Sets Reps AROM AAROM PROM Self PROM Comments   Shoulder flex/ext 1 10 [x] [] [] [] standing   Elbow flex/ext 1 10 [x] [] [] [] standing   Shoulder horizontal abduction/adduction 1 10 [x] [] [] [] Standing, completed sitting     Pain:  0/10    Activity Tolerance:   Fair and requires rest breaks  Please refer to the flowsheet for vital signs taken during this treatment. After treatment patient left in no apparent distress:   Sitting in chair and Call bell within reach    COMMUNICATION/COLLABORATION:   The patients plan of care was discussed with: Physical therapist and Registered nurse.      LYRIC Adrian  Time Calculation: 26 mins    Problem: Self Care Deficits Care Plan (Adult)  Goal: *Acute Goals and Plan of Care (Insert Text)  Description: Pt will be MI sup<->sit in prep for EOB ADL's  Pt will be MI  LB dressing EOB level  Pt will be MI  grooming EOB level  Pt will be MI  sit<-> prep for toilet transfer  Pt will be MI  toilet transfer with LRAD  Pt will be MI  toileting/cloth mgmt LRAD  Pt will be MI  grooming standing sink  Pt will be MI bathing sitting/standing sink LRAD  Pt will be MI dyan UE HEP in prep for self care tasks      Outcome: Progressing Towards Goal

## 2021-10-19 NOTE — PROGRESS NOTES
Patient declined by June as they are out of network with patient's insurance. ANA CRISTINA contacted [facility] admissions [phone]. She reported that she will look over patient's information and if they can accept she will start insurance auth, however CM requires PT/OT updates prior to starting insurance auth, therapy department notified. CM continues to follow. 3:00 PM - CM spoke with [facility], they are requesting OT updates, CM awaiting OT updates in order to start auth.

## 2021-10-19 NOTE — DISCHARGE INSTRUCTIONS
HOSPITALIST DISCHARGE INSTRUCTIONS    NAME: Yfn Eubanks   :  1955   MRN:  [id number]     Date/Time:  10/19/2021 11:43 AM    ADMIT DATE: 10/11/2021   DISCHARGE DATE: 10/19/2021     Attending Physician: Franco García MD    DISCHARGE DIAGNOSIS:  Acute hypoxic respiratory failure      Medications: Per above medication reconciliation. Pain Management: per above medications    Recommended diet: Cardiac Diet    Recommended activity: Activity as tolerated    Wound care: None Needed    Indwelling devices:  None    Supplemental Oxygen: 2 LNC,  wean as tolerated    Required Lab work: Weekly BMP    Glucose management:  Accucheck ACHS with sliding scale per SNF protocol    Code status: Full        Outside physician follow up: Follow-up Information     Follow up With Specialties Details Why Contact Info    Other, MD Palomo    Patient can only remember the practice name and not the physician                 Skilled nursing facility/ SNF MD responsible for above on discharge. Information obtained by :  I understand that if any problems occur once I am at home I am to contact my physician. I understand and acknowledge receipt of the instructions indicated above.                                                                                                                                            Physician's or R.N.'s Signature                                                                  Date/Time                                                                                                                                              Patient or Repres

## 2021-10-19 NOTE — DISCHARGE SUMMARY
Hospitalist Discharge Summary     Patient ID:  Julianne Patton  [id number]  76 y.o.  1955  10/11/2021    PCP on record: Palomo Jarvis MD    Admit date: 10/11/2021  Discharge date and time: 10/19/2021    DISCHARGE DIAGNOSIS:    Acute hypoxic respiratory failure, bacterial pneumonia,     CONSULTATIONS:  IP CONSULT TO CARDIOLOGY  IP CONSULT TO INFECTIOUS DISEASES    Excerpted HPI from H&P of Rhonda Vergara MD:  Patient is a 75-year-old male with history of diabetes, hypertension and COPD was admitted on 10/12 with altered mental status and lethargy. He was in respiratory distress on admission and had a temperature of 102. 1. Chest x-ray showed bilateral perihilar densities. White count was 31,000 and lactate was elevated. Patient was started on IV antibiotics. Mentation improved with hydration. Procalcitonin was 21.     ______________________________________________________________________  DISCHARGE SUMMARY/HOSPITAL COURSE:  for full details see H&P, daily progress notes, labs, consult notes. Patient was noted to have a troponin of 996. CPK descending slowly     Echo showed normal LV function with mild pulmonary hypertension. Repeat cxr congestion resolved it appears. CT of the chest showed only bandlike atelectasis in the right middle lobe     Blood cultures were positive for coag negative staph species with multiple colony types in 3 of 4 bottles, anaerobic bottle growing bacillus species. subsequent blcs no growth- Likely contaminant     Id and cardio was consulted  S/p cardiac cath on 10/14, mild disease in proximal PCA and mid LAD. Denies chest pain   ID recommends 14 days course of antibiotics for suspected aspiration pneumonia, and rule out right leg cellulitis. Vascular congestion, diuresing well with lasix, transitioned to oral lasix 10/18 as vascular congestion appears improved per cxr 10/17.  Patient was also treated with steroids for COPD exacerbation        Remains profoundly weak and is agreeable for snf, anticipated.        Patient has remained hypoxic and requiring supplemental oxygen. Now 2 L @ >91%  Afebrile  Subjectively feels improving. Still very weak ,  exertional dyspnea though improved. _______________________________________________________________________  Patient seen and examined by me on discharge day. Pertinent Findings:  Gen:    Not in distress  Chest: Bilateral crackles  CVS:   Regular rhythm. No edema  Abd:  Soft, not distended, not tender  Neuro:  Alert, oriented  _______________________________________________________________________  DISCHARGE MEDICATIONS:   Current Discharge Medication List      START taking these medications    Details   furosemide (LASIX) 40 mg tablet Take 1 Tablet by mouth daily. Qty: 30 Tablet, Refills: 0  Start date: 10/19/2021      insulin lispro (HUMALOG) 100 unit/mL injection As per sliding scale  Qty: 1 Each, Refills: 0  Start date: 10/19/2021      pantoprazole (PROTONIX) 40 mg tablet Take 1 Tablet by mouth Daily (before breakfast). Qty: 30 Tablet, Refills: 0  Start date: 10/20/2021      amoxicillin-clavulanate (Augmentin) 500-125 mg per tablet Take 1 Tablet by mouth every eight (8) hours. Qty: 15 Tablet, Refills: 0  Start date: 10/19/2021         CONTINUE these medications which have NOT CHANGED    Details   atorvastatin (LIPITOR) 20 mg tablet Take 20 mg by mouth nightly. aspirin delayed-release 81 mg tablet TAKE ONE TABLET BY MOUTH ONE TIME DAILY         STOP taking these medications       hydroCHLOROthiazide (HYDRODIURIL) 25 mg tablet Comments:   Reason for Stopping:         multivitamin (ONE A DAY) tablet Comments:   Reason for Stopping:         amLODIPine (NORVASC) 10 mg tablet Comments:   Reason for Stopping:         metoprolol succinate (TOPROL-XL) 25 mg XL tablet Comments:   Reason for Stopping:         Farxiga 10 mg tab tablet Comments:   Reason for Stopping:                 Patient Follow Up Instructions:    Activity: Activity as tolerated  Diet: Cardiac Diet  Wound Care: None needed    Follow-up with Cardiology in 2 week.   Follow-up tests/labs none  Follow-up Information     Follow up With Specialties Details Why Contact Info    Palomo Jarvis MD    Patient can only remember the practice name and not the physician          ________________________________________________________________    Risk of deterioration: Moderate    Condition at Discharge:  Stable  __________________________________________________________________    Disposition  SNF/LTC    ____________________________________________________________________    Code Status: Full Code  ___________________________________________________________________      Total time in minutes spent coordinating this discharge (includes going over instructions, follow-up, prescriptions, and preparing report for sign off to her PCP) :  35 minutes    Signed:  Abram Moore MD

## 2021-10-19 NOTE — PROGRESS NOTES
Hospitalist Progress Note               Daily Progress Note: 10/19/2021      Subjective:   Hospital course to date: Patient is a 78-year-old male with history of diabetes, hypertension and COPD was admitted on 10/12 with altered mental status and lethargy. He was in respiratory distress on admission and had a temperature of 102. 1. Chest x-ray showed bilateral perihilar densities. White count was 31,000 and lactate was elevated. Patient was started on IV antibiotics. Mentation improved with hydration. Procalcitonin was 21. Patient was noted to have a troponin of 996. CPK descending slowly    Echo showed normal LV function with mild pulmonary hypertension. Repeat cxr congestion resolved it appears. CT of the chest showed only bandlike atelectasis in the right middle lobe    Blood cultures were positive for coag negative staph species with multiple colony types in 3 of 4 bottles, anaerobic bottle growing bacillus species. subsequent blcs no growth     Id and cerdio following. Vascular congestion, diuresing well with lasix, transitioned to oral lasix 10/18 as vascular congestion appears improved per cxr 10/17. ID follwing, zosyn continues, wbc descending slowly, inflammatory markers descending. Remains profoundly weak and is agreeable for snf, anticipated. Patient has remained hypoxic and requiring supplemental oxygen. Now 2 L @ >91%  Afebrile  Subjectively feels improving. Still very weak + exertional dyspnea though improved.     Problem List:  Problem List as of 10/19/2021 Date Reviewed: 10/12/2021        Codes Class Noted - Resolved    Sepsis ([facility] ICD-10-CM: A41.9  ICD-9-CM: 038.9, 995.91  10/12/2021 - Present        Metabolic encephalopathy UXU-43-EA: G93.41  ICD-9-CM: 348.31  10/12/2021 - Present        Pneumonia ICD-10-CM: J18.9  ICD-9-CM: 486  10/12/2021 - Present        Peripheral venous insufficiency ICD-10-CM: L03.3  ICD-9-CM: 459.81  9/24/2020 - Present        Morbid obesity ([facility] ICD-10-CM: E66.01  ICD-9-CM: 278.01  9/24/2020 - Present        Atherosclerosis of native artery of left lower extremity with ulceration of calf (HCC) ICD-10-CM: N11.981  ICD-9-CM: 440.23, 707.12  9/24/2020 - Present        Septic pulmonary embolism without acute cor pulmonale (HCC) ICD-10-CM: I26.90  ICD-9-CM: 415.12  9/24/2020 - Present        Lymphedema ICD-10-CM: I89.0  ICD-9-CM: 457.1  9/24/2020 - Present        RESOLVED: Non-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 exposed ([facility] ICD-10-CM: J15.813  ICD-9-CM: 707.12  9/24/2020 - 12/3/2020        RESOLVED: Non-pressure chronic ulcer of right calf with fat layer exposed ([facility] ICD-10-CM: U31.952  ICD-9-CM: 707.12  9/24/2020 - 12/3/2020              Medications reviewed  Current Facility-Administered Medications   Medication Dose Route Frequency    furosemide (LASIX) tablet 40 mg  40 mg Oral DAILY    predniSONE (DELTASONE) tablet 40 mg  40 mg Oral DAILY WITH BREAKFAST    sodium chloride (NS) flush 5-40 mL  5-40 mL IntraVENous Q8H    sodium chloride (NS) flush 5-40 mL  5-40 mL IntraVENous PRN    albuterol-ipratropium (DUO-NEB) 2.5 MG-0.5 MG/3 ML  3 mL Nebulization Q6H PRN    aspirin delayed-release tablet 81 mg  81 mg Oral DAILY    insulin lispro (HUMALOG) injection   SubCUTAneous AC&HS    glucose chewable tablet 16 g  4 Tablet Oral PRN    dextrose (D50W) injection syrg 12.5-25 g  25-50 mL IntraVENous PRN    glucagon (GLUCAGEN) injection 1 mg  1 mg IntraMUSCular PRN    sodium chloride (NS) flush 5-40 mL  5-40 mL IntraVENous Q8H    sodium chloride (NS) flush 5-40 mL  5-40 mL IntraVENous PRN    acetaminophen (TYLENOL) tablet 650 mg  650 mg Oral Q6H PRN    Or    acetaminophen (TYLENOL) suppository 650 mg  650 mg Rectal Q6H PRN    ondansetron (ZOFRAN ODT) tablet 4 mg  4 mg Oral Q8H PRN    Or    ondansetron (ZOFRAN) injection 4 mg  4 mg IntraVENous Q6H PRN    pantoprazole (PROTONIX) tablet 40 mg  40 mg Oral ACB    atorvastatin (LIPITOR) tablet 80 mg  80 mg Oral DAILY       Review of Systems:   A comprehensive review of systems was negative except for that written in the HPI. Objective:   Physical Exam:     Visit Vitals  /69 (BP 1 Location: Left upper arm, BP Patient Position: At rest;Supine)   Pulse 68   Temp 97.8 °F (36.6 °C)   Resp 20   Ht 5' 7\" (1.702 m)   Wt (!) 181.4 kg (400 lb)   SpO2 94%   BMI 62.65 kg/m²    O2 Flow Rate (L/min): 3 l/min O2 Device: Nasal cannula    Temp (24hrs), Av.8 °F (36.6 °C), Min:97.6 °F (36.4 °C), Max:98.2 °F (36.8 °C)    No intake/output data recorded. 10/17 1901 - 10/19 0700  In: -   Out: 2100 [Urine:2100]    General:   Awake and alert. Morbidly obese   Lungs:    Breath sounds diffusely, no wheeze and not labored   Chest wall:  No tenderness or deformity. Heart:  Regular rate and rhythm, S1, S2 normal, no murmur, click, rub or gallop. Abdomen:   Soft, non-tender. Bowel sounds normal. No masses,  No organomegaly. Extremities: Extremities normal, atraumatic, no cyanosis    2+ edema   Pulses: 2+ and symmetric all extremities. Skin: Skin color, texture, turgor normal. No rashes or lesions   Neurologic: CNII-XII intact. No gross focal deficits         Data Review:       Recent Days:  Recent Labs     10/18/21  1054   WBC 19.1*   HGB 12.4   HCT 36.6        Recent Labs     10/18/21  1054 10/17/21  0145    137   K 3.5 4.0   CL 99 97   CO2 36* 33*   * 120*   BUN 25* 25*   CREA 0.86 0.79   CA 8.9 8.3*     No results for input(s): PH, PCO2, PO2, HCO3, FIO2 in the last 72 hours.     24 Hour Results:  Recent Results (from the past 24 hour(s))   GLUCOSE, POC    Collection Time: 10/18/21  8:20 AM   Result Value Ref Range    Glucose (POC) 104 65 - 117 mg/dL    Performed by Cyndi Mancera, BASIC    Collection Time: 10/18/21 10:54 AM   Result Value Ref Range    Sodium 140 136 - 145 mmol/L    Potassium 3.5 3.5 - 5.1 mmol/L    Chloride 99 97 - 108 mmol/L    CO2 36 (H) 21 - 32 mmol/L    Anion gap 5 5 - 15 mmol/L    Glucose 116 (H) 65 - 100 mg/dL    BUN 25 (H) 6 - 20 mg/dL    Creatinine 0.86 0.70 - 1.30 mg/dL    BUN/Creatinine ratio 29 (H) 12 - 20      GFR est AA >60 >60 ml/min/1.73m2    GFR est non-AA >60 >60 ml/min/1.73m2    Calcium 8.9 8.5 - 10.1 mg/dL   CBC WITH AUTOMATED DIFF    Collection Time: 10/18/21 10:54 AM   Result Value Ref Range    WBC 19.1 (H) 4.1 - 11.1 K/uL    RBC 4.72 4.10 - 5.70 M/uL    HGB 12.4 12.1 - 17.0 g/dL    HCT 36.6 36.6 - 50.3 %    MCV 77.5 (L) 80.0 - 99.0 FL    MCH 26.3 26.0 - 34.0 PG    MCHC 33.9 30.0 - 36.5 g/dL    RDW 15.2 (H) 11.5 - 14.5 %    PLATELET 297 296 - 229 K/uL    MPV 9.6 8.9 - 12.9 FL    NRBC 0.1 (H) 0.0  WBC    ABSOLUTE NRBC 0.02 (H) 0.00 - 0.01 K/uL    NEUTROPHILS 84 (H) 32 - 75 %    LYMPHOCYTES 9 (L) 12 - 49 %    MONOCYTES 6 5 - 13 %    EOSINOPHILS 0 0 - 7 %    BASOPHILS 0 0 - 1 %    IMMATURE GRANULOCYTES 1 (H) 0 - 0.5 %    ABS. NEUTROPHILS 16.0 (H) 1.8 - 8.0 K/UL    ABS. LYMPHOCYTES 1.7 0.8 - 3.5 K/UL    ABS. MONOCYTES 1.1 (H) 0.0 - 1.0 K/UL    ABS. EOSINOPHILS 0.0 0.0 - 0.4 K/UL    ABS. BASOPHILS 0.1 0.0 - 0.1 K/UL    ABS. IMM.  GRANS. 0.2 (H) 0.00 - 0.04 K/UL    DF AUTOMATED     CK    Collection Time: 10/18/21 10:54 AM   Result Value Ref Range     (H) 39 - 308 U/L   C REACTIVE PROTEIN, QT    Collection Time: 10/18/21 10:54 AM   Result Value Ref Range    C-Reactive protein 3.90 (H) 0.00 - 0.60 mg/dL   PTT    Collection Time: 10/18/21 10:54 AM   Result Value Ref Range    aPTT 33.1 21.2 - 34.1 sec    aPTT, therapeutic range   82 - 109 sec   GLUCOSE, POC    Collection Time: 10/18/21 11:26 AM   Result Value Ref Range    Glucose (POC) 124 (H) 65 - 117 mg/dL    Performed by Kike DAO, POC    Collection Time: 10/18/21  4:32 PM   Result Value Ref Range    Glucose (POC) 125 (H) 65 - 117 mg/dL    Performed by Sherif [address], POC    Collection Time: 10/18/21  9:41 PM   Result Value Ref Range    Glucose (POC) 247 (H) 65 - 117 mg/dL    Performed by Tigre Zuniga, POC    Collection Time: 10/19/21  7:36 AM   Result Value Ref Range    Glucose (POC) 100 65 - 117 mg/dL    Performed by Annette Rutherford        XR CHEST PORT   Final Result   No acute cardiopulmonary process. XR CHEST PORT   Final Result   Enlarged cardiac silhouette with central pulmonary vascular engorgement,   unchanged. No overt parenchymal edema is evident radiographically. XR CHEST PORT   Final Result      XR CHEST PORT   Final Result   Hydrostatic edema. No focal airspace consolidation definitively   visualized. CT CHEST WO CONT   Final Result   Bandlike opacity in the right lower lobe, probably atelectasis. Developing infection not excluded. Other findings as above. CT HEAD WO CONT   Final Result   1. No acute intracranial hemorrhage or mass effect. 2.  No acute fracture or subluxation of the cervical spine. CT SPINE CERV WO CONT   Final Result   1. No acute intracranial hemorrhage or mass effect. 2.  No acute fracture or subluxation of the cervical spine. XR CHEST PORT   Final Result   Bilateral perihilar airspace disease, suggestive of edema however pneumonia or   atelectasis are not excluded in the appropriate clinical setting. Enlarged   cardiopericardial silhouette. Assessment:  Acute non-STEMI   -He underwent cardiac catheterization 10/15 that showed mild coronary artery disease of the proximal RCA and mid LAD    Sepsis: pna +/- bacteremia   -Inflammatory markers have been improving on Zosyn d8 transition to augmentin on dc to complete 14 days total.    -procal descending. Crp marginally up.   -overall appears improving though wbc lagging remaining elevated, descending slowly to 19K. Coag negative staph bacteremia, probably due to contamination/ abacillus species in initial anaerobic bottle. Repeat blcs no growth to  Date. .     Acute exacerbation of COPD, wheezing and requiring supplemental o2, 2 l/m., prednisone    Acute respiratory failure with hypoxia, probably due to heart failure and COPD   -Still on 2 L nasal cannula    Acute diastolic heart failure; his 2 chest x-rays have shown evidence of heart failure, cxr 10/15 with vascular congestion without overt pulmonary edema. repeat cxr 10/17 reporting no acute pulmonary process and leg edema much improved. Lasix to po. Acute kidney injury, resolved (1.3-->-->1.17-->0.78-->0.65)    Obstructive sleep apnea    Rhabdomyolysis, ck descending ~6K-->433. Jayce Doyle Held hydration d/t vascular congestion. Generalized weakness      Plan:  Watch off ivf, lasix to po  Abx per id, leukocytosis persists ~ 19 K- descending  DuoNeb treatments  Prednisone x 5d(10/15 started)      PT and OT evaluations appreciated, rec snf    Care Plan discussed with: Patient/Family    Disposition:snf anticipated 1-2 days. Pt agress. Total time spent with patient: 30 minutes.     Tanvi Simpson MD

## 2021-10-19 NOTE — PROGRESS NOTES
Problem: Falls - Risk of  Goal: *Absence of Falls  Description: Document Verena Gallego Fall Risk and appropriate interventions in the flowsheet. Outcome: Progressing Towards Goal  Note: Fall Risk Interventions:  Mobility Interventions: Patient to call before getting OOB         Medication Interventions: Teach patient to arise slowly, Patient to call before getting OOB    Elimination Interventions: Call light in reach, Urinal in reach    History of Falls Interventions: Investigate reason for fall         Problem: Patient Education: Go to Patient Education Activity  Goal: Patient/Family Education  Outcome: Progressing Towards Goal     Problem: Pressure Injury - Risk of  Goal: *Prevention of pressure injury  Description: Document Stefano Scale and appropriate interventions in the flowsheet. Outcome: Progressing Towards Goal  Note: Pressure Injury Interventions:       Moisture Interventions: Minimize layers    Activity Interventions: Increase time out of bed    Mobility Interventions: PT/OT evaluation, Turn and reposition approx. every two hours(pillow and wedges)    Nutrition Interventions: Document food/fluid/supplement intake    Friction and Shear Interventions: Apply protective barrier, creams and emollients                Problem: Pressure Injury - Risk of  Goal: *Prevention of pressure injury  Description: Document Stefano Scale and appropriate interventions in the flowsheet. Outcome: Progressing Towards Goal  Note: Pressure Injury Interventions:       Moisture Interventions: Minimize layers    Activity Interventions: Increase time out of bed    Mobility Interventions: PT/OT evaluation, Turn and reposition approx.  every two hours(pillow and wedges)    Nutrition Interventions: Document food/fluid/supplement intake    Friction and Shear Interventions: Apply protective barrier, creams and emollients                Problem: Patient Education: Go to Patient Education Activity  Goal: Patient/Family Education  Outcome: Progressing Towards Goal     Problem: Patient Education: Go to Patient Education Activity  Goal: Patient/Family Education  Outcome: Progressing Towards Goal     Problem: Patient Education: Go to Patient Education Activity  Goal: Patient/Family Education  Outcome: Progressing Towards Goal

## 2021-10-19 NOTE — PROGRESS NOTES
PHYSICAL THERAPY TREATMENT  Patient: Cooper Sosa (84 y.o. male)  Date: 10/19/2021  Diagnosis: Sepsis ([facility] [E46.0]  Metabolic encephalopathy [O93.70]  Pneumonia [J18.9] <principal problem not specified>  Procedure(s) (LRB):  LEFT HEART CATH / CORONARY ANGIOGRAPHY (N/A) 5 Days Post-Op  Precautions:    Chart, physical therapy assessment, plan of care and goals were reviewed. ASSESSMENT  Patient received sitting on the recliner with b/l Le elevated  , agreeable for PT treatment . Pt completed  there ex's for b/l Le strengthening . Pt demonstrates fair dynamic sitting balance with support . Performed STS with Barry, with cues for hand placement , demonstrates good static standing balance with support ,. Pt ambulated - 8' with Rw, CGA/Barry ,  demonstrates slow sapphire with  , wide NIKOLAI , needs cues to increased step length . Overall, pt tolerates session fair today demonstrating improved ability to perform transfers and ambulation  . Increased ambulatory distance of 8' with RW, CGA/Barry . Pt is progressing towards goals , continues with skilled PT services to address deficits with static/dynamic standing balance , activity tolerance and strength b/l Le impacting overall performance of  transfers/mobility  . Current Level of Function Impacting Discharge (mobility/balance): requires CGA/Barry for transfers/mobility    Other factors to consider for discharge: severity of deficits , time since onset         PLAN :  Patient continues to benefit from skilled intervention to address the above impairments. Continue treatment per established plan of care. to address goals.     Recommendation for discharge: (in order for the patient to meet his/her long term goals)  Evaristo Yeboah    This discharge recommendation:  Has been made in collaboration with the attending provider and/or case management    IF patient discharges home will need the following DME: rolling walker       SUBJECTIVE:   Patient stated  I want to walk with a cane    OBJECTIVE DATA SUMMARY:   Critical Behavior:  Neurologic State: Alert  Orientation Level: Oriented X4  Cognition: Appropriate decision making, Appropriate safety awareness, Follows commands     Functional Mobility Training:     Transfers:  Sit to Stand: Minimum assistance  Stand to Sit: Contact guard assistance   Balance:  Sitting: Intact  Sitting - Static: Good (unsupported)  Sitting - Dynamic: Fair (occasional)  Standing: Impaired; Without support  Standing - Static: Good;Constant support  Standing - Dynamic : Fair;Constant support  Ambulation/Gait Training:  Distance (ft): 8 Feet (ft)  Assistive Device: Gait belt;Walker, rolling  Ambulation - Level of Assistance: Contact guard assistance;Minimal assistance  Step Length: Right shortened;Left shortened    Therapeutic Exercises:   AP , hip abduction-adduction , SLR , LAQ , seated marches - 10 reps     Pain Ratin/10    Activity Tolerance:   Fair  Please refer to the flowsheet for vital signs taken during this treatment. After treatment patient left in no apparent distress:   Sitting in chair and Call bell within reach    COMMUNICATION/COLLABORATION:   The patients plan of care was discussed with: Registered nurse. Problem: Mobility Impaired (Adult and Pediatric)  Goal: *Acute Goals and Plan of Care (Insert Text)  Description: Physical Therapy Goals  Initiated 10/15/21  1)  I with HEP in 7 days to improve overall functional mobility. 2)  Bed mobility and transfers with CGA in 7 days to prevent skin breakdown with O2 sats >90%  3)  Pt to amb 100ft with LRAD and sup in 7 days with O2 sats>90%. Pt. Goal:  Pt to be able to walk safely without falls.      Outcome: Progressing Towards Goal       Jaycee Arroyo   Time Calculation: 27 mins

## 2021-10-19 NOTE — PROGRESS NOTES
Infectious Disease Progress Note           Subjective:   Doing well, states he will like to be discharged to rehab when ready. No acute events since last seen   Objective:   Physical Exam:     Visit Vitals  /71 (BP Patient Position: At rest;Supine)   Pulse 71   Temp 98 °F (36.7 °C)   Resp 18   Ht 5' 7\" (1.702 m)   Wt (!) 400 lb (181.4 kg)   SpO2 93%   BMI 62.65 kg/m²    O2 Flow Rate (L/min): 2 l/min O2 Device: Nasal cannula    Temp (24hrs), Av.8 °F (36.6 °C), Min:97.5 °F (36.4 °C), Max:98 °F (36.7 °C)    10/19 0701 - 10/19 1900  In: -   Out: 250 [Urine:250]   10/17 1901 - 10/19 0700  In: -   Out: 2100 [Urine:2100]    General: NAD, alert, AAO x 4, OOB to chair   HEENT: DOUG, Moist mucosa   Lungs: CTA b/l, decreased at the bases, exp wheezing   Heart: S1S2+, RRR, no murmur  Abdo: Soft, distended, + ventral hernia, + BS   : No ball cath   Exts:Sig decreased right leg swelling, no warmth or erythema   Skin: No wounds, No rashes or lesions    Data Review:       Recent Days:  Recent Labs     10/19/21  1051 10/18/21  1054   WBC 20.4* 19.1*   HGB 12.2 12.4   HCT 36.3* 36.6    381     Recent Labs     10/19/21  1051 10/18/21  1054 10/17/21  0145   BUN 20 25* 25*   CREA 0.72 0.86 0.79       Lab Results   Component Value Date/Time    C-Reactive protein 3.90 (H) 10/18/2021 10:54 AM          Microbiology     Results     Procedure Component Value Units Date/Time    CULTURE, RESPIRATORY/SPUTUM/BRONCH Weyman Prima STAIN [[id number]] Collected: 10/13/21 1045    Order Status: Completed Specimen: Sputum Updated: 10/16/21 1248     Special Requests: No Special Requests        GRAM STAIN No wbc's seen         no epithelial cells seen               Occasional Gram Negative Rods                  Occasional Gram Positive Cocci in pairs           Culture result:        Moderate Normal respiratory jayde          CULTURE, BLOOD, PAIRED [[id number]] Collected: 10/13/21 0905    Order Status: Completed Specimen: Blood Updated: 10/19/21 1153     Special Requests: No Special Requests        Culture result: No growth 6 days       MRSA SCREEN - PCR (NASAL) [[id number]] Collected: 10/12/21 1313    Order Status: Canceled Specimen: Swab     MRSA SCREEN - PCR (NASAL) [[id number]] Collected: 10/12/21 0910    Order Status: Completed Specimen: Swab Updated: 10/13/21 1052     MRSA by PCR, Nasal Not Detected       COVID-19 RAPID TEST [[id number]] Collected: 10/12/21 0202    Order Status: Completed Specimen: Nasopharyngeal Updated: 10/12/21 0304     Specimen source Nasopharyngeal        COVID-19 rapid test Not Detected        Comment: Rapid Abbott ID Now   Rapid NAAT:  The specimen is NEGATIVE for SARS-CoV-2, the novel coronavirus associated with COVID-19. Negative results should be treated as presumptive and, if inconsistent with clinical signs and symptoms or necessary for patient management, should be tested with an alternative molecular assay. Negative results do not preclude SARS-CoV-2 infection and should not be used as the sole basis for patient management decisions. This test has been authorized by the FDA under   an Emergency Use Authorization (EUA) for use by authorized laboratories.  Fact sheet for Healthcare Providers: ConventionUpdate.co.nz Fact sheet for Patients: ConventionUpdate.co.nz   Methodology: Isothermal Nucleic Acid Amplification         CULTURE, BLOOD, PAIRED [[id number]]  (Abnormal) Collected: 10/11/21 2304    Order Status: Completed Specimen: Blood Updated: 10/14/21 0906     Special Requests: No Special Requests        Culture result:       Staphylococcus species, coagulase negative MULTIPLE COLONY TYPES  growing in 3 of 4 bottles drawn (NO SITES INDICATED)                  preliminary report of Gram Positive Cocci in clusters growing in 2 of 4 bottles drawn AND Gram variable rods growing in 1 of 4 bottles drawn CALLED TO AND READ BACK BY PHILIP SESAY RN SMR SCAV AT 0006 ON 10/13/21. [facility]                  Bacillus species, not anthracis GROWING IN THE ANAEROBIC BOTTLE          CULTURE, BLOOD, PAIRED [[id number]]     Order Status: Canceled Specimen: Blood              Diagnostics   CXR Results  (Last 48 hours)    None             Assessment/Plan     1. SIRS/Probable sepsis on admission, suspected aspiration PNA, r/o right leg cellulitis       Coag neg staph from blood Cx and Normal Nikki from sputum       Remains afebrile, leukocytosis likely from steroid therapy which has been discontinued       On day # 9 of Zosyn, again may transition to Augmentin upon d/c      Routine labs in the morning. D/c Zosyn in AM     2. Right leg cellulitis: Clinically and objectively improved     3. Hypoxic respiratory failure on admission: Likely from CHF/COPD exacerbation       Clear lungs on exam, off steroid therapy      4. NSTEMI: S/p cardiac cath on 10/14, mild disease in proximal PCA and mid LAD    Dispo:  Interested in d/c to rehab once medically cleared     Maddy Riggs MD    10/19/2021

## 2021-10-20 VITALS
HEART RATE: 74 BPM | WEIGHT: 315 LBS | OXYGEN SATURATION: 91 % | HEIGHT: 67 IN | RESPIRATION RATE: 22 BRPM | SYSTOLIC BLOOD PRESSURE: 139 MMHG | BODY MASS INDEX: 49.44 KG/M2 | DIASTOLIC BLOOD PRESSURE: 82 MMHG | TEMPERATURE: 98.1 F

## 2021-10-20 LAB
ANION GAP SERPL CALC-SCNC: 4 MMOL/L (ref 5–15)
BUN SERPL-MCNC: 19 MG/DL (ref 6–20)
BUN/CREAT SERPL: 28 (ref 12–20)
CA-I BLD-MCNC: 8.4 MG/DL (ref 8.5–10.1)
CHLORIDE SERPL-SCNC: 101 MMOL/L (ref 97–108)
CO2 SERPL-SCNC: 36 MMOL/L (ref 21–32)
CREAT SERPL-MCNC: 0.67 MG/DL (ref 0.7–1.3)
GLUCOSE BLD STRIP.AUTO-MCNC: 118 MG/DL (ref 65–117)
GLUCOSE BLD STRIP.AUTO-MCNC: 95 MG/DL (ref 65–117)
GLUCOSE BLD STRIP.AUTO-MCNC: 99 MG/DL (ref 65–117)
GLUCOSE SERPL-MCNC: 91 MG/DL (ref 65–100)
PERFORMED BY, TECHID: ABNORMAL
PERFORMED BY, TECHID: NORMAL
PERFORMED BY, TECHID: NORMAL
POTASSIUM SERPL-SCNC: 3.4 MMOL/L (ref 3.5–5.1)
SODIUM SERPL-SCNC: 141 MMOL/L (ref 136–145)

## 2021-10-20 PROCEDURE — 74011250637 HC RX REV CODE- 250/637: Performed by: INTERNAL MEDICINE

## 2021-10-20 PROCEDURE — 77010033678 HC OXYGEN DAILY

## 2021-10-20 PROCEDURE — 99232 SBSQ HOSP IP/OBS MODERATE 35: CPT | Performed by: INTERNAL MEDICINE

## 2021-10-20 PROCEDURE — 94760 N-INVAS EAR/PLS OXIMETRY 1: CPT

## 2021-10-20 PROCEDURE — 80048 BASIC METABOLIC PNL TOTAL CA: CPT

## 2021-10-20 PROCEDURE — 74011250637 HC RX REV CODE- 250/637: Performed by: HOSPITALIST

## 2021-10-20 PROCEDURE — 36415 COLL VENOUS BLD VENIPUNCTURE: CPT

## 2021-10-20 PROCEDURE — 82962 GLUCOSE BLOOD TEST: CPT

## 2021-10-20 RX ORDER — POTASSIUM CHLORIDE 750 MG/1
40 TABLET, FILM COATED, EXTENDED RELEASE ORAL EVERY 4 HOURS
Status: COMPLETED | OUTPATIENT
Start: 2021-10-20 | End: 2021-10-20

## 2021-10-20 RX ORDER — AMOXICILLIN AND CLAVULANATE POTASSIUM 500; 125 MG/1; MG/1
1 TABLET, FILM COATED ORAL EVERY 8 HOURS
Status: DISCONTINUED | OUTPATIENT
Start: 2021-10-20 | End: 2021-10-20 | Stop reason: HOSPADM

## 2021-10-20 RX ADMIN — ATORVASTATIN CALCIUM 80 MG: 40 TABLET, FILM COATED ORAL at 08:21

## 2021-10-20 RX ADMIN — PANTOPRAZOLE SODIUM 40 MG: 40 TABLET, DELAYED RELEASE ORAL at 08:21

## 2021-10-20 RX ADMIN — POTASSIUM CHLORIDE 40 MEQ: 750 TABLET, FILM COATED, EXTENDED RELEASE ORAL at 08:21

## 2021-10-20 RX ADMIN — Medication 10 ML: at 13:55

## 2021-10-20 RX ADMIN — Medication 10 ML: at 05:27

## 2021-10-20 RX ADMIN — FUROSEMIDE 40 MG: 40 TABLET ORAL at 08:20

## 2021-10-20 RX ADMIN — Medication 10 ML: at 13:56

## 2021-10-20 RX ADMIN — ASPIRIN 81 MG: 81 TABLET, COATED ORAL at 08:21

## 2021-10-20 RX ADMIN — POTASSIUM CHLORIDE 40 MEQ: 750 TABLET, FILM COATED, EXTENDED RELEASE ORAL at 11:46

## 2021-10-20 RX ADMIN — AMOXICILLIN AND CLAVULANATE POTASSIUM 1 TABLET: 500; 125 TABLET, FILM COATED ORAL at 13:55

## 2021-10-20 NOTE — PROGRESS NOTES
Infectious Disease Progress Note           Subjective:   Doing well, states he will like to be discharged to rehab when ready. No acute events since last seen. Objective:   Physical Exam:     Visit Vitals  /82 (BP 1 Location: Right upper arm, BP Patient Position: Sitting; At rest)   Pulse 74   Temp 98.1 °F (36.7 °C)   Resp 22   Ht 5' 7\" (1.702 m)   Wt (!) 400 lb (181.4 kg)   SpO2 91%   BMI 62.65 kg/m²    O2 Flow Rate (L/min): 1 l/min O2 Device: None (Room air)    Temp (24hrs), Av.8 °F (36.6 °C), Min:97.4 °F (36.3 °C), Max:98.1 °F (36.7 °C)    10/20 0701 - 10/20 1900  In: -   Out: 500 [Urine:500]   10/18 1901 - 10/20 07  In: -   Out: 7417 [Urine:1275]    General: NAD, alert, AAO x 4, OOB to chair   HEENT: DOUG, Moist mucosa   Lungs: CTA b/l, decreased at the bases, exp wheezing   Heart: S1S2+, RRR, no murmur  Abdo: Soft, distended, + ventral hernia, + BS   : No blal cath   Exts:Sig decreased right leg swelling, no warmth or erythema   Skin: No wounds, No rashes or lesions    Data Review:       Recent Days:  Recent Labs     10/19/21  1051 10/18/21  1054   WBC 20.4* 19.1*   HGB 12.2 12.4   HCT 36.3* 36.6    381     Recent Labs     10/20/21  0637 10/19/21  1051 10/18/21  1054   BUN 19 20 25*   CREA 0.67* 0.72 0.86       Lab Results   Component Value Date/Time    C-Reactive protein 3.90 (H) 10/18/2021 10:54 AM      Microbiology     Results     Procedure Component Value Units Date/Time    CULTURE, RESPIRATORY/SPUTUM/BRONCH Aida Elmer STAIN [[id number]] Collected: 10/13/21 1045    Order Status: Completed Specimen: Sputum Updated: 10/16/21 1248     Special Requests: No Special Requests        GRAM STAIN No wbc's seen         no epithelial cells seen               Occasional Gram Negative Rods                  Occasional Gram Positive Cocci in pairs           Culture result:        Moderate Normal respiratory jayde          CULTURE, BLOOD, PAIRED [[id number]] Collected: 10/13/21 2047 Order Status: Completed Specimen: Blood Updated: 10/19/21 1153     Special Requests: No Special Requests        Culture result: No growth 6 days       MRSA SCREEN - PCR (NASAL) [[id number]] Collected: 10/12/21 1313    Order Status: Canceled Specimen: Swab     MRSA SCREEN - PCR (NASAL) [[id number]] Collected: 10/12/21 0910    Order Status: Completed Specimen: Swab Updated: 10/13/21 1052     MRSA by PCR, Nasal Not Detected       COVID-19 RAPID TEST [[id number]] Collected: 10/12/21 0202    Order Status: Completed Specimen: Nasopharyngeal Updated: 10/12/21 0304     Specimen source Nasopharyngeal        COVID-19 rapid test Not Detected        Comment: Rapid Abbott ID Now   Rapid NAAT:  The specimen is NEGATIVE for SARS-CoV-2, the novel coronavirus associated with COVID-19. Negative results should be treated as presumptive and, if inconsistent with clinical signs and symptoms or necessary for patient management, should be tested with an alternative molecular assay. Negative results do not preclude SARS-CoV-2 infection and should not be used as the sole basis for patient management decisions. This test has been authorized by the FDA under   an Emergency Use Authorization (EUA) for use by authorized laboratories.  Fact sheet for Healthcare Providers: ConventionUpdate.co.nz Fact sheet for Patients: ConventionUpdate.co.nz   Methodology: Isothermal Nucleic Acid Amplification         CULTURE, BLOOD, PAIRED [[id number]]  (Abnormal) Collected: 10/11/21 2304    Order Status: Completed Specimen: Blood Updated: 10/14/21 0906     Special Requests: No Special Requests        Culture result:       Staphylococcus species, coagulase negative MULTIPLE COLONY TYPES  growing in 3 of 4 bottles drawn (NO SITES INDICATED)                  preliminary report of Gram Positive Cocci in clusters growing in 2 of 4 bottles drawn AND Gram variable rods growing in 1 of 4 bottles drawn CALLED TO AND READ BACK BY PHILIP SESAY RN SMR SCAV AT 0006 ON 10/13/21. [facility]                  Bacillus species, not anthracis GROWING IN THE ANAEROBIC BOTTLE          CULTURE, BLOOD, PAIRED [[id number]]     Order Status: Canceled Specimen: Blood           Diagnostics   CXR Results  (Last 48 hours)    None           Assessment/Plan     1. SIRS on admission w only source of infection being right leg cellulitis       Staph epidermidis isolated from BCx deemed a contaminant       Afebrile, routine labs not done today, ordered for AM       Completed 10 days of Zosyn, currently on Augmentin         2. Right leg cellulitis: Clinically and objectively improved     3. Hypoxic respiratory failure on admission: Likely from CHF/COPD exacerbation     4. NSTEMI: S/p cardiac cath on 10/14, mild disease in proximal PCA and mid LAD    Dispo: Anticipation d/c to rehab on 10/21.  Cleared for d/c form ID stand point     Fatemeh Carter MD     10/20/2021

## 2021-10-20 NOTE — PROGRESS NOTES
Problem: Falls - Risk of  Goal: *Absence of Falls  Description: Document Cora Lamas Fall Risk and appropriate interventions in the flowsheet. Outcome: Progressing Towards Goal  Note: Fall Risk Interventions:  Mobility Interventions: Utilize walker, cane, or other assistive device         Medication Interventions: Patient to call before getting OOB    Elimination Interventions: Call light in reach, Urinal in reach    History of Falls Interventions: Investigate reason for fall         Problem: Patient Education: Go to Patient Education Activity  Goal: Patient/Family Education  Outcome: Progressing Towards Goal     Problem: Pressure Injury - Risk of  Goal: *Prevention of pressure injury  Description: Document Stefano Scale and appropriate interventions in the flowsheet.   Outcome: Progressing Towards Goal  Note: Pressure Injury Interventions:  Sensory Interventions: Monitor skin under medical devices, Minimize linen layers, Keep linens dry and wrinkle-free, Assess changes in LOC    Moisture Interventions: Minimize layers    Activity Interventions: Increase time out of bed    Mobility Interventions: PT/OT evaluation    Nutrition Interventions: Document food/fluid/supplement intake    Friction and Shear Interventions: Minimize layers

## 2021-10-20 NOTE — DISCHARGE SUMMARY
Hospitalist Discharge Summary     Patient ID:  Philemon Habermann  [id number]  39 y.o.  1955  10/11/2021    PCP on record: Palomo Jarvis MD    Admit date: 10/11/2021  Discharge date and time: 10/20/2021    DISCHARGE DIAGNOSIS:    Acute hypoxic respiratory failure, bacterial pneumonia,     CONSULTATIONS:  IP CONSULT TO CARDIOLOGY  IP CONSULT TO INFECTIOUS DISEASES    Excerpted HPI from H&P of Carlitos Cordova MD:  Patient is a 25-year-old male with history of diabetes, hypertension and COPD was admitted on 10/12 with altered mental status and lethargy. He was in respiratory distress on admission and had a temperature of 102. 1. Chest x-ray showed bilateral perihilar densities. White count was 31,000 and lactate was elevated. Patient was started on IV antibiotics. Mentation improved with hydration. Procalcitonin was 21.     ______________________________________________________________________  DISCHARGE SUMMARY/HOSPITAL COURSE:  for full details see H&P, daily progress notes, labs, consult notes. Patient was noted to have a troponin of 996. CPK descending slowly     Echo showed normal LV function with mild pulmonary hypertension. Repeat cxr congestion resolved it appears. CT of the chest showed only bandlike atelectasis in the right middle lobe     Blood cultures were positive for coag negative staph species with multiple colony types in 3 of 4 bottles, anaerobic bottle growing bacillus species. subsequent blcs no growth- Likely contaminant     Id and cardio was consulted  S/p cardiac cath on 10/14, mild disease in proximal PCA and mid LAD. Denies chest pain   ID recommends 14 days course of antibiotics for suspected aspiration pneumonia, and rule out right leg cellulitis. Vascular congestion, diuresing well with lasix, transitioned to oral lasix 10/18 as vascular congestion appears improved per cxr 10/17.  Patient was also treated with steroids for COPD exacerbation        Remains profoundly weak and is agreeable for snf, anticipated.        Patient has remained hypoxic and requiring supplemental oxygen. Now 2 L @ >91%  Afebrile  Subjectively feels improving. Still very weak ,  exertional dyspnea though improved. _______________________________________________________________________  Patient seen and examined by me on discharge day. Pertinent Findings:  Gen:    Not in distress  Chest: Bilateral crackles  CVS:   Regular rhythm. No edema  Abd:  Soft, not distended, not tender  Neuro:  Alert, oriented    _______________________________________________________________________  DISCHARGE MEDICATIONS:   Current Discharge Medication List      START taking these medications    Details   furosemide (LASIX) 40 mg tablet Take 1 Tablet by mouth daily. Qty: 30 Tablet, Refills: 0  Start date: 10/19/2021      insulin lispro (HUMALOG) 100 unit/mL injection As per sliding scale  Qty: 1 Each, Refills: 0  Start date: 10/19/2021      pantoprazole (PROTONIX) 40 mg tablet Take 1 Tablet by mouth Daily (before breakfast). Qty: 30 Tablet, Refills: 0  Start date: 10/20/2021      amoxicillin-clavulanate (Augmentin) 500-125 mg per tablet Take 1 Tablet by mouth every eight (8) hours. Qty: 15 Tablet, Refills: 0  Start date: 10/19/2021         CONTINUE these medications which have NOT CHANGED    Details   atorvastatin (LIPITOR) 20 mg tablet Take 20 mg by mouth nightly.       aspirin delayed-release 81 mg tablet TAKE ONE TABLET BY MOUTH ONE TIME DAILY         STOP taking these medications       hydroCHLOROthiazide (HYDRODIURIL) 25 mg tablet Comments:   Reason for Stopping:         multivitamin (ONE A DAY) tablet Comments:   Reason for Stopping:         amLODIPine (NORVASC) 10 mg tablet Comments:   Reason for Stopping:         metoprolol succinate (TOPROL-XL) 25 mg XL tablet Comments:   Reason for Stopping:         Farxiga 10 mg tab tablet Comments:   Reason for Stopping:                 Patient Follow Up Instructions: Activity: Activity as tolerated  Diet: Cardiac Diet  Wound Care: None needed    Follow-up with Cardiology in 2 week.   Follow-up tests/labs none  Follow-up Information     Follow up With Specialties Details Why Contact Info    Palomo Jarvis MD    Patient can only remember the practice name and not the physician          ________________________________________________________________    Risk of deterioration: Moderate    Condition at Discharge:  Stable  __________________________________________________________________    Disposition  SNF/LTC    ____________________________________________________________________    Code Status: Full Code  ___________________________________________________________________      Total time in minutes spent coordinating this discharge (includes going over instructions, follow-up, prescriptions, and preparing report for sign off to her PCP) :  35 minutes    Signed:  Young Miles MD

## 2021-10-20 NOTE — PROGRESS NOTES
Report called to Niki Rogers RN at [id number] [facility]. Pt going to room 202.

## 2021-10-20 NOTE — PROGRESS NOTES
Patient is clear to d/c to [id number] [facility] room 202, nurse report can be called to [phone]. CM met with patient to notify, patient is nervous but agreeable. Medicare pt has received, reviewed, and signed 2nd IM letter informing them of their right to appeal the discharge. Signed copied has been placed on pt bedside chart. Nurse notified.

## 2022-03-18 PROBLEM — A41.9 SEPSIS (HCC): Status: ACTIVE | Noted: 2021-10-12

## 2022-03-18 PROBLEM — J18.9 PNEUMONIA: Status: ACTIVE | Noted: 2021-10-12

## 2022-03-19 PROBLEM — I26.90 SEPTIC PULMONARY EMBOLISM WITHOUT ACUTE COR PULMONALE (HCC): Status: ACTIVE | Noted: 2020-09-24

## 2022-03-19 PROBLEM — I89.0 LYMPHEDEMA: Status: ACTIVE | Noted: 2020-09-24

## 2022-03-19 PROBLEM — G93.41 METABOLIC ENCEPHALOPATHY: Status: ACTIVE | Noted: 2021-10-12

## 2022-03-19 PROBLEM — E66.01 MORBID OBESITY (HCC): Status: ACTIVE | Noted: 2020-09-24

## 2022-03-19 PROBLEM — I70.242 ATHEROSCLEROSIS OF NATIVE ARTERY OF LEFT LOWER EXTREMITY WITH ULCERATION OF CALF (HCC): Status: ACTIVE | Noted: 2020-09-24

## 2022-03-20 PROBLEM — I87.2 PERIPHERAL VENOUS INSUFFICIENCY: Status: ACTIVE | Noted: 2020-09-24

## 2022-03-28 ENCOUNTER — HOSPITAL ENCOUNTER (OUTPATIENT)
Dept: WOUND CARE | Age: 67
Discharge: HOME | End: 2022-03-28
Payer: MEDICARE

## 2022-03-28 VITALS
DIASTOLIC BLOOD PRESSURE: 75 MMHG | BODY MASS INDEX: 47.74 KG/M2 | HEIGHT: 68 IN | HEART RATE: 76 BPM | TEMPERATURE: 97.6 F | SYSTOLIC BLOOD PRESSURE: 149 MMHG | RESPIRATION RATE: 22 BRPM | WEIGHT: 315 LBS

## 2022-03-28 PROBLEM — L97.812 NON-PRESSURE CHRONIC ULCER OF OTHER PART OF RIGHT LOWER LEG WITH FAT LAYER EXPOSED (HCC): Status: ACTIVE | Noted: 2022-03-28

## 2022-03-28 PROBLEM — L97.822 NON-PRESSURE CHRONIC ULCER OF OTHER PART OF LEFT LOWER LEG WITH FAT LAYER EXPOSED (HCC): Status: ACTIVE | Noted: 2022-03-28

## 2022-03-28 PROCEDURE — 29581 APPL MULTLAYER CMPRN SYS LEG: CPT

## 2022-03-28 PROCEDURE — 74011000250 HC RX REV CODE- 250: Performed by: SPECIALIST

## 2022-03-28 PROCEDURE — 99214 OFFICE O/P EST MOD 30 MIN: CPT

## 2022-03-28 RX ORDER — BISMUTH SUBSALICYLATE 262 MG
1 TABLET,CHEWABLE ORAL DAILY
COMMUNITY

## 2022-03-28 RX ORDER — NEBIVOLOL 10 MG/1
10 TABLET ORAL DAILY
COMMUNITY

## 2022-03-28 RX ORDER — LIDOCAINE HYDROCHLORIDE 20 MG/ML
15 SOLUTION OROPHARYNGEAL AS NEEDED
Status: DISCONTINUED | OUTPATIENT
Start: 2022-03-28 | End: 2022-03-30 | Stop reason: HOSPADM

## 2022-03-28 NOTE — WOUND CARE
[address]   Progress Note and Procedure Note   NO Procedure      12 [facility] RECORD NUMBER:  [id number]  AGE: 74 y.o. RACE WHITE/NON-  BLACK/  GENDER: male  : 1955  EPISODE DATE:  3/28/2022    Subjective:   53-year-old nondiabetic male, last seen in this clinic about a year ago, presents with bilateral calf ulcers present for several weeks. He has compression stockings but does not wear them. He needs tries to elevate his legs much as possible. He has a lymphedema pump which he apparently is not using. Chief Complaint   Patient presents with    Wound Check         HISTORY of PRESENT ILLNESS HPI    Cory Whitmore is a 74 y.o. male who presents today for wound/ulcer evaluation. History of Wound Context: BLE  Wound/Ulcer Pain Timing/Severity: none  Quality of pain: dull  Severity:  0 / 10   Modifying Factors: None  Associated Signs/Symptoms: edema    Ulcer Identification:  Ulcer Type: venous    Contributing Factors: lymphedema    Wound: BLE         PAST MEDICAL HISTORY    Past Medical History:   Diagnosis Date    Arthritis     Chronic obstructive pulmonary disease (HCC)     Diabetes ([facility] [address]     Hypertension     Sleep apnea         PAST SURGICAL HISTORY    Past Surgical History:   Procedure Laterality Date    HX GI      HX HERNIA REPAIR         FAMILY HISTORY    Family History   Problem Relation Age of Onset    Hypertension Child     Hypertension Sister     Hypertension Sister        SOCIAL HISTORY    Social History     Tobacco Use    Smoking status: Never Smoker    Smokeless tobacco: Never Used   Substance Use Topics    Alcohol use: Never    Drug use: Never       ALLERGIES    No Known Allergies    MEDICATIONS    Current Outpatient Medications on File Prior to Encounter   Medication Sig Dispense Refill    nebivoloL (BYSTOLIC) 10 mg tablet Take 10 mg by mouth daily.       multivitamin (ONE A DAY) tablet Take 1 Tablet by mouth daily.      furosemide (LASIX) 40 mg tablet Take 1 Tablet by mouth daily. 30 Tablet 0    atorvastatin (LIPITOR) 20 mg tablet Take 20 mg by mouth nightly.  aspirin delayed-release 81 mg tablet TAKE ONE TABLET BY MOUTH ONE TIME DAILY (Patient not taking: Reported on 10/12/2021)       No current facility-administered medications on file prior to encounter. REVIEW OF SYSTEMS    Pertinent items are noted in HPI. Objective:     Visit Vitals  BP (!) 149/75 (BP 1 Location: Right upper arm, BP Patient Position: At rest;Sitting)   Pulse 76   Temp 97.6 °F (36.4 °C)   Resp 22   Ht 5' 8\" (1.727 m)   Wt 158.8 kg (350 lb)   BMI 53.22 kg/m²       Wt Readings from Last 3 Encounters:   03/28/22 158.8 kg (350 lb)   10/12/21 (!) 181.4 kg (400 lb)   12/09/20 (!) 181.4 kg (400 lb)       PHYSICAL EXAM  On the calves bilaterally there are superficial wounds with healthy pink granulation, no evidence of active infection  Pertinent items are noted in HPI. Assessment:   Bilateral calf ulcers of uncertain etiology  Problem List Items Addressed This Visit     None          Procedure Note  Indications:  Based on my examination of this patient's wound(s)/ulcer(s) today, debridement is not required to promote healing and evaluate the wound base.     Wound Leg lower Right;Posterior #1 03/28/22 (Active)   Wound Image   03/28/22 0933   Wound Etiology Venous 03/28/22 0933   Dressing Status Breakthrough drainage noted 03/28/22 0933   Cleansed Cleansed with saline 03/28/22 0933   Wound Length (cm) 14.5 cm 03/28/22 0933   Wound Width (cm) 8 cm 03/28/22 0933   Wound Depth (cm) 0.1 cm 03/28/22 0933   Wound Surface Area (cm^2) 116 cm^2 03/28/22 0933   Wound Volume (cm^3) 11.6 cm^3 03/28/22 0933   Wound Assessment Granulation tissue;Slough 03/28/22 0933   Drainage Amount Large 03/28/22 0933   Drainage Description Sanguineous 03/28/22 0933   Wound Odor None 03/28/22 0933   Betty-Wound/Incision Assessment Intact 03/28/22 0933   Edges Attached edges 03/28/22 0933   Wound Thickness Description Full thickness 03/28/22 0933   Number of days: 0       Wound Leg lower Left;Posterior #2 03/28/22 (Active)   Wound Image   03/28/22 0939   Wound Etiology Venous 03/28/22 0939   Dressing Status Breakthrough drainage noted 03/28/22 0939   Cleansed Cleansed with saline 03/28/22 0939   Wound Length (cm) 10.5 cm 03/28/22 0939   Wound Width (cm) 8.5 cm 03/28/22 0939   Wound Depth (cm) 0.3 cm 03/28/22 0939   Wound Surface Area (cm^2) 89.25 cm^2 03/28/22 0939   Wound Volume (cm^3) 26.775 cm^3 03/28/22 0939   Wound Assessment Granulation tissue;Slough;Eschar moist 03/28/22 0939   Drainage Amount Large 03/28/22 0939   Drainage Description Sanguineous 03/28/22 0939   Wound Odor None 03/28/22 0939   Betty-Wound/Incision Assessment Intact 03/28/22 0939   Edges Attached edges 03/28/22 0939   Wound Thickness Description Full thickness 03/28/22 0939   Number of days: 0        Plan:   Yuridia, Coflex lite  Arterial and venous studies in the vascular laboratory  Leg elevation    Treatment Note please see attached Discharge Instructions    Written patient dismissal instructions given to patient or POA.          Electronically signed by Bhupinder Edgar MD on 3/28/2022 at 10:01 AM

## 2022-03-28 NOTE — WOUND CARE
Discharge Instructions for  [address] [place] [address], [facility]7 [facility]  Telephone: [phone]    NAME:  Manish Barrera OF BIRTH:  1955  MEDICAL RECORD NUMBER:  [id number]  DATE:  3/28/2022      Return Appointment:  [] Dressing supply provider:   [] Home Healthcare:  [x] Return Appointment: 2 Week(s)  [x] Nurse Visit:  3/31/22, 4/5/22, 4/8/22    [] Discharge from [facility]  [x] Ordered tests: Vascular testing to be done at [facility], please call [phone] to schedule  [] Referrals:   [] Rx:   [] Other:      Wound Cleansing:   Do not scrub or use excessive force. Cleanse wound prior to applying a clean dressing with:  [] Normal Saline   [x] Mild Soap & Water/Baby Shampoo   [] Keep Wound Dry in Shower  [] Other:      Topical Treatments:  Do not apply lotions, creams, or ointments to wound bed unless directed. [x] Apply moisturizing lotion to skin surrounding the wound prior to dressing change.  [] Apply antifungal ointment to skin surrounding the wound prior to dressing change.  [] Apply thin film of moisture barrier/zinc ointment to skin immediately around wound. [] Other:       Dressings:           Wound Location R & L legs   [x] Apply Primary Dressing:       [] MediHoney Gel    [] MediHoney Alginate               [] Calcium Alginate      [] Calcium Alginate with Silver   [] Collagen                   [x] Collagen with Silver                [] Santyl with Moistened saline gauze              [] Hydrofera Blue (cut to size and moistened)  [] Hydrofera Blue Ready (Cut to size)   [] NS wet to dry    [] Betadine wet to dry              [] Hydrogel                [] Mepitel     [] Bactroban/Mupirocin               [] Other:     [x] Cover and Secure with:     [x] Gauze [] Jose Ramon Presser [] Kerlix   [] Foam [x] Super Absorbant [] ABD     [] ACE Wrap            [] Other:    Limit contact of tape with skin.     [x] Change dressing: [] Daily [] Every Other Day [] Once per week   [x] Twice per week   [] Three times per week [] Do Not Change Dressing   [] Other:    Pressure Relief:  [] When sitting, shift position or do seat lifts every 15 minutes.  [] Wheelchair cushion [] Specialty Bed/Mattress  [x]  Avoid direct pressure on wound site. Edema Control:  Apply: [] Compression Stocking:   mmHg  []Right Leg []Left Leg   [] Tubigrip []Right Leg Double Layer []Left Leg Double Layer      []Right Leg Single Layer []Left Leg Single Layer     [] Elevate leg(s) above the level of the heart when sitting. [] Avoid prolonged standing in one place. Compression:  Apply: [x] Multilayer Compression Wrap: [x]RightLeg [x]Left Leg                                 []Coflex   [x]Coflex Lite             []Unna     [x] Do not get leg(s) with wrap wet. If wraps become too tight call the center or completely remove the wrap. [x] Elevate leg(s) above the level of the heart when sitting. [x] Avoid prolonged standing in one place. Dietary:  [] Diet as tolerated: [] Calorie Diabetic Diet: [x] No Added Salt:  [x] Increase Protein: [] Other:     Activity:  [x] Activity as tolerated:    [] Patient has no activity restrictions       [] Strict Bedrest:   [] Remain off Work:       [] May return to full duty work:                                     [] Return to work with restrictions:               [address] Information: Should you experience any significant changes in your wound(s) or have questions about your wound care, please contact Dane Hanks at [place] [address] 8:00 am - 4:30. If you need help with your wound outside of these hours and cannot wait until we are again available, contact your PCP or go to the hospital emergency room. PLEASE NOTE: IF YOU ARE UNABLE TO OBTAIN WOUND SUPPLIES, CONTINUE TO USE THE SUPPLIES YOU HAVE AVAILABLE UNTIL YOU ARE ABLE TO REACH US.  IT IS MOST IMPORTANT TO KEEP THE WOUND COVERED AT ALL TIMES.     [x] Dr. Chai Greene   [] Dr. Crissie Soulier  [] Dr. Saba Bender

## 2022-03-28 NOTE — DISCHARGE INSTRUCTIONS
Discharge Instructions for  [address] [place] [address], [facility]7 [facility]  Telephone: [phone]    NAME:  Merary Urena OF BIRTH:  1955  MEDICAL RECORD NUMBER:  [id number]  DATE:  3/28/2022      Return Appointment:  [] Dressing supply provider:   [] Home Healthcare:  [x] Return Appointment: 2 Week(s)  [x] Nurse Visit:  3/31/22, 4/5/22, 4/8/22    [] Discharge from [facility]  [x] Ordered tests: Vascular testing to be done at [facility], please call [phone] to schedule  [] Referrals:   [] Rx:   [] Other:      Wound Cleansing:   Do not scrub or use excessive force. Cleanse wound prior to applying a clean dressing with:  [] Normal Saline   [x] Mild Soap & Water/Baby Shampoo   [] Keep Wound Dry in Shower  [] Other:      Topical Treatments:  Do not apply lotions, creams, or ointments to wound bed unless directed. [x] Apply moisturizing lotion to skin surrounding the wound prior to dressing change.  [] Apply antifungal ointment to skin surrounding the wound prior to dressing change.  [] Apply thin film of moisture barrier/zinc ointment to skin immediately around wound. [] Other:       Dressings:           Wound Location R & L legs   [x] Apply Primary Dressing:       [] MediHoney Gel    [] MediHoney Alginate               [] Calcium Alginate      [] Calcium Alginate with Silver   [] Collagen                   [x] Collagen with Silver                [] Santyl with Moistened saline gauze              [] Hydrofera Blue (cut to size and moistened)  [] Hydrofera Blue Ready (Cut to size)   [] NS wet to dry    [] Betadine wet to dry              [] Hydrogel                [] Mepitel     [] Bactroban/Mupirocin               [] Other:     [x] Cover and Secure with:     [x] Gauze [] Ricka Jack [] Kerlix   [] Foam [x] Super Absorbant [] ABD     [] ACE Wrap            [] Other:    Limit contact of tape with skin.     [x] Change dressing: [] Daily [] Every Other Day [] Once per week   [x] Twice per week   [] Three times per week [] Do Not Change Dressing   [] Other:    Pressure Relief:  [] When sitting, shift position or do seat lifts every 15 minutes.  [] Wheelchair cushion [] Specialty Bed/Mattress  [x]  Avoid direct pressure on wound site. Edema Control:  Apply: [] Compression Stocking:   mmHg  []Right Leg []Left Leg   [] Tubigrip []Right Leg Double Layer []Left Leg Double Layer      []Right Leg Single Layer []Left Leg Single Layer     [] Elevate leg(s) above the level of the heart when sitting. [] Avoid prolonged standing in one place. Compression:  Apply: [x] Multilayer Compression Wrap: [x]RightLeg [x]Left Leg                                 []Coflex   [x]Coflex Lite             []Unna     [x] Do not get leg(s) with wrap wet. If wraps become too tight call the center or completely remove the wrap. [x] Elevate leg(s) above the level of the heart when sitting. [x] Avoid prolonged standing in one place. Dietary:  [] Diet as tolerated: [] Calorie Diabetic Diet: [x] No Added Salt:  [x] Increase Protein: [] Other:     Activity:  [x] Activity as tolerated:    [] Patient has no activity restrictions       [] Strict Bedrest:   [] Remain off Work:       [] May return to full duty work:                                     [] Return to work with restrictions:               [address] Information: Should you experience any significant changes in your wound(s) or have questions about your wound care, please contact Dane Hanks at [place] [address] 8:00 am - 4:30. If you need help with your wound outside of these hours and cannot wait until we are again available, contact your PCP or go to the hospital emergency room. PLEASE NOTE: IF YOU ARE UNABLE TO OBTAIN WOUND SUPPLIES, CONTINUE TO USE THE SUPPLIES YOU HAVE AVAILABLE UNTIL YOU ARE ABLE TO REACH US.  IT IS MOST IMPORTANT TO KEEP THE WOUND COVERED AT ALL TIMES.     [x] Dr. Nehemiah Arzate   [] Dr. Ronnie Pearson  [] Dr. Mac Warren

## 2022-03-28 NOTE — WOUND CARE
Multilayer Compression Wrap   (Not Unna) Below the Knee    NAME:  Lucia Bishop  YOB: 1955  MEDICAL RECORD NUMBER:  [id number]  DATE:  3/28/2022    Applied primary and secondary dressing as ordered. Applied multilayered dressing below the knee to right lower leg. Applied multilayered dressing below the knee to left lower leg. Instructed patient/caregiver not to remove dressing and to keep it clean and dry. Instructed patient/caregiver on complications to report to provider, such as pain, numbness in toes, heavy drainage, and slippage of dressing. Instructed patient on purpose of compression dressing and on activity and exercise recommendations.     Response to treatment: Well tolerated by patient       Electronically signed by Odessa Rivas RN on 3/28/2022 at 9:56 AM

## 2022-03-31 ENCOUNTER — HOSPITAL ENCOUNTER (OUTPATIENT)
Dept: WOUND CARE | Age: 67
Discharge: HOME | End: 2022-03-31
Payer: MEDICARE

## 2022-03-31 VITALS
TEMPERATURE: 98 F | SYSTOLIC BLOOD PRESSURE: 149 MMHG | RESPIRATION RATE: 20 BRPM | DIASTOLIC BLOOD PRESSURE: 65 MMHG | HEART RATE: 79 BPM

## 2022-03-31 PROCEDURE — 29581 APPL MULTLAYER CMPRN SYS LEG: CPT

## 2022-03-31 NOTE — WOUND CARE
Multilayer Compression Wrap   (Not Unna) Below the Knee    NAME:  Julienne Paulino  YOB: 1955  MEDICAL RECORD NUMBER:  [id number]  DATE:  3/31/2022    Removed old Multilayer wrap if indicated and wash leg with mild soap/water. Applied moisturizing agent to dry skin as needed. Applied primary and secondary dressing as ordered. Applied multilayered dressing below the knee to right lower leg. Applied multilayered dressing below the knee to left lower leg. Instructed patient/caregiver not to remove dressing and to keep it clean and dry. Instructed patient/caregiver on complications to report to provider, such as pain, numbness in toes, heavy drainage, and slippage of dressing. Instructed patient on purpose of compression dressing and on activity and exercise recommendations.     Response to treatment: Well tolerated by patient       Electronically signed by Hoa Davis RN on 3/31/2022 at 11:35 AM

## 2022-04-05 ENCOUNTER — HOSPITAL ENCOUNTER (OUTPATIENT)
Dept: WOUND CARE | Age: 67
Discharge: HOME | End: 2022-04-05
Payer: MEDICARE

## 2022-04-05 VITALS
RESPIRATION RATE: 22 BRPM | TEMPERATURE: 97.5 F | SYSTOLIC BLOOD PRESSURE: 155 MMHG | HEART RATE: 70 BPM | DIASTOLIC BLOOD PRESSURE: 90 MMHG

## 2022-04-05 PROCEDURE — 29581 APPL MULTLAYER CMPRN SYS LEG: CPT

## 2022-04-05 NOTE — WOUND CARE
Multilayer Compression Wrap   (Not Unna) Below the Knee    NAME:  Lady Wiley  YOB: 1955  MEDICAL RECORD NUMBER:  [id number]  DATE:  4/5/2022    Removed old Multilayer wrap if indicated and wash leg with mild soap/water. Applied primary and secondary dressing as ordered. Applied multilayered dressing below the knee to right lower leg. Applied multilayered dressing below the knee to left lower leg. Instructed patient/caregiver not to remove dressing and to keep it clean and dry. Instructed patient/caregiver on complications to report to provider, such as pain, numbness in toes, heavy drainage, and slippage of dressing. Instructed patient on purpose of compression dressing and on activity and exercise recommendations.     Response to treatment: Well tolerated by patient       Electronically signed by Evelyn Khan RN on 4/5/2022 at 10:09 AM

## 2022-04-08 ENCOUNTER — HOSPITAL ENCOUNTER (OUTPATIENT)
Dept: WOUND CARE | Age: 67
Discharge: HOME | End: 2022-04-08
Payer: MEDICARE

## 2022-04-08 VITALS
HEART RATE: 72 BPM | TEMPERATURE: 97.8 F | RESPIRATION RATE: 22 BRPM | DIASTOLIC BLOOD PRESSURE: 94 MMHG | SYSTOLIC BLOOD PRESSURE: 172 MMHG

## 2022-04-08 PROCEDURE — 99213 OFFICE O/P EST LOW 20 MIN: CPT

## 2022-04-12 ENCOUNTER — HOSPITAL ENCOUNTER (OUTPATIENT)
Dept: WOUND CARE | Age: 67
Discharge: HOME | End: 2022-04-12
Payer: MEDICARE

## 2022-04-12 VITALS
HEART RATE: 66 BPM | SYSTOLIC BLOOD PRESSURE: 152 MMHG | DIASTOLIC BLOOD PRESSURE: 82 MMHG | TEMPERATURE: 97.9 F | RESPIRATION RATE: 22 BRPM

## 2022-04-12 PROCEDURE — 29581 APPL MULTLAYER CMPRN SYS LEG: CPT

## 2022-04-12 PROCEDURE — 74011000250 HC RX REV CODE- 250: Performed by: SPECIALIST

## 2022-04-12 RX ORDER — LIDOCAINE HYDROCHLORIDE 20 MG/ML
15 SOLUTION OROPHARYNGEAL AS NEEDED
Status: DISCONTINUED | OUTPATIENT
Start: 2022-04-12 | End: 2022-04-14 | Stop reason: HOSPADM

## 2022-04-12 NOTE — DISCHARGE INSTRUCTIONS
Discharge Instructions for  [address] [place] [address], [facility]7 [facility]  Telephone: [phone]    NAME:  Nikos Chain OF BIRTH:  1955  MEDICAL RECORD NUMBER:  [id number]  DATE:  4/12/2022      Return Appointment:  [] Dressing supply provider:   [] Home Healthcare:  [x] Return Appointment: 1Week(s)  [x] Nurse Visit:  4/15/22  AFTER VASCULAR TESTING AT [facility] [address]    [] Discharge from [facility]  [x] Ordered tests: Vascular testing to be done at [facility], 4/15/2022 ARRIVE AT 9:30- 2 TEST TO BE DONE     [x] Referrals: DR CARRERO-KEEP APPOINTMENT FOR Monday 4/18/2022    [] Rx:   [] Other:      Wound Cleansing:   Do not scrub or use excessive force. Cleanse wound prior to applying a clean dressing with:  [] Normal Saline   [x] Mild Soap & Water/Baby Shampoo   [] Keep Wound Dry in Shower  [] Other:      Topical Treatments:  Do not apply lotions, creams, or ointments to wound bed unless directed. [x] Apply moisturizing lotion to skin surrounding the wound prior to dressing change.  [] Apply antifungal ointment to skin surrounding the wound prior to dressing change.  [] Apply thin film of moisture barrier/zinc ointment to skin immediately around wound.   [] Other:       Dressings:           Wound Location R & L legs   [x] Apply Primary Dressing:       [] MediHoney Gel    [] MediHoney Alginate               [] Calcium Alginate      [] Calcium Alginate with Silver   [] Collagen                   [x] Collagen with Silver                [] Santyl with Moistened saline gauze              [] Hydrofera Blue (cut to size and moistened)  [] Hydrofera Blue Ready (Cut to size)   [] NS wet to dry    [] Betadine wet to dry              [] Hydrogel                [] Mepitel     [] Bactroban/Mupirocin               [] Other:     [x] Cover and Secure with:     [x] Gauze [] Tiffany [] Kerlix   [] Foam [x] Super Absorbant [] ABD     [] ACE Wrap [] Other:    Limit contact of tape with skin. [x] Change dressing: [] Daily    [] Every Other Day [] Once per week   [x] Twice per week   [] Three times per week [] Do Not Change Dressing   [] Other:    Pressure Relief:  [] When sitting, shift position or do seat lifts every 15 minutes.  [] Wheelchair cushion [] Specialty Bed/Mattress  [x]  Avoid direct pressure on wound site. Edema Control:  Apply: [] Compression Stocking:   mmHg  []Right Leg []Left Leg   [] Tubigrip []Right Leg Double Layer []Left Leg Double Layer      []Right Leg Single Layer []Left Leg Single Layer     [] Elevate leg(s) above the level of the heart when sitting. [] Avoid prolonged standing in one place. Compression:  Apply: [x] Multilayer Compression Wrap: [x]RightLeg [x]Left Leg                                 []Coflex   [x]Coflex Lite             []Unna     [x] Do not get leg(s) with wrap wet. If wraps become too tight call the center or completely remove the wrap. [x] Elevate leg(s) above the level of the heart when sitting. [x] Avoid prolonged standing in one place. Dietary:  [] Diet as tolerated: [] Calorie Diabetic Diet: [x] No Added Salt:  [x] Increase Protein: [] Other:     Activity:  [x] Activity as tolerated:    [] Patient has no activity restrictions       [] Strict Bedrest:   [] Remain off Work:       [] May return to full duty work:                                     [] Return to work with restrictions:               [address] Information: Should you experience any significant changes in your wound(s) or have questions about your wound care, please contact Dane Hanks at [place] [address] 8:00 am - 4:30. If you need help with your wound outside of these hours and cannot wait until we are again available, contact your PCP or go to the hospital emergency room.      PLEASE NOTE: IF YOU ARE UNABLE TO OBTAIN WOUND SUPPLIES, CONTINUE TO USE THE SUPPLIES YOU HAVE AVAILABLE UNTIL YOU ARE ABLE TO REACH US. IT IS MOST IMPORTANT TO KEEP THE WOUND COVERED AT ALL TIMES.     [x] Dr. Ashlee Chester   [] Dr. Hosea Fernandes  [] Dr. Jonh Kim

## 2022-04-12 NOTE — WOUND CARE
Multilayer Compression Wrap   (Not Unna) Below the Knee    NAME:  Parveen Montanez  YOB: 1955  MEDICAL RECORD NUMBER:  [id number]  DATE:  4/12/2022    Removed old Multilayer wrap if indicated and wash leg with mild soap/water. Applied moisturizing agent to dry skin as needed. Applied primary and secondary dressing as ordered. Applied multilayered dressing below the knee to right lower leg. Applied multilayered dressing below the knee to left lower leg. Instructed patient/caregiver not to remove dressing and to keep it clean and dry. Instructed patient/caregiver on complications to report to provider, such as pain, numbness in toes, heavy drainage, and slippage of dressing. Instructed patient on purpose of compression dressing and on activity and exercise recommendations.     Response to treatment: Well tolerated by patient       Electronically signed by Eulogio Yeung LPN on 4/20/3211 at 5:06 AM

## 2022-04-12 NOTE — WOUND CARE
[address]   Progress Note and Procedure Note   NO Procedure      [facility] RECORD NUMBER:  [id number]  AGE: 74 y.o. RACE WHITE/NON-  BLACK/  GENDER: male  : 1955  EPISODE DATE:  2022    Subjective:   Denies fever, increased drainage, increased    Chief Complaint   Patient presents with    Wound Check     R and L leg         HISTORY of PRESENT ILLNESS HPI Dawna Severance is a 74 y.o. male who presents today for wound/ulcer evaluation. History of Wound Context: BLE  Wound/Ulcer Pain Timing/Severity: none  Quality of pain: dull  Severity:  0 / 10   Modifying Factors: None  Associated Signs/Symptoms: edema    Ulcer Identification:  Ulcer Type: venous    Contributing Factors: lymphedema    Wound: BLE         PAST MEDICAL HISTORY    Past Medical History:   Diagnosis Date    Arthritis     Chronic obstructive pulmonary disease (HCC)     Diabetes ([facility] [address]     Hypertension     Sleep apnea         PAST SURGICAL HISTORY    Past Surgical History:   Procedure Laterality Date    HX GI      HX HERNIA REPAIR         FAMILY HISTORY    Family History   Problem Relation Age of Onset    Hypertension Child     Hypertension Sister     Hypertension Sister        SOCIAL HISTORY    Social History     Tobacco Use    Smoking status: Never Smoker    Smokeless tobacco: Never Used   Substance Use Topics    Alcohol use: Never    Drug use: Never       ALLERGIES    No Known Allergies    MEDICATIONS    Current Outpatient Medications on File Prior to Encounter   Medication Sig Dispense Refill    nebivoloL (BYSTOLIC) 10 mg tablet Take 10 mg by mouth daily.  multivitamin (ONE A DAY) tablet Take 1 Tablet by mouth daily.  furosemide (LASIX) 40 mg tablet Take 1 Tablet by mouth daily. 30 Tablet 0    atorvastatin (LIPITOR) 20 mg tablet Take 20 mg by mouth nightly.       aspirin delayed-release 81 mg tablet TAKE ONE TABLET BY MOUTH ONE TIME DAILY       No current facility-administered medications on file prior to encounter. REVIEW OF SYSTEMS    Pertinent items are noted in HPI. Objective:     Visit Vitals  BP (!) 152/82 (BP 1 Location: Right upper arm, BP Patient Position: At rest;Sitting)   Pulse 66   Temp 97.9 °F (36.6 °C)   Resp 22       Wt Readings from Last 3 Encounters:   03/28/22 158.8 kg (350 lb)   10/12/21 (!) 181.4 kg (400 lb)   12/09/20 (!) 181.4 kg (400 lb)       PHYSICAL EXAM  The left and right calf wounds remain superficial, healthy granulation tissue, no evidence of active infection; some improvement in measurements  Pertinent items are noted in HPI. Assessment:   Good progress  Problem List Items Addressed This Visit     None          Procedure Note  Indications:  Based on my examination of this patient's wound(s)/ulcer(s) today, debridement is not required to promote healing and evaluate the wound base. Wound Leg lower Right;Posterior #1 03/28/22 (Active)   Wound Image   04/12/22 0843   Wound Etiology Venous 04/12/22 0843   Dressing Status Clean;Dry; Intact 04/12/22 0843   Cleansed Cleansed with saline 04/12/22 0843   Wound Length (cm) 10.5 cm 04/12/22 0843   Wound Width (cm) 3.5 cm 04/12/22 0843   Wound Depth (cm) 0.1 cm 04/12/22 0843   Wound Surface Area (cm^2) 36.75 cm^2 04/12/22 0843   Change in Wound Size % 68.32 04/12/22 0843   Wound Volume (cm^3) 3.675 cm^3 04/12/22 0843   Wound Healing % 68 04/12/22 0843   Wound Assessment Granulation tissue;Slough 04/12/22 0843   Drainage Amount Large 04/12/22 0843   Drainage Description Serosanguinous 04/12/22 0843   Wound Odor None 04/12/22 0843   Betty-Wound/Incision Assessment Intact 04/12/22 0843   Edges Attached edges 04/12/22 0843   Wound Thickness Description Full thickness 04/12/22 0843   Number of days: 15       Wound Leg lower Left;Posterior #2 03/28/22 (Active)   Wound Image   04/12/22 0844   Wound Etiology Venous 04/12/22 0844   Dressing Status Clean;Dry; Intact 04/12/22 2255 Cleansed Cleansed with saline 04/12/22 0844   Wound Length (cm) 8.5 cm 04/12/22 0844   Wound Width (cm) 7 cm 04/12/22 0844   Wound Depth (cm) 0.2 cm 04/12/22 0844   Wound Surface Area (cm^2) 59.5 cm^2 04/12/22 0844   Change in Wound Size % 33.33 04/12/22 0844   Wound Volume (cm^3) 11.9 cm^3 04/12/22 0844   Wound Healing % 56 04/12/22 0844   Wound Assessment Granulation tissue;Slough 04/12/22 0844   Drainage Amount Moderate 04/12/22 0844   Drainage Description Serosanguinous 04/12/22 0844   Wound Odor None 04/12/22 0844   Betty-Wound/Incision Assessment Intact 04/12/22 0844   Edges Attached edges 04/12/22 0844   Wound Thickness Description Full thickness 04/12/22 0844   Number of days: 15        Plan:   Continue Yuridia, Coflex lite  Vascular labs at the hospital on Friday  Appointment with Dr. Sofiya Zuleta on Monday  Leg elevation    Treatment Note please see attached Discharge Instructions    Written patient dismissal instructions given to patient or POA.          Electronically signed by Avila Donis MD on 4/12/2022 at 8:53 AM

## 2022-04-12 NOTE — WOUND CARE
Discharge Instructions for  [address] [place] [address], [address] [place] [address]  Telephone: [phone]    NAME:  Yeimy Gage OF BIRTH:  1955  MEDICAL RECORD NUMBER:  [id number]  DATE:  4/12/2022      Return Appointment:  [] Dressing supply provider:   [] Home Healthcare:  [x] Return Appointment: 1Week(s)  [x] Nurse Visit:  4/15/22  AFTER VASCULAR TESTING AT [facility], [facility] [address]    [] Discharge from [facility]  [x] Ordered tests: Vascular testing to be done at [facility], 4/15/2022 ARRIVE AT 9:30- 2 TEST TO BE DONE     [x] Referrals: DR CARRERO-KEEP APPOINTMENT FOR Monday 4/18/2022    [] Rx:   [] Other:      Wound Cleansing:   Do not scrub or use excessive force. Cleanse wound prior to applying a clean dressing with:  [] Normal Saline   [x] Mild Soap & Water/Baby Shampoo   [] Keep Wound Dry in Shower  [] Other:      Topical Treatments:  Do not apply lotions, creams, or ointments to wound bed unless directed. [x] Apply moisturizing lotion to skin surrounding the wound prior to dressing change.  [] Apply antifungal ointment to skin surrounding the wound prior to dressing change.  [] Apply thin film of moisture barrier/zinc ointment to skin immediately around wound.   [] Other:       Dressings:           Wound Location R & L legs   [x] Apply Primary Dressing:       [] MediHoney Gel    [] MediHoney Alginate               [] Calcium Alginate      [] Calcium Alginate with Silver   [] Collagen                   [x] Collagen with Silver                [] Santyl with Moistened saline gauze              [] Hydrofera Blue (cut to size and moistened)  [] Hydrofera Blue Ready (Cut to size)   [] NS wet to dry    [] Betadine wet to dry              [] Hydrogel                [] Mepitel     [] Bactroban/Mupirocin               [] Other:     [x] Cover and Secure with:     [x] Gauze [] Tiffany [] Kerlix   [] Foam [x] Super Absorbant [] ABD     [] ACE Wrap [] Other:    Limit contact of tape with skin. [x] Change dressing: [] Daily    [] Every Other Day [] Once per week   [x] Twice per week   [] Three times per week [] Do Not Change Dressing   [] Other:    Pressure Relief:  [] When sitting, shift position or do seat lifts every 15 minutes.  [] Wheelchair cushion [] Specialty Bed/Mattress  [x]  Avoid direct pressure on wound site. Edema Control:  Apply: [] Compression Stocking:   mmHg  []Right Leg []Left Leg   [] Tubigrip []Right Leg Double Layer []Left Leg Double Layer      []Right Leg Single Layer []Left Leg Single Layer     [] Elevate leg(s) above the level of the heart when sitting. [] Avoid prolonged standing in one place. Compression:  Apply: [x] Multilayer Compression Wrap: [x]RightLeg [x]Left Leg                                 []Coflex   [x]Coflex Lite             []Unna     [x] Do not get leg(s) with wrap wet. If wraps become too tight call the center or completely remove the wrap. [x] Elevate leg(s) above the level of the heart when sitting. [x] Avoid prolonged standing in one place. Dietary:  [] Diet as tolerated: [] Calorie Diabetic Diet: [x] No Added Salt:  [x] Increase Protein: [] Other:     Activity:  [x] Activity as tolerated:    [] Patient has no activity restrictions       [] Strict Bedrest:   [] Remain off Work:       [] May return to full duty work:                                     [] Return to work with restrictions:               [address] Information: Should you experience any significant changes in your wound(s) or have questions about your wound care, please contact Dane Hanks at [place] [address] 8:00 am - 4:30. If you need help with your wound outside of these hours and cannot wait until we are again available, contact your PCP or go to the hospital emergency room.      PLEASE NOTE: IF YOU ARE UNABLE TO OBTAIN WOUND SUPPLIES, CONTINUE TO USE THE SUPPLIES YOU HAVE AVAILABLE UNTIL YOU ARE ABLE TO REACH US. IT IS MOST IMPORTANT TO KEEP THE WOUND COVERED AT ALL TIMES.     [x] Dr. Mal Palomino   [] Dr. Carmen Raymond  [] Dr. Kieran Lowery

## 2022-04-15 ENCOUNTER — HOSPITAL ENCOUNTER (OUTPATIENT)
Dept: NON INVASIVE DIAGNOSTICS | Age: 67
Discharge: HOME | End: 2022-04-15
Attending: SPECIALIST
Payer: MEDICARE

## 2022-04-15 ENCOUNTER — HOSPITAL ENCOUNTER (OUTPATIENT)
Dept: WOUND CARE | Age: 67
Discharge: HOME | End: 2022-04-15
Attending: SPECIALIST
Payer: MEDICARE

## 2022-04-15 VITALS
DIASTOLIC BLOOD PRESSURE: 88 MMHG | RESPIRATION RATE: 20 BRPM | HEART RATE: 67 BPM | TEMPERATURE: 97.5 F | SYSTOLIC BLOOD PRESSURE: 161 MMHG

## 2022-04-15 DIAGNOSIS — I87.2 PERIPHERAL VENOUS INSUFFICIENCY: ICD-10-CM

## 2022-04-15 DIAGNOSIS — L97.909 ULCER OF LOWER EXTREMITY, UNSPECIFIED LATERALITY, UNSPECIFIED ULCER STAGE (HCC): ICD-10-CM

## 2022-04-15 DIAGNOSIS — L97.812 NON-PRESSURE CHRONIC ULCER OF OTHER PART OF RIGHT LOWER LEG WITH FAT LAYER EXPOSED (HCC): ICD-10-CM

## 2022-04-15 DIAGNOSIS — L97.909 LOWER EXTREMITY ULCERATION (HCC): ICD-10-CM

## 2022-04-15 PROCEDURE — 93970 EXTREMITY STUDY: CPT

## 2022-04-15 PROCEDURE — 29581 APPL MULTLAYER CMPRN SYS LEG: CPT

## 2022-04-15 PROCEDURE — 93922 UPR/L XTREMITY ART 2 LEVELS: CPT

## 2022-04-15 NOTE — WOUND CARE
Multilayer Compression Wrap   (Not Unna) Below the Knee    NAME:  Eagle Amato  YOB: 1955  MEDICAL RECORD NUMBER:  [id number]  DATE:  4/15/2022    Removed old Multilayer wrap if indicated and wash leg with mild soap/water. Applied primary and secondary dressing as ordered. Applied multilayered dressing below the knee to right lower leg. Applied multilayered dressing below the knee to left lower leg. Instructed patient/caregiver not to remove dressing and to keep it clean and dry. Instructed patient/caregiver on complications to report to provider, such as pain, numbness in toes, heavy drainage, and slippage of dressing. Instructed patient on purpose of compression dressing and on activity and exercise recommendations.     Response to treatment: Well tolerated by patient       Electronically signed by Irene Infante RN on 4/15/2022 at 12:01 PM

## 2022-04-16 LAB
LEFT ABI: 1
LEFT ARM BP: 156 MMHG
LEFT GSV BK DIST RFX: 1.9 S
LEFT GSV BK MID RFX: 1 S
LEFT GSV BK PROX RFX: 2.7 S
LEFT GSV THIGH DIST RFX: 1.6 S
LEFT GSV THIGH PROX RFX: 4 S
LEFT GSV THIGHT MID RFX: 2.6 S
LEFT POP RFX: 1 S
LEFT POSTERIOR TIBIAL: 168 MMHG
LEFT TBI: 0.78
LEFT TOE PRESSURE: 134 MMHG
RIGHT ABI: 1.06
RIGHT ARM BP: 171 MMHG
RIGHT POSTERIOR TIBIAL: 181 MMHG
RIGHT TBI: 0.77
RIGHT TOE PRESSURE: 131 MMHG
VAS LEFT DORSALIS PEDIS BP: 171 MMHG

## 2022-04-16 PROCEDURE — 93970 EXTREMITY STUDY: CPT | Performed by: SURGERY

## 2022-04-16 PROCEDURE — 93922 UPR/L XTREMITY ART 2 LEVELS: CPT | Performed by: SURGERY

## 2022-04-19 ENCOUNTER — HOSPITAL ENCOUNTER (OUTPATIENT)
Dept: WOUND CARE | Age: 67
Discharge: HOME | End: 2022-04-19
Payer: MEDICARE

## 2022-04-19 VITALS
HEART RATE: 66 BPM | RESPIRATION RATE: 18 BRPM | DIASTOLIC BLOOD PRESSURE: 75 MMHG | TEMPERATURE: 97.3 F | SYSTOLIC BLOOD PRESSURE: 140 MMHG

## 2022-04-19 PROCEDURE — 11045 DBRDMT SUBQ TISS EACH ADDL: CPT

## 2022-04-19 PROCEDURE — 11042 DBRDMT SUBQ TIS 1ST 20SQCM/<: CPT

## 2022-04-19 PROCEDURE — 74011000250 HC RX REV CODE- 250: Performed by: SPECIALIST

## 2022-04-19 RX ORDER — LIDOCAINE HYDROCHLORIDE 20 MG/ML
15 SOLUTION OROPHARYNGEAL AS NEEDED
Status: DISCONTINUED | OUTPATIENT
Start: 2022-04-19 | End: 2022-04-21 | Stop reason: HOSPADM

## 2022-04-19 NOTE — WOUND CARE
Multilayer Compression Wrap   (Not Unna) Below the Knee    NAME:  Zabrina Nelson  YOB: 1955  MEDICAL RECORD NUMBER:  [id number]  DATE:  4/19/2022    Removed old Multilayer wrap if indicated and wash leg with mild soap/water. Applied moisturizing agent to dry skin as needed. Applied primary and secondary dressing as ordered. Applied multilayered dressing below the knee to right lower leg. Applied multilayered dressing below the knee to left lower leg. Instructed patient/caregiver not to remove dressing and to keep it clean and dry. Instructed patient/caregiver on complications to report to provider, such as pain, numbness in toes, heavy drainage, and slippage of dressing. Instructed patient on purpose of compression dressing and on activity and exercise recommendations.     Response to treatment: Well tolerated by patient       Electronically signed by Ascencion Verma LPN on 8/54/3517 at 6:23 PM

## 2022-04-19 NOTE — WOUND CARE
Discharge Instructions for  [address] [place] [address], [address] [place] [address]  Telephone: [phone]    NAME:  Dimas Coffman  YOB: 1955  MEDICAL RECORD NUMBER:  [id number]  DATE:  4/19/2022      Return Appointment:  [] Dressing supply provider:   [] Home Healthcare:  [x] Return Appointment: 1 Week(s)  [] Nurse Visit: 1 week  [] Discharge from [facility]  [] Ordered tests:   [x] Referrals: DR CARRERO-SCHEDULE  APPOINTMENT   [] Rx:   [] Other:      Wound Cleansing:   Do not scrub or use excessive force. Cleanse wound prior to applying a clean dressing with:  [x] Normal Saline   [x] Mild Soap & Water/Baby Shampoo   [] Keep Wound Dry in Shower  [] Other:      Topical Treatments:  Do not apply lotions, creams, or ointments to wound bed unless directed. [x] Apply moisturizing lotion to skin surrounding the wound prior to dressing change.  [] Apply antifungal ointment to skin surrounding the wound prior to dressing change.  [] Apply thin film of moisture barrier/zinc ointment to skin immediately around wound. [] Other:       Dressings:           Wound Location R & L legs   [x] Apply Primary Dressing:       [] MediHoney Gel    [] MediHoney Alginate               [] Calcium Alginate      [] Calcium Alginate with Silver   [] Collagen                   [x] Collagen with Silver                [] Santyl with Moistened saline gauze              [] Hydrofera Blue (cut to size and moistened)  [] Hydrofera Blue Ready (Cut to size)   [] NS wet to dry    [] Betadine wet to dry              [] Hydrogel                [] Mepitel     [] Bactroban/Mupirocin               [] Other:     [x] Cover and Secure with:     [x] Gauze [] Miguel Silver [] Kerlix   [] Foam [x] Super Absorbant [] ABD     [] ACE Wrap            [] Other:    Limit contact of tape with skin.     [x] Change dressing: [] Daily    [] Every Other Day [] Once per week   [x] Twice per week   [] Three times per week [] Do Not Change Dressing   [] Other:    Pressure Relief:  [] When sitting, shift position or do seat lifts every 15 minutes.  [] Wheelchair cushion [] Specialty Bed/Mattress  [x]  Avoid direct pressure on wound site. Edema Control:  Apply: [] Compression Stocking:   mmHg  []Right Leg []Left Leg   [] Tubigrip []Right Leg Double Layer []Left Leg Double Layer      []Right Leg Single Layer []Left Leg Single Layer     [] Elevate leg(s) above the level of the heart when sitting. [] Avoid prolonged standing in one place. Compression:  Apply: [x] Multilayer Compression Wrap: [x]RightLeg [x]Left Leg                                 []Coflex   [x]Coflex Lite             []Unna     [x] Do not get leg(s) with wrap wet. If wraps become too tight call the center or completely remove the wrap. [x] Elevate leg(s) above the level of the heart when sitting. [x] Avoid prolonged standing in one place. Dietary:  [] Diet as tolerated: [] Calorie Diabetic Diet: [x] No Added Salt:  [x] Increase Protein: [] Other:     Activity:  [x] Activity as tolerated:    [] Patient has no activity restrictions       [] Strict Bedrest:   [] Remain off Work:       [] May return to full duty work:                                     [] Return to work with restrictions:               [address] Information: Should you experience any significant changes in your wound(s) or have questions about your wound care, please contact Dane Hanks at [place] [address] 8:00 am - 4:30. If you need help with your wound outside of these hours and cannot wait until we are again available, contact your PCP or go to the hospital emergency room. PLEASE NOTE: IF YOU ARE UNABLE TO OBTAIN WOUND SUPPLIES, CONTINUE TO USE THE SUPPLIES YOU HAVE AVAILABLE UNTIL YOU ARE ABLE TO REACH US. IT IS MOST IMPORTANT TO KEEP THE WOUND COVERED AT ALL TIMES.     [x] Dr. Basil Jones   [] Dr. Miguel Angel Cook  [] Dr. Sarah Bray Mike

## 2022-04-19 NOTE — DISCHARGE INSTRUCTIONS
Discharge Instructions for  [address] [place] [address], [address] [place] [address]  Telephone: [phone]    NAME:  Yelena Crawford  YOB: 1955  MEDICAL RECORD NUMBER:  [id number]  DATE:  4/19/2022      Return Appointment:  [] Dressing supply provider:   [] Home Healthcare:  [x] Return Appointment: 1 Week(s)  [] Nurse Visit: 1 week  [] Discharge from [facility]  [] Ordered tests:   [x] Referrals: DR CARRERO-SCHEDULE  APPOINTMENT   [] Rx:   [] Other:      Wound Cleansing:   Do not scrub or use excessive force. Cleanse wound prior to applying a clean dressing with:  [x] Normal Saline   [x] Mild Soap & Water/Baby Shampoo   [] Keep Wound Dry in Shower  [] Other:      Topical Treatments:  Do not apply lotions, creams, or ointments to wound bed unless directed. [x] Apply moisturizing lotion to skin surrounding the wound prior to dressing change.  [] Apply antifungal ointment to skin surrounding the wound prior to dressing change.  [] Apply thin film of moisture barrier/zinc ointment to skin immediately around wound. [] Other:       Dressings:           Wound Location R & L legs   [x] Apply Primary Dressing:       [] MediHoney Gel    [] MediHoney Alginate               [] Calcium Alginate      [] Calcium Alginate with Silver   [] Collagen                   [x] Collagen with Silver                [] Santyl with Moistened saline gauze              [] Hydrofera Blue (cut to size and moistened)  [] Hydrofera Blue Ready (Cut to size)   [] NS wet to dry    [] Betadine wet to dry              [] Hydrogel                [] Mepitel     [] Bactroban/Mupirocin               [] Other:     [x] Cover and Secure with:     [x] Gauze [] Licona Clock [] Kerlix   [] Foam [x] Super Absorbant [] ABD     [] ACE Wrap            [] Other:    Limit contact of tape with skin.     [x] Change dressing: [] Daily    [] Every Other Day [] Once per week   [x] Twice per week   [] Three times per week [] Do Not Change Dressing   [] Other:    Pressure Relief:  [] When sitting, shift position or do seat lifts every 15 minutes.  [] Wheelchair cushion [] Specialty Bed/Mattress  [x]  Avoid direct pressure on wound site. Edema Control:  Apply: [] Compression Stocking:   mmHg  []Right Leg []Left Leg   [] Tubigrip []Right Leg Double Layer []Left Leg Double Layer      []Right Leg Single Layer []Left Leg Single Layer     [] Elevate leg(s) above the level of the heart when sitting. [] Avoid prolonged standing in one place. Compression:  Apply: [x] Multilayer Compression Wrap: [x]RightLeg [x]Left Leg                                 []Coflex   [x]Coflex Lite             []Unna     [x] Do not get leg(s) with wrap wet. If wraps become too tight call the center or completely remove the wrap. [x] Elevate leg(s) above the level of the heart when sitting. [x] Avoid prolonged standing in one place. Dietary:  [] Diet as tolerated: [] Calorie Diabetic Diet: [x] No Added Salt:  [x] Increase Protein: [] Other:     Activity:  [x] Activity as tolerated:    [] Patient has no activity restrictions       [] Strict Bedrest:   [] Remain off Work:       [] May return to full duty work:                                     [] Return to work with restrictions:               [address] Information: Should you experience any significant changes in your wound(s) or have questions about your wound care, please contact Dane Hanks at [place] [address] 8:00 am - 4:30. If you need help with your wound outside of these hours and cannot wait until we are again available, contact your PCP or go to the hospital emergency room. PLEASE NOTE: IF YOU ARE UNABLE TO OBTAIN WOUND SUPPLIES, CONTINUE TO USE THE SUPPLIES YOU HAVE AVAILABLE UNTIL YOU ARE ABLE TO REACH US. IT IS MOST IMPORTANT TO KEEP THE WOUND COVERED AT ALL TIMES.     [x] Dr. Tameka Uribe   [] Dr. Sylvia Ramirez  [] Dr. Solis Alba Mike

## 2022-04-19 NOTE — WOUND CARE
[address]   Progress Note and Procedure Note     [facility] RECORD NUMBER:  [id number]  AGE: 74 y.o. RACE WHITE/NON-  BLACK/  GENDER: male  : 1955  EPISODE DATE:  2022    Subjective:   Saw Dr Julieth Fernandes, plans for intervention pending  Arterial study DENNIS R 1.0, L 1.0  Venous study: Some venous reflux  No fever, mild drainage    Chief Complaint   Patient presents with    Wound Check     bilateral legs         HISTORY of PRESENT ILLNESS HPI    Yelena Crawford is a 74 y.o. male who presents today for wound/ulcer evaluation. History of Wound Context: BLE  Wound/Ulcer Pain Timing/Severity: none  Quality of pain: dull  Severity:  0 / 10   Modifying Factors: None  Associated Signs/Symptoms: edema    Ulcer Identification:  Ulcer Type: venous    Contributing Factors: venous stasis    Wound: BLE         PAST MEDICAL HISTORY    Past Medical History:   Diagnosis Date    Arthritis     Chronic obstructive pulmonary disease (HCC)     Diabetes ([address]     Hypertension     Sleep apnea         PAST SURGICAL HISTORY    Past Surgical History:   Procedure Laterality Date    HX GI      HX HERNIA REPAIR         FAMILY HISTORY    Family History   Problem Relation Age of Onset    Hypertension Child     Hypertension Sister     Hypertension Sister        SOCIAL HISTORY    Social History     Tobacco Use    Smoking status: Never Smoker    Smokeless tobacco: Never Used   Substance Use Topics    Alcohol use: Never    Drug use: Never       ALLERGIES    No Known Allergies    MEDICATIONS    Current Outpatient Medications on File Prior to Encounter   Medication Sig Dispense Refill    nebivoloL (BYSTOLIC) 10 mg tablet Take 10 mg by mouth daily.  multivitamin (ONE A DAY) tablet Take 1 Tablet by mouth daily.  furosemide (LASIX) 40 mg tablet Take 1 Tablet by mouth daily. 30 Tablet 0    atorvastatin (LIPITOR) 20 mg tablet Take 20 mg by mouth nightly.       aspirin delayed-release 81 mg tablet TAKE ONE TABLET BY MOUTH ONE TIME DAILY       No current facility-administered medications on file prior to encounter. REVIEW OF SYSTEMS    Pertinent items are noted in HPI. Objective:     Visit Vitals  BP (!) 140/75 (BP 1 Location: Right upper arm, BP Patient Position: At rest;Sitting)   Pulse 66   Temp 97.3 °F (36.3 °C)   Resp 18       Wt Readings from Last 3 Encounters:   03/28/22 158.8 kg (350 lb)   10/12/21 (!) 181.4 kg (400 lb)   12/09/20 (!) 181.4 kg (400 lb)       PHYSICAL EXAM  The right calf wound has healthy granulation, measures slightly smaller, no evidence of infection  Left calf wound measures smaller, with slough debrided, no evidence of active infection  Pertinent items are noted in HPI. Assessment:   some improvement, especialy on the left    Problem List Items Addressed This Visit     None          Wound Leg lower Right;Posterior #1 03/28/22 (Active)   Wound Image   04/19/22 1311   Wound Etiology Venous 04/19/22 1311   Dressing Status Clean;Dry; Intact 04/19/22 1311   Cleansed Soap and water 04/19/22 1311   Wound Length (cm) 9.5 cm 04/19/22 1311   Wound Width (cm) 3.4 cm 04/19/22 1311   Wound Depth (cm) 0.1 cm 04/19/22 1311   Wound Surface Area (cm^2) 32.3 cm^2 04/19/22 1311   Change in Wound Size % 72.16 04/19/22 1311   Wound Volume (cm^3) 3.23 cm^3 04/19/22 1311   Wound Healing % 72 04/19/22 1311   Wound Assessment Granulation tissue;Slough 04/19/22 1311   Drainage Amount Large 04/19/22 1311   Drainage Description Serosanguinous 04/19/22 1311   Wound Odor None 04/19/22 1311   Betty-Wound/Incision Assessment Intact; Maceration 04/19/22 1311   Edges Attached edges 04/19/22 1311   Wound Thickness Description Full thickness 04/19/22 1311   Number of days: 22       Wound Leg lower Left;Posterior #2 03/28/22 (Active)   Wound Image   04/19/22 1312   Wound Etiology Venous 04/19/22 1312   Dressing Status Breakthrough drainage noted 04/19/22 1312   Cleansed Soap and water 04/19/22 1312   Wound Length (cm) 7.5 cm 04/19/22 1312   Wound Width (cm) 6.7 cm 04/19/22 1312   Wound Depth (cm) 0.2 cm 04/19/22 1312   Wound Surface Area (cm^2) 50.25 cm^2 04/19/22 1312   Change in Wound Size % 43.7 04/19/22 1312   Wound Volume (cm^3) 10.05 cm^3 04/19/22 1312   Wound Healing % 62 04/19/22 1312   Post-Procedure Length (cm) 7.5 cm 04/19/22 1321   Post-Procedure Width (cm) 6.7 cm 04/19/22 1321   Post-Procedure Depth (cm) 0.2 cm 04/19/22 1321   Post-Procedure Surface Area (cm^2) 50.25 cm^2 04/19/22 1321   Post-Procedure Volume (cm^3) 10.05 cm^3 04/19/22 1321   Wound Assessment [facility] 04/19/22 1312   Drainage Amount Moderate 04/19/22 1312   Drainage Description Serosanguinous 04/19/22 1312   Wound Odor None 04/19/22 1312   Betty-Wound/Incision Assessment Intact; Maceration 04/19/22 1312   Edges Attached edges 04/19/22 1312   Wound Thickness Description Full thickness 04/19/22 1312   Number of days: 22       POP IN PROCEDURE TYPE (DEBRIDEMENT, BIOPSY, I&D, SKIN SUB, CHEMICAL CAUERTY)     Plan:   Continue Yuridia, Coflex lite  Follow up with Dr Tai Holloway    Treatment Note please see attached Discharge Instructions    Written patient dismissal instructions given to patient or POA. Electronically signed by Albert Goode MD on 4/19/2022 at 1:24 PM              Debridement Wound Care        Problem List Items Addressed This Visit     None          Procedure Note  Indications:  Based on my examination of this patient's wound(s)/ulcer(s) today, debridement is required to promote healing and evaluate the wound base.     Performed by: Albert Goode MD    Consent obtained: Yes    Time out taken: Yes    Debridement: Excisional    Using curette the wound(s)/ulcer(s) was/were sharply debrided down through and including the removal of    subcutaneous tissue    Devitalized Tissue Debrided: slough    Pre Debridement Measurements:  Are located in the [place]  Documentation Flow Sheet    Non-Pressure ulcer, fat layer exposed    Wound/Ulcer #: 1 and 2    Post Debridement Measurements:  Wound/Ulcer Descriptions are Pre Debridement except measurements:    Wound Leg lower Right;Posterior #1 03/28/22 (Active)   Wound Image   04/19/22 1311   Wound Etiology Venous 04/19/22 1311   Dressing Status Clean;Dry; Intact 04/19/22 1311   Cleansed Soap and water 04/19/22 1311   Wound Length (cm) 9.5 cm 04/19/22 1311   Wound Width (cm) 3.4 cm 04/19/22 1311   Wound Depth (cm) 0.1 cm 04/19/22 1311   Wound Surface Area (cm^2) 32.3 cm^2 04/19/22 1311   Change in Wound Size % 72.16 04/19/22 1311   Wound Volume (cm^3) 3.23 cm^3 04/19/22 1311   Wound Healing % 72 04/19/22 1311   Wound Assessment Granulation tissue;Slough 04/19/22 1311   Drainage Amount Large 04/19/22 1311   Drainage Description Serosanguinous 04/19/22 1311   Wound Odor None 04/19/22 1311   Betty-Wound/Incision Assessment Intact; Maceration 04/19/22 1311   Edges Attached edges 04/19/22 1311   Wound Thickness Description Full thickness 04/19/22 1311   Number of days: 22       Wound Leg lower Left;Posterior #2 03/28/22 (Active)   Wound Image   04/19/22 1312   Wound Etiology Venous 04/19/22 1312   Dressing Status Breakthrough drainage noted 04/19/22 1312   Cleansed Soap and water 04/19/22 1312   Wound Length (cm) 7.5 cm 04/19/22 1312   Wound Width (cm) 6.7 cm 04/19/22 1312   Wound Depth (cm) 0.2 cm 04/19/22 1312   Wound Surface Area (cm^2) 50.25 cm^2 04/19/22 1312   Change in Wound Size % 43.7 04/19/22 1312   Wound Volume (cm^3) 10.05 cm^3 04/19/22 1312   Wound Healing % 62 04/19/22 1312   Post-Procedure Length (cm) 7.5 cm 04/19/22 1321   Post-Procedure Width (cm) 6.7 cm 04/19/22 1321   Post-Procedure Depth (cm) 0.2 cm 04/19/22 1321   Post-Procedure Surface Area (cm^2) 50.25 cm^2 04/19/22 1321   Post-Procedure Volume (cm^3) 10.05 cm^3 04/19/22 1321   Wound Assessment [facility] 04/19/22 1312   Drainage Amount Moderate 04/19/22 1312   Drainage Description Serosanguinous 04/19/22 1312   Wound Odor None 04/19/22 1312   Betty-Wound/Incision Assessment Intact; Maceration 04/19/22 1312   Edges Attached edges 04/19/22 1312   Wound Thickness Description Full thickness 04/19/22 1312   Number of days: 22        Total Surface Area Debrided:  40 sq cm     Estimated Blood Loss:  None    Hemostasis Achieved: Pressure    Procedural Pain: 1 / 10     Post Procedural Pain: 0 / 10     Response to treatment: Well tolerated by patient

## 2022-04-26 ENCOUNTER — HOSPITAL ENCOUNTER (OUTPATIENT)
Dept: WOUND CARE | Age: 67
Discharge: HOME | End: 2022-04-26
Payer: MEDICARE

## 2022-04-26 VITALS
RESPIRATION RATE: 18 BRPM | HEART RATE: 69 BPM | DIASTOLIC BLOOD PRESSURE: 70 MMHG | SYSTOLIC BLOOD PRESSURE: 131 MMHG | TEMPERATURE: 97.3 F

## 2022-04-26 PROCEDURE — 74011000250 HC RX REV CODE- 250: Performed by: SPECIALIST

## 2022-04-26 PROCEDURE — 29581 APPL MULTLAYER CMPRN SYS LEG: CPT

## 2022-04-26 RX ORDER — LIDOCAINE HYDROCHLORIDE 20 MG/ML
15 SOLUTION OROPHARYNGEAL AS NEEDED
Status: DISCONTINUED | OUTPATIENT
Start: 2022-04-26 | End: 2022-04-28 | Stop reason: HOSPADM

## 2022-04-26 NOTE — WOUND CARE
Multilayer Compression Wrap   (Not Unna) Below the Knee    NAME:  Dee Dee Pérez  YOB: 1955  MEDICAL RECORD NUMBER:  [id number]  DATE:  4/26/2022    Applied primary and secondary dressing as ordered. Applied multilayered dressing below the knee to right lower leg. Applied multilayered dressing below the knee to left lower leg. Instructed patient/caregiver not to remove dressing and to keep it clean and dry. Instructed patient/caregiver on complications to report to provider, such as pain, numbness in toes, heavy drainage, and slippage of dressing. Instructed patient on purpose of compression dressing and on activity and exercise recommendations.     Response to treatment: Well tolerated by patient       Electronically signed by Corey Loera RN on 4/26/2022 at 2:46 PM

## 2022-04-26 NOTE — WOUND CARE
Discharge Instructions for  [address] [place] [address], [address] [place] [address]  Telephone: [phone]    NAME:  Yelena Crawford  YOB: 1955  MEDICAL RECORD NUMBER:  [id number]  DATE:  4/26/2022      Return Appointment:  [] Dressing supply provider:   [] Home Healthcare:  [x] Return Appointment: 1 Week(s)  [] Nurse Visit:  week  [] Discharge from [facility]  [] Ordered tests:   [x] Referrals: DR CARRERO- KEEP APPOINTMENT  [] Rx:   [] Other:      Wound Cleansing:   Do not scrub or use excessive force. Cleanse wound prior to applying a clean dressing with:  [x] Normal Saline   [x] Mild Soap & Water/Baby Shampoo   [] Keep Wound Dry in Shower  [] Other:      Topical Treatments:  Do not apply lotions, creams, or ointments to wound bed unless directed. [x] Apply moisturizing lotion to skin surrounding the wound prior to dressing change.  [] Apply antifungal ointment to skin surrounding the wound prior to dressing change.  [] Apply thin film of moisture barrier/zinc ointment to skin immediately around wound. [] Other:       Dressings:           Wound Location R & L legs   [x] Apply Primary Dressing:       [] MediHoney Gel    [] MediHoney Alginate               [] Calcium Alginate      [] Calcium Alginate with Silver   [] Collagen                   [x] Collagen with Silver                [] Santyl with Moistened saline gauze              [] Hydrofera Blue (cut to size and moistened)  [] Hydrofera Blue Ready (Cut to size)   [] NS wet to dry    [] Betadine wet to dry              [] Hydrogel                [] Mepitel     [] Bactroban/Mupirocin               [] Other:     [x] Cover and Secure with:     [x] Gauze [] Licona Clock [] Kerlix   [] Foam [x] Super Absorbant [] ABD     [] ACE Wrap            [] Other:    Limit contact of tape with skin.     [x] Change dressing: [] Daily    [] Every Other Day [] Once per week   [x] Twice per week   [] Three times per week [] Do Not Change Dressing   [] Other:    Pressure Relief:  [] When sitting, shift position or do seat lifts every 15 minutes.  [] Wheelchair cushion [] Specialty Bed/Mattress  [x]  Avoid direct pressure on wound site. Edema Control:  Apply: [] Compression Stocking:   mmHg  []Right Leg []Left Leg   [] Tubigrip []Right Leg Double Layer []Left Leg Double Layer      []Right Leg Single Layer []Left Leg Single Layer     [] Elevate leg(s) above the level of the heart when sitting. [] Avoid prolonged standing in one place. Compression:  Apply: [x] Multilayer Compression Wrap: [x]RightLeg [x]Left Leg                                 []Coflex   [x]Coflex Lite             []Unna     [x] Do not get leg(s) with wrap wet. If wraps become too tight call the center or completely remove the wrap. [x] Elevate leg(s) above the level of the heart when sitting. [x] Avoid prolonged standing in one place. Dietary:  [] Diet as tolerated: [] Calorie Diabetic Diet: [x] No Added Salt:  [x] Increase Protein: [] Other:     Activity:  [x] Activity as tolerated:    [] Patient has no activity restrictions       [] Strict Bedrest:   [] Remain off Work:       [] May return to full duty work:                                     [] Return to work with restrictions:               [address] Information: Should you experience any significant changes in your wound(s) or have questions about your wound care, please contact Dane Hanks at [facility] 8:00 am - 4:30. If you need help with your wound outside of these hours and cannot wait until we are again available, contact your PCP or go to the hospital emergency room. PLEASE NOTE: IF YOU ARE UNABLE TO OBTAIN WOUND SUPPLIES, CONTINUE TO USE THE SUPPLIES YOU HAVE AVAILABLE UNTIL YOU ARE ABLE TO REACH US. IT IS MOST IMPORTANT TO KEEP THE WOUND COVERED AT ALL TIMES.     [x] Dr. Harpreet Hutn   [] Dr. Aaron Jiménez  [] Dr. Mindi Guthrie

## 2022-04-26 NOTE — WOUND CARE
[address]   Progress Note and Procedure Note   NO Procedure      [facility] RECORD NUMBER:  [id number]  AGE: 74 y.o. RACE WHITE/NON-  BLACK/  GENDER: male  : 1955  EPISODE DATE:  2022    Subjective:   Patient spoke with Dr. Saman Feliciano office, is scheduled to visit with an in the next few days  No new problems reported    Chief Complaint   Patient presents with    Wound Check     bilateral lower legs         HISTORY of PRESENT ILLNESS HPI    Shea Stinson is a 74 y.o. male who presents today for wound/ulcer evaluation. History of Wound Context: BLE  Wound/Ulcer Pain Timing/Severity: none  Quality of pain: dull  Severity:  0 / 10   Modifying Factors: None  Associated Signs/Symptoms: edema    Ulcer Identification:  Ulcer Type: venous    Contributing Factors: lymphedema    Wound: BLE         PAST MEDICAL HISTORY    Past Medical History:   Diagnosis Date    Arthritis     Chronic obstructive pulmonary disease (HCC)     Diabetes ([address]     Hypertension     Sleep apnea         PAST SURGICAL HISTORY    Past Surgical History:   Procedure Laterality Date    HX GI      HX HERNIA REPAIR         FAMILY HISTORY    Family History   Problem Relation Age of Onset    Hypertension Child     Hypertension Sister     Hypertension Sister        SOCIAL HISTORY    Social History     Tobacco Use    Smoking status: Never Smoker    Smokeless tobacco: Never Used   Substance Use Topics    Alcohol use: Never    Drug use: Never       ALLERGIES    No Known Allergies    MEDICATIONS    Current Outpatient Medications on File Prior to Encounter   Medication Sig Dispense Refill    nebivoloL (BYSTOLIC) 10 mg tablet Take 10 mg by mouth daily.  multivitamin (ONE A DAY) tablet Take 1 Tablet by mouth daily.  furosemide (LASIX) 40 mg tablet Take 1 Tablet by mouth daily. 30 Tablet 0    atorvastatin (LIPITOR) 20 mg tablet Take 20 mg by mouth nightly.       aspirin delayed-release 81 mg tablet TAKE ONE TABLET BY MOUTH ONE TIME DAILY       No current facility-administered medications on file prior to encounter. REVIEW OF SYSTEMS    Pertinent items are noted in HPI. Objective:     Visit Vitals  /70 (BP 1 Location: Right upper arm, BP Patient Position: At rest;Sitting)   Pulse 69   Temp 97.3 °F (36.3 °C)   Resp 18       Wt Readings from Last 3 Encounters:   03/28/22 158.8 kg (350 lb)   10/12/21 (!) 181.4 kg (400 lb)   12/09/20 (!) 181.4 kg (400 lb)       PHYSICAL EXAM  The right leg Wound measures smaller, healthy granulation, no evidence of active infection  The left leg wound is significantly smaller, no evidence of infection    Pertinent items are noted in HPI. Assessment:   Both legs improved     Problem List Items Addressed This Visit     None          Procedure Note  Indications:  Based on my examination of this patient's wound(s)/ulcer(s) today, debridement is not required to promote healing and evaluate the wound base. Wound Leg lower Right;Posterior #1 03/28/22 (Active)   Wound Image   04/26/22 1417   Wound Etiology Venous 04/26/22 1417   Dressing Status Other (Comment) 04/26/22 1417   Cleansed Soap and water 04/19/22 1311   Wound Length (cm) 9 cm 04/26/22 1417   Wound Width (cm) 3 cm 04/26/22 1417   Wound Depth (cm) 0.1 cm 04/26/22 1417   Wound Surface Area (cm^2) 27 cm^2 04/26/22 1417   Change in Wound Size % 76.72 04/26/22 1417   Wound Volume (cm^3) 2.7 cm^3 04/26/22 1417   Wound Healing % 77 04/26/22 1417   Wound Assessment Granulation tissue;Slough 04/26/22 1417   Drainage Amount Large 04/26/22 1417   Drainage Description Serosanguinous 04/26/22 1417   Wound Odor None 04/26/22 1417   Betty-Wound/Incision Assessment Intact; Maceration 04/26/22 1417   Edges Attached edges 04/26/22 1417   Wound Thickness Description Full thickness 04/26/22 1417   Number of days: 29       Wound Leg lower Left;Posterior #2 03/28/22 (Active)   Wound Image 04/26/22 1418   Wound Etiology Venous 04/26/22 1418   Dressing Status Breakthrough drainage noted 04/19/22 1312   Cleansed Soap and water 04/19/22 1312   Wound Length (cm) 0.5 cm 04/26/22 1418   Wound Width (cm) 0.5 cm 04/26/22 1418   Wound Depth (cm) 0.2 cm 04/26/22 1418   Wound Surface Area (cm^2) 0.25 cm^2 04/26/22 1418   Change in Wound Size % 99.72 04/26/22 1418   Wound Volume (cm^3) 0.05 cm^3 04/26/22 1418   Wound Healing % 100 04/26/22 1418   Post-Procedure Length (cm) 7.5 cm 04/19/22 1321   Post-Procedure Width (cm) 6.7 cm 04/19/22 1321   Post-Procedure Depth (cm) 0.2 cm 04/19/22 1321   Post-Procedure Surface Area (cm^2) 50.25 cm^2 04/19/22 1321   Post-Procedure Volume (cm^3) 10.05 cm^3 04/19/22 1321   Wound Assessment Slough;Granulation tissue 04/26/22 1418   Drainage Amount Small 04/26/22 1418   Drainage Description Serosanguinous 04/26/22 1418   Wound Odor None 04/26/22 1418   Betty-Wound/Incision Assessment Intact 04/26/22 1418   Edges Attached edges 04/26/22 1418   Wound Thickness Description Full thickness 04/26/22 1418   Number of days: 29        Plan:   Continue Yuridia, Coflex lite    Treatment Note please see attached Discharge Instructions    Written patient dismissal instructions given to patient or POA.          Electronically signed by Nae Coreas MD on 4/26/2022 at 2:30 PM

## 2022-04-26 NOTE — DISCHARGE INSTRUCTIONS
Discharge Instructions for  [address] [place] [address], [address] [place] [address]  Telephone: [phone]    NAME:  Barbara Villalta  YOB: 1955  MEDICAL RECORD NUMBER:  [id number]  DATE:  4/26/2022      Return Appointment:  [] Dressing supply provider:   [] Home Healthcare:  [x] Return Appointment: 1 Week(s)  [] Nurse Visit:  week  [] Discharge from [facility]  [] Ordered tests:   [x] Referrals: DR CARRERO- KEEP APPOINTMENT  [] Rx:   [] Other:      Wound Cleansing:   Do not scrub or use excessive force. Cleanse wound prior to applying a clean dressing with:  [x] Normal Saline   [x] Mild Soap & Water/Baby Shampoo   [] Keep Wound Dry in Shower  [] Other:      Topical Treatments:  Do not apply lotions, creams, or ointments to wound bed unless directed. [x] Apply moisturizing lotion to skin surrounding the wound prior to dressing change.  [] Apply antifungal ointment to skin surrounding the wound prior to dressing change.  [] Apply thin film of moisture barrier/zinc ointment to skin immediately around wound. [] Other:       Dressings:           Wound Location R & L legs   [x] Apply Primary Dressing:       [] MediHoney Gel    [] MediHoney Alginate               [] Calcium Alginate      [] Calcium Alginate with Silver   [] Collagen                   [x] Collagen with Silver                [] Santyl with Moistened saline gauze              [] Hydrofera Blue (cut to size and moistened)  [] Hydrofera Blue Ready (Cut to size)   [] NS wet to dry    [] Betadine wet to dry              [] Hydrogel                [] Mepitel     [] Bactroban/Mupirocin               [] Other:     [x] Cover and Secure with:     [x] Gauze [] Cachil DeHe Steven [] Kerlix   [] Foam [x] Super Absorbant [] ABD     [] ACE Wrap            [] Other:    Limit contact of tape with skin.     [x] Change dressing: [] Daily    [] Every Other Day [] Once per week   [x] Twice per week   [] Three times per week [] Do Not Change Dressing   [] Other:    Pressure Relief:  [] When sitting, shift position or do seat lifts every 15 minutes.  [] Wheelchair cushion [] Specialty Bed/Mattress  [x]  Avoid direct pressure on wound site. Edema Control:  Apply: [] Compression Stocking:   mmHg  []Right Leg []Left Leg   [] Tubigrip []Right Leg Double Layer []Left Leg Double Layer      []Right Leg Single Layer []Left Leg Single Layer     [] Elevate leg(s) above the level of the heart when sitting. [] Avoid prolonged standing in one place. Compression:  Apply: [x] Multilayer Compression Wrap: [x]RightLeg [x]Left Leg                                 []Coflex   [x]Coflex Lite             []Unna     [x] Do not get leg(s) with wrap wet. If wraps become too tight call the center or completely remove the wrap. [x] Elevate leg(s) above the level of the heart when sitting. [x] Avoid prolonged standing in one place. Dietary:  [] Diet as tolerated: [] Calorie Diabetic Diet: [x] No Added Salt:  [x] Increase Protein: [] Other:     Activity:  [x] Activity as tolerated:    [] Patient has no activity restrictions       [] Strict Bedrest:   [] Remain off Work:       [] May return to full duty work:                                     [] Return to work with restrictions:               [address] Information: Should you experience any significant changes in your wound(s) or have questions about your wound care, please contact Dane Hanks at [place] [address] 8:00 am - 4:30. If you need help with your wound outside of these hours and cannot wait until we are again available, contact your PCP or go to the hospital emergency room. PLEASE NOTE: IF YOU ARE UNABLE TO OBTAIN WOUND SUPPLIES, CONTINUE TO USE THE SUPPLIES YOU HAVE AVAILABLE UNTIL YOU ARE ABLE TO REACH US. IT IS MOST IMPORTANT TO KEEP THE WOUND COVERED AT ALL TIMES.     [x] Dr. Kenyon Musa   [] Dr. Tess Franklin  [] Dr. Gibran Damon

## 2022-05-03 ENCOUNTER — HOSPITAL ENCOUNTER (OUTPATIENT)
Dept: WOUND CARE | Age: 67
Discharge: HOME | End: 2022-05-03
Payer: MEDICARE

## 2022-05-03 VITALS
HEART RATE: 67 BPM | RESPIRATION RATE: 22 BRPM | DIASTOLIC BLOOD PRESSURE: 73 MMHG | SYSTOLIC BLOOD PRESSURE: 130 MMHG | TEMPERATURE: 97.1 F

## 2022-05-03 PROCEDURE — 99213 OFFICE O/P EST LOW 20 MIN: CPT

## 2022-05-03 PROCEDURE — 74011000250 HC RX REV CODE- 250: Performed by: SPECIALIST

## 2022-05-03 RX ORDER — LIDOCAINE HYDROCHLORIDE 20 MG/ML
15 SOLUTION OROPHARYNGEAL AS NEEDED
Status: DISCONTINUED | OUTPATIENT
Start: 2022-05-03 | End: 2022-05-05 | Stop reason: HOSPADM

## 2022-05-03 NOTE — WOUND CARE
[address]   Progress Note and Procedure Note   NO Procedure      12 [facility] RECORD NUMBER:  [id number]  AGE: 74 y.o. RACE WHITE/NON-  BLACK/  GENDER: male  : 1955  EPISODE DATE:  5/3/2022    Subjective:   No new problems reported  Chief Complaint   Patient presents with    Wound Check     L and R leg         HISTORY of PRESENT ILLNESS KATIE Lal is a 74 y.o. male who presents today for wound/ulcer evaluation. History of Wound Context: BLE  Wound/Ulcer Pain Timing/Severity: none  Quality of pain: dull  Severity:  0 / 10   Modifying Factors: None  Associated Signs/Symptoms: edema    Ulcer Identification:  Ulcer Type: venous    Contributing Factors: edema    Wound: BLE         PAST MEDICAL HISTORY    Past Medical History:   Diagnosis Date    Arthritis     Chronic obstructive pulmonary disease (HCC)     Diabetes ([facility] [address]     Hypertension     Sleep apnea         PAST SURGICAL HISTORY    Past Surgical History:   Procedure Laterality Date    HX GI      HX HERNIA REPAIR         FAMILY HISTORY    Family History   Problem Relation Age of Onset    Hypertension Child     Hypertension Sister     Hypertension Sister        SOCIAL HISTORY    Social History     Tobacco Use    Smoking status: Never Smoker    Smokeless tobacco: Never Used   Substance Use Topics    Alcohol use: Never    Drug use: Never       ALLERGIES    No Known Allergies    MEDICATIONS    Current Outpatient Medications on File Prior to Encounter   Medication Sig Dispense Refill    nebivoloL (BYSTOLIC) 10 mg tablet Take 10 mg by mouth daily.  multivitamin (ONE A DAY) tablet Take 1 Tablet by mouth daily.  furosemide (LASIX) 40 mg tablet Take 1 Tablet by mouth daily. 30 Tablet 0    atorvastatin (LIPITOR) 20 mg tablet Take 20 mg by mouth nightly.       aspirin delayed-release 81 mg tablet TAKE ONE TABLET BY MOUTH ONE TIME DAILY       No current facility-administered medications on file prior to encounter. REVIEW OF SYSTEMS    Pertinent items are noted in HPI. Objective:     Visit Vitals  /73 (BP 1 Location: Right lower arm, BP Patient Position: At rest;Sitting)   Pulse 67   Temp 97.1 °F (36.2 °C)   Resp 22       Wt Readings from Last 3 Encounters:   03/28/22 158.8 kg (350 lb)   10/12/21 (!) 181.4 kg (400 lb)   12/09/20 (!) 181.4 kg (400 lb)       PHYSICAL EXAM  The left and right calf wounds are smaller and more superficial, no evidence of active infection  Pertinent items are noted in HPI. Assessment:   Good progress  Problem List Items Addressed This Visit     None          Procedure Note  Indications:  Based on my examination of this patient's wound(s)/ulcer(s) today, debridement is not required to promote healing and evaluate the wound base.     Wound Leg lower Right;Posterior #1 03/28/22 (Active)   Wound Image   05/03/22 1122   Wound Etiology Venous 05/03/22 1122   Dressing Status Other (Comment) 05/03/22 1122   Cleansed Cleansed with saline 05/03/22 1122   Wound Length (cm) 7.4 cm 05/03/22 1122   Wound Width (cm) 1.5 cm 05/03/22 1122   Wound Depth (cm) 0.1 cm 05/03/22 1122   Wound Surface Area (cm^2) 11.1 cm^2 05/03/22 1122   Change in Wound Size % 90.43 05/03/22 1122   Wound Volume (cm^3) 1.11 cm^3 05/03/22 1122   Wound Healing % 90 05/03/22 1122   Wound Assessment Granulation tissue;Slough 05/03/22 1122   Drainage Amount Moderate 05/03/22 1122   Drainage Description Serosanguinous 05/03/22 1122   Wound Odor None 05/03/22 1122   Betty-Wound/Incision Assessment Intact 05/03/22 1122   Edges Attached edges 05/03/22 1122   Wound Thickness Description Full thickness 05/03/22 1122   Number of days: 36       Wound Leg lower Left;Posterior #2 03/28/22 (Active)   Wound Image   05/03/22 1123   Wound Etiology Venous 05/03/22 1123   Dressing Status Other (Comment) 05/03/22 1123   Cleansed Cleansed with saline 05/03/22 1123   Wound Length (cm) 0.5 cm 05/03/22 1123   Wound Width (cm) 0.5 cm 05/03/22 1123   Wound Depth (cm) 0.1 cm 05/03/22 1123   Wound Surface Area (cm^2) 0.25 cm^2 05/03/22 1123   Change in Wound Size % 99.72 05/03/22 1123   Wound Volume (cm^3) 0.025 cm^3 05/03/22 1123   Wound Healing % 100 05/03/22 1123   Post-Procedure Length (cm) 7.5 cm 04/19/22 1321   Post-Procedure Width (cm) 6.7 cm 04/19/22 1321   Post-Procedure Depth (cm) 0.2 cm 04/19/22 1321   Post-Procedure Surface Area (cm^2) 50.25 cm^2 04/19/22 1321   Post-Procedure Volume (cm^3) 10.05 cm^3 04/19/22 1321   Wound Assessment Granulation tissue 05/03/22 1123   Drainage Amount Scant 05/03/22 1123   Drainage Description Serosanguinous 05/03/22 1123   Wound Odor None 05/03/22 1123   Betty-Wound/Incision Assessment Intact 05/03/22 1123   Edges Attached edges 05/03/22 1123   Wound Thickness Description Full thickness 05/03/22 1123   Number of days: 36        Plan:   Continue Yuridia, Coflex light  Follow up with Dr Ishan Verma    Treatment Note please see attached Discharge Instructions    Written patient dismissal instructions given to patient or POA.          Electronically signed by Emily Gan MD on 5/3/2022 at 12:33 PM

## 2022-05-03 NOTE — DISCHARGE INSTRUCTIONS
Discharge Instructions for  [address] [place] [address], [facility]  Telephone: [phone]     NAME:  Kendra Sprague  YOB: 1955  MEDICAL RECORD NUMBER:  [id number]  DATE:  5/3/2022        Return Appointment:  []? Dressing supply provider:   []? Home Healthcare:  [x]? Return Appointment: 1 Week(s)  []? Nurse Visit:  week  []? Discharge from [facility]  []? Ordered tests:   [x]? Referrals: DR CARRERO- KEEP APPOINTMENT  []? Rx:   []? Other:       Wound Cleansing:   Do not scrub or use excessive force. Cleanse wound prior to applying a clean dressing with:  [x]? Normal Saline          [x]? Mild Soap & Water/Baby Shampoo   []? Keep Wound Dry in Shower  []? Other:       Topical Treatments:  Do not apply lotions, creams, or ointments to wound bed unless directed. [x]? Apply moisturizing lotion to skin surrounding the wound prior to dressing change. []? Apply antifungal ointment to skin surrounding the wound prior to dressing change. []? Apply thin film of moisture barrier/zinc ointment to skin immediately around wound. []? Other:                  Dressings:                  Wound Location R & L legs              [x]? Apply Primary Dressing:                                          []? MediHoney Gel         []? MediHoney Alginate                     []? Calcium Alginate      []? Calcium Alginate with Silver              []? Collagen                   [x]? Collagen with Silver                []? Santyl with Moistened saline gauze              []? Hydrofera Blue (cut to size and moistened)  []? Hydrofera Blue Ready (Cut to size)              []? NS wet to dry            []? Betadine wet to dry              []? Hydrogel                   []? Mepitel                   []? Bactroban/Mupirocin                       []? Other:      [x]? Cover and Secure with:                   [x]? Gauze        []? Barbara Ferns           []?  Kerlix              []? Foam [x]? Super Absorbant    []? ABD                                      []? ACE Wrap            []? Other:               Limit contact of tape with skin.     [x]? Change dressing:  []? Daily           [x]? Every Other Day    []? Once per week   []? Twice per week              []? Three times per week        []? Do Not Change Dressing    []? Other:     Pressure Relief:  []? When sitting, shift position or do seat lifts every 15 minutes. []? Wheelchair cushion           []? Specialty Bed/Mattress  [x]? Avoid direct pressure on wound site. Edema Control:  Apply:  []? Compression Stocking:   mmHg    []? Right Leg     []? Left Leg              [x]? Tubigrip      [x]? Right Leg Double Layer      [x]? Left Leg Double Layer                                      []?Right Leg Single Layer       []? Left Leg Single Layer                 [x]? Elevate leg(s) above the level of the heart when sitting. [x]? Avoid prolonged standing in one place.         Compression:  Apply:  []? Multilayer Compression Wrap:      []? RightLeg      []? Left Leg                                 []?Coflex              []?Coflex Lite             []?Unna                 [x]? Do not get leg(s) with wrap wet. If wraps become too tight call the center or completely remove the wrap. [x]? Elevate leg(s) above the level of the heart when sitting. [x]? Avoid prolonged standing in one place.     Dietary:  []? Diet as tolerated:   []? Calorie Diabetic Diet:         [x]? No Added Salt:  [x]? Increase Protein:   []? Other:              Activity:  [x]? Activity as tolerated:    []? Patient has no activity restrictions       []? Strict Bedrest:          []? Remain off Work:       []?  May return to full duty work:                                               []? Return to work with restrictions:      [address] [facility] Information: Should you experience any significant changes in your wound(s) or have questions about your wound care, please contact Dane Melgar 26 at [place] [address] 8:00 am - 4:30. If you need help with your wound outside of these hours and cannot wait until we are again available, contact your PCP or go to the hospital emergency room.      PLEASE NOTE: IF YOU ARE UNABLE TO [address], CONTINUE TO USE THE SUPPLIES YOU HAVE AVAILABLE UNTIL YOU ARE ABLE TO [facility]. IT IS MOST IMPORTANT TO KEEP THE WOUND COVERED AT ALL TIMES.     [x]? Dr. Tameka Uribe   []? Dr. Sylvia Ramirez  []?  Dr. Devonte Gastelum

## 2022-05-03 NOTE — WOUND CARE
Discharge Instructions for  [address] [place] [address]  Telephone: [phone]    NAME:  Beatriz Ji  YOB: 1955  MEDICAL RECORD NUMBER:  [id number]  DATE:  5/3/2022      Return Appointment:  [] Dressing supply provider:   [] Home Healthcare:  [x] Return Appointment: 1 Week(s)  [] Nurse Visit:  week  [] Discharge from [facility]  [] Ordered tests:   [x] Referrals: DR CARRERO- KEEP APPOINTMENT  [] Rx:   [] Other:      Wound Cleansing:   Do not scrub or use excessive force. Cleanse wound prior to applying a clean dressing with:  [x] Normal Saline   [x] Mild Soap & Water/Baby Shampoo   [] Keep Wound Dry in Shower  [] Other:      Topical Treatments:  Do not apply lotions, creams, or ointments to wound bed unless directed. [x] Apply moisturizing lotion to skin surrounding the wound prior to dressing change.  [] Apply antifungal ointment to skin surrounding the wound prior to dressing change.  [] Apply thin film of moisture barrier/zinc ointment to skin immediately around wound. [] Other:       Dressings:           Wound Location R & L legs   [x] Apply Primary Dressing:       [] MediHoney Gel    [] MediHoney Alginate               [] Calcium Alginate      [] Calcium Alginate with Silver   [] Collagen                   [x] Collagen with Silver                [] Santyl with Moistened saline gauze              [] Hydrofera Blue (cut to size and moistened)  [] Hydrofera Blue Ready (Cut to size)   [] NS wet to dry    [] Betadine wet to dry              [] Hydrogel                [] Mepitel     [] Bactroban/Mupirocin               [] Other:     [x] Cover and Secure with:     [x] Gauze [] Lonell [place] [] Kerlix   [] Foam [x] Super Absorbant [] ABD     [] ACE Wrap            [] Other:    Limit contact of tape with skin.     [x] Change dressing: [] Daily    [x] Every Other Day [] Once per week   [] Twice per week   [] Three times per week [] Do Not Change Dressing   [] Other:    Pressure Relief:  [] When sitting, shift position or do seat lifts every 15 minutes.  [] Wheelchair cushion [] Specialty Bed/Mattress  [x]  Avoid direct pressure on wound site. Edema Control:  Apply: [] Compression Stocking:   mmHg  []Right Leg []Left Leg   [x] Tubigrip [x]Right Leg Double Layer [x]Left Leg Double Layer      []Right Leg Single Layer []Left Leg Single Layer     [x] Elevate leg(s) above the level of the heart when sitting. [x] Avoid prolonged standing in one place. Compression:  Apply: [] Multilayer Compression Wrap: []RightLeg []Left Leg                                 []Coflex   []Coflex Lite             []Unna     [x] Do not get leg(s) with wrap wet. If wraps become too tight call the center or completely remove the wrap. [x] Elevate leg(s) above the level of the heart when sitting. [x] Avoid prolonged standing in one place. Dietary:  [] Diet as tolerated: [] Calorie Diabetic Diet: [x] No Added Salt:  [x] Increase Protein: [] Other:     Activity:  [x] Activity as tolerated:    [] Patient has no activity restrictions       [] Strict Bedrest:   [] Remain off Work:       [] May return to full duty work:                                     [] Return to work with restrictions:               [address] Information: Should you experience any significant changes in your wound(s) or have questions about your wound care, please contact Dane Hanks at [place] [address] 8:00 am - 4:30. If you need help with your wound outside of these hours and cannot wait until we are again available, contact your PCP or go to the hospital emergency room. PLEASE NOTE: IF YOU ARE UNABLE TO OBTAIN WOUND SUPPLIES, CONTINUE TO USE THE SUPPLIES YOU HAVE AVAILABLE UNTIL YOU ARE ABLE TO REACH US. IT IS MOST IMPORTANT TO KEEP THE WOUND COVERED AT ALL TIMES.     [x] Dr. Ayad Reynoso   [] Dr. Tayla Walsh  [] Dr. Ethel Gray Mike

## 2022-05-10 ENCOUNTER — HOSPITAL ENCOUNTER (OUTPATIENT)
Dept: WOUND CARE | Age: 67
Discharge: HOME | End: 2022-05-10
Payer: MEDICARE

## 2022-05-10 VITALS
RESPIRATION RATE: 24 BRPM | DIASTOLIC BLOOD PRESSURE: 79 MMHG | TEMPERATURE: 97.9 F | SYSTOLIC BLOOD PRESSURE: 164 MMHG | HEART RATE: 74 BPM

## 2022-05-10 PROCEDURE — 99213 OFFICE O/P EST LOW 20 MIN: CPT

## 2022-05-10 PROCEDURE — 74011000250 HC RX REV CODE- 250: Performed by: SPECIALIST

## 2022-05-10 RX ORDER — LIDOCAINE HYDROCHLORIDE 20 MG/ML
15 SOLUTION OROPHARYNGEAL AS NEEDED
Status: DISCONTINUED | OUTPATIENT
Start: 2022-05-10 | End: 2022-05-12 | Stop reason: HOSPADM

## 2022-05-10 NOTE — WOUND CARE
Discharge Instructions for  [address] [place] [address], [address] [place] [address]  Telephone: [phone]    NAME:  Belen Williamson  YOB: 1955  MEDICAL RECORD NUMBER:  [id number]  DATE:  5/10/2022      Return Appointment:  [] Dressing supply provider:   [] Home Healthcare:  [x] Return Appointment: 1 Week(s)  [] Nurse Visit:  week  [] Discharge from [facility]  [] Ordered tests:   [x] Referrals: DR CARRERO- KEEP APPOINTMENT  [] Rx:   [] Other:      Wound Cleansing:   Do not scrub or use excessive force. Cleanse wound prior to applying a clean dressing with:  [x] Normal Saline   [x] Mild Soap & Water/Baby Shampoo   [] Keep Wound Dry in Shower  [] Other:      Topical Treatments:  Do not apply lotions, creams, or ointments to wound bed unless directed. [x] Apply moisturizing lotion to skin surrounding the wound prior to dressing change.  [] Apply antifungal ointment to skin surrounding the wound prior to dressing change.  [] Apply thin film of moisture barrier/zinc ointment to skin immediately around wound. [] Other:       Dressings:           Wound Location R  legs   [x] Apply Primary Dressing:       [] MediHoney Gel    [] MediHoney Alginate               [] Calcium Alginate      [] Calcium Alginate with Silver   [] Collagen                   [x] Collagen with Silver                [] Santyl with Moistened saline gauze              [] Hydrofera Blue (cut to size and moistened)  [] Hydrofera Blue Ready (Cut to size)   [] NS wet to dry    [] Betadine wet to dry              [] Hydrogel                [] Mepitel     [] Bactroban/Mupirocin               [] Other:     [x] Cover and Secure with:     [x] Gauze [] Scottie Soda [] Kerlix   [] Foam [x] Super Absorbant [] ABD     [] ACE Wrap            [] Other:    Limit contact of tape with skin.     [x] Change dressing: [] Daily    [x] Every Other Day [] Once per week   [] Twice per week   [] Three times per week [] Do Not Change Dressing   [] Other:    Pressure Relief:  [] When sitting, shift position or do seat lifts every 15 minutes.  [] Wheelchair cushion [] Specialty Bed/Mattress  [x]  Avoid direct pressure on wound site. Edema Control:  Apply: [] Compression Stocking:   mmHg  []Right Leg []Left Leg   [x] Tubigrip [x]Right Leg Double Layer [x]Left Leg Double Layer      []Right Leg Single Layer []Left Leg Single Layer     [x] Elevate leg(s) above the level of the heart when sitting. [x] Avoid prolonged standing in one place. Compression:  Apply: [] Multilayer Compression Wrap: []RightLeg []Left Leg                                 []Coflex   []Coflex Lite             []Unna     [x] Do not get leg(s) with wrap wet. If wraps become too tight call the center or completely remove the wrap. [x] Elevate leg(s) above the level of the heart when sitting. [x] Avoid prolonged standing in one place. Dietary:  [] Diet as tolerated: [] Calorie Diabetic Diet: [x] No Added Salt:  [x] Increase Protein: [] Other:     Activity:  [x] Activity as tolerated:    [] Patient has no activity restrictions       [] Strict Bedrest:   [] Remain off Work:       [] May return to full duty work:                                     [] Return to work with restrictions:               [address] Information: Should you experience any significant changes in your wound(s) or have questions about your wound care, please contact Dane Hanks at [place] [address] 8:00 am - 4:30. If you need help with your wound outside of these hours and cannot wait until we are again available, contact your PCP or go to the hospital emergency room. PLEASE NOTE: IF YOU ARE UNABLE TO OBTAIN WOUND SUPPLIES, CONTINUE TO USE THE SUPPLIES YOU HAVE AVAILABLE UNTIL YOU ARE ABLE TO REACH US. IT IS MOST IMPORTANT TO KEEP THE WOUND COVERED AT ALL TIMES.     [x] Dr. Leann Sharma   [] Dr. Perfecto Carrasco  [] Dr. Narda Cruz

## 2022-05-10 NOTE — DISCHARGE INSTRUCTIONS
Discharge Instructions for  [address], [address] [facility]  Telephone: [phone]     NAME:  Sonia Nuno  YOB: 1955  MEDICAL RECORD NUMBER:  [id number]  DATE:  5/10/2022        Return Appointment:  []? Dressing supply provider:   []? Home Healthcare:  [x]? Return Appointment: 1 Week(s)  []? Nurse Visit:  week  []? Discharge from [facility]  []? Ordered tests:   [x]? Referrals: DR CARRERO- KEEP APPOINTMENT  []? Rx:   []? Other:       Wound Cleansing:   Do not scrub or use excessive force. Cleanse wound prior to applying a clean dressing with:  [x]? Normal Saline          [x]? Mild Soap & Water/Baby Shampoo   []? Keep Wound Dry in Shower  []? Other:       Topical Treatments:  Do not apply lotions, creams, or ointments to wound bed unless directed. [x]? Apply moisturizing lotion to skin surrounding the wound prior to dressing change. []? Apply antifungal ointment to skin surrounding the wound prior to dressing change. []? Apply thin film of moisture barrier/zinc ointment to skin immediately around wound. []? Other:                  Dressings:                  Wound Location R  legs        [x]? Apply Primary Dressing:                                          []? MediHoney Gel         []? MediHoney Alginate                     []? Calcium Alginate      []? Calcium Alginate with Silver              []? Collagen                   [x]? Collagen with Silver                []? Santyl with Moistened saline gauze              []? Hydrofera Blue (cut to size and moistened)  []? Hydrofera Blue Ready (Cut to size)              []? NS wet to dry            []? Betadine wet to dry              []? Hydrogel                   []? Mepitel                   []? Bactroban/Mupirocin                       []? Other:      [x]? Cover and Secure with:                   [x]? Gauze        []? Milana Arvizu           []?  Kerlix              []? Foam [x]? Super Absorbant    []? ABD                                      []? ACE Wrap            []? Other:               Limit contact of tape with skin.     [x]? Change dressing:  []? Daily           [x]? Every Other Day    []? Once per week   []? Twice per week              []? Three times per week        []? Do Not Change Dressing    []? Other:     Pressure Relief:  []? When sitting, shift position or do seat lifts every 15 minutes. []? Wheelchair cushion           []? Specialty Bed/Mattress  [x]? Avoid direct pressure on wound site. Edema Control:  Apply:  []? Compression Stocking:   mmHg    []? Right Leg     []? Left Leg              [x]? Tubigrip      [x]? Right Leg Double Layer      [x]? Left Leg Double Layer                                      []?Right Leg Single Layer       []? Left Leg Single Layer                 [x]? Elevate leg(s) above the level of the heart when sitting. [x]? Avoid prolonged standing in one place.         Compression:  Apply:  []? Multilayer Compression Wrap:      []? RightLeg      []? Left Leg                                 []?Coflex              []?Coflex Lite             []?Unna                 [x]? Do not get leg(s) with wrap wet. If wraps become too tight call the center or completely remove the wrap. [x]? Elevate leg(s) above the level of the heart when sitting. [x]? Avoid prolonged standing in one place.     Dietary:  []? Diet as tolerated:   []? Calorie Diabetic Diet:         [x]? No Added Salt:  [x]? Increase Protein:   []? Other:              Activity:  [x]? Activity as tolerated:    []? Patient has no activity restrictions       []? Strict Bedrest:          []? Remain off Work:       []?  May return to full duty work:                                               []? Return to work with restrictions:      [address] [facility] Information: Should you experience any significant changes in your wound(s) or have questions about your wound care, please contact Dane Melgar 26 at [place] [address] 8:00 am - 4:30. If you need help with your wound outside of these hours and cannot wait until we are again available, contact your PCP or go to the hospital emergency room.      PLEASE NOTE: IF YOU ARE UNABLE TO [address], CONTINUE TO USE THE SUPPLIES YOU HAVE AVAILABLE UNTIL YOU ARE ABLE TO [facility]. IT IS MOST IMPORTANT TO KEEP THE WOUND COVERED AT ALL TIMES.     [x]? Dr. Thu Galvan   []? Dr. Larisa Harris  []?  Dr. Nickolas Machuca

## 2022-05-10 NOTE — WOUND CARE
[address]   Progress Note and Procedure Note   NO Procedure      [facility] RECORD NUMBER:  [id number]  AGE: 74 y.o. RACE WHITE/NON-  BLACK/  GENDER: male  : 1955  EPISODE DATE:  5/10/2022    Subjective:   Patient underwent venous ablative procedures by Dr. Yanci Goddard on the left lower extremity  No new problems reported    Chief Complaint   Patient presents with    Wound Check     R and L leg         HISTORY of PRESENT ILLNESS HPI    Parveen Montanez is a 74 y.o. male who presents today for wound/ulcer evaluation. History of Wound Context: RLE  Wound/Ulcer Pain Timing/Severity: none  Quality of pain: dull  Severity:  0 / 10   Modifying Factors: None  Associated Signs/Symptoms: edema    Ulcer Identification:  Ulcer Type: venous    Contributing Factors: edema    Wound: RLE         PAST MEDICAL HISTORY    Past Medical History:   Diagnosis Date    Arthritis     Chronic obstructive pulmonary disease (HCC)     Diabetes ([facility] [address]     Hypertension     Sleep apnea         PAST SURGICAL HISTORY    Past Surgical History:   Procedure Laterality Date    HX GI      HX HERNIA REPAIR         FAMILY HISTORY    Family History   Problem Relation Age of Onset    Hypertension Child     Hypertension Sister     Hypertension Sister        SOCIAL HISTORY    Social History     Tobacco Use    Smoking status: Never Smoker    Smokeless tobacco: Never Used   Substance Use Topics    Alcohol use: Never    Drug use: Never       ALLERGIES    No Known Allergies    MEDICATIONS    Current Outpatient Medications on File Prior to Encounter   Medication Sig Dispense Refill    nebivoloL (BYSTOLIC) 10 mg tablet Take 10 mg by mouth daily.  multivitamin (ONE A DAY) tablet Take 1 Tablet by mouth daily.  furosemide (LASIX) 40 mg tablet Take 1 Tablet by mouth daily. 30 Tablet 0    atorvastatin (LIPITOR) 20 mg tablet Take 20 mg by mouth nightly.       aspirin delayed-release 81 mg tablet TAKE ONE TABLET BY MOUTH ONE TIME DAILY       No current facility-administered medications on file prior to encounter. REVIEW OF SYSTEMS    Pertinent items are noted in HPI. Objective:     Visit Vitals  BP (!) 164/79 (BP 1 Location: Right lower arm, BP Patient Position: At rest;Sitting)   Pulse 74   Temp 97.9 °F (36.6 °C)   Resp 24       Wt Readings from Last 3 Encounters:   03/28/22 158.8 kg (350 lb)   10/12/21 (!) 181.4 kg (400 lb)   12/09/20 (!) 181.4 kg (400 lb)       PHYSICAL EXAM  The left leg wounds have healed  The right leg wounds are smaller, healthy granulation tissue, no evidence of infection    Pertinent items are noted in HPI. Assessment:   L leg healed, R leg improved     Problem List Items Addressed This Visit     None          Procedure Note  Indications:  Based on my examination of this patient's wound(s)/ulcer(s) today, debridement is not required to promote healing and evaluate the wound base. Wound Leg lower Right;Posterior #1 03/28/22 (Active)   Wound Image   05/10/22 1412   Wound Etiology Venous 05/10/22 1412   Dressing Status Clean;Dry; Intact 05/10/22 1412   Cleansed Soap and water 05/10/22 1412   Wound Length (cm) 7.4 cm 05/10/22 1412   Wound Width (cm) 1.5 cm 05/10/22 1412   Wound Depth (cm) 0.1 cm 05/10/22 1412   Wound Surface Area (cm^2) 11.1 cm^2 05/10/22 1412   Change in Wound Size % 90.43 05/10/22 1412   Wound Volume (cm^3) 1.11 cm^3 05/10/22 1412   Wound Healing % 90 05/10/22 1412   Wound Assessment Granulation tissue;Slough 05/10/22 1412   Drainage Amount Moderate 05/10/22 1412   Drainage Description Serosanguinous 05/10/22 1412   Wound Odor None 05/10/22 1412   Betty-Wound/Incision Assessment Intact 05/10/22 1412   Edges Attached edges 05/10/22 1412   Wound Thickness Description Full thickness 05/10/22 1412   Number of days: 43        Plan:   Continue Yuridia to R calf wounds, double tubi  bilaterally  Follow up with  Mufti      Treatment Note please see attached Discharge Instructions    Written patient dismissal instructions given to patient or POA.          Electronically signed by Yael Lima MD on 5/10/2022 at 2:24 PM

## 2022-05-17 ENCOUNTER — HOSPITAL ENCOUNTER (OUTPATIENT)
Dept: WOUND CARE | Age: 67
Discharge: HOME | End: 2022-05-17
Payer: MEDICARE

## 2022-05-17 VITALS
TEMPERATURE: 98.1 F | SYSTOLIC BLOOD PRESSURE: 187 MMHG | RESPIRATION RATE: 20 BRPM | DIASTOLIC BLOOD PRESSURE: 87 MMHG | HEART RATE: 74 BPM

## 2022-05-17 PROCEDURE — 74011000250 HC RX REV CODE- 250: Performed by: SPECIALIST

## 2022-05-17 PROCEDURE — 99213 OFFICE O/P EST LOW 20 MIN: CPT

## 2022-05-17 RX ORDER — LIDOCAINE HYDROCHLORIDE 20 MG/ML
15 SOLUTION OROPHARYNGEAL AS NEEDED
Status: DISCONTINUED | OUTPATIENT
Start: 2022-05-17 | End: 2022-05-19 | Stop reason: HOSPADM

## 2022-05-17 NOTE — DISCHARGE INSTRUCTIONS
Discharge Instructions for  [address] [place] [address], [address] [place] [address]  Telephone: [phone]    NAME:  Kendra Sprague  YOB: 1955  MEDICAL RECORD NUMBER:  [id number]  DATE:  5/17/2022      Return Appointment:  [] Dressing supply provider:   [] Home Healthcare:  [x] Return Appointment: 1 Week(s)  [] Nurse Visit:  week  [] Discharge from [facility]  [] Ordered tests:   [x] Referrals: DR CARRERO- KEEP APPOINTMENT  [] Rx:   [] Other:      Wound Cleansing:   Do not scrub or use excessive force. Cleanse wound prior to applying a clean dressing with:  [x] Normal Saline   [x] Mild Soap & Water/Baby Shampoo   [] Keep Wound Dry in Shower  [] Other:      Topical Treatments:  Do not apply lotions, creams, or ointments to wound bed unless directed. [x] Apply moisturizing lotion to skin surrounding the wound prior to dressing change.  [] Apply antifungal ointment to skin surrounding the wound prior to dressing change.  [] Apply thin film of moisture barrier/zinc ointment to skin immediately around wound. [] Other:       Dressings:           Wound Location R  legs   [x] Apply Primary Dressing:       [] MediHoney Gel    [] MediHoney Alginate               [] Calcium Alginate      [] Calcium Alginate with Silver   [] Collagen                   [x] Collagen with Silver                [] Santyl with Moistened saline gauze              [] Hydrofera Blue (cut to size and moistened)  [] Hydrofera Blue Ready (Cut to size)   [] NS wet to dry    [] Betadine wet to dry              [] Hydrogel                [] Mepitel     [] Bactroban/Mupirocin               [] Other:     [x] Cover and Secure with:     [x] Gauze [] Barbara Ferns [] Kerlix   [] Foam [x] Super Absorbant [] ABD     [] ACE Wrap            [] Other:    Limit contact of tape with skin.     [x] Change dressing: [] Daily    [x] Every Other Day [] Once per week   [] Twice per week   [] Three times per week [] Do Not Change Dressing   [] Other:    Pressure Relief:  [] When sitting, shift position or do seat lifts every 15 minutes.  [] Wheelchair cushion [] Specialty Bed/Mattress  [x]  Avoid direct pressure on wound site. Edema Control:  Apply: [] Compression Stocking:   mmHg  []Right Leg []Left Leg   [x] Tubigrip [x]Right Leg Double Layer [x]Left Leg Double Layer      []Right Leg Single Layer []Left Leg Single Layer     [x] Elevate leg(s) above the level of the heart when sitting. [x] Avoid prolonged standing in one place. Compression:  Apply: [] Multilayer Compression Wrap: []RightLeg []Left Leg                                 []Coflex   []Coflex Lite             []Unna     [x] Do not get leg(s) with wrap wet. If wraps become too tight call the center or completely remove the wrap. [x] Elevate leg(s) above the level of the heart when sitting. [x] Avoid prolonged standing in one place. Dietary:  [] Diet as tolerated: [] Calorie Diabetic Diet: [x] No Added Salt:  [x] Increase Protein: [] Other:     Activity:  [x] Activity as tolerated:    [] Patient has no activity restrictions       [] Strict Bedrest:   [] Remain off Work:       [] May return to full duty work:                                     [] Return to work with restrictions:               [address] [facility] [address] Information: Should you experience any significant changes in your wound(s) or have questions about your wound care, please contact Dane Hanks at [place] [address] 8:00 am - 4:30. If you need help with your wound outside of these hours and cannot wait until we are again available, contact your PCP or go to the hospital emergency room. PLEASE NOTE: IF YOU ARE UNABLE TO OBTAIN WOUND SUPPLIES, CONTINUE TO USE THE SUPPLIES YOU HAVE AVAILABLE UNTIL YOU ARE ABLE TO REACH US. IT IS MOST IMPORTANT TO KEEP THE WOUND COVERED AT ALL TIMES.     [x] Dr. Magda Terry   [] Dr. Michelina Soulier  [] Dr. Peyton Ferrari

## 2022-05-17 NOTE — WOUND CARE
[address]   Progress Note and Procedure Note   NO Procedure      [facility] RECORD NUMBER:  [id number]  AGE: 74 y.o. RACE WHITE/NON-  BLACK/  GENDER: male  : 1955  EPISODE DATE:  2022    Subjective:   No new problems    Chief Complaint   Patient presents with    Wound Check     right lower leg         HISTORY of PRESENT ILLNESS HPI    Barbara Villalta is a 74 y.o. male who presents today for wound/ulcer evaluation. History of Wound Context: RLE  Wound/Ulcer Pain Timing/Severity: none  Quality of pain: dull  Severity:  0 / 10   Modifying Factors: None  Associated Signs/Symptoms: edema    Ulcer Identification:  Ulcer Type: venous    Contributing Factors: lymphedema    Wound: RLE         PAST MEDICAL HISTORY    Past Medical History:   Diagnosis Date    Arthritis     Chronic obstructive pulmonary disease (HCC)     Diabetes ([facility] [address]     Hypertension     Sleep apnea         PAST SURGICAL HISTORY    Past Surgical History:   Procedure Laterality Date    HX GI      HX HERNIA REPAIR         FAMILY HISTORY    Family History   Problem Relation Age of Onset    Hypertension Child     Hypertension Sister     Hypertension Sister        SOCIAL HISTORY    Social History     Tobacco Use    Smoking status: Never Smoker    Smokeless tobacco: Never Used   Substance Use Topics    Alcohol use: Never    Drug use: Never       ALLERGIES    No Known Allergies    MEDICATIONS    Current Outpatient Medications on File Prior to Encounter   Medication Sig Dispense Refill    nebivoloL (BYSTOLIC) 10 mg tablet Take 10 mg by mouth daily.  multivitamin (ONE A DAY) tablet Take 1 Tablet by mouth daily.  furosemide (LASIX) 40 mg tablet Take 1 Tablet by mouth daily. 30 Tablet 0    atorvastatin (LIPITOR) 20 mg tablet Take 20 mg by mouth nightly.       aspirin delayed-release 81 mg tablet TAKE ONE TABLET BY MOUTH ONE TIME DAILY       No current facility-administered medications on file prior to encounter. REVIEW OF SYSTEMS    Pertinent items are noted in HPI. Objective:     Visit Vitals  BP (!) 187/87 (BP 1 Location: Right upper arm, BP Patient Position: At rest;Sitting)   Pulse 74   Temp 98.1 °F (36.7 °C)   Resp 20       Wt Readings from Last 3 Encounters:   03/28/22 158.8 kg (350 lb)   10/12/21 (!) 181.4 kg (400 lb)   12/09/20 (!) 181.4 kg (400 lb)       PHYSICAL EXAM  The left calf wound is a thin layer of new epithelium, no evidence of active infection  The right leg wounds x2 are smaller, healthy granulation, no evidence of infection    Pertinent items are noted in HPI. Assessment:    L side healed, R side smaller    Problem List Items Addressed This Visit     None          Procedure Note  Indications:  Based on my examination of this patient's wound(s)/ulcer(s) today, debridement is not required to promote healing and evaluate the wound base. Wound Leg lower Right;Posterior #1 03/28/22 (Active)   Wound Image   05/17/22 1406   Wound Etiology Venous 05/17/22 1406   Dressing Status Clean;Dry; Intact 05/10/22 1412   Cleansed Cleansed with saline 05/17/22 1406   Wound Length (cm) 7.2 cm 05/17/22 1406   Wound Width (cm) 1.8 cm 05/17/22 1406   Wound Depth (cm) 0.1 cm 05/17/22 1406   Wound Surface Area (cm^2) 12.96 cm^2 05/17/22 1406   Change in Wound Size % 88.83 05/17/22 1406   Wound Volume (cm^3) 1.296 cm^3 05/17/22 1406   Wound Healing % 89 05/17/22 1406   Wound Assessment Granulation tissue;Slough 05/17/22 1406   Drainage Amount Moderate 05/17/22 1406   Drainage Description Serosanguinous 05/17/22 1406   Wound Odor None 05/17/22 1406   Betty-Wound/Incision Assessment Intact 05/17/22 1406   Edges Attached edges 05/17/22 1406   Wound Thickness Description Full thickness 05/17/22 1406   Number of days: 50        Plan:   Continue Yuridia, Tubi compression    Treatment Note please see attached Discharge Instructions    Written patient dismissal instructions given to patient or POA.          Electronically signed by Samuel Urbina MD on 5/17/2022 at 2:33 PM

## 2022-05-17 NOTE — WOUND CARE
Discharge Instructions for  [address] [place] [address], [address] [place] [address]  Telephone: [phone]    NAME:  Herrera Del Rio  YOB: 1955  MEDICAL RECORD NUMBER:  [id number]  DATE:  5/17/2022      Return Appointment:  [] Dressing supply provider:   [] Home Healthcare:  [x] Return Appointment: 1 Week(s)  [] Nurse Visit:  week  [] Discharge from [facility]  [] Ordered tests:   [x] Referrals: DR CARRERO- KEEP APPOINTMENT  [] Rx:   [] Other:      Wound Cleansing:   Do not scrub or use excessive force. Cleanse wound prior to applying a clean dressing with:  [x] Normal Saline   [x] Mild Soap & Water/Baby Shampoo   [] Keep Wound Dry in Shower  [] Other:      Topical Treatments:  Do not apply lotions, creams, or ointments to wound bed unless directed. [x] Apply moisturizing lotion to skin surrounding the wound prior to dressing change.  [] Apply antifungal ointment to skin surrounding the wound prior to dressing change.  [] Apply thin film of moisture barrier/zinc ointment to skin immediately around wound. [] Other:       Dressings:           Wound Location R  legs   [x] Apply Primary Dressing:       [] MediHoney Gel    [] MediHoney Alginate               [] Calcium Alginate      [] Calcium Alginate with Silver   [] Collagen                   [x] Collagen with Silver                [] Santyl with Moistened saline gauze              [] Hydrofera Blue (cut to size and moistened)  [] Hydrofera Blue Ready (Cut to size)   [] NS wet to dry    [] Betadine wet to dry              [] Hydrogel                [] Mepitel     [] Bactroban/Mupirocin               [] Other:     [x] Cover and Secure with:     [x] Gauze [] Mauro Edge [] Kerlix   [] Foam [x] Super Absorbant [] ABD     [] ACE Wrap            [] Other:    Limit contact of tape with skin.     [x] Change dressing: [] Daily    [x] Every Other Day [] Once per week   [] Twice per week   [] Three times per week [] Do Not Change Dressing   [] Other:    Pressure Relief:  [] When sitting, shift position or do seat lifts every 15 minutes.  [] Wheelchair cushion [] Specialty Bed/Mattress  [x]  Avoid direct pressure on wound site. Edema Control:  Apply: [] Compression Stocking:   mmHg  []Right Leg []Left Leg   [x] Tubigrip [x]Right Leg Double Layer [x]Left Leg Double Layer      []Right Leg Single Layer []Left Leg Single Layer     [x] Elevate leg(s) above the level of the heart when sitting. [x] Avoid prolonged standing in one place. Compression:  Apply: [] Multilayer Compression Wrap: []RightLeg []Left Leg                                 []Coflex   []Coflex Lite             []Unna     [x] Do not get leg(s) with wrap wet. If wraps become too tight call the center or completely remove the wrap. [x] Elevate leg(s) above the level of the heart when sitting. [x] Avoid prolonged standing in one place. Dietary:  [] Diet as tolerated: [] Calorie Diabetic Diet: [x] No Added Salt:  [x] Increase Protein: [] Other:     Activity:  [x] Activity as tolerated:    [] Patient has no activity restrictions       [] Strict Bedrest:   [] Remain off Work:       [] May return to full duty work:                                     [] Return to work with restrictions:               [address] Information: Should you experience any significant changes in your wound(s) or have questions about your wound care, please contact Dane aHnks at [place] [address] 8:00 am - 4:30. If you need help with your wound outside of these hours and cannot wait until we are again available, contact your PCP or go to the hospital emergency room. PLEASE NOTE: IF YOU ARE UNABLE TO OBTAIN WOUND SUPPLIES, CONTINUE TO USE THE SUPPLIES YOU HAVE AVAILABLE UNTIL YOU ARE ABLE TO REACH US. IT IS MOST IMPORTANT TO KEEP THE WOUND COVERED AT ALL TIMES.     [x] Dr. Magda Terry   [] Dr. Michelina Soulier  [] Dr. Peyton Ferrari

## 2022-05-24 ENCOUNTER — HOSPITAL ENCOUNTER (OUTPATIENT)
Dept: WOUND CARE | Age: 67
Discharge: HOME | End: 2022-05-24
Payer: MEDICARE

## 2022-05-24 VITALS — TEMPERATURE: 97.3 F | RESPIRATION RATE: 22 BRPM

## 2022-05-24 PROCEDURE — 74011000250 HC RX REV CODE- 250: Performed by: SPECIALIST

## 2022-05-24 PROCEDURE — 99213 OFFICE O/P EST LOW 20 MIN: CPT

## 2022-05-24 RX ORDER — LIDOCAINE HYDROCHLORIDE 20 MG/ML
15 SOLUTION OROPHARYNGEAL AS NEEDED
Status: DISCONTINUED | OUTPATIENT
Start: 2022-05-24 | End: 2022-05-26 | Stop reason: HOSPADM

## 2022-05-24 NOTE — WOUND CARE
[address]:     [address] f: [phone] f: [phone] p: [phone] [email or web address]       Ordering Center:     [address] [place] [address] [address] NUMBER [phone]    Patient Information:     Leela Thibodeaux   [address]  [address] [id number] [phone] (home)     : 1955  AGE: 74 y.o. GENDER: male  TODAYS DATE: 2022    Insurance:     PRIMARY INSURANCE    [id number] - (Medicare)  Payor/Plan Subscr  Sex Relation Sub. Ins. ID Effective Group Num   1. [place] MEDICARE -* Genoveva Mirian 1955 Male Self [id number] 22                                    [address]   2.  [address] 1955 Male Self [id number]                                   [address] [address]        Patient Wound Information:     Hospital Problems  Date Reviewed: 2022          Codes Class Noted POA    Non-pressure chronic ulcer of other part of right lower leg with fat layer exposed ([facility] [address] ICD-10-CM: Z19.992  ICD-9-CM: 707.19  3/28/2022 Yes        Non-pressure chronic ulcer of other part of left lower leg with fat layer exposed ([facility] [address] ICD-10-CM: N42.771  ICD-9-CM: 707.19  3/28/2022 Yes              WOUNDS REQUIRING DRESSING SUPPLIES    Wound Leg lower Right;Posterior #1 22 (Active)   Wound Image   22 1508   Wound Etiology Venous 22 1508   Dressing Status Other (Comment) 22 1508   Cleansed Cleansed with saline 22 1508   Wound Length (cm) 6.1 cm 22 1508   Wound Width (cm) 1 cm 22 1508   Wound Depth (cm) 0.1 cm 22 1508   Wound Surface Area (cm^2) 6.1 cm^2 22 1508   Change in Wound Size % 94.74 22 1508   Wound Volume (cm^3) 0.61 cm^3 22 1508   Wound Healing % 95 22 1508   Wound Assessment Granulation tissue;Slough 22 1508   Drainage Amount Moderate 05/24/22 1508   Drainage Description Serosanguinous 05/24/22 1508   Wound Odor None 05/24/22 1508   Betty-Wound/Incision Assessment Intact; Hypopigmented 05/24/22 1508   Edges Attached edges 05/24/22 1508   Wound Thickness Description Full thickness 05/24/22 1508   Number of days: 57       Wound Leg Left;Posterior #2 05/24/22 (Active)   Wound Image   05/24/22 1510   Wound Etiology Venous 05/24/22 1510   Dressing Status Other (Comment) 05/24/22 1510   Cleansed Cleansed with saline 05/24/22 1510   Wound Length (cm) 0.7 cm 05/24/22 1510   Wound Width (cm) 1 cm 05/24/22 1510   Wound Depth (cm) 0.1 cm 05/24/22 1510   Wound Surface Area (cm^2) 0.7 cm^2 05/24/22 1510   Wound Volume (cm^3) 0.07 cm^3 05/24/22 1510   Wound Assessment Slough;Granulation tissue 05/24/22 1510   Drainage Amount Moderate 05/24/22 1510   Drainage Description Serosanguinous 05/24/22 1510   Wound Odor None 05/24/22 1510   Betty-Wound/Incision Assessment Intact; Hypopigmented 05/24/22 1510   Edges Attached edges 05/24/22 1510   Wound Thickness Description Full thickness 05/24/22 1510   Number of days: 0       Supplies Requested :     WOUND #:1   PRIMARY DRESSING:  Other: qiana ag   4X4 gauze pad, Bulky roll gauze and Other tubi size G      FREQUENCY OF DRESSING CHANGES:  Every other day       WOUND #: 2   PRIMARY DRESSING:  Other: qiana ag    4X4 gauze pad, Bulky roll gauze and Other tubi size G      FREQUENCY OF DRESSING CHANGES:  Every other day                     ADDITIONAL ITEMS:  [] Gloves Small  [] Gloves Medium [x] Gloves Large [] Gloves XLarge  [x] Tape 4\"  [x] Medipore Tape  [x] Saline  [] Skin Prep   [] Adhesive Remover   [] Cotton Tip Applicators   [] Other:    Patient Wound(s) Debrided: [x] Yes if yes please add date 4/19/22   [] No    Debribement Type: Excisional/Sharp    Patient currently being seen by Home Health: [] Yes   [x] No    Duration for needed supplies:  []15  [x]30 Days    Electronically signed by Pastora Oro Han To on 5/24/2022 at 3:20 PM     Provider Information:      PROVIDER'S NAME: Isabel Pinto MD.

## 2022-05-24 NOTE — WOUND CARE
Discharge Instructions for  [address] [place] [address], [address] [place] [address]  Telephone: [phone]    NAME:  Rigo Pereira  YOB: 1955  MEDICAL RECORD NUMBER:  [id number]  DATE:  5/24/2022      Return Appointment:  [] Dressing supply provider:   [] Home Healthcare:  [x] Return Appointment: 1 Week(s)  [] Nurse Visit:  Week    [] Discharge from [facility]  [] Ordered tests:   [x] Referrals: DR CARRERO- KEEP APPOINTMENT  [] Rx:   [] Other:      Wound Cleansing:   Do not scrub or use excessive force. Cleanse wound prior to applying a clean dressing with:  [] Normal Saline   [x] Mild Soap & Water/Baby Shampoo   [] Keep Wound Dry in Shower  [] Other:      Topical Treatments:  Do not apply lotions, creams, or ointments to wound bed unless directed. [x] Apply moisturizing lotion to skin surrounding the wound prior to dressing change.  [] Apply antifungal ointment to skin surrounding the wound prior to dressing change.  [] Apply thin film of moisture barrier/zinc ointment to skin immediately around wound. [] Other:       Dressings:           Wound Location R leg & L leg   [x] Apply Primary Dressing:       [] MediHoney Gel    [] MediHoney Alginate               [] Calcium Alginate      [] Calcium Alginate with Silver   [] Collagen                   [x] Collagen with Silver                [] Santyl with Moistened saline gauze              [] Hydrofera Blue (cut to size and moistened)  [] Hydrofera Blue Ready (Cut to size)   [] NS wet to dry    [] Betadine wet to dry              [] Hydrogel                [] Mepitel     [] Bactroban/Mupirocin               [] Other:     [x] Cover and Secure with:     [x] Gauze [] Tiffany [x] Kerlix   [] Foam [] Super Absorbant [] ABD     [] ACE Wrap            [] Other:    Limit contact of tape with skin.     [x] Change dressing: [] Daily    [] Every Other Day [] Once per week   [] Twice per week   [x] Three times per week [] Do Not Change Dressing   [] Other:    Pressure Relief:  [] When sitting, shift position or do seat lifts every 15 minutes.  [] Wheelchair cushion [] Specialty Bed/Mattress  [x]  Avoid direct pressure on wound site. Edema Control:  Apply: [] Compression Stocking:   mmHg  []Right Leg []Left Leg   [x] Tubigrip [x]Right Leg Double Layer [x]Left Leg Double Layer      []Right Leg Single Layer []Left Leg Single Layer     [x] Elevate leg(s) above the level of the heart when sitting. [x] Avoid prolonged standing in one place. Compression:  Apply: [] Multilayer Compression Wrap: []RightLeg []Left Leg                                 []Coflex   []Coflex Lite             []Unna     [] Do not get leg(s) with wrap wet. If wraps become too tight call the center or completely remove the wrap. [] Elevate leg(s) above the level of the heart when sitting. [] Avoid prolonged standing in one place. Dietary:  [] Diet as tolerated: [] Calorie Diabetic Diet: [x] No Added Salt:  [x] Increase Protein: [] Other:     Activity:  [x] Activity as tolerated:    [] Patient has no activity restrictions       [] Strict Bedrest:   [] Remain off Work:       [] May return to full duty work:                                     [] Return to work with restrictions:               [address] Information: Should you experience any significant changes in your wound(s) or have questions about your wound care, please contact Dane Hanks at [place] [address] 8:00 am - 4:30. If you need help with your wound outside of these hours and cannot wait until we are again available, contact your PCP or go to the hospital emergency room. PLEASE NOTE: IF YOU ARE UNABLE TO OBTAIN WOUND SUPPLIES, CONTINUE TO USE THE SUPPLIES YOU HAVE AVAILABLE UNTIL YOU ARE ABLE TO REACH US. IT IS MOST IMPORTANT TO KEEP THE WOUND COVERED AT ALL TIMES.     [x] Dr. Marko Ross   [] Dr. Danelle Ramos  [] Dr. Khushbu Tyson Mike

## 2022-05-24 NOTE — DISCHARGE INSTRUCTIONS
Discharge Instructions for  [address] [place] [address], [address] [place] [address]  Telephone: [phone]    NAME:  Leela Thibodeaux  YOB: 1955  MEDICAL RECORD NUMBER:  [id number]  DATE:  5/24/2022      Return Appointment:  [x] Dressing supply provider: Ketty  [] Home Healthcare:  [x] Return Appointment: 1 Week(s)  [] Nurse Visit:  Week    [] Discharge from [facility]  [] Ordered tests:   [x] Referrals: DR CARRERO- KEEP APPOINTMENT  [] Rx:   [] Other:      Wound Cleansing:   Do not scrub or use excessive force. Cleanse wound prior to applying a clean dressing with:  [] Normal Saline   [x] Mild Soap & Water/Baby Shampoo   [] Keep Wound Dry in Shower  [] Other:      Topical Treatments:  Do not apply lotions, creams, or ointments to wound bed unless directed. [x] Apply moisturizing lotion to skin surrounding the wound prior to dressing change.  [] Apply antifungal ointment to skin surrounding the wound prior to dressing change.  [] Apply thin film of moisture barrier/zinc ointment to skin immediately around wound. [] Other:       Dressings:           Wound Location R leg & L leg   [x] Apply Primary Dressing:       [] MediHoney Gel    [] MediHoney Alginate               [] Calcium Alginate      [] Calcium Alginate with Silver   [] Collagen                   [x] Collagen with Silver                [] Santyl with Moistened saline gauze              [] Hydrofera Blue (cut to size and moistened)  [] Hydrofera Blue Ready (Cut to size)   [] NS wet to dry    [] Betadine wet to dry              [] Hydrogel                [] Mepitel     [] Bactroban/Mupirocin               [] Other:     [x] Cover and Secure with:     [x] Gauze [] Tiffany [x] Kerlix   [] Foam [] Super Absorbant [] ABD     [] ACE Wrap            [] Other:    Limit contact of tape with skin.     [x] Change dressing: [] Daily    [] Every Other Day [] Once per week   [] Twice per week   [x] Three times per week [] Do Not Change Dressing   [] Other:    Pressure Relief:  [] When sitting, shift position or do seat lifts every 15 minutes.  [] Wheelchair cushion [] Specialty Bed/Mattress  [x]  Avoid direct pressure on wound site. Edema Control:  Apply: [] Compression Stocking:   mmHg  []Right Leg []Left Leg   [x] Tubigrip [x]Right Leg Double Layer [x]Left Leg Double Layer      []Right Leg Single Layer []Left Leg Single Layer     [x] Elevate leg(s) above the level of the heart when sitting. [x] Avoid prolonged standing in one place. Compression:  Apply: [] Multilayer Compression Wrap: []RightLeg []Left Leg                                 []Coflex   []Coflex Lite             []Unna     [] Do not get leg(s) with wrap wet. If wraps become too tight call the center or completely remove the wrap. [] Elevate leg(s) above the level of the heart when sitting. [] Avoid prolonged standing in one place. Dietary:  [] Diet as tolerated: [] Calorie Diabetic Diet: [x] No Added Salt:  [x] Increase Protein: [] Other:     Activity:  [x] Activity as tolerated:    [] Patient has no activity restrictions       [] Strict Bedrest:   [] Remain off Work:       [] May return to full duty work:                                     [] Return to work with restrictions:               [address] Information: Should you experience any significant changes in your wound(s) or have questions about your wound care, please contact Dane Hanks at [place] [address] 8:00 am - 4:30. If you need help with your wound outside of these hours and cannot wait until we are again available, contact your PCP or go to the hospital emergency room. PLEASE NOTE: IF YOU ARE UNABLE TO OBTAIN WOUND SUPPLIES, CONTINUE TO USE THE SUPPLIES YOU HAVE AVAILABLE UNTIL YOU ARE ABLE TO REACH US. IT IS MOST IMPORTANT TO KEEP THE WOUND COVERED AT ALL TIMES.     [x] Dr. Leann Sharma   [] Dr. Perfecto Carrasco  []  Reyes Alberto

## 2022-05-24 NOTE — WOUND CARE
[address]   Progress Note and Procedure Note   NO Procedure      [facility] RECORD NUMBER:  [id number]  AGE: 74 y.o. RACE WHITE/NON-  BLACK/  GENDER: male  : 1955  EPISODE DATE:  2022    Subjective:   New (recurrent) L calf wound    Chief Complaint   Patient presents with    Wound Check     R and L leg         HISTORY of PRESENT ILLNESS KATIE Villa is a 74 y.o. male who presents today for wound/ulcer evaluation. History of Wound Context: BLE  Wound/Ulcer Pain Timing/Severity: none  Quality of pain: dull  Severity:  0 / 10   Modifying Factors: None  Associated Signs/Symptoms: edema    Ulcer Identification:  Ulcer Type: venous    Contributing Factors: lymphedema    Wound: BLE         PAST MEDICAL HISTORY    Past Medical History:   Diagnosis Date    Arthritis     Chronic obstructive pulmonary disease (HCC)     Diabetes ([facility] [address]     Hypertension     Sleep apnea         PAST SURGICAL HISTORY    Past Surgical History:   Procedure Laterality Date    HX GI      HX HERNIA REPAIR         FAMILY HISTORY    Family History   Problem Relation Age of Onset    Hypertension Child     Hypertension Sister     Hypertension Sister        SOCIAL HISTORY    Social History     Tobacco Use    Smoking status: Never Smoker    Smokeless tobacco: Never Used   Substance Use Topics    Alcohol use: Never    Drug use: Never       ALLERGIES    No Known Allergies    MEDICATIONS    Current Outpatient Medications on File Prior to Encounter   Medication Sig Dispense Refill    nebivoloL (BYSTOLIC) 10 mg tablet Take 10 mg by mouth daily.  multivitamin (ONE A DAY) tablet Take 1 Tablet by mouth daily.  furosemide (LASIX) 40 mg tablet Take 1 Tablet by mouth daily. 30 Tablet 0    atorvastatin (LIPITOR) 20 mg tablet Take 20 mg by mouth nightly.       aspirin delayed-release 81 mg tablet TAKE ONE TABLET BY MOUTH ONE TIME DAILY       No current facility-administered medications on file prior to encounter. REVIEW OF SYSTEMS    Pertinent items are noted in HPI. Objective:     Visit Vitals  Temp 97.3 °F (36.3 °C)   Resp 22       Wt Readings from Last 3 Encounters:   03/28/22 158.8 kg (350 lb)   10/12/21 (!) 181.4 kg (400 lb)   12/09/20 (!) 181.4 kg (400 lb)       PHYSICAL EXAM  There are 2 small wounds clustered on the right posterior calf with healthy granulation tissue and no evidence of active infection; a wound has reoccurred on the left calf which is small and superficial, healthy granulation tissue, no evidence of active infection  Pertinent items are noted in HPI. Assessment:    recurrent L calf wound, improved R calf wound(s)    Problem List Items Addressed This Visit     None          Procedure Note  Indications:  Based on my examination of this patient's wound(s)/ulcer(s) today, debridement is not required to promote healing and evaluate the wound base. Wound Leg lower Right;Posterior #1 03/28/22 (Active)   Wound Image   05/24/22 1508   Wound Etiology Venous 05/24/22 1508   Dressing Status Other (Comment) 05/24/22 1508   Cleansed Cleansed with saline 05/24/22 1508   Wound Length (cm) 6.1 cm 05/24/22 1508   Wound Width (cm) 1 cm 05/24/22 1508   Wound Depth (cm) 0.1 cm 05/24/22 1508   Wound Surface Area (cm^2) 6.1 cm^2 05/24/22 1508   Change in Wound Size % 94.74 05/24/22 1508   Wound Volume (cm^3) 0.61 cm^3 05/24/22 1508   Wound Healing % 95 05/24/22 1508   Wound Assessment Granulation tissue;Slough 05/24/22 1508   Drainage Amount Moderate 05/24/22 1508   Drainage Description Serosanguinous 05/24/22 1508   Wound Odor None 05/24/22 1508   Betty-Wound/Incision Assessment Intact; Hypopigmented 05/24/22 1508   Edges Attached edges 05/24/22 1508   Wound Thickness Description Full thickness 05/24/22 1508   Number of days: 57       Wound Leg Left;Posterior #2 05/24/22 (Active)   Wound Image   05/24/22 1510   Wound Etiology Venous 05/24/22 1510 Dressing Status Other (Comment) 05/24/22 1510   Cleansed Cleansed with saline 05/24/22 1510   Wound Length (cm) 0.7 cm 05/24/22 1510   Wound Width (cm) 1 cm 05/24/22 1510   Wound Depth (cm) 0.1 cm 05/24/22 1510   Wound Surface Area (cm^2) 0.7 cm^2 05/24/22 1510   Wound Volume (cm^3) 0.07 cm^3 05/24/22 1510   Wound Assessment Slough;Granulation tissue 05/24/22 1510   Drainage Amount Moderate 05/24/22 1510   Drainage Description Serosanguinous 05/24/22 1510   Wound Odor None 05/24/22 1510   Betty-Wound/Incision Assessment Intact; Hypopigmented 05/24/22 1510   Edges Attached edges 05/24/22 1510   Wound Thickness Description Full thickness 05/24/22 1510   Number of days: 0        Plan:   Yuridia, Double Tubi     Treatment Note please see attached Discharge Instructions    Written patient dismissal instructions given to patient or POA.          Electronically signed by Emily Delgado MD on 5/24/2022 at 3:18 PM

## 2022-05-31 ENCOUNTER — HOSPITAL ENCOUNTER (OUTPATIENT)
Dept: WOUND CARE | Age: 67
Discharge: HOME | End: 2022-05-31
Payer: MEDICARE

## 2022-05-31 VITALS
DIASTOLIC BLOOD PRESSURE: 82 MMHG | SYSTOLIC BLOOD PRESSURE: 172 MMHG | TEMPERATURE: 95.7 F | RESPIRATION RATE: 22 BRPM | HEART RATE: 67 BPM

## 2022-05-31 PROCEDURE — 74011000250 HC RX REV CODE- 250: Performed by: SPECIALIST

## 2022-05-31 PROCEDURE — 99213 OFFICE O/P EST LOW 20 MIN: CPT

## 2022-05-31 RX ORDER — LIDOCAINE HYDROCHLORIDE 20 MG/ML
15 SOLUTION OROPHARYNGEAL AS NEEDED
Status: DISCONTINUED | OUTPATIENT
Start: 2022-05-31 | End: 2022-06-02 | Stop reason: HOSPADM

## 2022-05-31 NOTE — DISCHARGE INSTRUCTIONS
Discharge Instructions for  [address] [place] [address], [facility]  Telephone: [phone]     NAME:  Zabrina Nelson  YOB: 1955  MEDICAL RECORD NUMBER:  [id number]  DATE:  5/31/2022        Return Appointment:  []? Dressing supply provider:   []? Home Healthcare:  [x]? Return Appointment: 1 Week(s)  []? Nurse Visit:  Week     []? Discharge from [facility]  []? Ordered tests:   [x]? Referrals: DR CARRERO- KEEP APPOINTMENT  []? Rx:   []? Other:       Wound Cleansing:   Do not scrub or use excessive force. Cleanse wound prior to applying a clean dressing with:  []? Normal Saline          [x]? Mild Soap & Water/Baby Shampoo   []? Keep Wound Dry in Shower  []? Other:       Topical Treatments:  Do not apply lotions, creams, or ointments to wound bed unless directed. [x]? Apply moisturizing lotion to skin surrounding the wound prior to dressing change. []? Apply antifungal ointment to skin surrounding the wound prior to dressing change. []? Apply thin film of moisture barrier/zinc ointment to skin immediately around wound. []? Other:                  Dressings:                  Wound Location R leg & L leg         [x]? Apply Primary Dressing:                                          []? MediHoney Gel         []? MediHoney Alginate                     []? Calcium Alginate      []? Calcium Alginate with Silver              []? Collagen                   [x]? Collagen with Silver                []? Santyl with Moistened saline gauze              []? Hydrofera Blue (cut to size and moistened)  []? Hydrofera Blue Ready (Cut to size)              []? NS wet to dry            []? Betadine wet to dry              []? Hydrogel                   []? Mepitel                   []? Bactroban/Mupirocin                       []? Other:      [x]? Cover and Secure with:                   [x]? Gauze        []? Young Jenkins           [x]?  Kerlix              []? Foam          []? Super Absorbant    []? ABD                                      []? ACE Wrap            []? Other:               Limit contact of tape with skin.     [x]? Change dressing:  []? Daily           []? Every Other Day    []? Once per week   []? Twice per week              [x]? Three times per week        []? Do Not Change Dressing    []? Other:     Pressure Relief:  []? When sitting, shift position or do seat lifts every 15 minutes. []? Wheelchair cushion           []? Specialty Bed/Mattress  [x]? Avoid direct pressure on wound site. Edema Control:  Apply:  []? Compression Stocking:   mmHg    []? Right Leg     []? Left Leg              [x]? Tubigrip      [x]? Right Leg Double Layer      [x]? Left Leg Double Layer                                      []?Right Leg Single Layer       []? Left Leg Single Layer                 [x]? Elevate leg(s) above the level of the heart when sitting. [x]? Avoid prolonged standing in one place.         Compression:  Apply:  []? Multilayer Compression Wrap:      []? RightLeg      []? Left Leg                                 []?Coflex              []?Coflex Lite             []?Unna                 []? Do not get leg(s) with wrap wet. If wraps become too tight call the center or completely remove the wrap. []? Elevate leg(s) above the level of the heart when sitting. []? Avoid prolonged standing in one place.     Dietary:  []? Diet as tolerated:   []? Calorie Diabetic Diet:         [x]? No Added Salt:  [x]? Increase Protein:   []? Other:              Activity:  [x]? Activity as tolerated:    []? Patient has no activity restrictions       []? Strict Bedrest:          []? Remain off Work:       []?  May return to full duty work:                                               []? Return to work with restrictions:      [address] [facility],[address] Information: Should you experience any significant changes in your wound(s) or have questions about your wound care, please contact Dane Melgar 26 at [place] [address] 8:00 am - 4:30. If you need help with your wound outside of these hours and cannot wait until we are again available, contact your PCP or go to the hospital emergency room.      PLEASE NOTE: IF YOU ARE UNABLE TO [address], CONTINUE TO USE THE SUPPLIES YOU HAVE AVAILABLE UNTIL YOU ARE ABLE TO [address] [facility]. IT IS MOST IMPORTANT TO KEEP THE WOUND COVERED AT ALL TIMES.     [x]? Dr. Cali Vera   []? Dr. Christian Morales  []?  Dr. Betito Fonseca

## 2022-05-31 NOTE — WOUND CARE
Discharge Instructions for  [address] [place] [address], [address] [place] [address]  Telephone: [phone]    NAME:  Han Clay  YOB: 1955  MEDICAL RECORD NUMBER:  [id number]  DATE:  5/31/2022      Return Appointment:  [] Dressing supply provider:   [] Home Healthcare:  [x] Return Appointment: 1 Week(s)  [] Nurse Visit:  Week    [] Discharge from [facility]  [] Ordered tests:   [x] Referrals: DR CARRERO- KEEP APPOINTMENT  [] Rx:   [] Other:      Wound Cleansing:   Do not scrub or use excessive force. Cleanse wound prior to applying a clean dressing with:  [] Normal Saline   [x] Mild Soap & Water/Baby Shampoo   [] Keep Wound Dry in Shower  [] Other:      Topical Treatments:  Do not apply lotions, creams, or ointments to wound bed unless directed. [x] Apply moisturizing lotion to skin surrounding the wound prior to dressing change.  [] Apply antifungal ointment to skin surrounding the wound prior to dressing change.  [] Apply thin film of moisture barrier/zinc ointment to skin immediately around wound. [] Other:       Dressings:           Wound Location R leg & L leg   [x] Apply Primary Dressing:       [] MediHoney Gel    [] MediHoney Alginate               [] Calcium Alginate      [] Calcium Alginate with Silver   [] Collagen                   [x] Collagen with Silver                [] Santyl with Moistened saline gauze              [] Hydrofera Blue (cut to size and moistened)  [] Hydrofera Blue Ready (Cut to size)   [] NS wet to dry    [] Betadine wet to dry              [] Hydrogel                [] Mepitel     [] Bactroban/Mupirocin               [] Other:     [x] Cover and Secure with:     [x] Gauze [] Tiffany [x] Kerlix   [] Foam [] Super Absorbant [] ABD     [] ACE Wrap            [] Other:    Limit contact of tape with skin.     [x] Change dressing: [] Daily    [] Every Other Day [] Once per week   [] Twice per week   [x] Three times per week [] Do Not Change Dressing   [] Other:    Pressure Relief:  [] When sitting, shift position or do seat lifts every 15 minutes.  [] Wheelchair cushion [] Specialty Bed/Mattress  [x]  Avoid direct pressure on wound site. Edema Control:  Apply: [] Compression Stocking:   mmHg  []Right Leg []Left Leg   [x] Tubigrip [x]Right Leg Double Layer [x]Left Leg Double Layer      []Right Leg Single Layer []Left Leg Single Layer     [x] Elevate leg(s) above the level of the heart when sitting. [x] Avoid prolonged standing in one place. Compression:  Apply: [] Multilayer Compression Wrap: []RightLeg []Left Leg                                 []Coflex   []Coflex Lite             []Unna     [] Do not get leg(s) with wrap wet. If wraps become too tight call the center or completely remove the wrap. [] Elevate leg(s) above the level of the heart when sitting. [] Avoid prolonged standing in one place. Dietary:  [] Diet as tolerated: [] Calorie Diabetic Diet: [x] No Added Salt:  [x] Increase Protein: [] Other:     Activity:  [x] Activity as tolerated:    [] Patient has no activity restrictions       [] Strict Bedrest:   [] Remain off Work:       [] May return to full duty work:                                     [] Return to work with restrictions:               [address] Information: Should you experience any significant changes in your wound(s) or have questions about your wound care, please contact Dane Hanks at [place] [address] 8:00 am - 4:30. If you need help with your wound outside of these hours and cannot wait until we are again available, contact your PCP or go to the hospital emergency room. PLEASE NOTE: IF YOU ARE UNABLE TO OBTAIN WOUND SUPPLIES, CONTINUE TO USE THE SUPPLIES YOU HAVE AVAILABLE UNTIL YOU ARE ABLE TO REACH US. IT IS MOST IMPORTANT TO KEEP THE WOUND COVERED AT ALL TIMES.     [x] Dr. Denise Hale   [] Dr. Papito Ortega  [] Dr. Lito Zhong Mike

## 2022-05-31 NOTE — WOUND CARE
[address]   Progress Note and Procedure Note   NO Procedure      12 [facility] RECORD NUMBER:  [id number]  AGE: 74 y.o. RACE WHITE/NON-  BLACK/  GENDER: male  : 1955  EPISODE DATE:  2022    Subjective:   No new problems reported    Chief Complaint   Patient presents with    Wound Check     R leg, L leg          HISTORY of PRESENT ILLNESS KATIE Lal is a 74 y.o. male who presents today for wound/ulcer evaluation. History of Wound Context: L & R calf  Wound/Ulcer Pain Timing/Severity: none  Quality of pain: dull  Severity:  0 / 10   Modifying Factors: None  Associated Signs/Symptoms: edema    Ulcer Identification:  Ulcer Type: venous    Contributing Factors: lymphedema    Wound: R & L calf         PAST MEDICAL HISTORY    Past Medical History:   Diagnosis Date    Arthritis     Chronic obstructive pulmonary disease (HCC)     Diabetes ([facility] [address]     Hypertension     Sleep apnea         PAST SURGICAL HISTORY    Past Surgical History:   Procedure Laterality Date    HX GI      HX HERNIA REPAIR         FAMILY HISTORY    Family History   Problem Relation Age of Onset    Hypertension Child     Hypertension Sister     Hypertension Sister        SOCIAL HISTORY    Social History     Tobacco Use    Smoking status: Never Smoker    Smokeless tobacco: Never Used   Substance Use Topics    Alcohol use: Never    Drug use: Never       ALLERGIES    No Known Allergies    MEDICATIONS    Current Outpatient Medications on File Prior to Encounter   Medication Sig Dispense Refill    nebivoloL (BYSTOLIC) 10 mg tablet Take 10 mg by mouth daily.  multivitamin (ONE A DAY) tablet Take 1 Tablet by mouth daily.  furosemide (LASIX) 40 mg tablet Take 1 Tablet by mouth daily. 30 Tablet 0    atorvastatin (LIPITOR) 20 mg tablet Take 20 mg by mouth nightly.       aspirin delayed-release 81 mg tablet TAKE ONE TABLET BY MOUTH ONE TIME DAILY       No current facility-administered medications on file prior to encounter. REVIEW OF SYSTEMS    Pertinent items are noted in HPI. Objective:     Visit Vitals  BP (!) 172/82 (BP 1 Location: Right lower arm, BP Patient Position: At rest;Sitting)   Pulse 67   Temp (!) 95.7 °F (35.4 °C)   Resp 22       Wt Readings from Last 3 Encounters:   03/28/22 158.8 kg (350 lb)   10/12/21 (!) 181.4 kg (400 lb)   12/09/20 (!) 181.4 kg (400 lb)       PHYSICAL EXAM  The left and right calf wounds are quite small, no significant slough, no evidence of infection  Pertinent items are noted in HPI. Assessment:   Almost healed  Problem List Items Addressed This Visit     None          Procedure Note  Indications:  Based on my examination of this patient's wound(s)/ulcer(s) today, debridement is not required to promote healing and evaluate the wound base. Wound Leg lower Right;Posterior #1 03/28/22 (Active)   Wound Image   05/31/22 1356   Wound Etiology Venous 05/31/22 1356   Dressing Status Other (Comment) 05/31/22 1356   Cleansed Cleansed with saline; Soap and water 05/31/22 1356   Wound Length (cm) 5.1 cm 05/31/22 1356   Wound Width (cm) 1 cm 05/31/22 1356   Wound Depth (cm) 0.1 cm 05/31/22 1356   Wound Surface Area (cm^2) 5.1 cm^2 05/31/22 1356   Change in Wound Size % 95.6 05/31/22 1356   Wound Volume (cm^3) 0.51 cm^3 05/31/22 1356   Wound Healing % 96 05/31/22 1356   Wound Assessment Granulation tissue 05/31/22 1356   Drainage Amount Scant 05/31/22 1356   Drainage Description Serosanguinous 05/31/22 1356   Wound Odor None 05/31/22 1356   Betty-Wound/Incision Assessment Intact; Hypopigmented 05/31/22 1356   Edges Attached edges 05/31/22 1356   Wound Thickness Description Full thickness 05/31/22 1356   Number of days: 64       Wound Leg Left;Posterior #2 05/24/22 (Active)   Wound Image   05/31/22 1356   Wound Etiology Venous 05/31/22 1356   Dressing Status Other (Comment) 05/31/22 1356   Cleansed Soap and water;Cleansed with saline 05/31/22 1356   Wound Length (cm) 0.8 cm 05/31/22 1356   Wound Width (cm) 0.8 cm 05/31/22 1356   Wound Depth (cm) 0.1 cm 05/31/22 1356   Wound Surface Area (cm^2) 0.64 cm^2 05/31/22 1356   Change in Wound Size % 8.57 05/31/22 1356   Wound Volume (cm^3) 0.064 cm^3 05/31/22 1356   Wound Healing % 9 05/31/22 1356   Wound Assessment Granulation tissue 05/31/22 1356   Drainage Amount Scant 05/31/22 1356   Drainage Description Serosanguinous 05/31/22 1356   Wound Odor None 05/31/22 1356   Betty-Wound/Incision Assessment Intact; Hypopigmented 05/31/22 1356   Edges Attached edges 05/31/22 1356   Wound Thickness Description Full thickness 05/31/22 1356   Number of days: 7        Plan:   Continue Yuridia  Follow-up with Dr. Fritz Arroyo for ablation in the right lower extremity next week    Treatment Note please see attached Discharge Instructions    Written patient dismissal instructions given to patient or POA.          Electronically signed by Regine Louise MD on 5/31/2022 at 2:11 PM

## 2022-06-07 ENCOUNTER — HOSPITAL ENCOUNTER (OUTPATIENT)
Dept: WOUND CARE | Age: 67
Discharge: HOME | End: 2022-06-07
Payer: MEDICARE

## 2022-06-07 VITALS
DIASTOLIC BLOOD PRESSURE: 79 MMHG | TEMPERATURE: 97.8 F | RESPIRATION RATE: 22 BRPM | SYSTOLIC BLOOD PRESSURE: 164 MMHG | HEART RATE: 64 BPM

## 2022-06-07 PROCEDURE — 74011000250 HC RX REV CODE- 250: Performed by: SPECIALIST

## 2022-06-07 PROCEDURE — 99213 OFFICE O/P EST LOW 20 MIN: CPT

## 2022-06-07 RX ORDER — LIDOCAINE HYDROCHLORIDE 20 MG/ML
15 SOLUTION OROPHARYNGEAL AS NEEDED
Status: DISCONTINUED | OUTPATIENT
Start: 2022-06-07 | End: 2022-06-09 | Stop reason: HOSPADM

## 2022-06-07 NOTE — WOUND CARE
Discharge Instructions for  [address] [place] [address], [address] [place] [address]  Telephone: [phone]    NAME:  Pat Villa  YOB: 1955  MEDICAL RECORD NUMBER:  [id number]  DATE:  6/7/2022      Return Appointment:  [x] Dressing supply provider: [facility]  [] Home Healthcare:  [x] Return Appointment: 1 Week(s)  [] Nurse Visit:  Week    [] Discharge from [facility]  [] Ordered tests:   [x] Referrals: DR CARRERO- KEEP APPOINTMENT  [] Rx:   [] Other:      Wound Cleansing:   Do not scrub or use excessive force. Cleanse wound prior to applying a clean dressing with:  [x] Normal Saline   [x] Mild Soap & Water/Baby Shampoo   [] Keep Wound Dry in Shower  [] Other:      Topical Treatments:  Do not apply lotions, creams, or ointments to wound bed unless directed. [x] Apply moisturizing lotion to skin surrounding the wound prior to dressing change.  [] Apply antifungal ointment to skin surrounding the wound prior to dressing change.  [] Apply thin film of moisture barrier/zinc ointment to skin immediately around wound. [] Other:       Dressings:           Wound Location  L leg   [x] Apply Primary Dressing:       [] MediHoney Gel    [] MediHoney Alginate               [] Calcium Alginate      [] Calcium Alginate with Silver   [] Collagen                   [x] Collagen with Silver                [] Santyl with Moistened saline gauze              [] Hydrofera Blue (cut to size and moistened)  [] Hydrofera Blue Ready (Cut to size)   [] NS wet to dry    [] Betadine wet to dry              [] Hydrogel                [] Mepitel     [] Bactroban/Mupirocin               [] Other:     [x] Cover and Secure with:     [x] Gauze [] Tiffany [x] Kerlix   [] Foam [] Super Absorbant [] ABD     [] ACE Wrap            [] Other:    Limit contact of tape with skin.     [x] Change dressing: [] Daily    [] Every Other Day [] Once per week   [] Twice per week   [x] Three times per week [] Do Not Change Dressing   [] Other:    Pressure Relief:  [] When sitting, shift position or do seat lifts every 15 minutes.  [] Wheelchair cushion [] Specialty Bed/Mattress  [x]  Avoid direct pressure on wound site. Edema Control:  Apply: [] Compression Stocking:   mmHg  []Right Leg []Left Leg   [x] Tubigrip [x]Right Leg Double Layer [x]Left Leg Double Layer      []Right Leg Single Layer []Left Leg Single Layer     [x] Elevate leg(s) above the level of the heart when sitting. [x] Avoid prolonged standing in one place. Compression:  Apply: [] Multilayer Compression Wrap: []RightLeg []Left Leg                                 []Coflex   []Coflex Lite             []Unna     [] Do not get leg(s) with wrap wet. If wraps become too tight call the center or completely remove the wrap. [] Elevate leg(s) above the level of the heart when sitting. [] Avoid prolonged standing in one place. Dietary:  [] Diet as tolerated: [] Calorie Diabetic Diet: [x] No Added Salt:  [x] Increase Protein: [] Other:     Activity:  [x] Activity as tolerated:    [] Patient has no activity restrictions       [] Strict Bedrest:   [] Remain off Work:       [] May return to full duty work:                                     [] Return to work with restrictions:               [address] Information: Should you experience any significant changes in your wound(s) or have questions about your wound care, please contact Dane Hanks at [place] [address] 8:00 am - 4:30. If you need help with your wound outside of these hours and cannot wait until we are again available, contact your PCP or go to the hospital emergency room. PLEASE NOTE: IF YOU ARE UNABLE TO OBTAIN WOUND SUPPLIES, CONTINUE TO USE THE SUPPLIES YOU HAVE AVAILABLE UNTIL YOU ARE ABLE TO REACH US. IT IS MOST IMPORTANT TO KEEP THE WOUND COVERED AT ALL TIMES.     [x] Dr. Marko Ross   [] Dr. Danelle Ramos  []  Ashlee Douglas

## 2022-06-07 NOTE — WOUND CARE
Compression [address] f: [phone] f: [phone] p: [phone] [email or web address]     Ordering Center:     [facility] [address] [place] [address] [phone]  WOUND CARE [unfilled]   FAX NUMBER [unfilled]    Patient Information:      Lady Saurabh [address]och[phone]   [unfilled]   : 1955  AGE: 74 y.o. GENDER: male   TODAYS DATE:  2022    Insurance:      PRIMARY INSURANCE:  [id number] - (Medicare)    Payor/Plan Subscr  Sex Relation Sub. Ins. ID Effective Group Num   1. [place] MEDICARE -* Evette Melo 1955 Male Self [id number] 22                                    [address]   2. [address] 1955 Male Self [id number]                                   [address] [address]       Patient Wound Information:      Problem List Items Addressed This Visit     None          WOUNDS REQUIRING DRESSING SUPPLIES:     Wound Leg Left;Posterior #2 22 (Active)   Wound Image   22 1347   Wound Etiology Venous 22 1347   Dressing Status Other (Comment) 22 1347   Cleansed Cleansed with saline 22 1347   Wound Length (cm) 0.5 cm 22 1347   Wound Width (cm) 0.6 cm 22 1347   Wound Depth (cm) 0.1 cm 22 1347   Wound Surface Area (cm^2) 0.3 cm^2 22 1347   Change in Wound Size % 57.14 22 1347   Wound Volume (cm^3) 0.03 cm^3 22 1347   Wound Healing % 57 22 1347   Wound Assessment Granulation tissue;Slough 22 1347   Drainage Amount Small 22 1347   Drainage Description Serosanguinous 22 1347   Wound Odor None 22 1347   Betty-Wound/Incision Assessment Intact; Hypopigmented 22 1347   Edges Attached edges 22 1347   Wound Thickness Description Full thickness 22 1347   Number of days: 14        Right Leg Measurements: 48 cm,   26.5 cm, 38 cm       Left Leg Measurements: 50.5 cm,  26.5 cm,   39.5 cm        Supplies Requested :     Medicare Requirements  Patient must have a qualifying Active Venus Ulcer if ordering Bilateral Compression Wounds MUST be present on both legs for Medicare Coverage. The patient can Not be on home health or have had a Medicare part A stay in the past 24 hours.     Patient Wound(s) Debrided: [x] Yes if yes please add date 04/19/22     Debribement Type: Excisional/Sharp    Patient currently being seen by Home Health: [] Yes   [x] No     Compression Type: Circaid Juxtalite, Original, 30-40 mm/Hg, BILATERAL lower legs     Provider Information:      PROVIDER'S NAME: Dr. Ayad Reynoso

## 2022-06-07 NOTE — DISCHARGE INSTRUCTIONS
Discharge Instructions for  [address], 1507 [facility]  Telephone: [phone]     NAME:  Rigo Pereira  YOB: 1955  MEDICAL RECORD NUMBER:  [id number]  DATE:  6/7/2022        Return Appointment:  [x]? Dressing supply provider: [facility]  []? Home Healthcare:  [x]? Return Appointment: 1 Week(s)  []? Nurse Visit:  Week     []? Discharge from [facility]  []? Ordered tests:   [x]? Referrals: DR CARRERO- KEEP APPOINTMENT  []? Rx:   []? Other:       Wound Cleansing:   Do not scrub or use excessive force. Cleanse wound prior to applying a clean dressing with:  [x]? Normal Saline          [x]? Mild Soap & Water/Baby Shampoo   []? Keep Wound Dry in Shower  []? Other:       Topical Treatments:  Do not apply lotions, creams, or ointments to wound bed unless directed. [x]? Apply moisturizing lotion to skin surrounding the wound prior to dressing change. []? Apply antifungal ointment to skin surrounding the wound prior to dressing change. []? Apply thin film of moisture barrier/zinc ointment to skin immediately around wound. []? Other:                  Dressings:                  Wound Location  L leg          [x]? Apply Primary Dressing:                                          []? MediHoney Gel         []? MediHoney Alginate                     []? Calcium Alginate      []? Calcium Alginate with Silver              []? Collagen                   [x]? Collagen with Silver                []? Santyl with Moistened saline gauze              []? Hydrofera Blue (cut to size and moistened)  []? Hydrofera Blue Ready (Cut to size)              []? NS wet to dry            []? Betadine wet to dry              []? Hydrogel                   []? Mepitel                   []? Bactroban/Mupirocin                       []? Other:      [x]? Cover and Secure with:                   [x]? Gauze        []? Nathanel Dawson           [x]?  Francinex []? Foam          []? Super Absorbant    []? ABD                                      []? ACE Wrap            []? Other:               Limit contact of tape with skin.     [x]? Change dressing:  []? Daily           []? Every Other Day    []? Once per week   []? Twice per week              [x]? Three times per week        []? Do Not Change Dressing    []? Other:     Pressure Relief:  []? When sitting, shift position or do seat lifts every 15 minutes. []? Wheelchair cushion           []? Specialty Bed/Mattress  [x]? Avoid direct pressure on wound site. Edema Control:  Apply:  []? Compression Stocking:   mmHg    []? Right Leg     []? Left Leg              [x]? Tubigrip      [x]? Right Leg Double Layer      [x]? Left Leg Double Layer                                      []?Right Leg Single Layer       []? Left Leg Single Layer                 [x]? Elevate leg(s) above the level of the heart when sitting. [x]? Avoid prolonged standing in one place.         Compression:  Apply:  []? Multilayer Compression Wrap:      []? RightLeg      []? Left Leg                                 []?Coflex              []?Coflex Lite             []?Unna                 []? Do not get leg(s) with wrap wet. If wraps become too tight call the center or completely remove the wrap. []? Elevate leg(s) above the level of the heart when sitting. []? Avoid prolonged standing in one place.     Dietary:  []? Diet as tolerated:   []? Calorie Diabetic Diet:         [x]? No Added Salt:  [x]? Increase Protein:   []? Other:              Activity:  [x]? Activity as tolerated:    []? Patient has no activity restrictions       []? Strict Bedrest:          []? Remain off Work:       []?  May return to full duty work:                                               []? Return to work with restrictions:      [address]: Should you experience any significant changes in your wound(s) or have questions about your wound care, please contact Dane Melgar 26 at [place] [address] 8:00 am - 4:30. If you need help with your wound outside of these hours and cannot wait until we are again available, contact your PCP or go to the hospital emergency room.      PLEASE NOTE: IF YOU ARE UNABLE TO [address], CONTINUE TO USE THE SUPPLIES YOU HAVE AVAILABLE UNTIL YOU ARE ABLE TO [facility]. IT IS MOST IMPORTANT TO KEEP THE WOUND COVERED AT ALL TIMES.     [x]? Dr. Reuben Dubin   []? Dr. Curt Greene  []?  Dr. Grupo Erickson

## 2022-06-07 NOTE — WOUND CARE
[address]   Progress Note and Procedure Note   NO Procedure      12 [facility] RECORD NUMBER:  [id number]  AGE: 74 y.o. RACE WHITE/NON-  BLACK/  GENDER: male  : 1955  EPISODE DATE:  2022    Subjective:   Patient reports that he underwent several ablation procedures on his right leg with Dr. Carlos Haddad last week; no new problems reported  Chief Complaint   Patient presents with    Wound Check     R and L leg         HISTORY of PRESENT ILLNESS HPI    Dawson July is a 74 y.o. male who presents today for wound/ulcer evaluation. History of Wound Context: LLE  Wound/Ulcer Pain Timing/Severity: none  Quality of pain: dull  Severity:  0 / 10   Modifying Factors: None  Associated Signs/Symptoms: none    Ulcer Identification:  Ulcer Type: venous    Contributing Factors: edema and venous stasis    Wound: LLE         PAST MEDICAL HISTORY    Past Medical History:   Diagnosis Date    Arthritis     Chronic obstructive pulmonary disease (HCC)     Diabetes ([address]     Hypertension     Sleep apnea         PAST SURGICAL HISTORY    Past Surgical History:   Procedure Laterality Date    HX GI      HX HERNIA REPAIR         FAMILY HISTORY    Family History   Problem Relation Age of Onset    Hypertension Child     Hypertension Sister     Hypertension Sister        SOCIAL HISTORY    Social History     Tobacco Use    Smoking status: Never Smoker    Smokeless tobacco: Never Used   Substance Use Topics    Alcohol use: Never    Drug use: Never       ALLERGIES    No Known Allergies    MEDICATIONS    Current Outpatient Medications on File Prior to Encounter   Medication Sig Dispense Refill    nebivoloL (BYSTOLIC) 10 mg tablet Take 10 mg by mouth daily.  multivitamin (ONE A DAY) tablet Take 1 Tablet by mouth daily.  furosemide (LASIX) 40 mg tablet Take 1 Tablet by mouth daily.  30 Tablet 0    atorvastatin (LIPITOR) 20 mg tablet Take 20 mg by mouth nightly.  aspirin delayed-release 81 mg tablet TAKE ONE TABLET BY MOUTH ONE TIME DAILY       No current facility-administered medications on file prior to encounter. REVIEW OF SYSTEMS    Pertinent items are noted in HPI. Objective:     Visit Vitals  BP (!) 164/79 (BP 1 Location: Right lower arm, BP Patient Position: At rest;Sitting)   Pulse 64   Temp 97.8 °F (36.6 °C)   Resp 22       Wt Readings from Last 3 Encounters:   03/28/22 158.8 kg (350 lb)   10/12/21 (!) 181.4 kg (400 lb)   12/09/20 (!) 181.4 kg (400 lb)       PHYSICAL EXAM  Right calf wound is completely reepithelialized  The left calf wound is smaller, healthy granulation, no infection  Pertinent items are noted in HPI. Assessment:   Good progress  Problem List Items Addressed This Visit     None          Procedure Note  Indications:  Based on my examination of this patient's wound(s)/ulcer(s) today, debridement is not required to promote healing and evaluate the wound base. Wound Leg lower Right;Posterior #1 03/28/22 (Active)   Wound Image   06/07/22 1344   Wound Etiology Venous 06/07/22 1344   Dressing Status Clean;Dry; Intact 06/07/22 1344   Cleansed Cleansed with saline 06/07/22 1344   Wound Length (cm) 0.1 cm 06/07/22 1344   Wound Width (cm) 0.1 cm 06/07/22 1344   Wound Depth (cm) 0.1 cm 06/07/22 1344   Wound Surface Area (cm^2) 0.01 cm^2 06/07/22 1344   Change in Wound Size % 99.99 06/07/22 1344   Wound Volume (cm^3) 0.001 cm^3 06/07/22 1344   Wound Healing % 100 06/07/22 1344   Wound Assessment Epithelialization 06/07/22 1344   Drainage Amount None 06/07/22 1344   Drainage Description Serosanguinous 05/31/22 1356   Wound Odor None 06/07/22 1344   Betty-Wound/Incision Assessment Intact; Hypopigmented 06/07/22 1344   Edges Attached edges 06/07/22 1344   Wound Thickness Description Full thickness 05/31/22 1356   Number of days: 71       Wound Leg Left;Posterior #2 05/24/22 (Active)   Wound Image   06/07/22 8896   Wound Etiology Venous 06/07/22 1347   Dressing Status Other (Comment) 06/07/22 1347   Cleansed Cleansed with saline 06/07/22 1347   Wound Length (cm) 0.5 cm 06/07/22 1347   Wound Width (cm) 0.6 cm 06/07/22 1347   Wound Depth (cm) 0.1 cm 06/07/22 1347   Wound Surface Area (cm^2) 0.3 cm^2 06/07/22 1347   Change in Wound Size % 57.14 06/07/22 1347   Wound Volume (cm^3) 0.03 cm^3 06/07/22 1347   Wound Healing % 57 06/07/22 1347   Wound Assessment Granulation tissue;Slough 06/07/22 1347   Drainage Amount Small 06/07/22 1347   Drainage Description Serosanguinous 06/07/22 1347   Wound Odor None 06/07/22 1347   Betty-Wound/Incision Assessment Intact; Hypopigmented 06/07/22 1347   Edges Attached edges 06/07/22 1347   Wound Thickness Description Full thickness 06/07/22 1347   Number of days: 14        Plan:   Continue Yuridia, double Tubigrip compression  Measure for juxta light    Treatment Note please see attached Discharge Instructions    Written patient dismissal instructions given to patient or POA.          Electronically signed by Ahsan Crooks MD on 6/7/2022 at 2:02 PM

## 2022-06-14 ENCOUNTER — HOSPITAL ENCOUNTER (OUTPATIENT)
Dept: WOUND CARE | Age: 67
Discharge: HOME | End: 2022-06-14
Payer: MEDICARE

## 2022-06-14 VITALS
DIASTOLIC BLOOD PRESSURE: 75 MMHG | SYSTOLIC BLOOD PRESSURE: 165 MMHG | RESPIRATION RATE: 20 BRPM | HEART RATE: 70 BPM | TEMPERATURE: 97.1 F

## 2022-06-14 PROCEDURE — 74011000250 HC RX REV CODE- 250: Performed by: SPECIALIST

## 2022-06-14 PROCEDURE — 99213 OFFICE O/P EST LOW 20 MIN: CPT

## 2022-06-14 RX ORDER — LIDOCAINE HYDROCHLORIDE 20 MG/ML
15 SOLUTION OROPHARYNGEAL AS NEEDED
Status: DISCONTINUED | OUTPATIENT
Start: 2022-06-14 | End: 2022-06-16 | Stop reason: HOSPADM

## 2022-06-14 NOTE — WOUND CARE
[address]   Progress Note and Procedure Note   NO Procedure      12 [facility] RECORD NUMBER:  [id number]  AGE: 74 y.o. RACE WHITE/NON-  BLACK/  GENDER: male  : 1955  EPISODE DATE:  2022    Subjective:   No new problems    Chief Complaint   Patient presents with    Wound Check     left leg         HISTORY of PRESENT ILLNESS HPI    Beatirz Ji is a 74 y.o. male who presents today for wound/ulcer evaluation. History of Wound Context: LLE  Wound/Ulcer Pain Timing/Severity: none  Quality of pain: dull  Severity:  0 / 10   Modifying Factors: None  Associated Signs/Symptoms: edema    Ulcer Identification:  Ulcer Type: venous    Contributing Factors: edema and venous stasis    Wound: LLE         PAST MEDICAL HISTORY    Past Medical History:   Diagnosis Date    Arthritis     Chronic obstructive pulmonary disease (HCC)     Diabetes ([facility] [address] [facility]     Hypertension     Sleep apnea         PAST SURGICAL HISTORY    Past Surgical History:   Procedure Laterality Date    HX GI      HX HERNIA REPAIR         FAMILY HISTORY    Family History   Problem Relation Age of Onset    Hypertension Child     Hypertension Sister     Hypertension Sister        SOCIAL HISTORY    Social History     Tobacco Use    Smoking status: Never Smoker    Smokeless tobacco: Never Used   Substance Use Topics    Alcohol use: Never    Drug use: Never       ALLERGIES    No Known Allergies    MEDICATIONS    Current Outpatient Medications on File Prior to Encounter   Medication Sig Dispense Refill    nebivoloL (BYSTOLIC) 10 mg tablet Take 10 mg by mouth daily.  multivitamin (ONE A DAY) tablet Take 1 Tablet by mouth daily.  furosemide (LASIX) 40 mg tablet Take 1 Tablet by mouth daily. 30 Tablet 0    atorvastatin (LIPITOR) 20 mg tablet Take 20 mg by mouth nightly.       aspirin delayed-release 81 mg tablet TAKE ONE TABLET BY MOUTH ONE TIME DAILY       No current facility-administered medications on file prior to encounter. REVIEW OF SYSTEMS    Pertinent items are noted in HPI. Objective:     Visit Vitals  BP (!) 165/75 (BP 1 Location: Right lower arm, BP Patient Position: At rest;Sitting)   Pulse 70   Temp 97.1 °F (36.2 °C)   Resp 20       Wt Readings from Last 3 Encounters:   03/28/22 158.8 kg (350 lb)   10/12/21 (!) 181.4 kg (400 lb)   12/09/20 (!) 181.4 kg (400 lb)       PHYSICAL EXAM  L calf wound is small, healthy granulation, no infection    Pertinent items are noted in HPI. Assessment:   Almost healed     Problem List Items Addressed This Visit     None          Procedure Note  Indications:  Based on my examination of this patient's wound(s)/ulcer(s) today, debridement is not required to promote healing and evaluate the wound base. Wound Leg Left;Posterior #2 05/24/22 (Active)   Wound Image   06/14/22 1412   Wound Etiology Venous 06/14/22 1412   Dressing Status Other (Comment) 06/07/22 1347   Cleansed Cleansed with saline 06/07/22 1347   Wound Length (cm) 0.5 cm 06/14/22 1412   Wound Width (cm) 0.8 cm 06/14/22 1412   Wound Depth (cm) 0.1 cm 06/14/22 1412   Wound Surface Area (cm^2) 0.4 cm^2 06/14/22 1412   Change in Wound Size % 42.86 06/14/22 1412   Wound Volume (cm^3) 0.04 cm^3 06/14/22 1412   Wound Healing % 43 06/14/22 1412   Wound Assessment Granulation tissue;Slough 06/14/22 1412   Drainage Amount Small 06/14/22 1412   Drainage Description Serosanguinous 06/14/22 1412   Wound Odor None 06/14/22 1412   Betty-Wound/Incision Assessment Intact; Hypopigmented 06/07/22 1347   Edges Attached edges 06/14/22 1412   Wound Thickness Description Full thickness 06/14/22 1412   Number of days: 21        Plan:   Yuridia, Double Tubi   Juxta-lite Velcro compression hose    Treatment Note please see attached Discharge Instructions    Written patient dismissal instructions given to patient or POA.          Electronically signed by Patria Turner MD on 6/14/2022 at 2:33 PM

## 2022-06-14 NOTE — DISCHARGE INSTRUCTIONS
Discharge Instructions for  [address], 1507 [facility]  Telephone: [phone]     NAME:  Leela Thibodeaux  YOB: 1955  MEDICAL RECORD NUMBER:  [id number]  DATE:  6/14/2022        Return Appointment:  [x]? Dressing supply provider: [facility]  []? Home Healthcare:  [x]? Return Appointment: 1 Week(s)  []? Nurse Visit:  Week     []? Discharge from [facility]  []? Ordered tests:   [x]? Referrals: DR CARRERO- KEEP APPOINTMENT  []? Rx:   []? Other:       Wound Cleansing:   Do not scrub or use excessive force. Cleanse wound prior to applying a clean dressing with:  [x]? Normal Saline          [x]? Mild Soap & Water/Baby Shampoo   []? Keep Wound Dry in Shower  []? Other:       Topical Treatments:  Do not apply lotions, creams, or ointments to wound bed unless directed. [x]? Apply moisturizing lotion to skin surrounding the wound prior to dressing change. []? Apply antifungal ointment to skin surrounding the wound prior to dressing change. []? Apply thin film of moisture barrier/zinc ointment to skin immediately around wound. []? Other:                  Dressings:                  Wound Location  L leg          [x]? Apply Primary Dressing:                                          []? MediHoney Gel         []? MediHoney Alginate                     []? Calcium Alginate      []? Calcium Alginate with Silver              []? Collagen                   [x]? Collagen with Silver                []? Santyl with Moistened saline gauze              []? Hydrofera Blue (cut to size and moistened)  []? Hydrofera Blue Ready (Cut to size)              []? NS wet to dry            []? Betadine wet to dry              []? Hydrogel                   []? Mepitel                   []? Bactroban/Mupirocin                       []? Other:      [x]? Cover and Secure with:                   [x]? Gauze        []? Allison Fern           [x]? Kerlix              []? Foam          []? Super Absorbant    []? ABD                                      []? ACE Wrap            []? Other:               Limit contact of tape with skin.     [x]? Change dressing:  []? Daily           []? Every Other Day    []? Once per week   []? Twice per week              [x]? Three times per week        []? Do Not Change Dressing    []? Other:     Pressure Relief:  []? When sitting, shift position or do seat lifts every 15 minutes. []? Wheelchair cushion           []? Specialty Bed/Mattress  [x]? Avoid direct pressure on wound site. Edema Control:  Apply:  []? Compression Stocking:   mmHg    []? Right Leg     []? Left Leg              [x]? Tubigrip      [x]? Right Leg Double Layer      [x]? Left Leg Double Layer                                      []?Right Leg Single Layer       []? Left Leg Single Layer                 [x]? Elevate leg(s) above the level of the heart when sitting. [x]? Avoid prolonged standing in one place.         Compression:  Apply:  []? Multilayer Compression Wrap:      []? RightLeg      []? Left Leg                                 []?Coflex              []?Coflex Lite             []?Unna                 []? Do not get leg(s) with wrap wet. If wraps become too tight call the center or completely remove the wrap. []? Elevate leg(s) above the level of the heart when sitting. []? Avoid prolonged standing in one place.     Dietary:  []? Diet as tolerated:   []? Calorie Diabetic Diet:         [x]? No Added Salt:  [x]? Increase Protein:   []? Other:              Activity:  [x]? Activity as tolerated:    []? Patient has no activity restrictions       []? Strict Bedrest:          []? Remain off Work:       []?  May return to full duty work:                                               []? Return to work with restrictions:      [address]: Should you experience any significant changes in your wound(s) or have questions about your wound care, please contact Dane Melgar 26 at [place] [address] 8:00 am - 4:30. If you need help with your wound outside of these hours and cannot wait until we are again available, contact your PCP or go to the hospital emergency room.      PLEASE NOTE: IF YOU ARE UNABLE TO [address], CONTINUE TO USE THE SUPPLIES YOU HAVE AVAILABLE UNTIL YOU ARE ABLE TO [facility]. IT IS MOST IMPORTANT TO KEEP THE WOUND COVERED AT ALL TIMES.     [x]? Dr. Reuben Dubin   []? Dr. Curt Greene  []?  Dr. Grupo Erickson

## 2022-06-14 NOTE — WOUND CARE
Discharge Instructions for  7 [facility], [address] [place] [address]  Telephone: [phone]    NAME:  William Hathaway  YOB: 1955  MEDICAL RECORD NUMBER:  [id number]  DATE:  6/14/2022      Return Appointment:  [x] Dressing supply provider: [facility]  [] Home Healthcare:  [x] Return Appointment: 1 Week(s)  [] Nurse Visit:  Week    [] Discharge from [facility]  [] Ordered tests:   [x] Referrals: DR CARRERO- KEEP APPOINTMENT  [] Rx:   [] Other:      Wound Cleansing:   Do not scrub or use excessive force. Cleanse wound prior to applying a clean dressing with:  [x] Normal Saline   [x] Mild Soap & Water/Baby Shampoo   [] Keep Wound Dry in Shower  [] Other:      Topical Treatments:  Do not apply lotions, creams, or ointments to wound bed unless directed. [x] Apply moisturizing lotion to skin surrounding the wound prior to dressing change.  [] Apply antifungal ointment to skin surrounding the wound prior to dressing change.  [] Apply thin film of moisture barrier/zinc ointment to skin immediately around wound. [] Other:       Dressings:           Wound Location  L leg   [x] Apply Primary Dressing:       [] MediHoney Gel    [] MediHoney Alginate               [] Calcium Alginate      [] Calcium Alginate with Silver   [] Collagen                   [x] Collagen with Silver                [] Santyl with Moistened saline gauze              [] Hydrofera Blue (cut to size and moistened)  [] Hydrofera Blue Ready (Cut to size)   [] NS wet to dry    [] Betadine wet to dry              [] Hydrogel                [] Mepitel     [] Bactroban/Mupirocin               [] Other:     [x] Cover and Secure with:     [x] Gauze [] Tiffany [x] Kerlix   [] Foam [] Super Absorbant [] ABD     [] ACE Wrap            [] Other:    Limit contact of tape with skin.     [x] Change dressing: [] Daily    [] Every Other Day [] Once per week   [] Twice per week   [x] Three times per week [] Do Not Change Dressing   [] Other:    Pressure Relief:  [] When sitting, shift position or do seat lifts every 15 minutes.  [] Wheelchair cushion [] Specialty Bed/Mattress  [x]  Avoid direct pressure on wound site. Edema Control:  Apply: [] Compression Stocking:   mmHg  []Right Leg []Left Leg   [x] Tubigrip [x]Right Leg Double Layer [x]Left Leg Double Layer      []Right Leg Single Layer []Left Leg Single Layer     [x] Elevate leg(s) above the level of the heart when sitting. [x] Avoid prolonged standing in one place. Compression:  Apply: [] Multilayer Compression Wrap: []RightLeg []Left Leg                                 []Coflex   []Coflex Lite             []Unna     [] Do not get leg(s) with wrap wet. If wraps become too tight call the center or completely remove the wrap. [] Elevate leg(s) above the level of the heart when sitting. [] Avoid prolonged standing in one place. Dietary:  [] Diet as tolerated: [] Calorie Diabetic Diet: [x] No Added Salt:  [x] Increase Protein: [] Other:     Activity:  [x] Activity as tolerated:    [] Patient has no activity restrictions       [] Strict Bedrest:   [] Remain off Work:       [] May return to full duty work:                                     [] Return to work with restrictions:               [address] Information: Should you experience any significant changes in your wound(s) or have questions about your wound care, please contact Dane Hanks at [place] [address] 8:00 am - 4:30. If you need help with your wound outside of these hours and cannot wait until we are again available, contact your PCP or go to the hospital emergency room. PLEASE NOTE: IF YOU ARE UNABLE TO OBTAIN WOUND SUPPLIES, CONTINUE TO USE THE SUPPLIES YOU HAVE AVAILABLE UNTIL YOU ARE ABLE TO REACH US. IT IS MOST IMPORTANT TO KEEP THE WOUND COVERED AT ALL TIMES.     [x] Dr. Ashlee Chester   [] Dr. Hosea Fernandes  []  Misa Lizama

## 2022-06-14 NOTE — WOUND CARE
[address] [facility] Floor:     [address] f: [phone] f: [phone] p: [phone] [email or web address]     Ordering Center:     [address]  [address] [facility] [phone] [phone]  WOUND CARE [unfilled]   FAX NUMBER [unfilled]    Patient Information:      Sonia Morocho [phone]   @[email or web address]@   : 1955  AGE: 74 y.o. GENDER: male   TODAYS DATE:  2022    Insurance:      PRIMARY INSURANCE:  [id number] - (Medicare)    Payor/Plan Subscr  Sex Relation Sub. Ins. ID Effective Group Num   1. [place] -* Lamount Cuong 1955 Male Self [id number] 22                                    [address]   2. [address] 1955 Male Self [id number]                                   [address] [address]       Patient Wound Information:      Problem List Items Addressed This Visit     None          WOUNDS REQUIRING DRESSING SUPPLIES:     Wound Leg Left;Posterior #2 22 (Active)   Wound Image   22 1412   Wound Etiology Venous 22 1412   Dressing Status Other (Comment) 22 1347   Cleansed Cleansed with saline 22 1347   Wound Length (cm) 0.5 cm 22 1412   Wound Width (cm) 0.8 cm 22 1412   Wound Depth (cm) 0.1 cm 22 1412   Wound Surface Area (cm^2) 0.4 cm^2 22 1412   Change in Wound Size % 42.86 22 1412   Wound Volume (cm^3) 0.04 cm^3 22 1412   Wound Healing % 43 22 1412   Wound Assessment Granulation tissue;Slough 22 1412   Drainage Amount Small 22 1412   Drainage Description Serosanguinous 22 1412   Wound Odor None 22 141   Betty-Wound/Incision Assessment Intact; Hypopigmented 22 1347   Edges Attached edges 22 141   Wound Thickness Description Full thickness 22   Number of days: 21        Supplies Requested :      WOUND #:1 PRIMARY DRESSING:  Other: qiana ag   2X2 gauze pad and Other border gauze     FREQUENCY OF DRESSING CHANGES:  Every other day           ADDITIONAL ITEMS:  [] Gloves Small  [] Gloves Medium [x] Gloves Large [] Gloves XLarge  [] Tape 1\" [] Tape 2\" [] Tape 3\"  [] Medipore Tape  [x] Saline  [] Skin Prep   [] Adhesive Remover   [] Cotton Tip Applicators   [] Other:    Patient Wound(s) Debrided: [x] Yes if yes please add date 04/19/22       Debribement Type: Excisional/Sharp    Patient currently being seen by Home Health: [] Yes   [x] No    Duration for needed supplies:  []15  [x]30  []60  []90 Days    Electronically signed by Opal Lilly LPN on 6/26/0446 at 8:88 PM    Provider Information:      PROVIDER'S NAME: Susi Boone MD

## 2022-06-21 ENCOUNTER — HOSPITAL ENCOUNTER (OUTPATIENT)
Dept: WOUND CARE | Age: 67
Discharge: HOME | End: 2022-06-21
Payer: MEDICARE

## 2022-06-21 VITALS
DIASTOLIC BLOOD PRESSURE: 69 MMHG | RESPIRATION RATE: 22 BRPM | HEART RATE: 64 BPM | TEMPERATURE: 96.8 F | SYSTOLIC BLOOD PRESSURE: 169 MMHG

## 2022-06-21 PROCEDURE — 99212 OFFICE O/P EST SF 10 MIN: CPT

## 2022-06-21 NOTE — WOUND CARE
Discharge Instructions for  [address] [place] [address], [address] [place] [address]  Telephone: [phone]    NAME:  Paradise Toussaint  YOB: 1955  MEDICAL RECORD NUMBER:  [id number]  DATE:  6/21/2022      Return Appointment:  [x] Dressing supply provider: [facility]  [] Home Healthcare:  [] Return Appointment:  Week(s)  [] Nurse Visit:  Week    [x] Discharge from [facility]  [] Ordered tests:   [x] Referrals: DR CARRERO- KEEP APPOINTMENT  [] Rx:   [] Other:      Wound Cleansing:   Do not scrub or use excessive force. Cleanse wound prior to applying a clean dressing with:  [x] Normal Saline   [x] Mild Soap & Water/Baby Shampoo   [] Keep Wound Dry in Shower  [] Other:      Topical Treatments:  Do not apply lotions, creams, or ointments to wound bed unless directed. [x] Apply moisturizing lotion to skin surrounding the wound prior to dressing change.  [] Apply antifungal ointment to skin surrounding the wound prior to dressing change.  [] Apply thin film of moisture barrier/zinc ointment to skin immediately around wound. [] Other:       Dressings:           Wound Location  L leg   [] Apply Primary Dressing:       [] MediHoney Gel    [] MediHoney Alginate               [] Calcium Alginate      [] Calcium Alginate with Silver   [] Collagen                   [] Collagen with Silver                [] Santyl with Moistened saline gauze              [] Hydrofera Blue (cut to size and moistened)  [] Hydrofera Blue Ready (Cut to size)   [] NS wet to dry    [] Betadine wet to dry              [] Hydrogel                [] Mepitel     [] Bactroban/Mupirocin               [] Other:     [] Cover and Secure with:     [] Gauze [] Perico Powers [] Kerlix   [] Foam [] Super Absorbant [] ABD     [] ACE Wrap            [] Other:    Limit contact of tape with skin.     [] Change dressing: [] Daily    [] Every Other Day [] Once per week   [] Twice per week   [] Three times per week [] Do Not Change Dressing   [] Other:    Pressure Relief:  [] When sitting, shift position or do seat lifts every 15 minutes.  [] Wheelchair cushion [] Specialty Bed/Mattress  []  Avoid direct pressure on wound site. Edema Control:  Apply: [] Compression Stocking:   mmHg  []Right Leg []Left Leg   [x] Tubigrip [x]Right Leg Double Layer [x]Left Leg Double Layer      []Right Leg Single Layer []Left Leg Single Layer     [x] Elevate leg(s) above the level of the heart when sitting. [x] Avoid prolonged standing in one place. Compression:  Apply: [] Multilayer Compression Wrap: []RightLeg []Left Leg                                 []Coflex   []Coflex Lite             []Unna     [] Do not get leg(s) with wrap wet. If wraps become too tight call the center or completely remove the wrap. [] Elevate leg(s) above the level of the heart when sitting. [] Avoid prolonged standing in one place. Dietary:  [] Diet as tolerated: [] Calorie Diabetic Diet: [x] No Added Salt:  [x] Increase Protein: [] Other:     Activity:  [x] Activity as tolerated:    [] Patient has no activity restrictions       [] Strict Bedrest:   [] Remain off Work:       [] May return to full duty work:                                     [] Return to work with restrictions:               [address] Information: Should you experience any significant changes in your wound(s) or have questions about your wound care, please contact Dane Hanks at [place] [address] 8:00 am - 4:30. If you need help with your wound outside of these hours and cannot wait until we are again available, contact your PCP or go to the hospital emergency room. PLEASE NOTE: IF YOU ARE UNABLE TO OBTAIN WOUND SUPPLIES, CONTINUE TO USE THE SUPPLIES YOU HAVE AVAILABLE UNTIL YOU ARE ABLE TO REACH US. IT IS MOST IMPORTANT TO KEEP THE WOUND COVERED AT ALL TIMES.     [x] Dr. Karma Farmer   [] Dr. Bel Alfaro  []  Misa Lizama

## 2022-06-21 NOTE — DISCHARGE INSTRUCTIONS
Discharge Instructions for  [address] [place] [address], [address] [place] [address]  Telephone: [phone]    NAME:  Torito Carr  YOB: 1955  MEDICAL RECORD NUMBER:  [id number]  DATE:  6/21/2022      Return Appointment:  [x] Dressing supply provider: [facility]  [] Home Healthcare:  [] Return Appointment:  Week(s)  [] Nurse Visit:  Week    [x] Discharge from [facility]  [] Ordered tests:   [x] Referrals: DR CARRERO- KEEP APPOINTMENT  [] Rx:   [] Other:      Wound Cleansing:   Do not scrub or use excessive force. Cleanse wound prior to applying a clean dressing with:  [x] Normal Saline   [x] Mild Soap & Water/Baby Shampoo   [] Keep Wound Dry in Shower  [] Other:      Topical Treatments:  Do not apply lotions, creams, or ointments to wound bed unless directed. [x] Apply moisturizing lotion to skin surrounding the wound prior to dressing change.  [] Apply antifungal ointment to skin surrounding the wound prior to dressing change.  [] Apply thin film of moisture barrier/zinc ointment to skin immediately around wound. [] Other:       Dressings:           Wound Location  L leg   [] Apply Primary Dressing:       [] MediHoney Gel    [] MediHoney Alginate               [] Calcium Alginate      [] Calcium Alginate with Silver   [] Collagen                   [] Collagen with Silver                [] Santyl with Moistened saline gauze              [] Hydrofera Blue (cut to size and moistened)  [] Hydrofera Blue Ready (Cut to size)   [] NS wet to dry    [] Betadine wet to dry              [] Hydrogel                [] Mepitel     [] Bactroban/Mupirocin               [] Other:     [] Cover and Secure with:     [] Gauze [] Gerard Daniel [] Kerlix   [] Foam [] Super Absorbant [] ABD     [] ACE Wrap            [] Other:    Limit contact of tape with skin.     [] Change dressing: [] Daily    [] Every Other Day [] Once per week   [] Twice per week   [] Three times per week [] Do Not Change Dressing   [] Other:    Pressure Relief:  [] When sitting, shift position or do seat lifts every 15 minutes.  [] Wheelchair cushion [] Specialty Bed/Mattress  []  Avoid direct pressure on wound site. Edema Control:  Apply: [] Compression Stocking:   mmHg  []Right Leg []Left Leg   [x] Tubigrip [x]Right Leg Double Layer [x]Left Leg Double Layer      []Right Leg Single Layer []Left Leg Single Layer     [x] Elevate leg(s) above the level of the heart when sitting. [x] Avoid prolonged standing in one place. Compression:  Apply: [] Multilayer Compression Wrap: []RightLeg []Left Leg                                 []Coflex   []Coflex Lite             []Unna     [] Do not get leg(s) with wrap wet. If wraps become too tight call the center or completely remove the wrap. [] Elevate leg(s) above the level of the heart when sitting. [] Avoid prolonged standing in one place. Dietary:  [] Diet as tolerated: [] Calorie Diabetic Diet: [x] No Added Salt:  [x] Increase Protein: [] Other:     Activity:  [x] Activity as tolerated:    [] Patient has no activity restrictions       [] Strict Bedrest:   [] Remain off Work:       [] May return to full duty work:                                     [] Return to work with restrictions:               [address] Information: Should you experience any significant changes in your wound(s) or have questions about your wound care, please contact Dane Hanks at [place] [address] 8:00 am - 4:30. If you need help with your wound outside of these hours and cannot wait until we are again available, contact your PCP or go to the hospital emergency room. PLEASE NOTE: IF YOU ARE UNABLE TO OBTAIN WOUND SUPPLIES, CONTINUE TO USE THE SUPPLIES YOU HAVE AVAILABLE UNTIL YOU ARE ABLE TO REACH US. IT IS MOST IMPORTANT TO KEEP THE WOUND COVERED AT ALL TIMES.     [x] Dr. Sury Boyd   [] Dr. Amirah Celaya  []  Tl Sykes

## 2022-06-21 NOTE — WOUND CARE
[address]   Progress Note and Procedure Note   NO Procedure      [facility] RECORD NUMBER:  [id number]  AGE: 74 y.o. RACE WHITE/NON-  BLACK/  GENDER: male  : 1955  EPISODE DATE:  2022    Subjective:   No new problems    Chief Complaint   Patient presents with    Wound Check     L leg         HISTORY of PRESENT ILLNESS HPI    Shea Stinson is a 74 y.o. male who presents today for wound/ulcer evaluation. History of Wound Context: LLE  Wound/Ulcer Pain Timing/Severity: none  Quality of pain: dull  Severity:  0 / 10   Modifying Factors: None  Associated Signs/Symptoms: edema    Ulcer Identification:  Ulcer Type: venous    Contributing Factors: lymphedema    Wound: LLE         PAST MEDICAL HISTORY    Past Medical History:   Diagnosis Date    Arthritis     [facility] disease [facility] [address] [facility]     Diabetes ([facility] [facility]     Hypertension     Sleep apnea         PAST SURGICAL HISTORY    Past Surgical History:   Procedure Laterality Date    HX GI      HX HERNIA REPAIR         FAMILY HISTORY    Family History   Problem Relation Age of Onset    Hypertension Child     Hypertension Sister     Hypertension Sister        SOCIAL HISTORY    Social History     Tobacco Use    Smoking status: Never Smoker    Smokeless tobacco: Never Used   Substance Use Topics    Alcohol use: Never    Drug use: Never       ALLERGIES    No Known Allergies    MEDICATIONS    Current Outpatient Medications on File Prior to Encounter   Medication Sig Dispense Refill    nebivoloL (BYSTOLIC) 10 mg tablet Take 10 mg by mouth daily.  multivitamin (ONE A DAY) tablet Take 1 Tablet by mouth daily.  furosemide (LASIX) 40 mg tablet Take 1 Tablet by mouth daily. 30 Tablet 0    atorvastatin (LIPITOR) 20 mg tablet Take 20 mg by mouth nightly.       aspirin delayed-release 81 mg tablet TAKE ONE TABLET BY MOUTH ONE TIME DAILY       No current facility-administered medications on file prior to encounter. REVIEW OF SYSTEMS    Pertinent items are noted in HPI. Objective:     Visit Vitals  BP (!) 169/69 (BP 1 Location: Right upper arm, BP Patient Position: At rest;Sitting)   Pulse 64   Temp 96.8 °F (36 °C)   Resp 22       Wt Readings from Last 3 Encounters:   03/28/22 158.8 kg (350 lb)   10/12/21 (!) 181.4 kg (400 lb)   12/09/20 (!) 181.4 kg (400 lb)       PHYSICAL EXAM  Wound closed    Pertinent items are noted in HPI. Assessment:   Healed    Problem List Items Addressed This Visit     None          Procedure Note  Indications:  Based on my examination of this patient's wound(s)/ulcer(s) today, debridement is not required to promote healing and evaluate the wound base. Wound Leg Left;Posterior #2 05/24/22 (Active)   Wound Image   06/21/22 1406   Wound Etiology Venous 06/21/22 1406   Dressing Status Other (Comment) 06/21/22 1406   Cleansed Cleansed with saline 06/21/22 1406   Wound Length (cm) 0 cm 06/21/22 1406   Wound Width (cm) 0 cm 06/21/22 1406   Wound Depth (cm) 0 cm 06/21/22 1406   Wound Surface Area (cm^2) 0 cm^2 06/21/22 1406   Change in Wound Size % 100 06/21/22 1406   Wound Volume (cm^3) 0 cm^3 06/21/22 1406   Wound Healing % 100 06/21/22 1406   Wound Assessment Epithelialization 06/21/22 1406   Drainage Amount None 06/21/22 1406   Drainage Description Serosanguinous 06/14/22 1412   Wound Odor None 06/21/22 1406   Betty-Wound/Incision Assessment Intact 06/21/22 1406   Edges Attached edges 06/21/22 1406   Wound Thickness Description Full thickness 06/14/22 1412   Number of days: 28        Plan:   RTC as needed    Treatment Note please see attached Discharge Instructions    Written patient dismissal instructions given to patient or POA.          Electronically signed by Manjula Riojas MD on 6/21/2022 at 2:14 PM

## 2022-12-12 ENCOUNTER — HOSPITAL ENCOUNTER (OUTPATIENT)
Dept: WOUND CARE | Age: 67
Discharge: HOME | End: 2022-12-12
Payer: MEDICARE

## 2022-12-12 VITALS
BODY MASS INDEX: 47.74 KG/M2 | SYSTOLIC BLOOD PRESSURE: 168 MMHG | WEIGHT: 315 LBS | HEIGHT: 68 IN | TEMPERATURE: 97 F | HEART RATE: 88 BPM | DIASTOLIC BLOOD PRESSURE: 90 MMHG | RESPIRATION RATE: 20 BRPM

## 2022-12-12 DIAGNOSIS — L97.812 NON-PRESSURE CHRONIC ULCER OF OTHER PART OF RIGHT LOWER LEG WITH FAT LAYER EXPOSED (HCC): Primary | ICD-10-CM

## 2022-12-12 DIAGNOSIS — L97.822 NON-PRESSURE CHRONIC ULCER OF OTHER PART OF LEFT LOWER LEG WITH FAT LAYER EXPOSED (HCC): ICD-10-CM

## 2022-12-12 PROCEDURE — 11045 DBRDMT SUBQ TISS EACH ADDL: CPT

## 2022-12-12 PROCEDURE — 99214 OFFICE O/P EST MOD 30 MIN: CPT

## 2022-12-12 PROCEDURE — 74011000250 HC RX REV CODE- 250: Performed by: SPECIALIST

## 2022-12-12 PROCEDURE — 11042 DBRDMT SUBQ TIS 1ST 20SQCM/<: CPT

## 2022-12-12 RX ORDER — LIDOCAINE HYDROCHLORIDE 20 MG/ML
15 SOLUTION OROPHARYNGEAL ONCE
Status: COMPLETED | OUTPATIENT
Start: 2022-12-12 | End: 2022-12-12

## 2022-12-12 RX ORDER — TRIAMCINOLONE ACETONIDE 1 MG/G
OINTMENT TOPICAL ONCE
Status: CANCELLED | OUTPATIENT
Start: 2022-12-12 | End: 2022-12-12

## 2022-12-12 RX ORDER — LIDOCAINE HYDROCHLORIDE 20 MG/ML
15 SOLUTION OROPHARYNGEAL ONCE
OUTPATIENT
Start: 2022-12-12 | End: 2022-12-12

## 2022-12-12 RX ORDER — SILVER SULFADIAZINE 10 G/1000G
CREAM TOPICAL ONCE
Status: CANCELLED | OUTPATIENT
Start: 2022-12-12 | End: 2022-12-12

## 2022-12-12 RX ORDER — TRIAMCINOLONE ACETONIDE 1 MG/G
OINTMENT TOPICAL ONCE
OUTPATIENT
Start: 2022-12-12 | End: 2022-12-12

## 2022-12-12 RX ORDER — SILVER SULFADIAZINE 10 G/1000G
CREAM TOPICAL ONCE
OUTPATIENT
Start: 2022-12-12 | End: 2022-12-12

## 2022-12-12 RX ORDER — MUPIROCIN 20 MG/G
OINTMENT TOPICAL ONCE
Status: CANCELLED | OUTPATIENT
Start: 2022-12-12 | End: 2022-12-12

## 2022-12-12 RX ORDER — MUPIROCIN 20 MG/G
OINTMENT TOPICAL ONCE
OUTPATIENT
Start: 2022-12-12 | End: 2022-12-12

## 2022-12-12 RX ADMIN — LIDOCAINE HYDROCHLORIDE 15 ML: 20 SOLUTION ORAL; TOPICAL at 13:53

## 2022-12-12 NOTE — WOUND CARE
[address] [facility] Floor:     [address] f: [phone] f: [phone] p: [phone] [email or web address]@[employer]       Ordering Center:     [address] [place] [address] [address] NUMBER [phone]    Patient Information:     Dot Campbell   [address]  [phone] [phone] (home)     : 1955  AGE: 69 y.o. GENDER: male  TODAYS DATE: 2022    Insurance:     PRIMARY INSURANCE    [id number] - (Medicare)  Payer/Plan Subscr  Sex Relation Sub. Ins. ID Effective Group Num   1. [place] MEDICARE -* Mahnaz [place] 1955 Male Self [id number] 22                                    [address]   2.  [address] 1955 Male Self [id number]                                   [address] [address]        Patient Wound Information:     Hospital Problems  Date Reviewed: 2022            Codes Class Noted POA    Non-pressure chronic ulcer of other part of right lower leg with fat layer exposed ([facility] [address] ICD-10-CM: L77.136  ICD-9-CM: 707.19  3/28/2022 Yes        Non-pressure chronic ulcer of other part of left lower leg with fat layer exposed ([facility] [address] ICD-10-CM: Q62.159  ICD-9-CM: 707.19  3/28/2022 Yes           WOUNDS REQUIRING DRESSING SUPPLIES    Wound Leg Right;Posterior #1 22 (Active)   Wound Image   22 1331   Wound Etiology Venous 22 1331   Dressing Status Other (Comment) 22 1331   Cleansed Cleansed with saline 22 1331   Wound Length (cm) 14 cm 22 1331   Wound Width (cm) 8.5 cm 22 1331   Wound Depth (cm) 0.2 cm 22 1331   Wound Surface Area (cm^2) 119 cm^2 22 1331   Wound Volume (cm^3) 23.8 cm^3 22 1331   Post-Procedure Length (cm) 14 cm 22 1407   Post-Procedure Width (cm) 8.5 cm 22 1407   Post-Procedure Depth (cm) 0.2 cm 22 1407 Post-Procedure Surface Area (cm^2) 119 cm^2 12/12/22 1407   Post-Procedure Volume (cm^3) 23.8 cm^3 12/12/22 1407   Wound Assessment Granulation tissue; Hyper granulation tissue;Slough 12/12/22 1331   Drainage Amount Moderate 12/12/22 1331   Drainage Description Serosanguinous 12/12/22 1331   Wound Odor None 12/12/22 1331   Betty-Wound/Incision Assessment Maceration; Hypopigmented;Dry/flaky 12/12/22 1331   Edges Attached edges 12/12/22 1331   Wound Thickness Description Full thickness 12/12/22 1331   Number of days: 0       Wound Leg Left;Posterior #2 12/12/22 (Active)   Wound Image   12/12/22 1331   Wound Etiology Venous 12/12/22 1331   Dressing Status Other (Comment) 12/12/22 1331   Cleansed Cleansed with saline 12/12/22 1331   Wound Length (cm) 12.5 cm 12/12/22 1331   Wound Width (cm) 11 cm 12/12/22 1331   Wound Depth (cm) 0.2 cm 12/12/22 1331   Wound Surface Area (cm^2) 137.5 cm^2 12/12/22 1331   Wound Volume (cm^3) 27.5 cm^3 12/12/22 1331   Wound Assessment Granulation tissue;Slough 12/12/22 1331   Drainage Amount Moderate 12/12/22 1331   Drainage Description Serosanguinous 12/12/22 1331   Wound Odor None 12/12/22 1331   Betty-Wound/Incision Assessment Maceration; Intact;Edematous;Dry/flaky 12/12/22 1331   Edges Attached edges 12/12/22 1331   Wound Thickness Description Full thickness 12/12/22 1331   Number of days: 0       Supplies Requested :     WOUND #:1   PRIMARY DRESSING:  Other: [employer]   4X4 gauze pad, ABD pad, and Other Double TUBI      FREQUENCY OF DRESSING CHANGES:  Three times per week       WOUND #: 2   PRIMARY DRESSING:  Other: [employer]   4X4 gauze pad, ABD pad, and Other Double TUBI     FREQUENCY OF DRESSING CHANGES:  Three times per week         ADDITIONAL ITEMS:  [] Gloves Small  [] Gloves Medium [x] Gloves Large [] Gloves Mendel Dove  [x] Tape 4\"  [x] Medipore Tape  [x] Saline  [] Skin Prep   [] Adhesive Remover   [] Cotton Tip Applicators   [] Other:    Patient Wound(s) Debrided: [x] Yes if yes please add date 12/12/22  [] No    Debribement Type: Excisional/Sharp    Patient currently being seen by Home Health: [] Yes   [x] No    Duration for needed supplies:  []15  [x]30 Days    Electronically signed by Tricia Fuentes LPN on 20/54/9314 at 2:13 PM     Provider Information:      PROVIDER'S NAME: Dr. Camille Cortés

## 2022-12-12 NOTE — WOUND CARE
[address]   Progress Note and Procedure Note     12 [facility] RECORD NUMBER:  [id number]  AGE: 69 y.o. RACE WHITE/NON-  BLACK/  GENDER: male  : 1955  EPISODE DATE:  2022    Subjective:   Patient last seen in this clinic in . He has developed new ulcers on posterior calves of both lower extremities. He is following up with Dr. Dave Fried, who is performing venous ablation procedures. Chief Complaint   Patient presents with    Wound Check     R leg and l leg          HISTORY of PRESENT ILLNESS HPI    Bela Zayas is a 69 y.o. male who presents today for wound/ulcer evaluation. History of Wound Context: BLE  Wound/Ulcer Pain Timing/Severity: none  Quality of pain: dull  Severity:  0 / 10   Modifying Factors: None  Associated Signs/Symptoms: edema    Ulcer Identification:  Ulcer Type: venous    Contributing Factors: edema    Wound: BLE         PAST MEDICAL HISTORY    Past Medical History:   Diagnosis Date    Arthritis     Chronic obstructive pulmonary disease (HCC)     Diabetes ([facility] [address]     Hypertension     Sleep apnea         PAST SURGICAL HISTORY    Past Surgical History:   Procedure Laterality Date    HX GI      HX HERNIA REPAIR         FAMILY HISTORY    Family History   Problem Relation Age of Onset    Hypertension Mother     Hypertension Father     Hypertension Sister     Hypertension Sister     Hypertension Child        SOCIAL HISTORY    Social History     Tobacco Use    Smoking status: Never    Smokeless tobacco: Never   Vaping Use    Vaping Use: Never used   Substance Use Topics    Alcohol use: Never    Drug use: Never       ALLERGIES    No Known Allergies    MEDICATIONS    Current Outpatient Medications on File Prior to Encounter   Medication Sig Dispense Refill    nebivoloL (BYSTOLIC) 10 mg tablet Take 10 mg by mouth daily. multivitamin (ONE A DAY) tablet Take 1 Tablet by mouth daily.       furosemide (LASIX) 40 mg tablet Take 1 Tablet by mouth daily. 30 Tablet 0    atorvastatin (LIPITOR) 20 mg tablet Take 20 mg by mouth nightly. No current facility-administered medications on file prior to encounter. REVIEW OF SYSTEMS    Pertinent items are noted in HPI. Objective:     Visit Vitals  BP (!) 168/90 (BP 1 Location: Right arm, BP Patient Position: At rest;Sitting)   Pulse 88   Temp 97 °F (36.1 °C)   Resp 20   Ht 5' 8\" (1.727 m)   Wt (!) 170.1 kg (375 lb)   BMI 57.02 kg/m²       Wt Readings from Last 3 Encounters:   12/12/22 (!) 170.1 kg (375 lb)   03/28/22 158.8 kg (350 lb)   10/12/21 (!) 181.4 kg (400 lb)       PHYSICAL EXAM  Multiple bilateral calf wounds  Pertinent items are noted in HPI.     Assessment:    Return of BLE venous ulcers  Problem List Items Addressed This Visit       Non-pressure chronic ulcer of other part of right lower leg with fat layer exposed ([address] - Primary    Relevant Medications    lidocaine (XYLOCAINE) 2 % viscous solution 15 mL (Completed)    Other Relevant Orders    INITIATE OUTPATIENT WOUND CARE PROTOCOL    Non-pressure chronic ulcer of other part of left lower leg with fat layer exposed ([address]    Relevant Medications    lidocaine (XYLOCAINE) 2 % viscous solution 15 mL (Completed)    Other Relevant Orders    INITIATE OUTPATIENT WOUND CARE PROTOCOL       Wound Leg Right;Posterior #1 12/12/22 (Active)   Wound Image   12/12/22 1331   Wound Etiology Venous 12/12/22 1331   Dressing Status Other (Comment) 12/12/22 1331   Cleansed Cleansed with saline 12/12/22 1331   Wound Length (cm) 14 cm 12/12/22 1331   Wound Width (cm) 8.5 cm 12/12/22 1331   Wound Depth (cm) 0.2 cm 12/12/22 1331   Wound Surface Area (cm^2) 119 cm^2 12/12/22 1331   Wound Volume (cm^3) 23.8 cm^3 12/12/22 1331   Post-Procedure Length (cm) 14 cm 12/12/22 1407   Post-Procedure Width (cm) 8.5 cm 12/12/22 1407   Post-Procedure Depth (cm) 0.2 cm 12/12/22 1407   Post-Procedure Surface Area (cm^2) 119 cm^2 12/12/22 1407   Post-Procedure Volume (cm^3) 23.8 cm^3 12/12/22 1407   Wound Assessment Granulation tissue; Hyper granulation tissue;Slough 12/12/22 1331   Drainage Amount Moderate 12/12/22 1331   Drainage Description Serosanguinous 12/12/22 1331   Wound Odor None 12/12/22 1331   Betty-Wound/Incision Assessment Maceration; Hypopigmented;Dry/flaky 12/12/22 1331   Edges Attached edges 12/12/22 1331   Wound Thickness Description Full thickness 12/12/22 1331   Number of days: 0       Wound Leg Left;Posterior #2 12/12/22 (Active)   Wound Image   12/12/22 1331   Wound Etiology Venous 12/12/22 1331   Dressing Status Other (Comment) 12/12/22 1331   Cleansed Cleansed with saline 12/12/22 1331   Wound Length (cm) 12.5 cm 12/12/22 1331   Wound Width (cm) 11 cm 12/12/22 1331   Wound Depth (cm) 0.2 cm 12/12/22 1331   Wound Surface Area (cm^2) 137.5 cm^2 12/12/22 1331   Wound Volume (cm^3) 27.5 cm^3 12/12/22 1331   Wound Assessment Granulation tissue;Slough 12/12/22 1331   Drainage Amount Moderate 12/12/22 1331   Drainage Description Serosanguinous 12/12/22 1331   Wound Odor None 12/12/22 1331   Betty-Wound/Incision Assessment Maceration; Intact;Edematous;Dry/flaky 12/12/22 1331   Edges Attached edges 12/12/22 1331   Wound Thickness Description Full thickness 12/12/22 1331   Number of days: 0       POP IN PROCEDURE TYPE (DEBRIDEMENT, BIOPSY, I&D, SKIN SUB, CHEMICAL CAUERTY)     Plan:   Katharine Vu double tubigrip  Dr Hutchinson Artist    Treatment Note please see attached Discharge Instructions    Written patient dismissal instructions given to patient or POA.          Electronically signed by Ivon Key MD on 12/12/2022 at 2:12 PM               Debridement Wound Care        Problem List Items Addressed This Visit       Non-pressure chronic ulcer of other part of right lower leg with fat layer exposed ([address] - Primary    Relevant Medications    lidocaine (XYLOCAINE) 2 % viscous solution 15 mL (Completed)    Other Relevant Orders    INITIATE OUTPATIENT WOUND CARE PROTOCOL Non-pressure chronic ulcer of other part of left lower leg with fat layer exposed ([address]    Relevant Medications    lidocaine (XYLOCAINE) 2 % viscous solution 15 mL (Completed)    Other Relevant Orders    INITIATE OUTPATIENT WOUND CARE PROTOCOL       Procedure Note  Indications:  Based on my examination of this patient's wound(s)/ulcer(s) today, debridement is required to promote healing and evaluate the wound base. Performed by: Jarad Vergara MD    Consent obtained: Yes    Time out taken: Yes    Debridement: Excisional    Using curette the wound(s)/ulcer(s) was/were sharply debrided down through and including the removal of    subcutaneous tissue    Devitalized Tissue Debrided: slough    Pre Debridement Measurements:  Are located in the [place]  Documentation Flow Sheet    Non-Pressure ulcer, fat layer exposed    Wound/Ulcer #: 1 and 2    Post Debridement Measurements:  Wound/Ulcer Descriptions are Pre Debridement except measurements:    Wound Leg Right;Posterior #1 12/12/22 (Active)   Wound Image   12/12/22 1331   Wound Etiology Venous 12/12/22 1331   Dressing Status Other (Comment) 12/12/22 1331   Cleansed Cleansed with saline 12/12/22 1331   Wound Length (cm) 14 cm 12/12/22 1331   Wound Width (cm) 8.5 cm 12/12/22 1331   Wound Depth (cm) 0.2 cm 12/12/22 1331   Wound Surface Area (cm^2) 119 cm^2 12/12/22 1331   Wound Volume (cm^3) 23.8 cm^3 12/12/22 1331   Post-Procedure Length (cm) 14 cm 12/12/22 1407   Post-Procedure Width (cm) 8.5 cm 12/12/22 1407   Post-Procedure Depth (cm) 0.2 cm 12/12/22 1407   Post-Procedure Surface Area (cm^2) 119 cm^2 12/12/22 1407   Post-Procedure Volume (cm^3) 23.8 cm^3 12/12/22 1407   Wound Assessment Granulation tissue; Hyper granulation tissue;Slough 12/12/22 1331   Drainage Amount Moderate 12/12/22 1331   Drainage Description Serosanguinous 12/12/22 1331   Wound Odor None 12/12/22 1331   Betty-Wound/Incision Assessment Maceration; Hypopigmented;Dry/flaky 12/12/22 1331 Edges Attached edges 12/12/22 1331   Wound Thickness Description Full thickness 12/12/22 1331   Number of days: 0       Wound Leg Left;Posterior #2 12/12/22 (Active)   Wound Image   12/12/22 1331   Wound Etiology Venous 12/12/22 1331   Dressing Status Other (Comment) 12/12/22 1331   Cleansed Cleansed with saline 12/12/22 1331   Wound Length (cm) 12.5 cm 12/12/22 1331   Wound Width (cm) 11 cm 12/12/22 1331   Wound Depth (cm) 0.2 cm 12/12/22 1331   Wound Surface Area (cm^2) 137.5 cm^2 12/12/22 1331   Wound Volume (cm^3) 27.5 cm^3 12/12/22 1331   Wound Assessment Granulation tissue;Slough 12/12/22 1331   Drainage Amount Moderate 12/12/22 1331   Drainage Description Serosanguinous 12/12/22 1331   Wound Odor None 12/12/22 1331   Betty-Wound/Incision Assessment Maceration; Intact;Edematous;Dry/flaky 12/12/22 1331   Edges Attached edges 12/12/22 1331   Wound Thickness Description Full thickness 12/12/22 1331   Number of days: 0        Total Surface Area Debrided:  257 sq cm     Estimated Blood Loss:  Minimal     Hemostasis Achieved: Pressure    Procedural Pain: 0 / 10     Post Procedural Pain: 0 / 10     Response to treatment: Well tolerated by patient

## 2022-12-12 NOTE — WOUND CARE
Discharge Instructions for  [address] [place] [address], [facility]  Telephone: [phone]    NAME:  Vladimir Whitehead  YOB: 1955  MEDICAL RECORD NUMBER:  [id number]  DATE:  12/12/2022      Return Appointment:  [x] Dressing supply provider: Ketty   [] Home Healthcare:  [x] Return Appointment: 1 Week(s)  [] Nurse Visit:  Week(s)    [] Discharge from [facility]  [] Ordered tests:   [] Referrals:   [] Rx:   [x] Other: Use lymphedema pump twice a day for 1 hour each time     Wound Cleansing:   Do not scrub or use excessive force. Cleanse wound prior to applying a clean dressing with:  [] Normal Saline   [x] Mild Soap & Water/Baby Shampoo   [] Keep Wound Dry in Shower  [] Other:      Topical Treatments:  Do not apply lotions, creams, or ointments to wound bed unless directed. [x] Apply moisturizing lotion to skin surrounding the wound prior to dressing change.  [] Apply antifungal ointment to skin surrounding the wound prior to dressing change.  [] Apply thin film of moisture barrier/zinc ointment to skin immediately around wound. [] Other:       Dressings:           Wound Location R & L leg   [x] Apply Primary Dressing:       [] MediHoney Gel    [] MediHoney Alginate               [] Calcium Alginate      [x] Calcium Alginate with Silver   [] Collagen                   [] Collagen with Silver                [] Santyl with Moistened saline gauze              [] Hydrofera Blue (cut to size and moistened)  [] Hydrofera Blue Ready (Cut to size)   [] NS wet to dry    [] Betadine wet to dry              [] Hydrogel                [] Mepitel     [] Bactroban/Mupirocin               [] Other:     [x] Cover and Secure with:     [x] Gauze [x] Tiffany [] Kerlix   [] Foam [] Super Absorbant [x] ABD     [] ACE Wrap            [] Other:    Limit contact of tape with skin.     [x] Change dressing: [] Daily    [] Every Other Day [] Once per week   [] Twice per week   [x] Three times per week [] Do Not Change Dressing   [] Other:     Pressure Relief:  [] When sitting, shift position or do seat lifts every 15 minutes.  [] Wheelchair cushion [] Specialty Bed/Mattress  [x]  Avoid direct pressure on wound site. Edema Control:  Apply: [] Compression Stocking:   mmHg  []Right Leg []Left Leg   [x] Tubigrip [x]Right Leg Double Layer [x]Left Leg Double Layer      []Right Leg Single Layer []Left Leg Single Layer     [x] Elevate leg(s) above the level of the heart when sitting. [x] Avoid prolonged standing in one place. Compression:  Apply: [] Multilayer Compression Wrap: []RightLeg []Left Leg                                 []Coflex   []Coflex Lite             []Unna     [] Do not get leg(s) with wrap wet. If wraps become too tight call the center or completely remove the wrap. [] Elevate leg(s) above the level of the heart when sitting. [] Avoid prolonged standing in one place. Dietary:  [x] Diet as tolerated: [] Calorie Diabetic Diet: [x] No Added Salt:  [x] Increase Protein: [] Other:     Activity:  [x] Activity as tolerated:    [] Patient has no activity restrictions       [] Strict Bedrest:   [] Remain off Work:       [] May return to full duty work:                                     [] Return to work with restrictions:               [address] Information: Should you experience any significant changes in your wound(s) or have questions about your wound care, please contact Dane Hanks at [place] [phone] 8:00 am - 4:30. If you need help with your wound outside of these hours and cannot wait until we are again available, contact your PCP or go to the hospital emergency room. PLEASE NOTE: IF YOU ARE UNABLE TO OBTAIN WOUND SUPPLIES, CONTINUE TO USE THE SUPPLIES YOU HAVE AVAILABLE UNTIL YOU ARE ABLE TO REACH US. IT IS MOST IMPORTANT TO KEEP THE WOUND COVERED AT ALL TIMES.     [x] Dr. Meenu Hamm   [] Dr. Perry Mullen Isabell Langston  [] Dr. Mae Stevens

## 2022-12-12 NOTE — DISCHARGE INSTRUCTIONS
Discharge Instructions for  [address] [place] [address], [address] [place] [address]  Telephone: [phone]    NAME:  Selena Ram  YOB: 1955  MEDICAL RECORD NUMBER:  [id number]  DATE:  12/12/2022      Return Appointment:  [x] Dressing supply provider: Ketty   [] Home Healthcare:  [x] Return Appointment: 1 Week(s)  [] Nurse Visit:  Week(s)    [] Discharge from [facility]  [] Ordered tests:   [] Referrals:   [] Rx:   [x] Other: Use lymphedema pump twice a day for 1 hour each time     Wound Cleansing:   Do not scrub or use excessive force. Cleanse wound prior to applying a clean dressing with:  [] Normal Saline   [x] Mild Soap & Water/Baby Shampoo   [] Keep Wound Dry in Shower  [] Other:      Topical Treatments:  Do not apply lotions, creams, or ointments to wound bed unless directed. [x] Apply moisturizing lotion to skin surrounding the wound prior to dressing change.  [] Apply antifungal ointment to skin surrounding the wound prior to dressing change.  [] Apply thin film of moisture barrier/zinc ointment to skin immediately around wound. [] Other:       Dressings:           Wound Location R & L leg   [x] Apply Primary Dressing:       [] MediHoney Gel    [] MediHoney Alginate               [] Calcium Alginate      [x] Calcium Alginate with Silver   [] Collagen                   [] Collagen with Silver                [] Santyl with Moistened saline gauze              [] Hydrofera Blue (cut to size and moistened)  [] Hydrofera Blue Ready (Cut to size)   [] NS wet to dry    [] Betadine wet to dry              [] Hydrogel                [] Mepitel     [] Bactroban/Mupirocin               [] Other:     [x] Cover and Secure with:     [x] Gauze [x] Tiffany [] Kerlix   [] Foam [] Super Absorbant [x] ABD     [] ACE Wrap            [] Other:    Limit contact of tape with skin.     [x] Change dressing: [] Daily    [] Every Other Day [] Once per week   [] Twice per week   [x] Three times per week [] Do Not Change Dressing   [] Other:     Pressure Relief:  [] When sitting, shift position or do seat lifts every 15 minutes.  [] Wheelchair cushion [] Specialty Bed/Mattress  [x]  Avoid direct pressure on wound site. Edema Control:  Apply: [] Compression Stocking:   mmHg  []Right Leg []Left Leg   [x] Tubigrip [x]Right Leg Double Layer [x]Left Leg Double Layer      []Right Leg Single Layer []Left Leg Single Layer     [x] Elevate leg(s) above the level of the heart when sitting. [x] Avoid prolonged standing in one place. Compression:  Apply: [] Multilayer Compression Wrap: []RightLeg []Left Leg                                 []Coflex   []Coflex Lite             []Unna     [] Do not get leg(s) with wrap wet. If wraps become too tight call the center or completely remove the wrap. [] Elevate leg(s) above the level of the heart when sitting. [] Avoid prolonged standing in one place. Dietary:  [x] Diet as tolerated: [] Calorie Diabetic Diet: [x] No Added Salt:  [x] Increase Protein: [] Other:     Activity:  [x] Activity as tolerated:    [] Patient has no activity restrictions       [] Strict Bedrest:   [] Remain off Work:       [] May return to full duty work:                                     [] Return to work with restrictions:               [address] Information: Should you experience any significant changes in your wound(s) or have questions about your wound care, please contact Dane Hanks at [place] [phone] 8:00 am - 4:30. If you need help with your wound outside of these hours and cannot wait until we are again available, contact your PCP or go to the hospital emergency room. PLEASE NOTE: IF YOU ARE UNABLE TO OBTAIN WOUND SUPPLIES, CONTINUE TO USE THE SUPPLIES YOU HAVE AVAILABLE UNTIL YOU ARE ABLE TO REACH US. IT IS MOST IMPORTANT TO KEEP THE WOUND COVERED AT ALL TIMES.     [x] Dr. Carolyn Cortés   [] Dr. Elliott Jackson [place]  [] Dr. Tootie Perales

## 2022-12-19 ENCOUNTER — HOSPITAL ENCOUNTER (OUTPATIENT)
Dept: WOUND CARE | Age: 67
Discharge: HOME | End: 2022-12-19
Payer: MEDICARE

## 2022-12-19 VITALS
RESPIRATION RATE: 20 BRPM | TEMPERATURE: 96.4 F | SYSTOLIC BLOOD PRESSURE: 156 MMHG | DIASTOLIC BLOOD PRESSURE: 97 MMHG | HEART RATE: 71 BPM

## 2022-12-19 DIAGNOSIS — L97.812 NON-PRESSURE CHRONIC ULCER OF OTHER PART OF RIGHT LOWER LEG WITH FAT LAYER EXPOSED (HCC): Primary | ICD-10-CM

## 2022-12-19 DIAGNOSIS — L97.822 NON-PRESSURE CHRONIC ULCER OF OTHER PART OF LEFT LOWER LEG WITH FAT LAYER EXPOSED (HCC): ICD-10-CM

## 2022-12-19 PROCEDURE — 99213 OFFICE O/P EST LOW 20 MIN: CPT

## 2022-12-19 PROCEDURE — 74011000250 HC RX REV CODE- 250: Performed by: SPECIALIST

## 2022-12-19 RX ORDER — LIDOCAINE HYDROCHLORIDE 20 MG/ML
15 SOLUTION OROPHARYNGEAL ONCE
Status: COMPLETED | OUTPATIENT
Start: 2022-12-19 | End: 2022-12-19

## 2022-12-19 RX ORDER — MUPIROCIN 20 MG/G
OINTMENT TOPICAL ONCE
OUTPATIENT
Start: 2022-12-19 | End: 2022-12-19

## 2022-12-19 RX ORDER — LIDOCAINE HYDROCHLORIDE 20 MG/ML
15 SOLUTION OROPHARYNGEAL ONCE
OUTPATIENT
Start: 2022-12-19 | End: 2022-12-19

## 2022-12-19 RX ORDER — TRIAMCINOLONE ACETONIDE 1 MG/G
OINTMENT TOPICAL ONCE
OUTPATIENT
Start: 2022-12-19 | End: 2022-12-19

## 2022-12-19 RX ORDER — SILVER SULFADIAZINE 10 G/1000G
CREAM TOPICAL ONCE
OUTPATIENT
Start: 2022-12-19 | End: 2022-12-19

## 2022-12-19 RX ADMIN — LIDOCAINE HYDROCHLORIDE 15 ML: 20 SOLUTION ORAL; TOPICAL at 13:16

## 2022-12-19 NOTE — DISCHARGE INSTRUCTIONS
Discharge Instructions for  [address] [place] [address], [address] [place] [address]  Telephone: [phone]    NAME:  Lefty Zuniga  YOB: 1955  MEDICAL RECORD NUMBER:  [id number]  DATE:  12/19/2022      Return Appointment:  [] Dressing supply provider: Ketty   [] Home Healthcare:  [x] Return Appointment: 3 Week(s)  [] Nurse Visit:  Week(s)    [] Discharge from [facility]  [] Ordered tests:   [] Referrals:   [] Rx:   [x] Other: Use lymphedema pump twice a day for 1 hour each time     Wound Cleansing:   Do not scrub or use excessive force. Cleanse wound prior to applying a clean dressing with:  [] Normal Saline   [x] Mild Soap & Water/Baby Shampoo   [] Keep Wound Dry in Shower  [] Other:      Topical Treatments:  Do not apply lotions, creams, or ointments to wound bed unless directed. [x] Apply moisturizing lotion to skin surrounding the wound prior to dressing change.  [] Apply antifungal ointment to skin surrounding the wound prior to dressing change.  [] Apply thin film of moisture barrier/zinc ointment to skin immediately around wound. [] Other:       Dressings:           Wound Location R & L leg   [x] Apply Primary Dressing:       [] MediHoney Gel    [] MediHoney Alginate               [] Calcium Alginate      [x] Calcium Alginate with Silver   [] Collagen                   [] Collagen with Silver                [] Santyl with Moistened saline gauze              [] Hydrofera Blue (cut to size and moistened)  [] Hydrofera Blue Ready (Cut to size)   [] NS wet to dry    [] Betadine wet to dry              [] Hydrogel                [] Mepitel     [] Bactroban/Mupirocin               [] Other:     [x] Cover and Secure with:     [x] Gauze [x] Tiffany [] Kerlix   [] Foam [] Super Absorbant [x] ABD     [] ACE Wrap            [] Other:    Limit contact of tape with skin.     [x] Change dressing: [] Daily    [] Every Other Day [] Once per week   [] Twice per week   [x] Three times per week [] Do Not Change Dressing   [] Other:     Pressure Relief:  [] When sitting, shift position or do seat lifts every 15 minutes.  [] Wheelchair cushion [] Specialty Bed/Mattress  [x]  Avoid direct pressure on wound site. Edema Control:  Apply: [] Compression Stocking:   mmHg  []Right Leg []Left Leg   [x] Tubigrip [x]Right Leg Double Layer [x]Left Leg Double Layer      []Right Leg Single Layer []Left Leg Single Layer     [x] Elevate leg(s) above the level of the heart when sitting. [x] Avoid prolonged standing in one place. Compression:  Apply: [] Multilayer Compression Wrap: []RightLeg []Left Leg                                 []Coflex   []Coflex Lite             []Unna     [] Do not get leg(s) with wrap wet. If wraps become too tight call the center or completely remove the wrap. [] Elevate leg(s) above the level of the heart when sitting. [] Avoid prolonged standing in one place. Dietary:  [x] Diet as tolerated: [] Calorie Diabetic Diet: [x] No Added Salt:  [x] Increase Protein: [] Other:     Activity:  [x] Activity as tolerated:    [] Patient has no activity restrictions       [] Strict Bedrest:   [] Remain off Work:       [] May return to full duty work:                                     [] Return to work with restrictions:               [address] Information: Should you experience any significant changes in your wound(s) or have questions about your wound care, please contact Dane Hanks at [place] [phone] 8:00 am - 4:30. If you need help with your wound outside of these hours and cannot wait until we are again available, contact your PCP or go to the hospital emergency room. PLEASE NOTE: IF YOU ARE UNABLE TO OBTAIN WOUND SUPPLIES, CONTINUE TO USE THE SUPPLIES YOU HAVE AVAILABLE UNTIL YOU ARE ABLE TO REACH US. IT IS MOST IMPORTANT TO KEEP THE WOUND COVERED AT ALL TIMES.     [x] Dr. Livia Silverman   [] Dr. Narda Vargas Cayla Siegel  [] Dr. Patti Vargas

## 2022-12-19 NOTE — WOUND CARE
[address]   Progress Note and Procedure Note   NO Procedure      12 [facility] RECORD NUMBER:  [id number]  AGE: 69 y.o. RACE WHITE/NON-  BLACK/  GENDER: male  : 1955  EPISODE DATE:  2022    Subjective:   Has undergone several ablation procedures with Dr. Chrissy Miles  Chief Complaint   Patient presents with    Wound Check     R & L leg         HISTORY of PRESENT ILLNESS HPI    Fco Cheney is a 69 y.o. male who presents today for wound/ulcer evaluation. History of Wound Context: BLE  Wound/Ulcer Pain Timing/Severity: mild  Quality of pain: aching  Severity:  1 / 10   Modifying Factors: None  Associated Signs/Symptoms: edema    Ulcer Identification:  Ulcer Type: venous    Contributing Factors: edema    Wound: BLE         PAST MEDICAL HISTORY    Past Medical History:   Diagnosis Date    Arthritis     Chronic obstructive pulmonary disease (HCC)     Diabetes ([facility] [address]     Hypertension     Sleep apnea         PAST SURGICAL HISTORY    Past Surgical History:   Procedure Laterality Date    HX GI      HX HERNIA REPAIR         FAMILY HISTORY    Family History   Problem Relation Age of Onset    Hypertension Mother     Hypertension Father     Hypertension Sister     Hypertension Sister     Hypertension Child        SOCIAL HISTORY    Social History     Tobacco Use    Smoking status: Never    Smokeless tobacco: Never   Vaping Use    Vaping Use: Never used   Substance Use Topics    Alcohol use: Never    Drug use: Never       ALLERGIES    No Known Allergies    MEDICATIONS    Current Outpatient Medications on File Prior to Encounter   Medication Sig Dispense Refill    nebivoloL (BYSTOLIC) 10 mg tablet Take 10 mg by mouth daily. multivitamin (ONE A DAY) tablet Take 1 Tablet by mouth daily. furosemide (LASIX) 40 mg tablet Take 1 Tablet by mouth daily. 30 Tablet 0    atorvastatin (LIPITOR) 20 mg tablet Take 20 mg by mouth nightly.        No current facility-administered medications on file prior to encounter. REVIEW OF SYSTEMS    Pertinent items are noted in HPI. Objective:     Visit Vitals  BP (!) 156/97 (BP 1 Location: Left lower arm, BP Patient Position: At rest;Sitting)   Pulse 71   Temp (!) 96.4 °F (35.8 °C)   Resp 20       Wt Readings from Last 3 Encounters:   12/12/22 (!) 170.1 kg (375 lb)   03/28/22 158.8 kg (350 lb)   10/12/21 (!) 181.4 kg (400 lb)       PHYSICAL EXAM  Left and right calf wounds measure slightly smaller, healthy granulation, no evidence of active infection  Pertinent items are noted in HPI. Assessment:    Some improvement following venous ablation(s)  Problem List Items Addressed This Visit       Non-pressure chronic ulcer of other part of right lower leg with fat layer exposed ([address] - Primary    Relevant Medications    lidocaine (XYLOCAINE) 2 % viscous solution 15 mL (Completed) (Start on 12/19/2022  2:00 PM)    Other Relevant Orders    INITIATE OUTPATIENT WOUND CARE PROTOCOL    Non-pressure chronic ulcer of other part of left lower leg with fat layer exposed ([address]    Relevant Medications    lidocaine (XYLOCAINE) 2 % viscous solution 15 mL (Completed) (Start on 12/19/2022  2:00 PM)    Other Relevant Orders    INITIATE OUTPATIENT WOUND CARE PROTOCOL       Procedure Note  Indications:  Based on my examination of this patient's wound(s)/ulcer(s) today, debridement is not required to promote healing and evaluate the wound base. Wound Leg Right;Posterior #1 12/12/22 (Active)   Wound Image   12/19/22 1322   Wound Etiology Venous 12/19/22 1322   Dressing Status Clean;Dry; Intact 12/19/22 1322   Cleansed Cleansed with saline 12/19/22 1322   Wound Length (cm) 14.7 cm 12/19/22 1322   Wound Width (cm) 7.9 cm 12/19/22 1322   Wound Depth (cm) 0.2 cm 12/19/22 1322   Wound Surface Area (cm^2) 116.13 cm^2 12/19/22 1322   Change in Wound Size % 2.41 12/19/22 1322   Wound Volume (cm^3) 23.226 cm^3 12/19/22 1322   Wound Healing % 2 12/19/22 1322   Post-Procedure Length (cm) 14 cm 12/12/22 1407   Post-Procedure Width (cm) 8.5 cm 12/12/22 1407   Post-Procedure Depth (cm) 0.2 cm 12/12/22 1407   Post-Procedure Surface Area (cm^2) 119 cm^2 12/12/22 1407   Post-Procedure Volume (cm^3) 23.8 cm^3 12/12/22 1407   Wound Assessment Granulation tissue; Hyper granulation tissue;Slough 12/19/22 1322   Drainage Amount Moderate 12/19/22 1322   Drainage Description Serosanguinous 12/19/22 1322   Wound Odor Mild 12/19/22 1322   Betty-Wound/Incision Assessment Maceration; Hypopigmented;Dry/flaky 12/19/22 1322   Edges Attached edges 12/19/22 1322   Wound Thickness Description Full thickness 12/19/22 1322   Number of days: 7       Wound Leg Left;Posterior #2 12/12/22 (Active)   Wound Image   12/19/22 1319   Wound Etiology Venous 12/19/22 1319   Dressing Status Clean;Dry; Intact 12/19/22 1319   Cleansed Cleansed with saline 12/19/22 1319   Wound Length (cm) 12.6 cm 12/19/22 1319   Wound Width (cm) 4 cm 12/19/22 1319   Wound Depth (cm) 0.2 cm 12/19/22 1319   Wound Surface Area (cm^2) 50.4 cm^2 12/19/22 1319   Change in Wound Size % 63.35 12/19/22 1319   Wound Volume (cm^3) 10.08 cm^3 12/19/22 1319   Wound Healing % 63 12/19/22 1319   Wound Assessment Granulation tissue;Slough 12/19/22 1319   Drainage Amount Moderate 12/19/22 1319   Drainage Description Serosanguinous 12/19/22 1319   Wound Odor None 12/19/22 1319   Betty-Wound/Incision Assessment Maceration; Intact;Edematous;Dry/flaky 12/19/22 1319   Edges Attached edges 12/19/22 1319   Wound Thickness Description Full thickness 12/19/22 1319   Number of days: 7        Plan:   Continue silvercel, double tubi  Follow up with Dr Farrah Cheese  Treatment Note please see attached Discharge Instructions    Written patient dismissal instructions given to patient or POA.          Electronically signed by Marisol Rincon MD on 12/19/2022 at 1:51 PM

## 2022-12-19 NOTE — WOUND CARE
Discharge Instructions for  [address] [place] [address], [address] [place] [address]  Telephone: [phone]    NAME:  Anette Moore  YOB: 1955  MEDICAL RECORD NUMBER:  [id number]  DATE:  12/19/2022      Return Appointment:  [] Dressing supply provider: Ketty   [] Home Healthcare:  [x] Return Appointment: 3 Week(s)  [] Nurse Visit:  Week(s)    [] Discharge from [facility]  [] Ordered tests:   [] Referrals:   [] Rx:   [x] Other: Use lymphedema pump twice a day for 1 hour each time     Wound Cleansing:   Do not scrub or use excessive force. Cleanse wound prior to applying a clean dressing with:  [] Normal Saline   [x] Mild Soap & Water/Baby Shampoo   [] Keep Wound Dry in Shower  [] Other:      Topical Treatments:  Do not apply lotions, creams, or ointments to wound bed unless directed. [x] Apply moisturizing lotion to skin surrounding the wound prior to dressing change.  [] Apply antifungal ointment to skin surrounding the wound prior to dressing change.  [] Apply thin film of moisture barrier/zinc ointment to skin immediately around wound. [] Other:       Dressings:           Wound Location R & L leg   [x] Apply Primary Dressing:       [] MediHoney Gel    [] MediHoney Alginate               [] Calcium Alginate      [x] Calcium Alginate with Silver   [] Collagen                   [] Collagen with Silver                [] Santyl with Moistened saline gauze              [] Hydrofera Blue (cut to size and moistened)  [] Hydrofera Blue Ready (Cut to size)   [] NS wet to dry    [] Betadine wet to dry              [] Hydrogel                [] Mepitel     [] Bactroban/Mupirocin               [] Other:     [x] Cover and Secure with:     [x] Gauze [x] Tiffany [] Kerlix   [] Foam [] Super Absorbant [x] ABD     [] ACE Wrap            [] Other:    Limit contact of tape with skin.     [x] Change dressing: [] Daily    [] Every Other Day [] Once per week   [] Twice per week   [x] Three times per week [] Do Not Change Dressing   [] Other:     Pressure Relief:  [] When sitting, shift position or do seat lifts every 15 minutes.  [] Wheelchair cushion [] Specialty Bed/Mattress  [x]  Avoid direct pressure on wound site. Edema Control:  Apply: [] Compression Stocking:   mmHg  []Right Leg []Left Leg   [x] Tubigrip [x]Right Leg Double Layer [x]Left Leg Double Layer      []Right Leg Single Layer []Left Leg Single Layer     [x] Elevate leg(s) above the level of the heart when sitting. [x] Avoid prolonged standing in one place. Compression:  Apply: [] Multilayer Compression Wrap: []RightLeg []Left Leg                                 []Coflex   []Coflex Lite             []Unna     [] Do not get leg(s) with wrap wet. If wraps become too tight call the center or completely remove the wrap. [] Elevate leg(s) above the level of the heart when sitting. [] Avoid prolonged standing in one place. Dietary:  [x] Diet as tolerated: [] Calorie Diabetic Diet: [x] No Added Salt:  [x] Increase Protein: [] Other:     Activity:  [x] Activity as tolerated:    [] Patient has no activity restrictions       [] Strict Bedrest:   [] Remain off Work:       [] May return to full duty work:                                     [] Return to work with restrictions:               [address] Information: Should you experience any significant changes in your wound(s) or have questions about your wound care, please contact Dane Hanks at [place] [address] 8:00 am - 4:30. If you need help with your wound outside of these hours and cannot wait until we are again available, contact your PCP or go to the hospital emergency room. PLEASE NOTE: IF YOU ARE UNABLE TO OBTAIN WOUND SUPPLIES, CONTINUE TO USE THE SUPPLIES YOU HAVE AVAILABLE UNTIL YOU ARE ABLE TO REACH US. IT IS MOST IMPORTANT TO KEEP THE WOUND COVERED AT ALL TIMES.     [x] Dr. Chai Greene   [] Dr. Alesha Miller Isabell Langston  [] Dr. Mae Stevens

## 2023-01-30 ENCOUNTER — HOSPITAL ENCOUNTER (OUTPATIENT)
Dept: WOUND CARE | Age: 68
Discharge: HOME | End: 2023-01-30
Payer: MEDICARE

## 2023-01-30 VITALS
DIASTOLIC BLOOD PRESSURE: 90 MMHG | SYSTOLIC BLOOD PRESSURE: 182 MMHG | HEART RATE: 84 BPM | RESPIRATION RATE: 20 BRPM | TEMPERATURE: 95.9 F

## 2023-01-30 DIAGNOSIS — L97.812 NON-PRESSURE CHRONIC ULCER OF OTHER PART OF RIGHT LOWER LEG WITH FAT LAYER EXPOSED (HCC): ICD-10-CM

## 2023-01-30 DIAGNOSIS — L97.822 NON-PRESSURE CHRONIC ULCER OF OTHER PART OF LEFT LOWER LEG WITH FAT LAYER EXPOSED (HCC): Primary | ICD-10-CM

## 2023-01-30 PROBLEM — L97.212 NON-PRESSURE CHRONIC ULCER OF RIGHT CALF WITH FAT LAYER EXPOSED (HCC): Status: RESOLVED | Noted: 2020-09-24 | Resolved: 2020-12-03

## 2023-01-30 PROCEDURE — 99213 OFFICE O/P EST LOW 20 MIN: CPT

## 2023-01-30 PROCEDURE — 74011000250 HC RX REV CODE- 250: Performed by: SPECIALIST

## 2023-01-30 RX ORDER — TRIAMCINOLONE ACETONIDE 1 MG/G
OINTMENT TOPICAL ONCE
OUTPATIENT
Start: 2023-01-30 | End: 2023-01-30

## 2023-01-30 RX ORDER — LIDOCAINE HYDROCHLORIDE 20 MG/ML
15 SOLUTION OROPHARYNGEAL ONCE
OUTPATIENT
Start: 2023-01-30 | End: 2023-01-30

## 2023-01-30 RX ORDER — MUPIROCIN 20 MG/G
OINTMENT TOPICAL ONCE
OUTPATIENT
Start: 2023-01-30 | End: 2023-01-30

## 2023-01-30 RX ORDER — LIDOCAINE HYDROCHLORIDE 20 MG/ML
15 SOLUTION OROPHARYNGEAL ONCE
Status: COMPLETED | OUTPATIENT
Start: 2023-01-30 | End: 2023-01-30

## 2023-01-30 RX ORDER — SILVER SULFADIAZINE 10 G/1000G
CREAM TOPICAL ONCE
OUTPATIENT
Start: 2023-01-30 | End: 2023-01-30

## 2023-01-30 RX ADMIN — LIDOCAINE HYDROCHLORIDE 15 ML: 20 SOLUTION ORAL; TOPICAL at 14:24

## 2023-01-30 NOTE — WOUND CARE
Discharge Instructions for  [address] [place] [address], [address] [place] [address]  Telephone: [phone]    NAME:  Destini Up  YOB: 1955  MEDICAL RECORD NUMBER:  [id number]  DATE:  1/30/2023      Return Appointment:  [x] Dressing supply provider: Ketty   [] Home Healthcare:  [x] Return Appointment: 2 Week(s)  [] Nurse Visit:  Week(s)    [] Discharge from [facility]  [] Ordered tests:   [] Referrals:   [] Rx:   [x] Other: Use lymphedema pump twice a day for 1 hour each time     Wound Cleansing:   Do not scrub or use excessive force. Cleanse wound prior to applying a clean dressing with:  [] Normal Saline   [x] Mild Soap & Water/Baby Shampoo   [] Keep Wound Dry in Shower  [] Other:      Topical Treatments:  Do not apply lotions, creams, or ointments to wound bed unless directed. [x] Apply moisturizing lotion to skin surrounding the wound prior to dressing change.  [] Apply antifungal ointment to skin surrounding the wound prior to dressing change.  [] Apply thin film of moisture barrier/zinc ointment to skin immediately around wound. [] Other:       Dressings:           Wound Location R & L leg   [x] Apply Primary Dressing:       [] MediHoney Gel    [] MediHoney Alginate               [] Calcium Alginate      [] Calcium Alginate with Silver   [] Collagen                   [x] Collagen with Silver                [] Santyl with Moistened saline gauze              [] Hydrofera Blue (cut to size and moistened)  [] Hydrofera Blue Ready (Cut to size)   [] NS wet to dry    [] Betadine wet to dry              [] Hydrogel                [] Mepitel     [] Bactroban/Mupirocin               [] Other:     [x] Cover and Secure with:     [x] Gauze [x] Tiffany [] Kerlix   [] Foam [] Super Absorbant [x] ABD     [] ACE Wrap            [] Other:    Limit contact of tape with skin.     [x] Change dressing: [] Daily    [] Every Other Day [] Once per week   [] Twice per week   [x] Three times per week [] Do Not Change Dressing   [] Other:     Pressure Relief:  [] When sitting, shift position or do seat lifts every 15 minutes.  [] Wheelchair cushion [] Specialty Bed/Mattress  [x]  Avoid direct pressure on wound site. Edema Control:  Apply: [] Compression Stocking:   mmHg  []Right Leg []Left Leg   [x] Tubigrip [x]Right Leg Double Layer [x]Left Leg Double Layer      []Right Leg Single Layer []Left Leg Single Layer     [x] Elevate leg(s) above the level of the heart when sitting. [x] Avoid prolonged standing in one place. Compression:  Apply: [] Multilayer Compression Wrap: []RightLeg []Left Leg                                 []Coflex   []Coflex Lite             []Unna     [] Do not get leg(s) with wrap wet. If wraps become too tight call the center or completely remove the wrap. [] Elevate leg(s) above the level of the heart when sitting. [] Avoid prolonged standing in one place. Dietary:  [x] Diet as tolerated: [] Calorie Diabetic Diet: [x] No Added Salt:  [x] Increase Protein: [] Other:     Activity:  [x] Activity as tolerated:    [] Patient has no activity restrictions       [] Strict Bedrest:   [] Remain off Work:       [] May return to full duty work:                                     [] Return to work with restrictions:               [address] Information: Should you experience any significant changes in your wound(s) or have questions about your wound care, please contact Dane Hanks at [place] [address] 8:00 am - 4:30. If you need help with your wound outside of these hours and cannot wait until we are again available, contact your PCP or go to the hospital emergency room. PLEASE NOTE: IF YOU ARE UNABLE TO OBTAIN WOUND SUPPLIES, CONTINUE TO USE THE SUPPLIES YOU HAVE AVAILABLE UNTIL YOU ARE ABLE TO REACH US. IT IS MOST IMPORTANT TO KEEP THE WOUND COVERED AT ALL TIMES.     [x] Dr. Chai Greene   [] Dr. Alesha Miller Tiffany Delarosa  [] Dr. Hayden Staples

## 2023-01-30 NOTE — WOUND CARE
[address]   Progress Note and Procedure Note   NO Procedure      12 [facility] [place] RECORD NUMBER:  [id number]  AGE: 69 y.o. RACE WHITE/NON-  BLACK/  GENDER: male  : 1955  EPISODE DATE:  2023    Subjective:   Last visit   No new problems  Has not made an appt to see Dr Coco Mast   Patient presents with    Wound Check     R and L leg          HISTORY of PRESENT ILLNESS KATIE Whitehead is a 69 y.o. male who presents today for wound/ulcer evaluation. History of Wound Context: BLE  Wound/Ulcer Pain Timing/Severity: none  Quality of pain: dull  Severity:  0 / 10   Modifying Factors: None  Associated Signs/Symptoms: edema    Ulcer Identification:  Ulcer Type: venous    Contributing Factors: lymphedema    Wound: BLE         PAST MEDICAL HISTORY    Past Medical History:   Diagnosis Date    Arthritis     Chronic obstructive pulmonary disease (HCC)     Diabetes ([facility] [address]     Hypertension     Sleep apnea         PAST SURGICAL HISTORY    Past Surgical History:   Procedure Laterality Date    HX GI      HX HERNIA REPAIR         FAMILY HISTORY    Family History   Problem Relation Age of Onset    Hypertension Mother     Hypertension Father     Hypertension Sister     Hypertension Sister     Hypertension Child        SOCIAL HISTORY    Social History     Tobacco Use    Smoking status: Never    Smokeless tobacco: Never   Vaping Use    Vaping Use: Never used   Substance Use Topics    Alcohol use: Never    Drug use: Never       ALLERGIES    No Known Allergies    MEDICATIONS    Current Outpatient Medications on File Prior to Encounter   Medication Sig Dispense Refill    nebivoloL (BYSTOLIC) 10 mg tablet Take 10 mg by mouth daily. multivitamin (ONE A DAY) tablet Take 1 Tablet by mouth daily. furosemide (LASIX) 40 mg tablet Take 1 Tablet by mouth daily. 30 Tablet 0    atorvastatin (LIPITOR) 20 mg tablet Take 20 mg by mouth nightly. No current facility-administered medications on file prior to encounter. REVIEW OF SYSTEMS    Pertinent items are noted in HPI. Objective:     Visit Vitals  BP (!) 182/90 (BP Patient Position: At rest;Sitting)   Pulse 84   Temp (!) 95.9 °F (35.5 °C)   Resp 20       Wt Readings from Last 3 Encounters:   12/12/22 (!) 170.1 kg (375 lb)   03/28/22 158.8 kg (350 lb)   10/12/21 (!) 181.4 kg (400 lb)       PHYSICAL EXAM  Bilateral calf wounds largely unchanged, slightly smaller on the R, slightly larger on the L, no infection    Pertinent items are noted in HPI. Assessment:   Marginal change    Problem List Items Addressed This Visit       Non-pressure chronic ulcer of other part of right lower leg with fat layer exposed ([address]    Relevant Medications    lidocaine (XYLOCAINE) 2 % viscous solution 15 mL (Completed) (Start on 1/30/2023  3:00 PM)    Other Relevant Orders    INITIATE OUTPATIENT WOUND CARE PROTOCOL    Non-pressure chronic ulcer of other part of left lower leg with fat layer exposed ([address] - Primary    Relevant Medications    lidocaine (XYLOCAINE) 2 % viscous solution 15 mL (Completed) (Start on 1/30/2023  3:00 PM)    Other Relevant Orders    INITIATE OUTPATIENT WOUND CARE PROTOCOL       Procedure Note  Indications:  Based on my examination of this patient's wound(s)/ulcer(s) today, debridement is not required to promote healing and evaluate the wound base.     Wound Leg Right;Posterior #1 12/12/22 (Active)   Wound Image   01/30/23 1419   Wound Etiology Venous 01/30/23 1419   Dressing Status Other (Comment) 01/30/23 1419   Cleansed Cleansed with saline 01/30/23 1419   Wound Length (cm) 8 cm 01/30/23 1419   Wound Width (cm) 3.5 cm 01/30/23 1419   Wound Depth (cm) 0.1 cm 01/30/23 1419   Wound Surface Area (cm^2) 28 cm^2 01/30/23 1419   Change in Wound Size % 76.47 01/30/23 1419   Wound Volume (cm^3) 2.8 cm^3 01/30/23 1419   Wound Healing % 88 01/30/23 1419   Post-Procedure Length (cm) 14 cm 12/12/22 1407   Post-Procedure Width (cm) 8.5 cm 12/12/22 1407   Post-Procedure Depth (cm) 0.2 cm 12/12/22 1407   Post-Procedure Surface Area (cm^2) 119 cm^2 12/12/22 1407   Post-Procedure Volume (cm^3) 23.8 cm^3 12/12/22 1407   Wound Assessment Granulation tissue; Hyper granulation tissue;Slough 01/30/23 1419   Drainage Amount Moderate 01/30/23 1419   Drainage Description Serosanguinous 01/30/23 1419   Wound Odor None 01/30/23 1419   Betty-Wound/Incision Assessment Intact;Dry/flaky 01/30/23 1419   Edges Attached edges 01/30/23 1419   Wound Thickness Description Full thickness 01/30/23 1419   Number of days: 49       Wound Leg Left;Posterior #2 12/12/22 (Active)   Wound Image   01/30/23 1419   Wound Etiology Venous 01/30/23 1419   Dressing Status Other (Comment) 01/30/23 1419   Cleansed Cleansed with saline 01/30/23 1419   Wound Length (cm) 11.5 cm 01/30/23 1419   Wound Width (cm) 8 cm 01/30/23 1419   Wound Depth (cm) 0.1 cm 01/30/23 1419   Wound Surface Area (cm^2) 92 cm^2 01/30/23 1419   Change in Wound Size % 33.09 01/30/23 1419   Wound Volume (cm^3) 9.2 cm^3 01/30/23 1419   Wound Healing % 67 01/30/23 1419   Wound Assessment Granulation tissue;Slough 01/30/23 1419   Drainage Amount Moderate 01/30/23 1419   Drainage Description Serosanguinous 01/30/23 1419   Wound Odor None 01/30/23 1419   Betty-Wound/Incision Assessment Intact; Hyperkeratosis (Callous) 01/30/23 1419   Edges Attached edges 01/30/23 1419   Wound Thickness Description Full thickness 01/30/23 1419   Number of days: 49        Plan:   Switch to Yuridia, continue double tubi   Make appt to see Dr Ludwig Clark    Treatment Note please see attached Discharge Instructions    Written patient dismissal instructions given to patient or POA.          Electronically signed by Calos Latif MD on 1/30/2023 at 2:38 PM

## 2023-01-30 NOTE — WOUND CARE
[address]:     [address] f: [phone] f: [phone] [phone] [email or web address]       Ordering Center:     [address] [place] [address] [address] [place] [address] NUMBER [phone]    Patient Information:     Dot Campbell   [address]  [id number] [phone] (home)     : 1955  AGE: 69 y.o. GENDER: male  TODAYS DATE: 2023    Insurance:     PRIMARY INSURANCE    [id number] - (Medicare)  Payer/Plan Subscr  Sex Relation Sub. Ins. ID Effective Group Num   1. [place] -* [place] 1955 Male Self [id number] 22                                    [address]   2.  [address] 1955 Male Self [id number]                                   [address] [address]        Patient Wound Information:     Hospital Problems  Date Reviewed: 2023            Codes Class Noted POA    Non-pressure chronic ulcer of other part of right lower leg with fat layer exposed ([facility] ICD-10-CM: G60.518  ICD-9-CM: 707.19  3/28/2022 Yes        Non-pressure chronic ulcer of other part of left lower leg with fat layer exposed ([facility] [address] [facility] ICD-10-CM: O45.510  ICD-9-CM: 707.19  3/28/2022 Yes           WOUNDS REQUIRING DRESSING SUPPLIES    Wound Leg Right;Posterior #1 22 (Active)   Wound Image   23 1419   Wound Etiology Venous 23 1419   Dressing Status Other (Comment) 23 1419   Cleansed Cleansed with saline 23 1419   Wound Length (cm) 8 cm 23 1419   Wound Width (cm) 3.5 cm 23 1419   Wound Depth (cm) 0.1 cm 23 1419   Wound Surface Area (cm^2) 28 cm^2 23 1419   Change in Wound Size % 76.47 23 141   Wound Volume (cm^3) 2.8 cm^3 23 1419   Wound Healing % 88 23 1419   Post-Procedure Length (cm) 14 cm 22 1407   Post-Procedure Width (cm) 8.5 cm 12/12/22 1407   Post-Procedure Depth (cm) 0.2 cm 12/12/22 1407   Post-Procedure Surface Area (cm^2) 119 cm^2 12/12/22 1407   Post-Procedure Volume (cm^3) 23.8 cm^3 12/12/22 1407   Wound Assessment Granulation tissue; Hyper granulation tissue;Slough 01/30/23 1419   Drainage Amount Moderate 01/30/23 1419   Drainage Description Serosanguinous 01/30/23 1419   Wound Odor None 01/30/23 1419   Betty-Wound/Incision Assessment Intact;Dry/flaky 01/30/23 1419   Edges Attached edges 01/30/23 1419   Wound Thickness Description Full thickness 01/30/23 1419   Number of days: 49       Wound Leg Left;Posterior #2 12/12/22 (Active)   Wound Image   01/30/23 1419   Wound Etiology Venous 01/30/23 1419   Dressing Status Other (Comment) 01/30/23 1419   Cleansed Cleansed with saline 01/30/23 1419   Wound Length (cm) 11.5 cm 01/30/23 1419   Wound Width (cm) 8 cm 01/30/23 1419   Wound Depth (cm) 0.1 cm 01/30/23 1419   Wound Surface Area (cm^2) 92 cm^2 01/30/23 1419   Change in Wound Size % 33.09 01/30/23 1419   Wound Volume (cm^3) 9.2 cm^3 01/30/23 1419   Wound Healing % 67 01/30/23 1419   Wound Assessment Granulation tissue;Slough 01/30/23 1419   Drainage Amount Moderate 01/30/23 1419   Drainage Description Serosanguinous 01/30/23 1419   Wound Odor None 01/30/23 1419   Betty-Wound/Incision Assessment Intact; Hyperkeratosis (Callous) 01/30/23 1419   Edges Attached edges 01/30/23 1419   Wound Thickness Description Full thickness 01/30/23 1419   Number of days: 49       Supplies Requested :     WOUND #:1 and 2   PRIMARY DRESSING:  Collagen with silver   4X4 gauze pad, ABD pad, Bulky roll gauze, and Other Tubi  - Size G     FREQUENCY OF DRESSING CHANGES:  Three times per week         ADDITIONAL ITEMS:  [] Gloves Small  [] Gloves Medium [x] Gloves Large [] Gloves Jorge Nyasia  [x] Tape 4\"  [x] Medipore Tape  [x] Saline  [] Skin Prep   [] Adhesive Remover   [] Cotton Tip Applicators   [] Other:    Patient Wound(s) Debrided: [x] Yes if yes please add date 12/12/23   [] No    Debribement Type: Excisional/Sharp    Patient currently being seen by Home Health: [] Yes   [x] No    Duration for needed supplies:  []15  [x]30 Days    Electronically signed by Katie Negro RN on 1/30/2023 at 2:37 PM     Provider Information:      PROVIDER'S NAME: Dr. Teagan Delgado

## 2023-02-27 ENCOUNTER — HOSPITAL ENCOUNTER (OUTPATIENT)
Dept: WOUND CARE | Age: 68
Discharge: HOME | End: 2023-02-27
Payer: MEDICARE

## 2023-02-27 VITALS
SYSTOLIC BLOOD PRESSURE: 164 MMHG | HEART RATE: 72 BPM | TEMPERATURE: 96.6 F | DIASTOLIC BLOOD PRESSURE: 85 MMHG | RESPIRATION RATE: 20 BRPM

## 2023-02-27 DIAGNOSIS — L97.822 NON-PRESSURE CHRONIC ULCER OF OTHER PART OF LEFT LOWER LEG WITH FAT LAYER EXPOSED (HCC): Primary | ICD-10-CM

## 2023-02-27 DIAGNOSIS — L97.812 NON-PRESSURE CHRONIC ULCER OF OTHER PART OF RIGHT LOWER LEG WITH FAT LAYER EXPOSED (HCC): ICD-10-CM

## 2023-02-27 PROCEDURE — 74011000250 HC RX REV CODE- 250: Performed by: SPECIALIST

## 2023-02-27 PROCEDURE — 11045 DBRDMT SUBQ TISS EACH ADDL: CPT

## 2023-02-27 PROCEDURE — 11042 DBRDMT SUBQ TIS 1ST 20SQCM/<: CPT

## 2023-02-27 RX ORDER — LIDOCAINE HYDROCHLORIDE 20 MG/ML
15 SOLUTION OROPHARYNGEAL ONCE
OUTPATIENT
Start: 2023-02-27 | End: 2023-02-27

## 2023-02-27 RX ORDER — LIDOCAINE HYDROCHLORIDE 20 MG/ML
15 SOLUTION OROPHARYNGEAL ONCE
Status: COMPLETED | OUTPATIENT
Start: 2023-02-27 | End: 2023-02-27

## 2023-02-27 RX ORDER — SILVER SULFADIAZINE 10 G/1000G
CREAM TOPICAL ONCE
OUTPATIENT
Start: 2023-02-27 | End: 2023-02-27

## 2023-02-27 RX ORDER — TRIAMCINOLONE ACETONIDE 1 MG/G
OINTMENT TOPICAL ONCE
OUTPATIENT
Start: 2023-02-27 | End: 2023-02-27

## 2023-02-27 RX ORDER — MUPIROCIN 20 MG/G
OINTMENT TOPICAL ONCE
OUTPATIENT
Start: 2023-02-27 | End: 2023-02-27

## 2023-02-27 RX ADMIN — LIDOCAINE HYDROCHLORIDE 15 ML: 20 SOLUTION ORAL; TOPICAL at 14:11

## 2023-02-27 NOTE — WOUND CARE
Discharge Instructions for  [address] [place] [address], [address] [place] [address]  Telephone: [phone]    NAME:  Saud Atkins  YOB: 1955  MEDICAL RECORD NUMBER:  [id number]  DATE:  2/27/2023      Return Appointment:  [] Dressing supply provider: Ketty   [] Home Healthcare:  [x] Return Appointment: 2 Week(s)  [] Nurse Visit:  Week(s)    [] Discharge from [facility]  [] Ordered tests:   [] Referrals:   [] Rx:   [x] Other: Use lymphedema pump twice a day for 1 hour each time     Wound Cleansing:   Do not scrub or use excessive force. Cleanse wound prior to applying a clean dressing with:  [] Normal Saline   [x] Mild Soap & Water/Baby Shampoo   [] Keep Wound Dry in Shower  [] Other:      Topical Treatments:  Do not apply lotions, creams, or ointments to wound bed unless directed. [x] Apply moisturizing lotion to skin surrounding the wound prior to dressing change.  [] Apply antifungal ointment to skin surrounding the wound prior to dressing change.  [] Apply thin film of moisture barrier/zinc ointment to skin immediately around wound. [] Other:       Dressings:           Wound Location R & L leg   [x] Apply Primary Dressing:       [] MediHoney Gel    [] MediHoney Alginate               [] Calcium Alginate      [] Calcium Alginate with Silver   [] Collagen                   [x] Collagen with Silver                [] Santyl with Moistened saline gauze              [] Hydrofera Blue (cut to size and moistened)  [] Hydrofera Blue Ready (Cut to size)   [] NS wet to dry    [] Betadine wet to dry              [] Hydrogel                [] Mepitel     [] Bactroban/Mupirocin               [] Other:     [x] Cover and Secure with:     [x] Gauze [x] Tiffany [] Kerlix   [] Foam [] Super Absorbant [x] ABD     [] ACE Wrap            [] Other:    Limit contact of tape with skin.     [x] Change dressing: [] Daily    [] Every Other Day [] Once per week   [] Twice per week   [x] Three times per week [] Do Not Change Dressing   [] Other:     Pressure Relief:  [] When sitting, shift position or do seat lifts every 15 minutes.  [] Wheelchair cushion [] Specialty Bed/Mattress  [x]  Avoid direct pressure on wound site. Edema Control:  Apply: [] Compression Stocking:   mmHg  []Right Leg []Left Leg   [x] Tubigrip [x]Right Leg Double Layer [x]Left Leg Double Layer      []Right Leg Single Layer []Left Leg Single Layer     [x] Elevate leg(s) above the level of the heart when sitting. [x] Avoid prolonged standing in one place. Compression:  Apply: [] Multilayer Compression Wrap: []RightLeg []Left Leg                                 []Coflex   []Coflex Lite             []Unna     [] Do not get leg(s) with wrap wet. If wraps become too tight call the center or completely remove the wrap. [] Elevate leg(s) above the level of the heart when sitting. [] Avoid prolonged standing in one place. Dietary:  [x] Diet as tolerated: [] Calorie Diabetic Diet: [x] No Added Salt:  [x] Increase Protein: [] Other:     Activity:  [x] Activity as tolerated:    [] Patient has no activity restrictions       [] Strict Bedrest:   [] Remain off Work:       [] May return to full duty work:                                     [] Return to work with restrictions:               [address] Information: Should you experience any significant changes in your wound(s) or have questions about your wound care, please contact Dane Hanks at [place] [address] 8:00 am - 4:30. If you need help with your wound outside of these hours and cannot wait until we are again available, contact your PCP or go to the hospital emergency room. PLEASE NOTE: IF YOU ARE UNABLE TO OBTAIN WOUND SUPPLIES, CONTINUE TO USE THE SUPPLIES YOU HAVE AVAILABLE UNTIL YOU ARE ABLE TO REACH US. IT IS MOST IMPORTANT TO KEEP THE WOUND COVERED AT ALL TIMES.     [x] Dr. Kalyani Mcgovern   [] Dr. Waylon Keita Laura Carlos  [] Dr. Patt Phoenix

## 2023-02-27 NOTE — DISCHARGE INSTRUCTIONS
Discharge Instructions for  [address] [place] [address]  Telephone: [phone]    NAME:  Patience Schirmer  YOB: 1955  MEDICAL RECORD NUMBER:  [id number]  DATE:  2/27/2023      Return Appointment:  [x] Dressing supply provider: Ketty   [] Home Healthcare:  [x] Return Appointment: 2 Week(s)  [] Nurse Visit:  Week(s)    [] Discharge from [facility]  [] Ordered tests:   [] Referrals:   [] Rx:   [x] Other: Use lymphedema pump twice a day for 1 hour each time     Wound Cleansing:   Do not scrub or use excessive force. Cleanse wound prior to applying a clean dressing with:  [] Normal Saline   [x] Mild Soap & Water/Baby Shampoo   [] Keep Wound Dry in Shower  [] Other:      Topical Treatments:  Do not apply lotions, creams, or ointments to wound bed unless directed. [x] Apply moisturizing lotion to skin surrounding the wound prior to dressing change.  [] Apply antifungal ointment to skin surrounding the wound prior to dressing change.  [] Apply thin film of moisture barrier/zinc ointment to skin immediately around wound. [] Other:       Dressings:           Wound Location R & L leg   [x] Apply Primary Dressing:       [] MediHoney Gel    [] MediHoney Alginate               [] Calcium Alginate      [] Calcium Alginate with Silver   [] Collagen                   [x] Collagen with Silver                [] Santyl with Moistened saline gauze              [] Hydrofera Blue (cut to size and moistened)  [] Hydrofera Blue Ready (Cut to size)   [] NS wet to dry    [] Betadine wet to dry              [] Hydrogel                [] Mepitel     [] Bactroban/Mupirocin               [] Other:     [x] Cover and Secure with:     [x] Gauze [x] Tiffany [] Kerlix   [] Foam [] Super Absorbant [x] ABD     [] ACE Wrap            [] Other:    Limit contact of tape with skin.     [x] Change dressing: [] Daily    [] Every Other Day [] Once per week   [] Twice per week   [x] Three times per week [] Do Not Change Dressing   [] Other:     Pressure Relief:  [] When sitting, shift position or do seat lifts every 15 minutes.  [] Wheelchair cushion [] Specialty Bed/Mattress  [x]  Avoid direct pressure on wound site. Edema Control:  Apply: [] Compression Stocking:   mmHg  []Right Leg []Left Leg   [x] Tubigrip [x]Right Leg Double Layer [x]Left Leg Double Layer      []Right Leg Single Layer []Left Leg Single Layer     [x] Elevate leg(s) above the level of the heart when sitting. [x] Avoid prolonged standing in one place. Compression:  Apply: [] Multilayer Compression Wrap: []RightLeg []Left Leg                                 []Coflex   []Coflex Lite             []Unna     [] Do not get leg(s) with wrap wet. If wraps become too tight call the center or completely remove the wrap. [] Elevate leg(s) above the level of the heart when sitting. [] Avoid prolonged standing in one place. Dietary:  [x] Diet as tolerated: [] Calorie Diabetic Diet: [x] No Added Salt:  [x] Increase Protein: [] Other:     Activity:  [x] Activity as tolerated:    [] Patient has no activity restrictions       [] Strict Bedrest:   [] Remain off Work:       [] May return to full duty work:                                     [] Return to work with restrictions:               [address] [facility] [address] Information: Should you experience any significant changes in your wound(s) or have questions about your wound care, please contact Dane Hanks at [place] [address] 8:00 am - 4:30. If you need help with your wound outside of these hours and cannot wait until we are again available, contact your PCP or go to the hospital emergency room. PLEASE NOTE: IF YOU ARE UNABLE TO OBTAIN WOUND SUPPLIES, CONTINUE TO USE THE SUPPLIES YOU HAVE AVAILABLE UNTIL YOU ARE ABLE TO REACH US. IT IS MOST IMPORTANT TO KEEP THE WOUND COVERED AT ALL TIMES.     [x] Dr. Chelsey Laureano   [] Dr. Adryan Arzate Nisha Charleston  [] Dr. Gisell Mcelroy

## 2023-02-27 NOTE — WOUND CARE
[address]   Progress Note and Procedure Note     [facility] RECORD NUMBER:  [id number]  AGE: 69 y.o. RACE WHITE/NON-  BLACK/  GENDER: male  : 1955  EPISODE DATE:  2023    Subjective:   Last visit   Saw Dr Tucker Madsen, underwent ablations  No paain, no drainage reported    Chief Complaint   Patient presents with    Wound Check     R and L leg          HISTORY of PRESENT ILLNESS KATIE Nava is a 69 y.o. male who presents today for wound/ulcer evaluation. History of Wound Context: BLE  Wound/Ulcer Pain Timing/Severity: mild  Quality of pain: aching  Severity:  1 / 10   Modifying Factors: None  Associated Signs/Symptoms: edema    Ulcer Identification:  Ulcer Type: venous    Contributing Factors: edema    Wound: BLE         PAST MEDICAL HISTORY    Past Medical History:   Diagnosis Date    Arthritis     Chronic obstructive pulmonary disease (HCC)     Diabetes ([facility] [address]     Hypertension     Sleep apnea         PAST SURGICAL HISTORY    Past Surgical History:   Procedure Laterality Date    HX GI      HX HERNIA REPAIR         FAMILY HISTORY    Family History   Problem Relation Age of Onset    Hypertension Mother     Hypertension Father     Hypertension Sister     Hypertension Sister     Hypertension Child        SOCIAL HISTORY    Social History     Tobacco Use    Smoking status: Never    Smokeless tobacco: Never   Vaping Use    Vaping Use: Never used   Substance Use Topics    Alcohol use: Never    Drug use: Never       ALLERGIES    No Known Allergies    MEDICATIONS    Current Outpatient Medications on File Prior to Encounter   Medication Sig Dispense Refill    nebivoloL (BYSTOLIC) 10 mg tablet Take 10 mg by mouth daily. multivitamin (ONE A DAY) tablet Take 1 Tablet by mouth daily. furosemide (LASIX) 40 mg tablet Take 1 Tablet by mouth daily. 30 Tablet 0    atorvastatin (LIPITOR) 20 mg tablet Take 20 mg by mouth nightly.        No current facility-administered medications on file prior to encounter. REVIEW OF SYSTEMS    Pertinent items are noted in HPI. Objective:     Visit Vitals  BP (!) 164/85 (BP 1 Location: Left lower arm, BP Patient Position: At rest;Sitting)   Pulse 72   Temp (!) 96.6 °F (35.9 °C)   Resp 20       Wt Readings from Last 3 Encounters:   12/12/22 (!) 170.1 kg (375 lb)   03/28/22 158.8 kg (350 lb)   10/12/21 (!) 181.4 kg (400 lb)       PHYSICAL EXAM  R calf wound is very small and superficial, no infection  Multiple L calf wounds are small, slough debrided, no infection    Pertinent items are noted in HPI.     Assessment:    Slowly improving  Problem List Items Addressed This Visit       Non-pressure chronic ulcer of other part of right lower leg with fat layer exposed ([address]    Relevant Medications    lidocaine (XYLOCAINE) 2 % viscous solution 15 mL (Completed) (Start on 2/27/2023  3:00 PM)    Other Relevant Orders    INITIATE OUTPATIENT WOUND CARE PROTOCOL    Non-pressure chronic ulcer of other part of left lower leg with fat layer exposed ([address] - Primary    Relevant Medications    lidocaine (XYLOCAINE) 2 % viscous solution 15 mL (Completed) (Start on 2/27/2023  3:00 PM)    Other Relevant Orders    INITIATE OUTPATIENT WOUND CARE PROTOCOL       Wound Leg Right;Posterior #1 12/12/22 (Active)   Wound Image   02/27/23 1408   Wound Etiology Venous 02/27/23 1408   Dressing Status Other (Comment) 02/27/23 1408   Cleansed Cleansed with saline 02/27/23 1408   Wound Length (cm) 1.8 cm 02/27/23 1408   Wound Width (cm) 0.8 cm 02/27/23 1408   Wound Depth (cm) 0.1 cm 02/27/23 1408   Wound Surface Area (cm^2) 1.44 cm^2 02/27/23 1408   Change in Wound Size % 98.79 02/27/23 1408   Wound Volume (cm^3) 0.144 cm^3 02/27/23 1408   Wound Healing % 99 02/27/23 1408   Post-Procedure Length (cm) 14 cm 12/12/22 1407   Post-Procedure Width (cm) 8.5 cm 12/12/22 1407   Post-Procedure Depth (cm) 0.2 cm 12/12/22 1407   Post-Procedure Surface Area (cm^2) 119 cm^2 12/12/22 1407   Post-Procedure Volume (cm^3) 23.8 cm^3 12/12/22 1407   Wound Assessment Granulation tissue;Slough 02/27/23 1408   Drainage Amount Moderate 02/27/23 1408   Drainage Description Serosanguinous 02/27/23 1408   Wound Odor None 02/27/23 1408   Betty-Wound/Incision Assessment Intact;Dry/flaky 02/27/23 1408   Edges Attached edges 02/27/23 1408   Wound Thickness Description Full thickness 02/27/23 1408   Number of days: 77       Wound Leg Left;Posterior #2 12/12/22 (Active)   Wound Image   02/27/23 1408   Wound Etiology Venous 02/27/23 1408   Dressing Status Other (Comment) 02/27/23 1408   Cleansed Cleansed with saline 02/27/23 1408   Wound Length (cm) 11.3 cm 02/27/23 1408   Wound Width (cm) 7.9 cm 02/27/23 1408   Wound Depth (cm) 0.1 cm 02/27/23 1408   Wound Surface Area (cm^2) 89.27 cm^2 02/27/23 1408   Change in Wound Size % 35.08 02/27/23 1408   Wound Volume (cm^3) 8.927 cm^3 02/27/23 1408   Wound Healing % 68 02/27/23 1408   Post-Procedure Length (cm) 11.3 cm 02/27/23 1423   Post-Procedure Width (cm) 7.9 cm 02/27/23 1423   Post-Procedure Depth (cm) 0.1 cm 02/27/23 1423   Post-Procedure Surface Area (cm^2) 89.27 cm^2 02/27/23 1423   Post-Procedure Volume (cm^3) 8.927 cm^3 02/27/23 1423   Wound Assessment Granulation tissue;Slough 02/27/23 1408   Drainage Amount Moderate 02/27/23 1408   Drainage Description Serosanguinous 02/27/23 1408   Wound Odor None 02/27/23 1408   Betty-Wound/Incision Assessment Intact; Hyperkeratosis (Callous) 02/27/23 1408   Edges Attached edges 02/27/23 1408   Wound Thickness Description Full thickness 02/27/23 1408   Number of days: 77       POP IN PROCEDURE TYPE (DEBRIDEMENT, BIOPSY, I&D, SKIN SUB, CHEMICAL CAUERTY)     Plan:   Continue qiana + double tubi     Treatment Note please see attached Discharge Instructions    Written patient dismissal instructions given to patient or POA.          Electronically signed by Lul Isaacs MD on 2/27/2023 at 2:34 PM               Debridement Wound Care        Problem List Items Addressed This Visit       Non-pressure chronic ulcer of other part of right lower leg with fat layer exposed ([address]    Relevant Medications    lidocaine (XYLOCAINE) 2 % viscous solution 15 mL (Completed) (Start on 2/27/2023  3:00 PM)    Other Relevant Orders    INITIATE OUTPATIENT WOUND CARE PROTOCOL    Non-pressure chronic ulcer of other part of left lower leg with fat layer exposed ([address] - Primary    Relevant Medications    lidocaine (XYLOCAINE) 2 % viscous solution 15 mL (Completed) (Start on 2/27/2023  3:00 PM)    Other Relevant Orders    INITIATE OUTPATIENT WOUND CARE PROTOCOL       Procedure Note  Indications:  Based on my examination of this patient's wound(s)/ulcer(s) today, debridement is required to promote healing and evaluate the wound base.     Performed by: Nichole Sofia MD    Consent obtained: Yes    Time out taken: Yes    Debridement: Excisional    Using curette the wound(s)/ulcer(s) was/were sharply debrided down through and including the removal of    subcutaneous tissue    Devitalized Tissue Debrided: slough    Pre Debridement Measurements:  Are located in the [facility]  Documentation Flow Sheet    Non-Pressure ulcer, fat layer exposed    Wound/Ulcer #: 2    Post Debridement Measurements:  Wound/Ulcer Descriptions are Pre Debridement except measurements:    Wound Leg Right;Posterior #1 12/12/22 (Active)   Wound Image   02/27/23 1408   Wound Etiology Venous 02/27/23 1408   Dressing Status Other (Comment) 02/27/23 1408   Cleansed Cleansed with saline 02/27/23 1408   Wound Length (cm) 1.8 cm 02/27/23 1408   Wound Width (cm) 0.8 cm 02/27/23 1408   Wound Depth (cm) 0.1 cm 02/27/23 1408   Wound Surface Area (cm^2) 1.44 cm^2 02/27/23 1408   Change in Wound Size % 98.79 02/27/23 1408   Wound Volume (cm^3) 0.144 cm^3 02/27/23 1408   Wound Healing % 99 02/27/23 1408   Post-Procedure Length (cm) 14 cm 12/12/22 1407   Post-Procedure Width (cm) 8.5 cm 12/12/22 1407   Post-Procedure Depth (cm) 0.2 cm 12/12/22 1407   Post-Procedure Surface Area (cm^2) 119 cm^2 12/12/22 1407   Post-Procedure Volume (cm^3) 23.8 cm^3 12/12/22 1407   Wound Assessment Granulation tissue;Slough 02/27/23 1408   Drainage Amount Moderate 02/27/23 1408   Drainage Description Serosanguinous 02/27/23 1408   Wound Odor None 02/27/23 1408   Betty-Wound/Incision Assessment Intact;Dry/flaky 02/27/23 1408   Edges Attached edges 02/27/23 1408   Wound Thickness Description Full thickness 02/27/23 1408   Number of days: 77       Wound Leg Left;Posterior #2 12/12/22 (Active)   Wound Image   02/27/23 1408   Wound Etiology Venous 02/27/23 1408   Dressing Status Other (Comment) 02/27/23 1408   Cleansed Cleansed with saline 02/27/23 1408   Wound Length (cm) 11.3 cm 02/27/23 1408   Wound Width (cm) 7.9 cm 02/27/23 1408   Wound Depth (cm) 0.1 cm 02/27/23 1408   Wound Surface Area (cm^2) 89.27 cm^2 02/27/23 1408   Change in Wound Size % 35.08 02/27/23 1408   Wound Volume (cm^3) 8.927 cm^3 02/27/23 1408   Wound Healing % 68 02/27/23 1408   Post-Procedure Length (cm) 11.3 cm 02/27/23 1423   Post-Procedure Width (cm) 7.9 cm 02/27/23 1423   Post-Procedure Depth (cm) 0.1 cm 02/27/23 1423   Post-Procedure Surface Area (cm^2) 89.27 cm^2 02/27/23 1423   Post-Procedure Volume (cm^3) 8.927 cm^3 02/27/23 1423   Wound Assessment Granulation tissue;Slough 02/27/23 1408   Drainage Amount Moderate 02/27/23 1408   Drainage Description Serosanguinous 02/27/23 1408   Wound Odor None 02/27/23 1408   Betty-Wound/Incision Assessment Intact; Hyperkeratosis (Callous) 02/27/23 1408   Edges Attached edges 02/27/23 1408   Wound Thickness Description Full thickness 02/27/23 1408   Number of days: 77        Total Surface Area Debrided:  25 sq cm     Estimated Blood Loss:  Minimal     Hemostasis Achieved: Pressure    Procedural Pain: 2 / 10     Post Procedural Pain: 1 / 10     Response to treatment: Well tolerated by patient

## 2023-03-20 ENCOUNTER — HOSPITAL ENCOUNTER (OUTPATIENT)
Dept: WOUND CARE | Age: 68
Discharge: HOME | End: 2023-03-20
Payer: MEDICARE

## 2023-03-20 VITALS
SYSTOLIC BLOOD PRESSURE: 176 MMHG | TEMPERATURE: 95.8 F | HEART RATE: 68 BPM | DIASTOLIC BLOOD PRESSURE: 89 MMHG | RESPIRATION RATE: 20 BRPM

## 2023-03-20 DIAGNOSIS — L97.812 NON-PRESSURE CHRONIC ULCER OF OTHER PART OF RIGHT LOWER LEG WITH FAT LAYER EXPOSED (HCC): ICD-10-CM

## 2023-03-20 DIAGNOSIS — L97.822 NON-PRESSURE CHRONIC ULCER OF OTHER PART OF LEFT LOWER LEG WITH FAT LAYER EXPOSED (HCC): Primary | ICD-10-CM

## 2023-03-20 PROCEDURE — 11045 DBRDMT SUBQ TISS EACH ADDL: CPT

## 2023-03-20 PROCEDURE — 11042 DBRDMT SUBQ TIS 1ST 20SQCM/<: CPT

## 2023-03-20 PROCEDURE — 74011000250 HC RX REV CODE- 250: Performed by: SPECIALIST

## 2023-03-20 RX ORDER — TRIAMCINOLONE ACETONIDE 1 MG/G
OINTMENT TOPICAL ONCE
OUTPATIENT
Start: 2023-03-20 | End: 2023-03-20

## 2023-03-20 RX ORDER — SILVER SULFADIAZINE 10 G/1000G
CREAM TOPICAL ONCE
OUTPATIENT
Start: 2023-03-20 | End: 2023-03-20

## 2023-03-20 RX ORDER — MUPIROCIN 20 MG/G
OINTMENT TOPICAL ONCE
OUTPATIENT
Start: 2023-03-20 | End: 2023-03-20

## 2023-03-20 RX ORDER — ISOSORBIDE DINITRATE 30 MG/1
30 TABLET ORAL 4 TIMES DAILY
COMMUNITY

## 2023-03-20 RX ORDER — LIDOCAINE HYDROCHLORIDE 20 MG/ML
15 SOLUTION OROPHARYNGEAL ONCE
Status: COMPLETED | OUTPATIENT
Start: 2023-03-20 | End: 2023-03-20

## 2023-03-20 RX ORDER — LIDOCAINE HYDROCHLORIDE 20 MG/ML
15 SOLUTION OROPHARYNGEAL ONCE
OUTPATIENT
Start: 2023-03-20 | End: 2023-03-20

## 2023-03-20 RX ADMIN — LIDOCAINE HYDROCHLORIDE 15 ML: 20 SOLUTION ORAL; TOPICAL at 14:01

## 2023-03-20 NOTE — DISCHARGE INSTRUCTIONS
Discharge Instructions for  [address] [place] [address], [address] [place] [address]  Telephone: [phone]    NAME:  Shannon Matamoros  YOB: 1955  MEDICAL RECORD NUMBER:  [id number]  DATE:  3/20/2023      Return Appointment:  [x] Dressing supply provider: Ketty   [] Home Healthcare:  [x] Return Appointment: 2 Week(s)  [] Nurse Visit:  Week(s)    [] Discharge from [facility]  [] Ordered tests:   [] Referrals:   [] Rx:   [x] Other: Use lymphedema pump twice a day for 1 hour each time     Wound Cleansing:   Do not scrub or use excessive force. Cleanse wound prior to applying a clean dressing with:  [] Normal Saline   [x] Mild Soap & Water/Baby Shampoo   [] Keep Wound Dry in Shower  [] Other:      Topical Treatments:  Do not apply lotions, creams, or ointments to wound bed unless directed. [x] Apply moisturizing lotion to skin surrounding the wound prior to dressing change.  [] Apply antifungal ointment to skin surrounding the wound prior to dressing change.  [] Apply thin film of moisture barrier/zinc ointment to skin immediately around wound. [] Other:       Dressings:           Wound Location R & L leg   [x] Apply Primary Dressing:       [] MediHoney Gel    [] MediHoney Alginate               [] Calcium Alginate      [x] Calcium Alginate with Silver   [] Collagen                   [] Collagen with Silver                [] Santyl with Moistened saline gauze              [] Hydrofera Blue (cut to size and moistened)  [] Hydrofera Blue Ready (Cut to size)   [] NS wet to dry    [] Betadine wet to dry              [] Hydrogel                [] Mepitel     [] Bactroban/Mupirocin               [] Other:     [x] Cover and Secure with:     [x] Gauze [x] Tiffany [] Kerlix   [] Foam [] Super Absorbant [] ABD     [] ACE Wrap            [] Other:    Limit contact of tape with skin.     [x] Change dressing: [] Daily    [] Every Other Day [] Once per week   [] Twice per week   [x] Three times per week [] Do Not Change Dressing   [] Other:     Pressure Relief:  [] When sitting, shift position or do seat lifts every 15 minutes.  [] Wheelchair cushion [] Specialty Bed/Mattress  [x]  Avoid direct pressure on wound site. Edema Control:  Apply: [] Compression Stocking:   mmHg  []Right Leg []Left Leg   [x] Tubigrip [x]Right Leg Double Layer [x]Left Leg Double Layer      []Right Leg Single Layer []Left Leg Single Layer     [x] Elevate leg(s) above the level of the heart when sitting. [x] Avoid prolonged standing in one place. Compression:  Apply: [] Multilayer Compression Wrap: []RightLeg []Left Leg                                 []Coflex   []Coflex Lite             []Unna     [] Do not get leg(s) with wrap wet. If wraps become too tight call the center or completely remove the wrap. [] Elevate leg(s) above the level of the heart when sitting. [] Avoid prolonged standing in one place. Dietary:  [x] Diet as tolerated: [] Calorie Diabetic Diet: [x] No Added Salt:  [x] Increase Protein: [] Other:     Activity:  [x] Activity as tolerated:    [] Patient has no activity restrictions       [] Strict Bedrest:   [] Remain off Work:       [] May return to full duty work:                                     [] Return to work with restrictions:               [address] [facility] [address] Information: Should you experience any significant changes in your wound(s) or have questions about your wound care, please contact Dane Hanks at [place] [address] 8:00 am - 4:30. If you need help with your wound outside of these hours and cannot wait until we are again available, contact your PCP or go to the hospital emergency room. PLEASE NOTE: IF YOU ARE UNABLE TO OBTAIN WOUND SUPPLIES, CONTINUE TO USE THE SUPPLIES YOU HAVE AVAILABLE UNTIL YOU ARE ABLE TO REACH US. IT IS MOST IMPORTANT TO KEEP THE WOUND COVERED AT ALL TIMES.     [x] Dr. Mela Coughlin   [] Dr. Parminder Walters David Crenshaw  [] Dr. [place] Galvez

## 2023-03-20 NOTE — WOUND CARE
[address]   Progress Note and Procedure Note     [facility] RECORD NUMBER:  [id number]  AGE: 69 y.o. RACE WHITE/NON-  BLACK/  GENDER: male  : 1955  EPISODE DATE:  3/20/2023    Subjective:   No new problems reported  Chief Complaint   Patient presents with    Wound Check     Left and Right leg          HISTORY of PRESENT ILLNESS HPI    Chung Guo is a 69 y.o. male who presents today for wound/ulcer evaluation. History of Wound Context: BLE  Wound/Ulcer Pain Timing/Severity: mild  Quality of pain: aching  Severity:  1 / 10   Modifying Factors: None  Associated Signs/Symptoms: edema    Ulcer Identification:  Ulcer Type: venous    Contributing Factors: edema    Wound: BLE         PAST MEDICAL HISTORY    Past Medical History:   Diagnosis Date    Arthritis     Chronic obstructive pulmonary disease (HCC)     Diabetes ([facility] [address]     Hypertension     Sleep apnea         PAST SURGICAL HISTORY    Past Surgical History:   Procedure Laterality Date    HX GI      HX HERNIA REPAIR         FAMILY HISTORY    Family History   Problem Relation Age of Onset    Hypertension Mother     Hypertension Father     Hypertension Sister     Hypertension Sister     Hypertension Child        SOCIAL HISTORY    Social History     Tobacco Use    Smoking status: Never    Smokeless tobacco: Never   Vaping Use    Vaping Use: Never used   Substance Use Topics    Alcohol use: Never    Drug use: Never       ALLERGIES    No Known Allergies    MEDICATIONS    Current Outpatient Medications on File Prior to Encounter   Medication Sig Dispense Refill    isosorbide dinitrate (ISORDIL) 30 mg tablet Take 30 mg by mouth four (4) times daily. nebivoloL (BYSTOLIC) 10 mg tablet Take 10 mg by mouth daily. multivitamin (ONE A DAY) tablet Take 1 Tablet by mouth daily. furosemide (LASIX) 40 mg tablet Take 1 Tablet by mouth daily.  30 Tablet 0    atorvastatin (LIPITOR) 20 mg tablet Take 20 mg by mouth nightly. No current facility-administered medications on file prior to encounter. REVIEW OF SYSTEMS    Pertinent items are noted in HPI. Objective:     Visit Vitals  BP (!) 176/89 (BP 1 Location: Right arm, BP Patient Position: At rest;Sitting)   Pulse 68   Temp (!) 95.8 °F (35.4 °C)   Resp 20       Wt Readings from Last 3 Encounters:   12/12/22 (!) 170.1 kg (375 lb)   03/28/22 158.8 kg (350 lb)   10/12/21 (!) 181.4 kg (400 lb)       PHYSICAL EXAM  R calf wound very small, no infection  Multiple L calf wounds slightly smaller, no infection, slough debrided    Pertinent items are noted in HPI.     Assessment:   R almost healed, L mildly improved     Problem List Items Addressed This Visit       Non-pressure chronic ulcer of other part of right lower leg with fat layer exposed ([address]    Relevant Medications    isosorbide dinitrate (ISORDIL) 30 mg tablet    lidocaine (XYLOCAINE) 2 % viscous solution 15 mL (Completed) (Start on 3/20/2023  3:00 PM)    Other Relevant Orders    INITIATE OUTPATIENT WOUND CARE PROTOCOL    Non-pressure chronic ulcer of other part of left lower leg with fat layer exposed ([address] - Primary    Relevant Medications    isosorbide dinitrate (ISORDIL) 30 mg tablet    lidocaine (XYLOCAINE) 2 % viscous solution 15 mL (Completed) (Start on 3/20/2023  3:00 PM)    Other Relevant Orders    INITIATE OUTPATIENT WOUND CARE PROTOCOL       Wound Leg Right;Posterior #1 12/12/22 (Active)   Wound Image   03/20/23 1355   Wound Etiology Venous 03/20/23 1355   Dressing Status Other (Comment) 03/20/23 1355   Cleansed Cleansed with saline 03/20/23 1355   Wound Length (cm) 0.6 cm 03/20/23 1355   Wound Width (cm) 0.6 cm 03/20/23 1355   Wound Depth (cm) 0.1 cm 03/20/23 1355   Wound Surface Area (cm^2) 0.36 cm^2 03/20/23 1355   Change in Wound Size % 99.7 03/20/23 1355   Wound Volume (cm^3) 0.036 cm^3 03/20/23 1355   Wound Healing % 100 03/20/23 1355   Post-Procedure Length (cm) 0.6 cm 03/20/23 1410   Post-Procedure Width (cm) 0.6 cm 03/20/23 1410   Post-Procedure Depth (cm) 0.1 cm 03/20/23 1410   Post-Procedure Surface Area (cm^2) 0.36 cm^2 03/20/23 1410   Post-Procedure Volume (cm^3) 0.036 cm^3 03/20/23 1410   Wound Assessment Granulation tissue;Slough 03/20/23 1355   Drainage Amount Moderate 03/20/23 1355   Drainage Description Serosanguinous 03/20/23 1355   Wound Odor None 03/20/23 1355   Betty-Wound/Incision Assessment Intact;Dry/flaky 03/20/23 1355   Edges Attached edges 03/20/23 1355   Wound Thickness Description Full thickness 03/20/23 1355   Number of days: 98       Wound Leg Left;Posterior #2 12/12/22 (Active)   Wound Image   03/20/23 1355   Wound Etiology Venous 03/20/23 1355   Dressing Status Other (Comment) 03/20/23 1355   Cleansed Cleansed with saline 03/20/23 1355   Wound Length (cm) 11.5 cm 03/20/23 1355   Wound Width (cm) 9 cm 03/20/23 1355   Wound Depth (cm) 0.1 cm 03/20/23 1355   Wound Surface Area (cm^2) 103.5 cm^2 03/20/23 1355   Change in Wound Size % 24.73 03/20/23 1355   Wound Volume (cm^3) 10.35 cm^3 03/20/23 1355   Wound Healing % 62 03/20/23 1355   Post-Procedure Length (cm) 11.5 cm 03/20/23 1410   Post-Procedure Width (cm) 9 cm 03/20/23 1410   Post-Procedure Depth (cm) 0.1 cm 03/20/23 1410   Post-Procedure Surface Area (cm^2) 103.5 cm^2 03/20/23 1410   Post-Procedure Volume (cm^3) 10.35 cm^3 03/20/23 1410   Wound Assessment Granulation tissue;Slough 03/20/23 1355   Drainage Amount Moderate 03/20/23 1355   Drainage Description Serosanguinous 03/20/23 1355   Wound Odor None 03/20/23 1355   Betty-Wound/Incision Assessment Intact;Dry/flaky; Hyperkeratosis (Callous) 03/20/23 1355   Edges Attached edges 03/20/23 1355   Wound Thickness Description Full thickness 03/20/23 1355   Number of days: 98       POP IN PROCEDURE TYPE (DEBRIDEMENT, BIOPSY, I&D, SKIN SUB, CHEMICAL CAUERTY)     Plan:   Switch to Aquacel  Double tubi  compression  Follow up with Dr Christiano Pastrana tomorrow    Treatment Note please see attached Discharge Instructions    Written patient dismissal instructions given to patient or POA. Electronically signed by Shayy Holbrook MD on 3/20/2023 at 2:18 PM               Debridement Wound Care        Problem List Items Addressed This Visit       Non-pressure chronic ulcer of other part of right lower leg with fat layer exposed ([address]    Relevant Medications    isosorbide dinitrate (ISORDIL) 30 mg tablet    lidocaine (XYLOCAINE) 2 % viscous solution 15 mL (Completed) (Start on 3/20/2023  3:00 PM)    Other Relevant Orders    INITIATE OUTPATIENT WOUND CARE PROTOCOL    Non-pressure chronic ulcer of other part of left lower leg with fat layer exposed ([address] - Primary    Relevant Medications    isosorbide dinitrate (ISORDIL) 30 mg tablet    lidocaine (XYLOCAINE) 2 % viscous solution 15 mL (Completed) (Start on 3/20/2023  3:00 PM)    Other Relevant Orders    INITIATE OUTPATIENT WOUND CARE PROTOCOL       Procedure Note  Indications:  Based on my examination of this patient's wound(s)/ulcer(s) today, debridement is required to promote healing and evaluate the wound base.     Performed by: Shayy Holbrook MD    Consent obtained: Yes    Time out taken: Yes    Debridement: Excisional    Using curette the wound(s)/ulcer(s) was/were sharply debrided down through and including the removal of    subcutaneous tissue    Devitalized Tissue Debrided: slough    Pre Debridement Measurements:  Are located in the [facility]  Documentation Flow Sheet    Non-Pressure ulcer, fat layer exposed    Wound/Ulcer #: 1 and 2    Post Debridement Measurements:  Wound/Ulcer Descriptions are Pre Debridement except measurements:    Wound Leg Right;Posterior #1 12/12/22 (Active)   Wound Image   03/20/23 1355   Wound Etiology Venous 03/20/23 1355   Dressing Status Other (Comment) 03/20/23 1355   Cleansed Cleansed with saline 03/20/23 1355   Wound Length (cm) 0.6 cm 03/20/23 1355   Wound Width (cm) 0.6 cm 03/20/23 1355 Wound Depth (cm) 0.1 cm 03/20/23 1355   Wound Surface Area (cm^2) 0.36 cm^2 03/20/23 1355   Change in Wound Size % 99.7 03/20/23 1355   Wound Volume (cm^3) 0.036 cm^3 03/20/23 1355   Wound Healing % 100 03/20/23 1355   Post-Procedure Length (cm) 0.6 cm 03/20/23 1410   Post-Procedure Width (cm) 0.6 cm 03/20/23 1410   Post-Procedure Depth (cm) 0.1 cm 03/20/23 1410   Post-Procedure Surface Area (cm^2) 0.36 cm^2 03/20/23 1410   Post-Procedure Volume (cm^3) 0.036 cm^3 03/20/23 1410   Wound Assessment Granulation tissue;Slough 03/20/23 1355   Drainage Amount Moderate 03/20/23 1355   Drainage Description Serosanguinous 03/20/23 1355   Wound Odor None 03/20/23 1355   Betty-Wound/Incision Assessment Intact;Dry/flaky 03/20/23 1355   Edges Attached edges 03/20/23 1355   Wound Thickness Description Full thickness 03/20/23 1355   Number of days: 98       Wound Leg Left;Posterior #2 12/12/22 (Active)   Wound Image   03/20/23 1355   Wound Etiology Venous 03/20/23 1355   Dressing Status Other (Comment) 03/20/23 1355   Cleansed Cleansed with saline 03/20/23 1355   Wound Length (cm) 11.5 cm 03/20/23 1355   Wound Width (cm) 9 cm 03/20/23 1355   Wound Depth (cm) 0.1 cm 03/20/23 1355   Wound Surface Area (cm^2) 103.5 cm^2 03/20/23 1355   Change in Wound Size % 24.73 03/20/23 1355   Wound Volume (cm^3) 10.35 cm^3 03/20/23 1355   Wound Healing % 62 03/20/23 1355   Post-Procedure Length (cm) 11.5 cm 03/20/23 1410   Post-Procedure Width (cm) 9 cm 03/20/23 1410   Post-Procedure Depth (cm) 0.1 cm 03/20/23 1410   Post-Procedure Surface Area (cm^2) 103.5 cm^2 03/20/23 1410   Post-Procedure Volume (cm^3) 10.35 cm^3 03/20/23 1410   Wound Assessment Granulation tissue;Slough 03/20/23 1355   Drainage Amount Moderate 03/20/23 1355   Drainage Description Serosanguinous 03/20/23 1355   Wound Odor None 03/20/23 1355   Betty-Wound/Incision Assessment Intact;Dry/flaky; Hyperkeratosis (Callous) 03/20/23 1355   Edges Attached edges 03/20/23 1355   Wound Thickness Description Full thickness 03/20/23 1355   Number of days: 98        Total Surface Area Debrided:  103 sq cm     Estimated Blood Loss:  Minimal     Hemostasis Achieved: Pressure    Procedural Pain: 1 / 10     Post Procedural Pain: 0 / 10     Response to treatment: Well tolerated by patient

## 2023-03-20 NOTE — WOUND CARE
Discharge Instructions for  [address] [place] [address]  Telephone: [phone]    NAME:  Elder Holbrook  YOB: 1955  MEDICAL RECORD NUMBER:  [id number]  DATE:  3/20/2023      Return Appointment:  [x] Dressing supply provider: Ketty   [] Home Healthcare:  [x] Return Appointment: 2 Week(s)  [] Nurse Visit:  Week(s)    [] Discharge from [facility]  [] Ordered tests:   [] Referrals:   [] Rx:   [x] Other: Use lymphedema pump twice a day for 1 hour each time     Wound Cleansing:   Do not scrub or use excessive force. Cleanse wound prior to applying a clean dressing with:  [] Normal Saline   [x] Mild Soap & Water/Baby Shampoo   [] Keep Wound Dry in Shower  [] Other:      Topical Treatments:  Do not apply lotions, creams, or ointments to wound bed unless directed. [x] Apply moisturizing lotion to skin surrounding the wound prior to dressing change.  [] Apply antifungal ointment to skin surrounding the wound prior to dressing change.  [] Apply thin film of moisture barrier/zinc ointment to skin immediately around wound. [] Other:       Dressings:           Wound Location R & L leg   [x] Apply Primary Dressing:       [] MediHoney Gel    [] MediHoney Alginate               [] Calcium Alginate      [x] Calcium Alginate with Silver   [] Collagen                   [] Collagen with Silver                [] Santyl with Moistened saline gauze              [] Hydrofera Blue (cut to size and moistened)  [] Hydrofera Blue Ready (Cut to size)   [] NS wet to dry    [] Betadine wet to dry              [] Hydrogel                [] Mepitel     [] Bactroban/Mupirocin               [] Other:     [x] Cover and Secure with:     [x] Gauze [x] Tiffany [] Kerlix   [] Foam [] Super Absorbant [] ABD     [] ACE Wrap            [] Other:    Limit contact of tape with skin.     [x] Change dressing: [] Daily    [] Every Other Day [] Once per week   [] Twice per week   [x] Three times per week [] Do Not Change Dressing   [] Other:     Pressure Relief:  [] When sitting, shift position or do seat lifts every 15 minutes.  [] Wheelchair cushion [] Specialty Bed/Mattress  [x]  Avoid direct pressure on wound site. Edema Control:  Apply: [] Compression Stocking:   mmHg  []Right Leg []Left Leg   [x] Tubigrip [x]Right Leg Double Layer [x]Left Leg Double Layer      []Right Leg Single Layer []Left Leg Single Layer     [x] Elevate leg(s) above the level of the heart when sitting. [x] Avoid prolonged standing in one place. Compression:  Apply: [] Multilayer Compression Wrap: []RightLeg []Left Leg                                 []Coflex   []Coflex Lite             []Unna     [] Do not get leg(s) with wrap wet. If wraps become too tight call the center or completely remove the wrap. [] Elevate leg(s) above the level of the heart when sitting. [] Avoid prolonged standing in one place. Dietary:  [x] Diet as tolerated: [] Calorie Diabetic Diet: [x] No Added Salt:  [x] Increase Protein: [] Other:     Activity:  [x] Activity as tolerated:    [] Patient has no activity restrictions       [] Strict Bedrest:   [] Remain off Work:       [] May return to full duty work:                                     [] Return to work with restrictions:               [address] [facility] [address] Information: Should you experience any significant changes in your wound(s) or have questions about your wound care, please contact Dane Hanks at [place] [address] 8:00 am - 4:30. If you need help with your wound outside of these hours and cannot wait until we are again available, contact your PCP or go to the hospital emergency room. PLEASE NOTE: IF YOU ARE UNABLE TO OBTAIN WOUND SUPPLIES, CONTINUE TO USE THE SUPPLIES YOU HAVE AVAILABLE UNTIL YOU ARE ABLE TO REACH US. IT IS MOST IMPORTANT TO KEEP THE WOUND COVERED AT ALL TIMES.     [x] Dr. Vladislav Dejesus   [] Dr. Avani Liu David Crenshaw  [] Dr. Hernandez Galvez

## 2023-03-20 NOTE — WOUND CARE
[address] [facility]:     [address] f: [phone] f: [phone] p: [phone] [email or web address]     Ordering Center:     [facility]  [address] [facility] [address] [id number] [phone]  WOUND CARE [unfilled]   FAX NUMBER [unfilled]    Patient Information:      Denisse Pinehurst  [phone]   [unfilled]   : 1955  AGE: 69 y.o. GENDER: male   TODAYS DATE:  3/20/2023    Insurance:      PRIMARY INSURANCE:  [id number] - (Medicare)    Payer/Plan Subscr  Sex Relation Sub. Ins. ID Effective Group Num   1. [place] MED[place] -* [employer] 1955 Male Self [id number]                                    [address]   2.  [address] 1955 Male Self [id number]                                   [address] [address]       Patient Wound Information:      Problem List Items Addressed This Visit          Other    Non-pressure chronic ulcer of other part of right lower leg with fat layer exposed ([facility] [address]    Relevant Medications    isosorbide dinitrate (ISORDIL) 30 mg tablet    lidocaine (XYLOCAINE) 2 % viscous solution 15 mL (Completed) (Start on 3/20/2023  3:00 PM)    Other Relevant Orders    INITIATE OUTPATIENT WOUND CARE PROTOCOL    Non-pressure chronic ulcer of other part of left lower leg with fat layer exposed ([facility] [address] - Primary    Relevant Medications    isosorbide dinitrate (ISORDIL) 30 mg tablet    lidocaine (XYLOCAINE) 2 % viscous solution 15 mL (Completed) (Start on 3/20/2023  3:00 PM)    Other Relevant Orders    INITIATE OUTPATIENT WOUND CARE PROTOCOL       WOUNDS REQUIRING DRESSING SUPPLIES:     Wound Leg Right;Posterior #1 22 (Active)   Wound Image   23 1355   Wound Etiology Venous 23 1355   Dressing Status Other (Comment) 23 1355   Cleansed Cleansed with saline 23 1355   Wound Length (cm) 0.6 cm 23 3416 Wound Width (cm) 0.6 cm 03/20/23 1355   Wound Depth (cm) 0.1 cm 03/20/23 1355   Wound Surface Area (cm^2) 0.36 cm^2 03/20/23 1355   Change in Wound Size % 99.7 03/20/23 1355   Wound Volume (cm^3) 0.036 cm^3 03/20/23 1355   Wound Healing % 100 03/20/23 1355   Post-Procedure Length (cm) 0.6 cm 03/20/23 1410   Post-Procedure Width (cm) 0.6 cm 03/20/23 1410   Post-Procedure Depth (cm) 0.1 cm 03/20/23 1410   Post-Procedure Surface Area (cm^2) 0.36 cm^2 03/20/23 1410   Post-Procedure Volume (cm^3) 0.036 cm^3 03/20/23 1410   Wound Assessment Granulation tissue;Slough 03/20/23 1355   Drainage Amount Moderate 03/20/23 1355   Drainage Description Serosanguinous 03/20/23 1355   Wound Odor None 03/20/23 1355   Betty-Wound/Incision Assessment Intact;Dry/flaky 03/20/23 1355   Edges Attached edges 03/20/23 1355   Wound Thickness Description Full thickness 03/20/23 1355   Number of days: 98       Wound Leg Left;Posterior #2 12/12/22 (Active)   Wound Image   03/20/23 1355   Wound Etiology Venous 03/20/23 1355   Dressing Status Other (Comment) 03/20/23 1355   Cleansed Cleansed with saline 03/20/23 1355   Wound Length (cm) 11.5 cm 03/20/23 1355   Wound Width (cm) 9 cm 03/20/23 1355   Wound Depth (cm) 0.1 cm 03/20/23 1355   Wound Surface Area (cm^2) 103.5 cm^2 03/20/23 1355   Change in Wound Size % 24.73 03/20/23 1355   Wound Volume (cm^3) 10.35 cm^3 03/20/23 1355   Wound Healing % 62 03/20/23 1355   Post-Procedure Length (cm) 11.5 cm 03/20/23 1410   Post-Procedure Width (cm) 9 cm 03/20/23 1410   Post-Procedure Depth (cm) 0.1 cm 03/20/23 1410   Post-Procedure Surface Area (cm^2) 103.5 cm^2 03/20/23 1410   Post-Procedure Volume (cm^3) 10.35 cm^3 03/20/23 1410   Wound Assessment Granulation tissue;Slough 03/20/23 1355   Drainage Amount Moderate 03/20/23 1355   Drainage Description Serosanguinous 03/20/23 1355   Wound Odor None 03/20/23 1355   Betty-Wound/Incision Assessment Intact;Dry/flaky; Hyperkeratosis (Callous) 03/20/23 1355   Edges Attached edges 03/20/23 1355   Wound Thickness Description Full thickness 03/20/23 1355   Number of days: 98        Supplies Requested :      WOUND #:1   PRIMARY DRESSING:  Alginate with silver pad   4X4 gauze pad, Bulky roll gauze, and Other tubi size G     FREQUENCY OF DRESSING CHANGES:  Every other day       WOUND #: 2   PRIMARY DRESSING:  Alginate with silver pad   4X4 gauze pad, Bulky roll gauze, and Other tubi size G     FREQUENCY OF DRESSING CHANGES:  Every other day         ADDITIONAL ITEMS:  [] Gloves Small  [] Gloves Medium [] Gloves Large [x] Gloves XLarge  [] Tape 1\" [x] Tape 4\" [] Tape 3\"  [] Medipore Tape  [x] Saline  [] Skin Prep   [] Adhesive Remover   [] Cotton Tip Applicators   [] Other:    Patient Wound(s) Debrided: [x] Yes if yes please add date 03/20/2023   [] No    Debribement Type: Excisional/Sharp    Patient currently being seen by Home Health: [] Yes   [x] No    Duration for needed supplies:  []15  [x]30  []60  []90 Days    Electronically signed by Jimena Jarvis LPN on 1/31/7792 at 6:05 PM    Provider Information:      PROVIDER'S NAME: Benson Cantrell MD

## 2023-04-24 ENCOUNTER — HOSPITAL ENCOUNTER (OUTPATIENT)
Dept: WOUND CARE | Age: 68
Discharge: HOME | End: 2023-04-24
Payer: MEDICARE

## 2023-04-24 VITALS
TEMPERATURE: 97.7 F | RESPIRATION RATE: 20 BRPM | SYSTOLIC BLOOD PRESSURE: 162 MMHG | HEART RATE: 69 BPM | DIASTOLIC BLOOD PRESSURE: 80 MMHG

## 2023-04-24 DIAGNOSIS — L97.812 NON-PRESSURE CHRONIC ULCER OF OTHER PART OF RIGHT LOWER LEG WITH FAT LAYER EXPOSED (HCC): ICD-10-CM

## 2023-04-24 DIAGNOSIS — L97.822 NON-PRESSURE CHRONIC ULCER OF OTHER PART OF LEFT LOWER LEG WITH FAT LAYER EXPOSED (HCC): Primary | ICD-10-CM

## 2023-04-24 PROCEDURE — 74011000250 HC RX REV CODE- 250: Performed by: SPECIALIST

## 2023-04-24 PROCEDURE — 11042 DBRDMT SUBQ TIS 1ST 20SQCM/<: CPT

## 2023-04-24 RX ORDER — MUPIROCIN 20 MG/G
OINTMENT TOPICAL ONCE
OUTPATIENT
Start: 2023-04-24 | End: 2023-04-24

## 2023-04-24 RX ORDER — LIDOCAINE HYDROCHLORIDE 20 MG/ML
15 SOLUTION OROPHARYNGEAL ONCE
Status: COMPLETED | OUTPATIENT
Start: 2023-04-24 | End: 2023-04-24

## 2023-04-24 RX ORDER — TRIAMCINOLONE ACETONIDE 1 MG/G
OINTMENT TOPICAL ONCE
OUTPATIENT
Start: 2023-04-24 | End: 2023-04-24

## 2023-04-24 RX ORDER — SILVER SULFADIAZINE 10 G/1000G
CREAM TOPICAL ONCE
OUTPATIENT
Start: 2023-04-24 | End: 2023-04-24

## 2023-04-24 RX ORDER — LIDOCAINE HYDROCHLORIDE 20 MG/ML
15 SOLUTION OROPHARYNGEAL ONCE
OUTPATIENT
Start: 2023-04-24 | End: 2023-04-24

## 2023-04-24 RX ADMIN — LIDOCAINE HYDROCHLORIDE 15 ML: 20 SOLUTION ORAL; TOPICAL at 13:24

## 2023-04-24 NOTE — WOUND CARE
[address]   Progress Note and Procedure Note     [facility] RECORD NUMBER:  [id number]  AGE: 69 y.o. RACE WHITE/NON-  BLACK/  GENDER: male  : 1955  EPISODE DATE:  2023    Subjective:   Noo new problems  Chief Complaint   Patient presents with    Wound Check     Left leg         HISTORY of PRESENT ILLNESS KATIE Alfonso is a 69 y.o. male who presents today for wound/ulcer evaluation. History of Wound Context: L calf  Wound/Ulcer Pain Timing/Severity: none  Quality of pain: dull  Severity:  0 / 10   Modifying Factors: None  Associated Signs/Symptoms: edema    Ulcer Identification:  Ulcer Type: venous    Contributing Factors: edema    Wound: L calf         PAST MEDICAL HISTORY    Past Medical History:   Diagnosis Date    Arthritis     Chronic obstructive pulmonary disease (HCC)     Diabetes (HCC)     Hypertension     Sleep apnea         PAST SURGICAL HISTORY    Past Surgical History:   Procedure Laterality Date    HX GI      HX HERNIA REPAIR         FAMILY HISTORY    Family History   Problem Relation Age of Onset    Hypertension Mother     Hypertension Father     Hypertension Sister     Hypertension Sister     Hypertension Child        SOCIAL HISTORY    Social History     Tobacco Use    Smoking status: Never    Smokeless tobacco: Never   Vaping Use    Vaping Use: Never used   Substance Use Topics    Alcohol use: Never    Drug use: Never       ALLERGIES    No Known Allergies    MEDICATIONS    Current Outpatient Medications on File Prior to Encounter   Medication Sig Dispense Refill    isosorbide dinitrate (ISORDIL) 30 mg tablet Take 1 Tablet by mouth four (4) times daily. nebivoloL (BYSTOLIC) 10 mg tablet Take 1 Tablet by mouth daily. multivitamin (ONE A DAY) tablet Take 1 Tablet by mouth daily. furosemide (LASIX) 40 mg tablet Take 1 Tablet by mouth daily.  30 Tablet 0    atorvastatin (LIPITOR) 20 mg tablet Take 1 Tablet by mouth nightly. No current facility-administered medications on file prior to encounter. REVIEW OF SYSTEMS    Pertinent items are noted in HPI. Objective:     Visit Vitals  BP (!) 162/80 (BP 1 Location: Left upper arm, BP Patient Position: At rest;Sitting)   Pulse 69   Temp 97.7 °F (36.5 °C)   Resp 20       Wt Readings from Last 3 Encounters:   12/12/22 (!) 170.1 kg (375 lb)   03/28/22 158.8 kg (350 lb)   10/12/21 (!) 181.4 kg (400 lb)       PHYSICAL EXAM  Significant change in multiple small left calf wounds, slough debrided, no evidence of infection  Pertinent items are noted in HPI.     Assessment:    Slow improvement  Problem List Items Addressed This Visit       Non-pressure chronic ulcer of other part of right lower leg with fat layer exposed ([address]    Relevant Medications    lidocaine (XYLOCAINE) 2 % viscous solution 15 mL (Completed)    Other Relevant Orders    INITIATE OUTPATIENT WOUND CARE PROTOCOL    Non-pressure chronic ulcer of other part of left lower leg with fat layer exposed ([address] - Primary    Relevant Medications    lidocaine (XYLOCAINE) 2 % viscous solution 15 mL (Completed)    Other Relevant Orders    INITIATE OUTPATIENT WOUND CARE PROTOCOL       Wound Leg Left;Posterior #2 12/12/22 (Active)   Wound Image   04/24/23 1322   Wound Etiology Venous 04/24/23 1322   Dressing Status Other (Comment) 04/24/23 1322   Cleansed Cleansed with saline 04/10/23 1308   Wound Length (cm) 8.8 cm 04/24/23 1322   Wound Width (cm) 9.9 cm 04/24/23 1322   Wound Depth (cm) 0.1 cm 04/24/23 1322   Wound Surface Area (cm^2) 87.12 cm^2 04/24/23 1322   Change in Wound Size % 36.64 04/24/23 1322   Wound Volume (cm^3) 8.712 cm^3 04/24/23 1322   Wound Healing % 68 04/24/23 1322   Post-Procedure Length (cm) 8.8 cm 04/24/23 1340   Post-Procedure Width (cm) 9.9 cm 04/24/23 1340   Post-Procedure Depth (cm) 0.1 cm 04/24/23 1340   Post-Procedure Surface Area (cm^2) 87.12 cm^2 04/24/23 1340   Post-Procedure Volume (cm^3) 8.712 cm^3 04/24/23 1340   Wound Assessment Granulation tissue;Slough 04/24/23 1322   Drainage Amount Moderate 04/24/23 1322   Drainage Description Serosanguinous 04/24/23 1322   Wound Odor None 04/10/23 1308   Betty-Wound/Incision Assessment Intact;Dry/flaky 04/24/23 1322   Edges Attached edges 04/24/23 1322   Wound Thickness Description Full thickness 04/24/23 1322   Number of days: 133       POP IN PROCEDURE TYPE (DEBRIDEMENT, BIOPSY, I&D, SKIN SUB, CHEMICAL CAUERTY)     Plan:   Continue hydrogel + qiana  Double tubi     Treatment Note please see attached Discharge Instructions    Written patient dismissal instructions given to patient or POA. Electronically signed by Stephanie Brown MD on 4/24/2023 at 2:19 PM               Debridement Wound Care        Problem List Items Addressed This Visit       Non-pressure chronic ulcer of other part of right lower leg with fat layer exposed ([address]    Relevant Medications    lidocaine (XYLOCAINE) 2 % viscous solution 15 mL (Completed)    Other Relevant Orders    INITIATE OUTPATIENT WOUND CARE PROTOCOL    Non-pressure chronic ulcer of other part of left lower leg with fat layer exposed ([address] - Primary    Relevant Medications    lidocaine (XYLOCAINE) 2 % viscous solution 15 mL (Completed)    Other Relevant Orders    INITIATE OUTPATIENT WOUND CARE PROTOCOL       Procedure Note  Indications:  Based on my examination of this patient's wound(s)/ulcer(s) today, debridement is required to promote healing and evaluate the wound base.     Performed by: Stephanie Brown MD    Consent obtained: Yes    Time out taken: Yes    Debridement: Excisional    Using curette the wound(s)/ulcer(s) was/were sharply debrided down through and including the removal of    subcutaneous tissue    Devitalized Tissue Debrided: slough    Pre Debridement Measurements:  Are located in the [place]  Documentation Flow Sheet    Non-Pressure ulcer, fat layer exposed    Wound/Ulcer #: 1    Post Debridement Measurements:  Wound/Ulcer Descriptions are Pre Debridement except measurements:    Wound Leg Left;Posterior #2 12/12/22 (Active)   Wound Image   04/24/23 1322   Wound Etiology Venous 04/24/23 1322   Dressing Status Other (Comment) 04/24/23 1322   Cleansed Cleansed with saline 04/10/23 1308   Wound Length (cm) 8.8 cm 04/24/23 1322   Wound Width (cm) 9.9 cm 04/24/23 1322   Wound Depth (cm) 0.1 cm 04/24/23 1322   Wound Surface Area (cm^2) 87.12 cm^2 04/24/23 1322   Change in Wound Size % 36.64 04/24/23 1322   Wound Volume (cm^3) 8.712 cm^3 04/24/23 1322   Wound Healing % 68 04/24/23 1322   Post-Procedure Length (cm) 8.8 cm 04/24/23 1340   Post-Procedure Width (cm) 9.9 cm 04/24/23 1340   Post-Procedure Depth (cm) 0.1 cm 04/24/23 1340   Post-Procedure Surface Area (cm^2) 87.12 cm^2 04/24/23 1340   Post-Procedure Volume (cm^3) 8.712 cm^3 04/24/23 1340   Wound Assessment Granulation tissue;Slough 04/24/23 1322   Drainage Amount Moderate 04/24/23 1322   Drainage Description Serosanguinous 04/24/23 1322   Wound Odor None 04/10/23 1308   Betty-Wound/Incision Assessment Intact;Dry/flaky 04/24/23 1322   Edges Attached edges 04/24/23 1322   Wound Thickness Description Full thickness 04/24/23 1322   Number of days: 133        Total Surface Area Debrided:  19 sq cm     Estimated Blood Loss:  Minimal     Hemostasis Achieved: Pressure    Procedural Pain: 1 / 10     Post Procedural Pain: 0 / 10     Response to treatment: Well tolerated by patient

## 2023-04-24 NOTE — WOUND CARE
Discharge Instructions for  [address] [place] [address], [address] [place] [address]  Telephone: [phone]    NAME:  Daune Schilder  YOB: 1955  MEDICAL RECORD NUMBER:  [id number]  DATE:  4/24/2023      Return Appointment:  [] Dressing supply provider: Ketty   [] Home Healthcare:  [x] Return Appointment: 2 Week(s)  [] Nurse Visit:  Week(s)    [] Discharge from [facility]  [] Ordered tests:   [] Referrals:   [] Rx:   [] Other:      Wound Cleansing:   Do not scrub or use excessive force. Cleanse wound prior to applying a clean dressing with:  [] Normal Saline   [x] Mild Soap & Water/Baby Shampoo   [] Keep Wound Dry in Shower  [] Other:      Topical Treatments:  Do not apply lotions, creams, or ointments to wound bed unless directed. [x] Apply moisturizing lotion to skin surrounding the wound prior to dressing change.  [] Apply antifungal ointment to skin surrounding the wound prior to dressing change.  [] Apply thin film of moisture barrier/zinc ointment to skin immediately around wound. [] Other:       Dressings:           Wound Location L leg   [x] Apply Primary Dressing:       [] MediHoney Gel    [] MediHoney Alginate               [] Calcium Alginate      [] Calcium Alginate with Silver   [] Collagen                   [x] Collagen with Silver 2nd              [] Santyl with Moistened saline gauze              [] Hydrofera Blue (cut to size and moistened)  [] Hydrofera Blue Ready (Cut to size)   [] NS wet to dry    [] Betadine wet to dry              [x] Hydrogel 1st                [] Mepitel     [] Bactroban/Mupirocin               [] Other:     [x] Cover and Secure with:     [x] Gauze [x] Tiffany [] Kerlix   [] Foam [] Super Absorbant [] ABD     [] ACE Wrap            [] Other:    Limit contact of tape with skin.     [x] Change dressing: [] Daily    [] Every Other Day [] Once per week   [] Twice per week   [x] Three times per week [] Do Not Change Dressing   [] Other:     Pressure Relief:  [] When sitting, shift position or do seat lifts every 15 minutes.  [] Wheelchair cushion [] Specialty Bed/Mattress  [x]  Avoid direct pressure on wound site. Edema Control:  Apply: [] Compression Stocking:   mmHg  []Right Leg []Left Leg   [x] Tubigrip []Right Leg Double Layer [x]Left Leg Double Layer      []Right Leg Single Layer []Left Leg Single Layer     [x] Elevate leg(s) above the level of the heart when sitting. [x] Avoid prolonged standing in one place. Compression:  Apply: [] Multilayer Compression Wrap: []RightLeg []Left Leg                                 []Coflex   []Coflex Lite             []Unna     [] Do not get leg(s) with wrap wet. If wraps become too tight call the center or completely remove the wrap. [] Elevate leg(s) above the level of the heart when sitting. [] Avoid prolonged standing in one place. Dietary:  [x] Diet as tolerated: [] Calorie Diabetic Diet: [x] No Added Salt:  [x] Increase Protein: [] Other:     Activity:  [x] Activity as tolerated:    [] Patient has no activity restrictions       [] Strict Bedrest:   [] Remain off Work:       [] May return to full duty work:                                     [] Return to work with restrictions:               [address] Information: Should you experience any significant changes in your wound(s) or have questions about your wound care, please contact Dane Hanks at [place] [address] 8:00 am - 4:30. If you need help with your wound outside of these hours and cannot wait until we are again available, contact your PCP or go to the hospital emergency room. PLEASE NOTE: IF YOU ARE UNABLE TO OBTAIN WOUND SUPPLIES, CONTINUE TO USE THE SUPPLIES YOU HAVE AVAILABLE UNTIL YOU ARE ABLE TO REACH US. IT IS MOST IMPORTANT TO KEEP THE WOUND COVERED AT ALL TIMES.     [x] Dr. Derek Lindo   [] Dr. Elvi Marquez  [] Dr. Rose Patel

## 2023-04-24 NOTE — DISCHARGE INSTRUCTIONS
Discharge Instructions for  [address] [place] [address], [address] [place] [address]  Telephone: [phone]    NAME:  Kathy Brown  YOB: 1955  MEDICAL RECORD NUMBER:  [id number]  DATE:  4/24/2023      Return Appointment:  [] Dressing supply provider: Ketty   [] Home Healthcare:  [x] Return Appointment: 2 Week(s)  [] Nurse Visit:  Week(s)    [] Discharge from [facility]  [] Ordered tests:   [] Referrals:   [] Rx:   [] Other:      Wound Cleansing:   Do not scrub or use excessive force. Cleanse wound prior to applying a clean dressing with:  [] Normal Saline   [x] Mild Soap & Water/Baby Shampoo   [] Keep Wound Dry in Shower  [] Other:      Topical Treatments:  Do not apply lotions, creams, or ointments to wound bed unless directed. [x] Apply moisturizing lotion to skin surrounding the wound prior to dressing change.  [] Apply antifungal ointment to skin surrounding the wound prior to dressing change.  [] Apply thin film of moisture barrier/zinc ointment to skin immediately around wound. [] Other:       Dressings:           Wound Location L leg   [x] Apply Primary Dressing:       [] MediHoney Gel    [] MediHoney Alginate               [] Calcium Alginate      [] Calcium Alginate with Silver   [] Collagen                   [x] Collagen with Silver 2nd              [] Santyl with Moistened saline gauze              [] Hydrofera Blue (cut to size and moistened)  [] Hydrofera Blue Ready (Cut to size)   [] NS wet to dry    [] Betadine wet to dry              [x] Hydrogel 1st                [] Mepitel     [] Bactroban/Mupirocin               [] Other:     [x] Cover and Secure with:     [x] Gauze [x] Tiffany [] Kerlix   [] Foam [] Super Absorbant [] ABD     [] ACE Wrap            [] Other:    Limit contact of tape with skin.     [x] Change dressing: [] Daily    [] Every Other Day [] Once per week   [] Twice per week   [x] Three times per week [] Do Not Change Dressing   [] Other:     Pressure Relief:  [] When sitting, shift position or do seat lifts every 15 minutes.  [] Wheelchair cushion [] Specialty Bed/Mattress  [x]  Avoid direct pressure on wound site. Edema Control:  Apply: [] Compression Stocking:   mmHg  []Right Leg []Left Leg   [x] Tubigrip []Right Leg Double Layer [x]Left Leg Double Layer      []Right Leg Single Layer []Left Leg Single Layer     [x] Elevate leg(s) above the level of the heart when sitting. [x] Avoid prolonged standing in one place. Compression:  Apply: [] Multilayer Compression Wrap: []RightLeg []Left Leg                                 []Coflex   []Coflex Lite             []Unna     [] Do not get leg(s) with wrap wet. If wraps become too tight call the center or completely remove the wrap. [] Elevate leg(s) above the level of the heart when sitting. [] Avoid prolonged standing in one place. Dietary:  [x] Diet as tolerated: [] Calorie Diabetic Diet: [x] No Added Salt:  [x] Increase Protein: [] Other:     Activity:  [x] Activity as tolerated:    [] Patient has no activity restrictions       [] Strict Bedrest:   [] Remain off Work:       [] May return to full duty work:                                     [] Return to work with restrictions:               [address] Information: Should you experience any significant changes in your wound(s) or have questions about your wound care, please contact Dane aHnks at [place] [address] 8:00 am - 4:30. If you need help with your wound outside of these hours and cannot wait until we are again available, contact your PCP or go to the hospital emergency room. PLEASE NOTE: IF YOU ARE UNABLE TO OBTAIN WOUND SUPPLIES, CONTINUE TO USE THE SUPPLIES YOU HAVE AVAILABLE UNTIL YOU ARE ABLE TO REACH US. IT IS MOST IMPORTANT TO KEEP THE WOUND COVERED AT ALL TIMES.     [x] Dr. Jessica Dailey   [] Dr. Rogelio Morales  [] Dr. Gavino Sheridan

## 2023-06-05 ENCOUNTER — HOSPITAL ENCOUNTER (OUTPATIENT)
Facility: HOSPITAL | Age: 68
Discharge: HOME | End: 2023-06-05
Payer: COMMERCIAL

## 2023-06-05 VITALS
RESPIRATION RATE: 20 BRPM | WEIGHT: 315 LBS | HEART RATE: 107 BPM | TEMPERATURE: 97.9 F | HEIGHT: 67 IN | BODY MASS INDEX: 49.44 KG/M2 | SYSTOLIC BLOOD PRESSURE: 156 MMHG | DIASTOLIC BLOOD PRESSURE: 80 MMHG

## 2023-06-05 PROCEDURE — 99212 OFFICE O/P EST SF 10 MIN: CPT

## 2023-06-05 RX ORDER — LISINOPRIL 20 MG/1
20 TABLET ORAL DAILY
COMMUNITY

## 2023-06-05 NOTE — WOUND CARE
Patient was just seen at Dr. Trinidad Keen office with a procedure today. Patient had compression wrap on and was told it needed to stay on for 2-3 days so patient was resceduled for next week with the wound healing center.

## 2023-06-26 ENCOUNTER — HOSPITAL ENCOUNTER (OUTPATIENT)
Facility: HOSPITAL | Age: 68
Discharge: HOME | End: 2023-06-26
Payer: COMMERCIAL

## 2023-06-26 VITALS
SYSTOLIC BLOOD PRESSURE: 189 MMHG | TEMPERATURE: 97.9 F | DIASTOLIC BLOOD PRESSURE: 98 MMHG | RESPIRATION RATE: 20 BRPM | HEART RATE: 94 BPM

## 2023-06-26 PROCEDURE — 99213 OFFICE O/P EST LOW 20 MIN: CPT

## 2023-06-26 RX ORDER — LIDOCAINE HYDROCHLORIDE 20 MG/ML
15 SOLUTION OROPHARYNGEAL PRN
Status: DISCONTINUED | OUTPATIENT
Start: 2023-06-26 | End: 2023-06-27 | Stop reason: HOSPADM

## 2023-07-24 ENCOUNTER — HOSPITAL ENCOUNTER (OUTPATIENT)
Facility: HOSPITAL | Age: 68
Discharge: HOME | End: 2023-07-24
Payer: COMMERCIAL

## 2023-07-24 VITALS
TEMPERATURE: 97.7 F | RESPIRATION RATE: 20 BRPM | SYSTOLIC BLOOD PRESSURE: 190 MMHG | HEART RATE: 92 BPM | DIASTOLIC BLOOD PRESSURE: 98 MMHG

## 2023-07-24 PROCEDURE — 99213 OFFICE O/P EST LOW 20 MIN: CPT

## 2023-07-24 NOTE — WOUND CARE
Wound Clinic Physician Orders and Discharge Instructions  [address] [place] [address] S. [address]  Telephone: [phone]    NAME:  Leonardo Shepherd  YOB: 1955  MEDICAL RECORD NUMBER:  [id number]  DATE:  7/24/2023      Return Appointment:  [] Dressing Supply Provider:   [] Home Healthcare:  [x] Return Appointment: 2 Week(s)  [] Nurse Visit:      [] Discharge from [facility]: [] Healed        [] Refer to Provider:         [] Consult    Follow-up Information:  [] Ordered Tests:   [] Referrals:   [] Rx:   [] Other:       Wound Cleansing:   Do not scrub or use excessive force. Cleanse wound prior to applying a clean dressing with:  [] Normal Saline  OR   [] Keep Wound Dry in Shower     [] Wound Cleanser   [x] Cleanse wound with Mild Soap & Water    [] Other:       Topical Treatments:  Do not apply lotions, creams, or ointments to wound bed unless directed. [] Apply moisturizing lotion to skin surrounding the wound prior to dressing change.  [] Apply antifungal ointment to skin surrounding the wound prior to dressing change.  [] Apply thin film of moisture barrier ointment to skin immediately around wound.   [] Apply Betadine to skin immediately around wound   [] Other:      Dressings:           Wound Location Bilateral legs   [x] Apply Primary Dressing:       [] MediHoney Gel [] MediHoney Alginate  [] Calcium Alginate with Silver   [] Calcium Alginate without silver   [x] Collagen with silver  [] Collagen without Silver    [] Santyl with moist saline gauze     [] Hydrofera Blue (cut to size and moistened with normal saline)  [] Hydrofera Blue Ready (cut to size)      [] Normal Saline wet to dry  [] Betadine wet to dry    [] Hydrogel 1st  [] Mepitel     [] Bactroban/Mupirocin   [] Iodoform Packing Strip [] Plain Packing Strip   [] Skin Sub:   [x] Other:  betadine to the patricia     [x] Cover and Secure with:     [x] Gauze [x] Marysol []

## 2023-07-24 NOTE — DISCHARGE INSTRUCTIONS
Wound Clinic Physician Orders and Discharge Instructions  [address] [place] [address] S. [address]  Telephone: [phone]    NAME:  Donn Cole  YOB: 1955  MEDICAL RECORD NUMBER:  [id number]  DATE:  7/24/2023      Return Appointment:  [] Dressing Supply Provider:   [] Home Healthcare:  [x] Return Appointment: 2 Week(s)  [] Nurse Visit:      [] Discharge from [facility] [] Healed        [] Refer to Provider:         [] Consult    Follow-up Information:  [] Ordered Tests:   [] Referrals:   [] Rx:   [] Other:       Wound Cleansing:   Do not scrub or use excessive force. Cleanse wound prior to applying a clean dressing with:  [] Normal Saline  OR   [] Keep Wound Dry in Shower     [] Wound Cleanser   [x] Cleanse wound with Mild Soap & Water    [] Other:       Topical Treatments:  Do not apply lotions, creams, or ointments to wound bed unless directed. [] Apply moisturizing lotion to skin surrounding the wound prior to dressing change.  [] Apply antifungal ointment to skin surrounding the wound prior to dressing change.  [] Apply thin film of moisture barrier ointment to skin immediately around wound.   [] Apply Betadine to skin immediately around wound   [] Other:      Dressings:           Wound Location Bilateral legs   [x] Apply Primary Dressing:       [] MediHoney Gel [] MediHoney Alginate  [] Calcium Alginate with Silver   [] Calcium Alginate without silver   [x] Collagen with silver  [] Collagen without Silver    [] Santyl with moist saline gauze     [] Hydrofera Blue (cut to size and moistened with normal saline)  [] Hydrofera Blue Ready (cut to size)      [] Normal Saline wet to dry  [] Betadine wet to dry    [] Hydrogel 1st  [] Mepitel     [] Bactroban/Mupirocin   [] Iodoform Packing Strip [] Plain Packing Strip   [] Skin Sub:   [x] Other:  betadine to the patricia     [x] Cover and Secure with:     [x] Gauze [x] Marysol []

## 2023-07-24 NOTE — WOUND CARE
[facility] [place]   Medical Staff Progress Note     Jailyn Meyer  MEDICAL RECORD NUMBER:  [id number]  AGE: 69 y.o. GENDER: male  : 1955  EPISODE DATE:  2023    Chief complaint and reason for visit:   BLE wounds  Chief Complaint   Patient presents with    Wound Check     Bilateral legs      Patient presenting for follow up evaluation of wound(s) per chief complaint. Subjective: Symptoms, wound related issues, or other pertinent wound history since last visit: new R calf ulcer; no significant drainage, no pain     Wound 22 Leg Left;Posterior #1 (Active)   Wound Image   23 1429   Wound Etiology Venous 23 142   Dressing Status Clean;Dry; Intact 23 142   Wound Cleansed Cleansed with saline 23 142   Wound Length (cm) 11.5 cm 23 142   Wound Width (cm) 8.5 cm 23 142   Wound Depth (cm) 0.2 cm 23 142   Wound Surface Area (cm^2) 97.75 cm^2 23 142   Change in Wound Size % (l*w) -44.81 23 142   Wound Volume (cm^3) 19.55 cm^3 23 142   Wound Healing % -190 23 142   Wound Assessment Granulation tissue;Slough 23 142   Drainage Amount Large 23 142   Drainage Description Serosanguinous 23 1429   Odor None 23 142   Pia-wound Assessment Maceration 23 1429   Margins Attached edges 23 1429   Wound Thickness Description not for Pressure Injury Full thickness 23 1429   Number of days: 224       Wound 23 Leg Right #2 (Active)   Wound Image   23 1431   Wound Etiology Venous 23 1431   Dressing Status Clean;Dry; Intact 23 1431   Wound Cleansed Cleansed with saline 23 1431   Wound Length (cm) 4.5 cm 23 1431   Wound Width (cm) 5.5 cm 23 1431   Wound Depth (cm) 0.1 cm 23 1431   Wound Surface Area (cm^2) 24.75 cm^2 23 1431   Wound Volume (cm^3) 2.475 cm^3 23 1431   Wound

## 2023-08-31 ENCOUNTER — HOSPITAL ENCOUNTER (OUTPATIENT)
Facility: HOSPITAL | Age: 68
Discharge: HOME | End: 2023-08-31
Payer: MEDICARE

## 2023-08-31 VITALS
RESPIRATION RATE: 20 BRPM | SYSTOLIC BLOOD PRESSURE: 169 MMHG | DIASTOLIC BLOOD PRESSURE: 92 MMHG | HEART RATE: 66 BPM | TEMPERATURE: 97.9 F

## 2023-08-31 PROCEDURE — 99213 OFFICE O/P EST LOW 20 MIN: CPT

## 2023-08-31 RX ORDER — FLUTICASONE FUROATE, UMECLIDINIUM BROMIDE AND VILANTEROL TRIFENATATE 100; 62.5; 25 UG/1; UG/1; UG/1
1 POWDER RESPIRATORY (INHALATION) DAILY
COMMUNITY

## 2023-08-31 NOTE — WOUND CARE
Wound Clinic Physician Orders and Discharge Instructions  [address] [place] [address] S. [address]  Telephone: [phone]    NAME:  Opal Winters  YOB: 1955  MEDICAL RECORD NUMBER:  [id number]  DATE:  8/31/2023      Return Appointment:  [] Dressing Supply Provider:   [] Home Healthcare:  [x] Return Appointment: 2 Week(s)  [] Nurse Visit:      [] Discharge from [facility]: [] Healed        [] Refer to Provider:         [] Consult    Follow-up Information:  [] Ordered Tests:   [] Referrals:   [] Rx:   [] Other:       Wound Cleansing:   Do not scrub or use excessive force. Cleanse wound prior to applying a clean dressing with:  [] Normal Saline  OR   [] Keep Wound Dry in Shower     [] Wound Cleanser   [x] Cleanse wound with Mild Soap & Water    [] Other:       Topical Treatments:  Do not apply lotions, creams, or ointments to wound bed unless directed. [] Apply moisturizing lotion to skin surrounding the wound prior to dressing change.  [] Apply antifungal ointment to skin surrounding the wound prior to dressing change.  [] Apply thin film of moisture barrier ointment to skin immediately around wound.   [] Apply Betadine to skin immediately around wound   [] Other:      Dressings:           Wound Location Bilateral legs   [x] Apply Primary Dressing:       [] MediHoney Gel [] MediHoney Alginate  [] Calcium Alginate with Silver   [] Calcium Alginate without silver   [x] Collagen with silver  [] Collagen without Silver    [] Santyl with moist saline gauze     [] Hydrofera Blue (cut to size and moistened with normal saline)  [] Hydrofera Blue Ready (cut to size)      [] Normal Saline wet to dry  [] Betadine wet to dry    [] Hydrogel 1st  [] Mepitel     [] Bactroban/Mupirocin   [] Iodoform Packing Strip [] Plain Packing Strip   [] Skin Sub:   [x] Other:  betadine to the patricia     [x] Cover and Secure with:     [x] Gauze [x] Marysol []

## 2023-08-31 NOTE — DISCHARGE INSTRUCTIONS
Blue Ready (cut to size)                      [] Normal Saline wet to dry    [] Betadine wet to dry                          [] Hydrogel 1st                       [] Mepitel                       [] Bactroban/Mupirocin              [] Iodoform Packing Strip      [] Plain Packing Strip              [] Skin Sub:   [x] Other:  betadine to the patricia      [x] Cover and Secure with:                   [x] Gauze         [x] Marysol           [] Kerlix              [] Foam          [] Super Absorbant    [] ABD                                      [] Ace Wrap   [] Other:               Limit contact of tape with skin. [x] Change dressing:   [] Daily           [] Every Other Day    [] Twice per week   [x] Three times per week              [] Once a week          [] Do Not Change Dressing     [] Other:                Pressure Relief:  [] When sitting, shift position or do seat lifts every 15 minutes.  [] Wheelchair cushion           [] Specialty Bed/Mattress  [x] Avoid direct pressure on wound site. [] Other                Edema Control:  Apply:  [] Compression Stocking:   mmHg    []Right Leg     []Left Leg              [x] Tubigrip      []Right Leg Double Layer      [x]Left Leg Double Layer                                      []Right Leg Single Layer       []Left Leg Single Layer                            [x] Elevate leg(s) above the level of the heart when sitting. [x] Avoid prolonged standing in one place.          Dietary:  [x] Diet as tolerated:    [] Calorie Diabetic Diet:         [x] No Added Salt:  [x] Increase Protein:    [] Other:              Activity:  [x] Activity as tolerated:    [] Patient has no activity restrictions      [] Strict Bedrest           [] Remain off Work      [] May return to full duty work                                     [] Return to work with restrictions          Physician:  [x] Dr. Zahraa Carbajal  [] Dr. Karyle Grana  [] Dr. Leif Li        Nurse Case

## 2023-08-31 NOTE — WOUND CARE
[address] [facility]   Medical Staff Progress Note     Jory Santos  MEDICAL RECORD NUMBER:  [id number]  AGE: 77 y.o. GENDER: male  : 1955  EPISODE DATE:  2023    Chief complaint and reason for visit:   Bilateral calf wounds  Chief Complaint   Patient presents with    Wound Check     L and R leg      Patient presenting for follow up evaluation of wound(s) per chief complaint. Subjective: Symptoms, wound related issues, or other pertinent wound history since last visit:   Patient's last visit was on 23. He reports that his wife passed away.   No new problems reported     Wound 22 Leg Left #1 Circumferential (Active)   Wound Image   23 1107   Wound Etiology Venous 23 1107   Dressing Status Other (Comment) 23 1107   Wound Cleansed Cleansed with saline 23 1107   Wound Length (cm) 7 cm 23 1107   Wound Width (cm) 6 cm 23 1107   Wound Depth (cm) 0.2 cm 23 1107   Wound Surface Area (cm^2) 42 cm^2 23 1107   Change in Wound Size % (l*w) 37.78 23 1107   Wound Volume (cm^3) 8.4 cm^3 23 1107   Wound Healing % -24 23 1107   Wound Assessment Granulation tissue;Slough 23 1107   Drainage Amount Moderate (25-50%) 23 1107   Drainage Description Serosanguinous 23 1107   Odor None 23 1107   Pia-wound Assessment Maceration 23 1107   Margins Attached edges 23 1107   Wound Thickness Description not for Pressure Injury Full thickness 23 1107   Number of days: 261       Wound 23 Leg Right;Posterior #2 (Active)   Wound Image   23 1107   Wound Etiology Venous 23 1107   Dressing Status Other (Comment) 23 1107   Wound Cleansed Cleansed with saline 23 1107   Wound Length (cm) 2.8 cm 23 1107   Wound Width (cm) 3 cm 23 1107   Wound Depth (cm) 0.1 cm 23 1107   Wound Surface Area (cm^2) 8.4 cm^2

## 2023-09-01 ENCOUNTER — APPOINTMENT (OUTPATIENT)
Facility: HOSPITAL | Age: 68
End: 2023-09-01
Payer: MEDICARE

## 2023-09-01 ENCOUNTER — HOSPITAL ENCOUNTER (EMERGENCY)
Facility: HOSPITAL | Age: 68
Discharge: HOME | End: 2023-09-02
Attending: EMERGENCY MEDICINE
Payer: MEDICARE

## 2023-09-01 DIAGNOSIS — R09.02 HYPOXIA: ICD-10-CM

## 2023-09-01 DIAGNOSIS — I10 HYPERTENSION, UNSPECIFIED TYPE: ICD-10-CM

## 2023-09-01 DIAGNOSIS — J44.9 CHRONIC OBSTRUCTIVE PULMONARY DISEASE, UNSPECIFIED COPD TYPE (HCC): Primary | ICD-10-CM

## 2023-09-01 DIAGNOSIS — R42 DIZZINESS: ICD-10-CM

## 2023-09-01 DIAGNOSIS — Z99.81 HISTORY OF HOME OXYGEN THERAPY: ICD-10-CM

## 2023-09-01 LAB
ALBUMIN SERPL-MCNC: 3 G/DL (ref 3.5–5)
ALBUMIN/GLOB SERPL: 0.8 (ref 1.1–2.2)
ALP SERPL-CCNC: 79 U/L (ref 45–117)
ALT SERPL-CCNC: 26 U/L (ref 12–78)
ANION GAP SERPL CALC-SCNC: 5 MMOL/L (ref 5–15)
AST SERPL W P-5'-P-CCNC: ABNORMAL U/L (ref 15–37)
BASOPHILS # BLD: 0.1 K/UL (ref 0–0.1)
BASOPHILS NFR BLD: 1 % (ref 0–1)
BILIRUB SERPL-MCNC: 0.7 MG/DL (ref 0.2–1)
BNP SERPL-MCNC: 1178 PG/ML
BUN SERPL-MCNC: 11 MG/DL (ref 6–20)
BUN/CREAT SERPL: 12 (ref 12–20)
CA-I BLD-MCNC: 8.2 MG/DL (ref 8.5–10.1)
CHLORIDE SERPL-SCNC: 107 MMOL/L (ref 97–108)
CO2 SERPL-SCNC: 25 MMOL/L (ref 21–32)
CREAT SERPL-MCNC: 0.89 MG/DL (ref 0.7–1.3)
DIFFERENTIAL METHOD BLD: ABNORMAL
EKG ATRIAL RATE: 90 BPM
EKG DIAGNOSIS: NORMAL
EKG P AXIS: 87 DEGREES
EKG P-R INTERVAL: 208 MS
EKG Q-T INTERVAL: 374 MS
EKG QRS DURATION: 96 MS
EKG QTC CALCULATION (BAZETT): 457 MS
EKG R AXIS: -20 DEGREES
EKG T AXIS: 46 DEGREES
EKG VENTRICULAR RATE: 90 BPM
EOSINOPHIL # BLD: 0 K/UL (ref 0–0.4)
EOSINOPHIL NFR BLD: 0 % (ref 0–7)
ERYTHROCYTE [DISTWIDTH] IN BLOOD BY AUTOMATED COUNT: 15.6 % (ref 11.5–14.5)
GLOBULIN SER CALC-MCNC: 3.9 G/DL (ref 2–4)
GLUCOSE SERPL-MCNC: 103 MG/DL (ref 65–100)
HCT VFR BLD AUTO: 38.5 % (ref 36.6–50.3)
HGB BLD-MCNC: 13.2 G/DL (ref 12.1–17)
IMM GRANULOCYTES # BLD AUTO: 0 K/UL (ref 0–0.04)
IMM GRANULOCYTES NFR BLD AUTO: 0 % (ref 0–0.5)
LYMPHOCYTES # BLD: 1.1 K/UL (ref 0.8–3.5)
LYMPHOCYTES NFR BLD: 10 % (ref 12–49)
MAGNESIUM SERPL-MCNC: NORMAL MG/DL (ref 1.6–2.4)
MCH RBC QN AUTO: 27.2 PG (ref 26–34)
MCHC RBC AUTO-ENTMCNC: 34.3 G/DL (ref 30–36.5)
MCV RBC AUTO: 79.2 FL (ref 80–99)
MONOCYTES # BLD: 1 K/UL (ref 0–1)
MONOCYTES NFR BLD: 9 % (ref 5–13)
NEUTS SEG # BLD: 8.7 K/UL (ref 1.8–8)
NEUTS SEG NFR BLD: 80 % (ref 32–75)
NRBC # BLD: 0 K/UL (ref 0–0.01)
NRBC BLD-RTO: 0 PER 100 WBC
PLATELET # BLD AUTO: 261 K/UL (ref 150–400)
PMV BLD AUTO: 10.2 FL (ref 8.9–12.9)
POTASSIUM SERPL-SCNC: 3.8 MMOL/L (ref 3.5–5.1)
POTASSIUM SERPL-SCNC: ABNORMAL MMOL/L (ref 3.5–5.1)
PROT SERPL-MCNC: 6.9 G/DL (ref 6.4–8.2)
RBC # BLD AUTO: 4.86 M/UL (ref 4.1–5.7)
SODIUM SERPL-SCNC: 137 MMOL/L (ref 136–145)
TROPONIN I SERPL HS-MCNC: 44 NG/L (ref 0–76)
WBC # BLD AUTO: 10.9 K/UL (ref 4.1–11.1)

## 2023-09-01 PROCEDURE — 2700000000 HC OXYGEN THERAPY PER DAY

## 2023-09-01 PROCEDURE — 71045 X-RAY EXAM CHEST 1 VIEW: CPT

## 2023-09-01 PROCEDURE — 36415 COLL VENOUS BLD VENIPUNCTURE: CPT

## 2023-09-01 PROCEDURE — 94761 N-INVAS EAR/PLS OXIMETRY MLT: CPT

## 2023-09-01 PROCEDURE — 84132 ASSAY OF SERUM POTASSIUM: CPT

## 2023-09-01 PROCEDURE — 6370000000 HC RX 637 (ALT 250 FOR IP): Performed by: EMERGENCY MEDICINE

## 2023-09-01 PROCEDURE — 83735 ASSAY OF MAGNESIUM: CPT

## 2023-09-01 PROCEDURE — 94640 AIRWAY INHALATION TREATMENT: CPT

## 2023-09-01 PROCEDURE — 80053 COMPREHEN METABOLIC PANEL: CPT

## 2023-09-01 PROCEDURE — 83880 ASSAY OF NATRIURETIC PEPTIDE: CPT

## 2023-09-01 PROCEDURE — 99285 EMERGENCY DEPT VISIT HI MDM: CPT

## 2023-09-01 PROCEDURE — 93005 ELECTROCARDIOGRAM TRACING: CPT | Performed by: EMERGENCY MEDICINE

## 2023-09-01 PROCEDURE — 85025 COMPLETE CBC W/AUTO DIFF WBC: CPT

## 2023-09-01 PROCEDURE — 84484 ASSAY OF TROPONIN QUANT: CPT

## 2023-09-01 RX ORDER — IPRATROPIUM BROMIDE AND ALBUTEROL SULFATE 2.5; .5 MG/3ML; MG/3ML
1 SOLUTION RESPIRATORY (INHALATION)
Status: COMPLETED | OUTPATIENT
Start: 2023-09-01 | End: 2023-09-01

## 2023-09-01 RX ORDER — PREDNISONE 20 MG/1
60 TABLET ORAL
Status: COMPLETED | OUTPATIENT
Start: 2023-09-01 | End: 2023-09-01

## 2023-09-01 RX ORDER — HYDRALAZINE HYDROCHLORIDE 20 MG/ML
10 INJECTION INTRAMUSCULAR; INTRAVENOUS ONCE
Status: DISCONTINUED | OUTPATIENT
Start: 2023-09-01 | End: 2023-09-02 | Stop reason: HOSPADM

## 2023-09-01 RX ADMIN — PREDNISONE 60 MG: 20 TABLET ORAL at 18:50

## 2023-09-01 RX ADMIN — IPRATROPIUM BROMIDE AND ALBUTEROL SULFATE 1 DOSE: 2.5; .5 SOLUTION RESPIRATORY (INHALATION) at 18:27

## 2023-09-01 ASSESSMENT — PAIN - FUNCTIONAL ASSESSMENT: PAIN_FUNCTIONAL_ASSESSMENT: NONE - DENIES PAIN

## 2023-09-01 ASSESSMENT — HEART SCORE: ECG: 1

## 2023-09-01 NOTE — ED NOTES
Walked pt and sats dropped to 88, pt c/o dizziness, back to room, provider aware, orders given     Claudio Dickinson RN  09/01/23 7103

## 2023-09-01 NOTE — ED PROVIDER NOTES
and MRI are read by the radiologist.   Plain radiographic images are visualized and preliminarily interpreted by me, the ED Provider, with the following findings:   Xray of chest interpreted by me. Shows no acute findings including no pleural effusion, pulmonary edema, pneumothorax, or infiltrate. Interpretation per the Radiologist below, if available at the time of this note:  XR CHEST 1 VIEW   Final Result   No acute process. EMERGENCY DEPARTMENT COURSE and DIFFERENTIAL DIAGNOSIS/MDM   CC/HPI Summary: Dizziness, however blood pressure, decreased oxygen obesity  Ddx: Pneumonia, COPD, sleep apnea, vertigo, hypertension  ED Course and Reassessment:   The patient's work-up is relatively unremarkable. He does however have decreased oxygen with ambulation to 88%. He also gets very dizzy. States that he does need home oxygen. He was previously on 3 L. His machine broke. He is requesting a new oxygen machine for home. I do not think he requires admission to the hospital.  I did discuss this with the hospitalist and did consider admission. Plan is to keep him for case management in the morning. Plan is to see if he can get oxygen for home. He was placed on 3 L nasal oxygenation nasal cannula. I did order treatment for COPD since he does have this. He was given 60 mg prednisone and a DuoNeb treatment. 1151 on 9/2/2023: Patient seen by case management. Patient will get oxygen condenser and be discharged home. Sepsis Reassessment: Not applicable    Disposition Considerations:  I did consider admission. See MDM.     Additional ED Course       Clinical Management Tools:  Not Applicable    Records Reviewed (source and summary of external notes): Prior medical records and Nursing notes    Vitals:    Vitals:    09/02/23 0930 09/02/23 1000 09/02/23 1100 09/02/23 1130   BP: (!) 151/88 (!) 156/99 135/79 (!) 147/82   Pulse:       Resp:       Temp:       TempSrc:       SpO2: 96%  98% 98%   Weight: Height:            Patient was given the following medications:  Medications   hydrALAZINE (APRESOLINE) injection 10 mg (10 mg IntraVENous Not Given 9/1/23 1651)   ipratropium 0.5 mg-albuterol 2.5 mg (DUONEB) nebulizer solution 1 Dose (1 Dose Inhalation Given 9/1/23 1827)   predniSONE (DELTASONE) tablet 60 mg (60 mg Oral Given 9/1/23 1850)       CONSULTS: (Who and What was discussed)  Case management. Discussed oxygen requirements. Social Determinants affecting Dx or Tx: None    PROCEDURES   Procedures   No procedures. Smoking Cessation: None. CRITICAL CARE TIME     Critical Care:  I personally spent a total of 35 minutes of critical care time in obtaining history, performing a physical exam, bedside monitoring of interventions, collecting, and interpreting tests but excluding time spent in any separately billed procedures and excluding time spent treating other patients and/or teaching time. Discussion with consultant: n/a. Clinical Concern: hypoxia. Intervention: supp oxygenation. Deneen Rebolledo D.O.    DISPOSITION/PLAN   DISPOSITION Ed Observation 09/01/2023 05:51:47 PM    Discharged    PATIENT REFERRED TO:  [facility] EMERGENCY DEPT  [address] [facility]3]  [phone]  Go to   As soon as possible if symptoms worsen. Mark Anthony Powell MD  [address] [place] [address]  [phone]            DISCHARGE MEDICATIONS:     Medication List        ASK your doctor about these medications      fluticasone 27.5 MCG/SPRAY nasal spray  Commonly known as: VERAMYST     lisinopril 20 MG tablet  Commonly known as: PRINIVIL;ZESTRIL     Trelegy Ellipta 100-62.5-25 MCG/ACT Aepb inhaler  Generic drug: fluticasone-umeclidin-vilant              DISCONTINUED MEDICATIONS:  Current Discharge Medication List          ED FINAL IMPRESSION     1. Chronic obstructive pulmonary disease, unspecified COPD type ([address])    2. Dizziness    3. Hypertension, unspecified type    4.  History of home oxygen

## 2023-09-02 VITALS
TEMPERATURE: 98 F | SYSTOLIC BLOOD PRESSURE: 155 MMHG | DIASTOLIC BLOOD PRESSURE: 78 MMHG | HEIGHT: 67 IN | OXYGEN SATURATION: 97 % | BODY MASS INDEX: 49.44 KG/M2 | WEIGHT: 315 LBS | RESPIRATION RATE: 18 BRPM | HEART RATE: 80 BPM

## 2023-09-02 ASSESSMENT — LIFESTYLE VARIABLES
HOW OFTEN DO YOU HAVE A DRINK CONTAINING ALCOHOL: NEVER
HOW MANY STANDARD DRINKS CONTAINING ALCOHOL DO YOU HAVE ON A TYPICAL DAY: PATIENT DOES NOT DRINK

## 2023-09-02 ASSESSMENT — PAIN SCALES - GENERAL: PAINLEVEL_OUTOF10: 0

## 2023-09-02 ASSESSMENT — PAIN - FUNCTIONAL ASSESSMENT: PAIN_FUNCTIONAL_ASSESSMENT: NONE - DENIES PAIN

## 2023-09-02 NOTE — ED NOTES
Assumed care of pt.  Without acute distress or respiratory distress     Darrick Sandhu RN  09/02/23 8490

## 2023-09-02 NOTE — CARE COORDINATION
Pt is OBS in the Ed, he is needing home oxygen. Met f/f with Pt, he confirmed that the information on the face sheet is correct. Pt stated that his wife passed on July 5th, 2023. Pt stated that he lives alone. Pt stated no HH, he declined HH stating that he does not feel that he needs it and his house is a mess. Pt stated that he has a walker and is independent with his ADL, stated he does his own cooking and cleaning. Send RX to [facility] [address]    For emergency contact: son, Rose Anton, [phone]    Pt also stated that his friend Merlyn Heimlich [phone], is his best friend, Pt stated that Yris Maribel knows everything about me that you may need to know. Received order for home 02, Pt has [employer] so the order has to go through [facility]) ADAPT. Pt signed FOC, will send a referral, will place [address] on Pt chart. CM called [facility], got their on call services. She stated that she will relay the information over to the on-call and they will call CM with delivery of the oxygen. 12:33  [facility] is delivering Pt oxygen. CM dispo: home with no CM needs identified, Pt declined HH at this time.

## 2023-09-02 NOTE — ED NOTES
Report given to Chema RN by Kenia Burch RN at this time. Patient resting comfortably in ER stretcher connected to continuous cardiac, BP, and pulse ox monitoring. 5L 02 running through nasal cannula. No signs of acute or respiratory distress noted.      Chelle Gates RN  09/02/23 7030

## 2023-09-02 NOTE — ED NOTES
[facility] AT BEDSIDE WITH  OXYGEN EQUIPMENT.       Armando Slade RN  09/02/23 7508

## 2023-09-25 ENCOUNTER — HOSPITAL ENCOUNTER (OUTPATIENT)
Facility: HOSPITAL | Age: 68
Discharge: HOME | End: 2023-09-25
Payer: MEDICARE

## 2023-09-25 VITALS
TEMPERATURE: 98.1 F | SYSTOLIC BLOOD PRESSURE: 104 MMHG | RESPIRATION RATE: 18 BRPM | OXYGEN SATURATION: 95 % | DIASTOLIC BLOOD PRESSURE: 73 MMHG | HEART RATE: 106 BPM

## 2023-09-25 PROCEDURE — 99213 OFFICE O/P EST LOW 20 MIN: CPT

## 2023-09-25 RX ORDER — HYDRALAZINE HYDROCHLORIDE 25 MG/1
25 TABLET, FILM COATED ORAL 3 TIMES DAILY
COMMUNITY

## 2023-09-25 RX ORDER — ISOSORBIDE MONONITRATE 30 MG/1
30 TABLET, EXTENDED RELEASE ORAL DAILY
COMMUNITY

## 2023-09-25 NOTE — DISCHARGE INSTRUCTIONS
Wound Clinic Physician Orders and Discharge Instructions  [address] [place] [address] [address]. [address]  Telephone: [phone]    NAME:  Anushka Mccarthy  YOB: 1955  MEDICAL RECORD NUMBER:  [id number]  DATE:  9/25/2023      Return Appointment:  [] Dressing Supply Provider:   [] Home Healthcare:  [x] Return Appointment: 2 Week(s)  [] Nurse Visit:      [] Discharge from [facility]: [] Healed        [] Refer to Provider:         [] Consult    Follow-up Information:  [] Ordered Tests:   [] Referrals:   [] Rx:   [] Other:       Wound Cleansing:   Do not scrub or use excessive force. Cleanse wound prior to applying a clean dressing with:  [] Normal Saline  OR   [] Keep Wound Dry in Shower     [] Wound Cleanser   [x] Cleanse wound with Mild Soap & Water    [] Other:       Topical Treatments:  Do not apply lotions, creams, or ointments to wound bed unless directed. [] Apply moisturizing lotion to skin surrounding the wound prior to dressing change.  [] Apply antifungal ointment to skin surrounding the wound prior to dressing change.  [] Apply thin film of moisture barrier ointment to skin immediately around wound.   [] Apply Betadine to skin immediately around wound   [] Other:      Dressings:           Wound Location R Leg  [x] Apply Primary Dressing:       [] MediHoney Gel [] MediHoney Alginate  [] Calcium Alginate with Silver   [] Calcium Alginate without silver   [x] Collagen with silver  [] Collagen without Silver    [] Santyl with moist saline gauze     [] Hydrofera Blue (cut to size and moistened with normal saline)  [] Hydrofera Blue Ready (cut to size)      [] Normal Saline wet to dry  [] Betadine wet to dry    [] Hydrogel 1st  [] Mepitel     [] Bactroban/Mupirocin   [] Iodoform Packing Strip [] Plain Packing Strip   [] Skin Sub:   [] Other:       [x] Cover and Secure with:     [x] Gauze [x] Marysol [] Kerlix   [] Foam  [] Super

## 2023-09-25 NOTE — WOUND CARE
[] Return to work with restrictions     Physician:  [x] Dr. Lm Dhillon  [] Dr. Bakari Lopez  [] Dr. Boby Vega      Nurse Case Manger: [address] [facility] Information: Should you experience any significant changes in your wound(s) or have questions about your wound care, please contact the [facility] at [phone]. Our hours are Monday - Friday 8am - 4:30pm, closed all major holidays. If you need help with your wound outside these hours and cannot wait until we are again available, contact your PCP or go to the hospital emergency room. PLEASE NOTE: IF YOU ARE UNABLE TO OBTAIN WOUND SUPPLIES, CONTINUE TO USE THE SUPPLIES YOU HAVE AVAILABLE UNTIL YOU ARE ABLE TO REACH US. IT IS MOST IMPORTANT TO KEEP THE WOUND COVERED AT ALL TIMES.
[] Other     Edema Control:  Apply: [] Compression Stocking:   mmHg  []Right Leg []Left Leg   [x] Tubigrip [x]Right Leg Double Layer [x]Left Leg Double Layer      []Right Leg Single Layer []Left Leg Single Layer      [x] Elevate leg(s) above the level of the heart when sitting. [x] Avoid prolonged standing in one place. Dietary:  [x] Diet as tolerated: [] Calorie Diabetic Diet: [x] No Added Salt:  [x] Increase Protein: [] Other:     Activity:  [x] Activity as tolerated:    [] Patient has no activity restrictions      [] Strict Bedrest   [] Remain off Work      [] May return to full duty work                                     [] Return to work with restrictions     Physician:  [x] Dr. Blake Aguero  [] Dr. Deacon Mancera  [] Dr. Didi Cox      Nurse Case Manger: [address] [facility] Information: Should you experience any significant changes in your wound(s) or have questions about your wound care, please contact the [email or web address] at [phone]. Our hours are Monday - Friday 8am - 4:30pm, closed all major holidays. If you need help with your wound outside these hours and cannot wait until we are again available, contact your PCP or go to the hospital emergency room. PLEASE NOTE: IF YOU ARE UNABLE TO OBTAIN WOUND SUPPLIES, CONTINUE TO USE THE SUPPLIES YOU HAVE AVAILABLE UNTIL YOU ARE ABLE TO REACH US. IT IS MOST IMPORTANT TO KEEP THE WOUND COVERED AT ALL TIMES.         Electronically signed by Maile Morocho MD on 9/25/2023 at 3:56 PM

## 2023-10-16 ENCOUNTER — HOSPITAL ENCOUNTER (OUTPATIENT)
Facility: HOSPITAL | Age: 68
Discharge: HOME | End: 2023-10-16
Attending: SPECIALIST
Payer: MEDICARE

## 2023-10-16 VITALS
DIASTOLIC BLOOD PRESSURE: 103 MMHG | OXYGEN SATURATION: 95 % | SYSTOLIC BLOOD PRESSURE: 167 MMHG | RESPIRATION RATE: 22 BRPM | HEART RATE: 78 BPM | TEMPERATURE: 97.8 F

## 2023-10-16 PROCEDURE — 11042 DBRDMT SUBQ TIS 1ST 20SQCM/<: CPT

## 2023-10-16 NOTE — DISCHARGE INSTRUCTIONS
[] Return to work with restrictions     Physician:  [x] Dr. Arabella Zhou  [] Dr. Elier Mckeon  [] Dr. Romario Reynolds      Nurse Case Manger: [address] [facility] [address] Information: Should you experience any significant changes in your wound(s) or have questions about your wound care, please contact the  [address] [address] at [phone]. Our hours are Monday - Friday 8am - 4:30pm, closed all major holidays. If you need help with your wound outside these hours and cannot wait until we are again available, contact your PCP or go to the hospital emergency room. PLEASE NOTE: IF YOU ARE UNABLE TO OBTAIN WOUND SUPPLIES, CONTINUE TO USE THE SUPPLIES YOU HAVE AVAILABLE UNTIL YOU ARE ABLE TO REACH US. IT IS MOST IMPORTANT TO KEEP THE WOUND COVERED AT ALL TIMES.

## 2023-10-16 NOTE — WOUND CARE
[address] [place] [address] [facility]   Medical Staff Progress Note     Mary Kay Moss  MEDICAL RECORD NUMBER:  [id number]  AGE: 77 y.o. GENDER: male  : 1955  EPISODE DATE:  10/16/2023    Chief complaint and reason for visit:     Chief Complaint   Patient presents with    Wound Check     R & L leg      Patient presenting for follow up evaluation of wound(s) per chief complaint. Subjective: Symptoms, wound related issues, or other pertinent wound history since last visit: no new problems reported     Wound 22 Leg Left #1 Circumferential (Active)   Wound Image   10/16/23 1417   Wound Etiology Venous 10/16/23 1417   Dressing Status Other (Comment) 10/16/23 1417   Wound Cleansed Cleansed with saline 10/16/23 1417   Dressing/Treatment Honey gel/honey paste; Tubular bandage;Gauze dressing/dressing sponge;Dry dressing 23 1455   Wound Length (cm) 0.4 cm 10/16/23 1417   Wound Width (cm) 0.5 cm 10/16/23 1417   Wound Depth (cm) 0.1 cm 10/16/23 1417   Wound Surface Area (cm^2) 0.2 cm^2 10/16/23 1417   Change in Wound Size % (l*w) 99.7 10/16/23 1417   Wound Volume (cm^3) 0.02 cm^3 10/16/23 1417   Wound Healing % 100 10/16/23 1417   Post-Procedure Length (cm) 0.4 cm 10/16/23 1426   Post-Procedure Width (cm) 0.5 cm 10/16/23 1426   Post-Procedure Depth (cm) 0.1 cm 10/16/23 1426   Post-Procedure Surface Area (cm^2) 0.2 cm^2 10/16/23 1426   Post-Procedure Volume (cm^3) 0.02 cm^3 10/16/23 1426   Wound Assessment Granulation tissue;Slough 10/16/23 1417   Drainage Amount Moderate (25-50%) 10/16/23 1417   Drainage Description Serosanguinous 10/16/23 1417   Odor None 10/16/23 1417   Pia-wound Assessment Intact;Dry/flaky; Hypopigmented 10/16/23 1417   Margins Attached edges 10/16/23 1417   Wound Thickness Description not for Pressure Injury Full thickness 10/16/23 1417   Number of days: 308       Wound 23 Leg Right;Posterior #2 (Active)   Wound Image   10/16/23

## 2023-10-16 NOTE — WOUND CARE
Wound Clinic Physician Orders and Discharge Instructions  [address] [place] [address] S. [address]  Telephone: [phone]    NAME:  Mary Kay Moss  YOB: 1955  MEDICAL RECORD NUMBER:  [id number]  DATE:  10/16/2023      Return Appointment:  [] Dressing Supply Provider:   [] Home Healthcare:  [x] Return Appointment: 2 Week(s)  [] Nurse Visit:      [] Discharge from [facility]: [] Healed        [] Refer to Provider:         [] Consult    Follow-up Information:  [] Ordered Tests:   [x] Referrals: Dr. Dawit Guillory  [] Rx:   [] Other:       Wound Cleansing:   Do not scrub or use excessive force. Cleanse wound prior to applying a clean dressing with:  [] Normal Saline    [] Keep Wound Dry in Shower     [] Wound Cleanser   [x] Cleanse wound with Mild Soap & Water    [] Other:       Topical Treatments:  Do not apply lotions, creams, or ointments to wound bed unless directed. [] Apply moisturizing lotion to skin surrounding the wound prior to dressing change.  [] Apply antifungal ointment to skin surrounding the wound prior to dressing change.  [] Apply thin film of moisture barrier ointment to skin immediately around wound.   [] Apply Betadine to skin immediately around wound   [] Other:      Dressings:           Wound Location R Leg  [x] Apply Primary Dressing:       [] MediHoney Gel [] MediHoney Alginate  [] Calcium Alginate with Silver   [] Calcium Alginate without silver   [x] Collagen with silver  [] Collagen without Silver    [] Santyl with moist saline gauze     [] Hydrofera Blue (cut to size and moistened with normal saline)  [] Hydrofera Blue Ready (cut to size)      [] Normal Saline wet to dry  [] Betadine wet to dry    [] Hydrogel 1st  [] Mepitel     [] Bactroban/Mupirocin   [] Iodoform Packing Strip [] Plain Packing Strip   [] Skin Sub:   [] Other:       [x] Cover and Secure with:     [x] Gauze [x] Marysol [] Kerlix   [] Foam  []

## 2023-11-06 ENCOUNTER — HOSPITAL ENCOUNTER (OUTPATIENT)
Facility: HOSPITAL | Age: 68
Discharge: HOME | End: 2023-11-06
Attending: SPECIALIST
Payer: MEDICARE

## 2023-11-06 VITALS
RESPIRATION RATE: 22 BRPM | SYSTOLIC BLOOD PRESSURE: 119 MMHG | HEART RATE: 81 BPM | TEMPERATURE: 98.2 F | DIASTOLIC BLOOD PRESSURE: 66 MMHG | OXYGEN SATURATION: 98 %

## 2023-11-06 PROCEDURE — 11042 DBRDMT SUBQ TIS 1ST 20SQCM/<: CPT

## 2023-11-06 NOTE — DISCHARGE INSTRUCTIONS
Wound Clinic Physician Orders and Discharge Instructions  [address] [place] [address] S[address]  Telephone: [phone]    NAME:  Maya Méndez  YOB: 1955  MEDICAL RECORD NUMBER:  [id number]  DATE:  11/6/2023      Return Appointment:  [] Dressing Supply Provider:   [] Home Healthcare:  [x] Return Appointment: 2 Week(s)  [] Nurse Visit:      [] Discharge from [facility]: [] Healed        [] Refer to Provider:         [] Consult    Follow-up Information:  [] Ordered Tests:   [x] Referrals: Dr. Cherelle Leigh 11/7  [] Rx:   [] Other:       Wound Cleansing:   Do not scrub or use excessive force. Cleanse wound prior to applying a clean dressing with:  [] Normal Saline    [] Keep Wound Dry in Shower     [] Wound Cleanser   [x] Cleanse wound with Mild Soap & Water    [] Other:       Topical Treatments:  Do not apply lotions, creams, or ointments to wound bed unless directed. [] Apply moisturizing lotion to skin surrounding the wound prior to dressing change.  [] Apply antifungal ointment to skin surrounding the wound prior to dressing change.  [] Apply thin film of moisture barrier ointment to skin immediately around wound.   [] Apply Betadine to skin immediately around wound   [] Other:      Dressings:           Wound Location R Leg  [x] Apply Primary Dressing:       [] MediHoney Gel [] MediHoney Alginate  [] Calcium Alginate with Silver   [] Calcium Alginate without silver   [x] Collagen with silver  [] Collagen without Silver    [] Santyl with moist saline gauze     [] Hydrofera Blue (cut to size and moistened with normal saline)  [] Hydrofera Blue Ready (cut to size)      [] Normal Saline wet to dry  [] Betadine wet to dry    [] Hydrogel 1st  [] Mepitel     [] Bactroban/Mupirocin   [] Iodoform Packing Strip [] Plain Packing Strip   [] Skin Sub:   [] Other:       [x] Cover and Secure with:     [x]

## 2023-11-06 NOTE — WOUND CARE
Wound Clinic Physician Orders and Discharge Instructions  [address] [place] [address] S. [address]  Telephone: [phone]    NAME:  Carmelo Harden  YOB: 1955  MEDICAL RECORD NUMBER:  [id number]  DATE:  11/6/2023      Return Appointment:  [] Dressing Supply Provider:   [] Home Healthcare:  [x] Return Appointment: 2 Week(s)  [] Nurse Visit:      [] Discharge from [facility]: [] Healed        [] Refer to Provider:         [] Consult    Follow-up Information:  [] Ordered Tests:   [x] Referrals: Dr. Deep Leigh 11/7  [] Rx:   [] Other:       Wound Cleansing:   Do not scrub or use excessive force. Cleanse wound prior to applying a clean dressing with:  [] Normal Saline    [] Keep Wound Dry in Shower     [] Wound Cleanser   [x] Cleanse wound with Mild Soap & Water    [] Other:       Topical Treatments:  Do not apply lotions, creams, or ointments to wound bed unless directed. [] Apply moisturizing lotion to skin surrounding the wound prior to dressing change.  [] Apply antifungal ointment to skin surrounding the wound prior to dressing change.  [] Apply thin film of moisture barrier ointment to skin immediately around wound.   [] Apply Betadine to skin immediately around wound   [] Other:      Dressings:           Wound Location R Leg  [x] Apply Primary Dressing:       [] MediHoney Gel [] MediHoney Alginate  [] Calcium Alginate with Silver   [] Calcium Alginate without silver   [x] Collagen with silver  [] Collagen without Silver    [] Santyl with moist saline gauze     [] Hydrofera Blue (cut to size and moistened with normal saline)  [] Hydrofera Blue Ready (cut to size)      [] Normal Saline wet to dry  [] Betadine wet to dry    [] Hydrogel 1st  [] Mepitel     [] Bactroban/Mupirocin   [] Iodoform Packing Strip [] Plain Packing Strip   [] Skin Sub:   [] Other:       [x] Cover and Secure with:     [x]
Post-Procedure Surface Area (cm^2) 0.5 cm^2 11/06/23 1429   Post-Procedure Volume (cm^3) 0.1 cm^3 11/06/23 1429   Wound Assessment Granulation tissue;Slough 11/06/23 1420   Drainage Amount Moderate (25-50%) 11/06/23 1420   Drainage Description Serosanguinous 11/06/23 1420   Odor None 11/06/23 1420   Pia-wound Assessment Intact;Dry/flaky; Hypopigmented 11/06/23 1420   Margins Attached edges 11/06/23 1420   Wound Thickness Description not for Pressure Injury Full thickness 11/06/23 1420   Number of days: 329       Wound 07/24/23 Leg Right;Posterior #2 (Active)   Wound Image   11/06/23 1419   Wound Etiology Venous 11/06/23 1419   Dressing Status Other (Comment) 11/06/23 1419   Wound Cleansed Cleansed with saline 10/16/23 1418   Dressing/Treatment Collagen with Ag;Gauze dressing/dressing sponge;Dry dressing;Tubular bandage 10/16/23 1433   Wound Length (cm) 10 cm 11/06/23 1419   Wound Width (cm) 6 cm 11/06/23 1419   Wound Depth (cm) 0.2 cm 11/06/23 1419   Wound Surface Area (cm^2) 60 cm^2 11/06/23 1419   Change in Wound Size % (l*w) -142.42 11/06/23 1419   Wound Volume (cm^3) 12 cm^3 11/06/23 1419   Wound Healing % -385 11/06/23 1419   Post-Procedure Length (cm) 10 cm 11/06/23 1429   Post-Procedure Width (cm) 6 cm 11/06/23 1429   Post-Procedure Depth (cm) 0.2 cm 11/06/23 1429   Post-Procedure Surface Area (cm^2) 60 cm^2 11/06/23 1429   Post-Procedure Volume (cm^3) 12 cm^3 11/06/23 1429   Wound Assessment Granulation tissue;Slough 11/06/23 1419   Drainage Amount Moderate (25-50%) 11/06/23 1419   Drainage Description Serosanguinous 11/06/23 1419   Odor None 11/06/23 1419   Pia-wound Assessment Intact;Dry/flaky; Hypopigmented; Hyperkeratosis (callous) 11/06/23 1419   Margins Attached edges 11/06/23 1419   Wound Thickness Description not for Pressure Injury Full thickness 11/06/23 1419   Number of days: 105        Total Surface Area Debrided:  5 sq cm   Estimated Blood Loss: Minimal amount blood loss .   Hemostasis Achieved:

## 2023-11-27 ENCOUNTER — HOSPITAL ENCOUNTER (OUTPATIENT)
Facility: HOSPITAL | Age: 68
Discharge: HOME | End: 2023-11-27
Attending: SPECIALIST
Payer: MEDICARE

## 2023-11-27 VITALS
SYSTOLIC BLOOD PRESSURE: 130 MMHG | OXYGEN SATURATION: 94 % | TEMPERATURE: 97.6 F | DIASTOLIC BLOOD PRESSURE: 67 MMHG | HEART RATE: 79 BPM | RESPIRATION RATE: 20 BRPM

## 2023-11-27 PROCEDURE — 11042 DBRDMT SUBQ TIS 1ST 20SQCM/<: CPT

## 2023-11-27 NOTE — WOUND CARE
Wound Clinic Physician Orders and Discharge Instructions  [address] [place] [address] S. [address]  Telephone: [phone]    NAME:  Colleen Brock  YOB: 1955  MEDICAL RECORD NUMBER:  [id number]  DATE:  11/27/2023      Return Appointment:  [] Dressing Supply Provider:   [] Home Healthcare:  [x] Return Appointment: 2 Week(s)  [] Nurse Visit:      [] Discharge from [facility]: [] Healed        [] Refer to Provider:         [] Consult    Follow-up Information:  [] Ordered Tests:   [x] Referrals: Dr. Larissa Saleem  [] Rx:   [] Other:       Wound Cleansing:   Do not scrub or use excessive force. Cleanse wound prior to applying a clean dressing with:  [] Normal Saline    [] Keep Wound Dry in Shower     [] Wound Cleanser   [x] Cleanse wound with Mild Soap & Water    [] Other:       Topical Treatments:  Do not apply lotions, creams, or ointments to wound bed unless directed. [x] Apply moisturizing lotion to skin surrounding the wound prior to dressing change.  [] Apply antifungal ointment to skin surrounding the wound prior to dressing change.  [] Apply thin film of moisture barrier ointment to skin immediately around wound.   [] Apply Betadine to skin immediately around wound   [] Other:      Dressings:           Wound Location R Leg  [x] Apply Primary Dressing:       [] MediHoney Gel [] MediHoney Alginate  [] Calcium Alginate with Silver   [] Calcium Alginate without silver   [x] Collagen with silver  [] Collagen without Silver    [] Santyl with moist saline gauze     [] Hydrofera Blue (cut to size and moistened with normal saline)  [] Hydrofera Blue Ready (cut to size)      [] Normal Saline wet to dry  [] Betadine wet to dry    [] Hydrogel 1st  [] Mepitel     [] Bactroban/Mupirocin   [] Iodoform Packing Strip [] Plain Packing Strip   [] Skin Sub:   [] Other:       [x] Cover and Secure with:     [x] Gauze [x] Marysol [] Kerlix   [] Foam  []

## 2023-11-27 NOTE — WOUND CARE
[address] [facility]   Medical Staff Progress Note     Tai Alvarez  MEDICAL RECORD NUMBER:  [id number]  AGE: 77 y.o. GENDER: male  : 1955  EPISODE DATE:  2023    Chief complaint and reason for visit:   Bilateral calf wounds  Chief Complaint   Patient presents with    Wound Check     Right and Left legs      Patient presenting for follow up evaluation of wound(s) per chief complaint. Subjective: Symptoms, wound related issues, or other pertinent wound history since last visit: No new problems reported    Wound 22 Leg Left #1 Circumferential (Active)   Wound Image   23   Wound Etiology Venous 23   Dressing Status Other (Comment) 23   Wound Cleansed Cleansed with saline 23   Dressing/Treatment Collagen with Ag;Dry dressing;Tubular bandage 23   Wound Length (cm) 0.5 cm 23   Wound Width (cm) 0.9 cm 23   Wound Depth (cm) 0.1 cm 23   Wound Surface Area (cm^2) 0.45 cm^2 23   Change in Wound Size % (l*w) 99.33 23   Wound Volume (cm^3) 0.045 cm^3 23   Wound Healing % 99 23   Post-Procedure Length (cm) 0.5 cm 23   Post-Procedure Width (cm) 0.9 cm 23   Post-Procedure Depth (cm) 0.1 cm 23   Post-Procedure Surface Area (cm^2) 0.45 cm^2 23   Post-Procedure Volume (cm^3) 0.045 cm^3 23   Wound Assessment Granulation tissue;Slough 23   Drainage Amount Moderate (25-50%) 23   Drainage Description Serosanguinous 23   Odor None 23   Pia-wound Assessment Intact;Dry/flaky; Hypopigmented 23   Margins Attached edges 23   Wound Thickness Description not for Pressure Injury Full thickness 23 1406   Number of days: 350       Wound 23 Leg Right;Posterior #2 (Active)   Wound Image   23

## 2023-11-27 NOTE — DISCHARGE INSTRUCTIONS
Wound Clinic Physician Orders and Discharge Instructions  [address] [place], [place] [address] S. [address]  Telephone: [phone]    NAME:  Opal Winters  YOB: 1955  MEDICAL RECORD NUMBER:  [id number]  DATE:  11/27/2023      Return Appointment:  [] Dressing Supply Provider:   [] Home Healthcare:  [x] Return Appointment: 2 Week(s)  [] Nurse Visit:      [] Discharge from [facility]: [] Healed        [] Refer to Provider:         [] Consult    Follow-up Information:  [] Ordered Tests:   [x] Referrals: Dr. Cherie Tee  [] Rx:   [] Other:       Wound Cleansing:   Do not scrub or use excessive force. Cleanse wound prior to applying a clean dressing with:  [] Normal Saline    [] Keep Wound Dry in Shower     [] Wound Cleanser   [x] Cleanse wound with Mild Soap & Water    [] Other:       Topical Treatments:  Do not apply lotions, creams, or ointments to wound bed unless directed. [x] Apply moisturizing lotion to skin surrounding the wound prior to dressing change.  [] Apply antifungal ointment to skin surrounding the wound prior to dressing change.  [] Apply thin film of moisture barrier ointment to skin immediately around wound.   [] Apply Betadine to skin immediately around wound   [] Other:      Dressings:           Wound Location R Leg  [x] Apply Primary Dressing:       [] MediHoney Gel [] MediHoney Alginate  [] Calcium Alginate with Silver   [] Calcium Alginate without silver   [x] Collagen with silver  [] Collagen without Silver    [] Santyl with moist saline gauze     [] Hydrofera Blue (cut to size and moistened with normal saline)  [] Hydrofera Blue Ready (cut to size)      [] Normal Saline wet to dry  [] Betadine wet to dry    [] Hydrogel 1st  [] Mepitel     [] Bactroban/Mupirocin   [] Iodoform Packing Strip [] Plain Packing Strip   [] Skin Sub:   [] Other:       [x] Cover and Secure with:     [x] Gauze [x] Marysol [] Kerlix   [] Foam  []

## 2024-01-26 ENCOUNTER — HOSPITAL ENCOUNTER (EMERGENCY)
Facility: HOSPITAL | Age: 69
Discharge: HOME | End: 2024-01-27
Attending: STUDENT IN AN ORGANIZED HEALTH CARE EDUCATION/TRAINING PROGRAM
Payer: COMMERCIAL

## 2024-01-26 DIAGNOSIS — R07.9 CHEST PAIN, UNSPECIFIED TYPE: Primary | ICD-10-CM

## 2024-01-26 PROCEDURE — 93005 ELECTROCARDIOGRAM TRACING: CPT | Performed by: STUDENT IN AN ORGANIZED HEALTH CARE EDUCATION/TRAINING PROGRAM

## 2024-01-26 PROCEDURE — 99285 EMERGENCY DEPT VISIT HI MDM: CPT

## 2024-01-26 ASSESSMENT — PAIN - FUNCTIONAL ASSESSMENT: PAIN_FUNCTIONAL_ASSESSMENT: 0-10

## 2024-01-26 ASSESSMENT — PAIN SCALES - GENERAL: PAINLEVEL_OUTOF10: 10

## 2024-01-27 ENCOUNTER — APPOINTMENT (OUTPATIENT)
Facility: HOSPITAL | Age: 69
End: 2024-01-27
Payer: COMMERCIAL

## 2024-01-27 VITALS
WEIGHT: 315 LBS | RESPIRATION RATE: 13 BRPM | HEIGHT: 67 IN | BODY MASS INDEX: 49.44 KG/M2 | TEMPERATURE: 98.8 F | OXYGEN SATURATION: 97 % | DIASTOLIC BLOOD PRESSURE: 68 MMHG | HEART RATE: 77 BPM | SYSTOLIC BLOOD PRESSURE: 124 MMHG

## 2024-01-27 LAB
ALBUMIN SERPL-MCNC: 3 G/DL (ref 3.5–5)
ALBUMIN/GLOB SERPL: 0.8 (ref 1.1–2.2)
ALP SERPL-CCNC: 91 U/L (ref 45–117)
ALT SERPL-CCNC: 45 U/L (ref 12–78)
ANION GAP SERPL CALC-SCNC: 4 MMOL/L (ref 5–15)
AST SERPL W P-5'-P-CCNC: 68 U/L (ref 15–37)
BASOPHILS # BLD: 0 K/UL (ref 0–0.1)
BASOPHILS NFR BLD: 0 % (ref 0–1)
BILIRUB SERPL-MCNC: 1.3 MG/DL (ref 0.2–1)
BNP SERPL-MCNC: 488 PG/ML
BUN SERPL-MCNC: 19 MG/DL (ref 6–20)
BUN/CREAT SERPL: 19 (ref 12–20)
CA-I BLD-MCNC: 8.3 MG/DL (ref 8.5–10.1)
CHLORIDE SERPL-SCNC: 103 MMOL/L (ref 97–108)
CO2 SERPL-SCNC: 29 MMOL/L (ref 21–32)
CREAT SERPL-MCNC: 1 MG/DL (ref 0.7–1.3)
DIFFERENTIAL METHOD BLD: ABNORMAL
EKG ATRIAL RATE: 84 BPM
EKG ATRIAL RATE: 92 BPM
EKG DIAGNOSIS: NORMAL
EKG DIAGNOSIS: NORMAL
EKG P AXIS: 50 DEGREES
EKG P AXIS: 63 DEGREES
EKG P-R INTERVAL: 196 MS
EKG P-R INTERVAL: 218 MS
EKG Q-T INTERVAL: 356 MS
EKG Q-T INTERVAL: 378 MS
EKG QRS DURATION: 100 MS
EKG QRS DURATION: 102 MS
EKG QTC CALCULATION (BAZETT): 440 MS
EKG QTC CALCULATION (BAZETT): 446 MS
EKG R AXIS: -21 DEGREES
EKG R AXIS: -24 DEGREES
EKG T AXIS: 18 DEGREES
EKG T AXIS: 20 DEGREES
EKG VENTRICULAR RATE: 84 BPM
EKG VENTRICULAR RATE: 92 BPM
EOSINOPHIL # BLD: 0 K/UL (ref 0–0.4)
EOSINOPHIL NFR BLD: 0 % (ref 0–7)
ERYTHROCYTE [DISTWIDTH] IN BLOOD BY AUTOMATED COUNT: 14.9 % (ref 11.5–14.5)
FLUAV AG NPH QL IA: NEGATIVE
FLUBV AG NOSE QL IA: NEGATIVE
GLOBULIN SER CALC-MCNC: 3.8 G/DL (ref 2–4)
GLUCOSE SERPL-MCNC: 116 MG/DL (ref 65–100)
HCT VFR BLD AUTO: 36.8 % (ref 36.6–50.3)
HGB BLD-MCNC: 12.7 G/DL (ref 12.1–17)
IMM GRANULOCYTES # BLD AUTO: 0.1 K/UL (ref 0–0.04)
IMM GRANULOCYTES NFR BLD AUTO: 0 % (ref 0–0.5)
LYMPHOCYTES # BLD: 0.8 K/UL (ref 0.8–3.5)
LYMPHOCYTES NFR BLD: 6 % (ref 12–49)
MCH RBC QN AUTO: 26.8 PG (ref 26–34)
MCHC RBC AUTO-ENTMCNC: 34.5 G/DL (ref 30–36.5)
MCV RBC AUTO: 77.6 FL (ref 80–99)
MONOCYTES # BLD: 0.2 K/UL (ref 0–1)
MONOCYTES NFR BLD: 2 % (ref 5–13)
NEUTS SEG # BLD: 11.7 K/UL (ref 1.8–8)
NEUTS SEG NFR BLD: 92 % (ref 32–75)
NRBC # BLD: 0 K/UL (ref 0–0.01)
NRBC BLD-RTO: 0 PER 100 WBC
PLATELET # BLD AUTO: 283 K/UL (ref 150–400)
PMV BLD AUTO: 9.6 FL (ref 8.9–12.9)
POTASSIUM SERPL-SCNC: 4.1 MMOL/L (ref 3.5–5.1)
PROT SERPL-MCNC: 6.8 G/DL (ref 6.4–8.2)
RBC # BLD AUTO: 4.74 M/UL (ref 4.1–5.7)
SARS-COV-2 RDRP RESP QL NAA+PROBE: NOT DETECTED
SODIUM SERPL-SCNC: 136 MMOL/L (ref 136–145)
TROPONIN I SERPL HS-MCNC: 19 NG/L (ref 0–76)
TROPONIN I SERPL HS-MCNC: 20 NG/L (ref 0–76)
WBC # BLD AUTO: 12.8 K/UL (ref 4.1–11.1)

## 2024-01-27 PROCEDURE — 85025 COMPLETE CBC W/AUTO DIFF WBC: CPT

## 2024-01-27 PROCEDURE — 83880 ASSAY OF NATRIURETIC PEPTIDE: CPT

## 2024-01-27 PROCEDURE — 71045 X-RAY EXAM CHEST 1 VIEW: CPT

## 2024-01-27 PROCEDURE — 2580000003 HC RX 258: Performed by: STUDENT IN AN ORGANIZED HEALTH CARE EDUCATION/TRAINING PROGRAM

## 2024-01-27 PROCEDURE — 87635 SARS-COV-2 COVID-19 AMP PRB: CPT

## 2024-01-27 PROCEDURE — 93005 ELECTROCARDIOGRAM TRACING: CPT | Performed by: STUDENT IN AN ORGANIZED HEALTH CARE EDUCATION/TRAINING PROGRAM

## 2024-01-27 PROCEDURE — 84484 ASSAY OF TROPONIN QUANT: CPT

## 2024-01-27 PROCEDURE — 80053 COMPREHEN METABOLIC PANEL: CPT

## 2024-01-27 PROCEDURE — 36415 COLL VENOUS BLD VENIPUNCTURE: CPT

## 2024-01-27 PROCEDURE — 96374 THER/PROPH/DIAG INJ IV PUSH: CPT

## 2024-01-27 PROCEDURE — 87804 INFLUENZA ASSAY W/OPTIC: CPT

## 2024-01-27 PROCEDURE — 6360000002 HC RX W HCPCS: Performed by: STUDENT IN AN ORGANIZED HEALTH CARE EDUCATION/TRAINING PROGRAM

## 2024-01-27 RX ORDER — IPRATROPIUM BROMIDE AND ALBUTEROL SULFATE 2.5; .5 MG/3ML; MG/3ML
1 SOLUTION RESPIRATORY (INHALATION)
Status: ACTIVE | OUTPATIENT
Start: 2024-01-27 | End: 2024-01-27

## 2024-01-27 RX ORDER — MORPHINE SULFATE 4 MG/ML
4 INJECTION INTRAVENOUS ONCE
Status: DISCONTINUED | OUTPATIENT
Start: 2024-01-27 | End: 2024-01-27 | Stop reason: HOSPADM

## 2024-01-27 RX ADMIN — METHYLPREDNISOLONE SODIUM SUCCINATE 125 MG: 125 INJECTION INTRAMUSCULAR; INTRAVENOUS at 01:57

## 2024-01-27 ASSESSMENT — PAIN - FUNCTIONAL ASSESSMENT: PAIN_FUNCTIONAL_ASSESSMENT: NONE - DENIES PAIN

## 2024-01-27 ASSESSMENT — PAIN SCALES - GENERAL
PAINLEVEL_OUTOF10: 0
PAINLEVEL_OUTOF10: 6

## 2024-01-27 ASSESSMENT — HEART SCORE: ECG: 0

## 2024-01-27 NOTE — ED PROVIDER NOTES
fluticasone-umeclidin-vilant            5.   Current Discharge Medication List          Procedures, Critical Care, & Clinical Tools   Performed by: Alejandro Miranda MD  Procedures     EKG interpretation (Preliminary interpretation by me):  Rhythm: normal sinus rhythm; and regular . Rate (approx.): 92.  Axis: R  AK interval: normal;  QRS interval: iRBBB  ST/T wave: normal;      CRITICAL CARE DOCUMENTATION  NOT MET: Critical care billing criteria and/or time were NOT met.      HEART SCORE  History: 0  EC  Patient Age: 1  Risk Factors: 1  Troponin: 0  Heart Score Total: 3     Management   Scores 0-3: 0.9-1.7% risk of adverse cardiac event. In the HEART Score study, these patients were discharged (0.99% in the retrospective study, 1.7% in the prospective study)   Scores 4-6: 12-16.6% risk of adverse cardiac event. In the HEART Score study, these patients were admitted to the hospital. (11.6% retrospective, 16.6% prospective)   Scores ?7: 50-65% risk of adverse cardiac event. In the HEART Score study, these patients were candidates for early invasive measures. (65.2% retrospective, 50.1% prospective)   A MACE (Major Adverse Cardiac Event) was defined as all-cause mortality, myocardial infarction, or coronary revascularization.    Original/Primary Reference  Michelle [place] Kelder JC. Chest pain in the emergency room: value of the HEART score. Neth Heart J. 2008;16(6):191-6.   Validation  Renny Ventura, Ez ALEXANDER, et al. A prospective validation of the HEART score for chest pain patients at the emergency department. Int J Cardiol. 2013;168(3):2153-8.   Renny Ventura, Ez ALEXANDER, et al. Chest pain in the emergency room: a multicenter validation of the HEART Score. Crit Pathw Cardiol. 2010;9(3):164-9.  Maria Teresa SUREKHA, Kwame RF, Jerrica HULL, et al. The HEART Pathway Randomized Controlled Trial One Year Outcomes. Acad Emerg Med. 2018;        Results, Consults, Medications     Consults:  None   Labs:  Recent Results

## 2024-01-27 NOTE — DISCHARGE INSTRUCTIONS
unable to reach your usual health care provider, you should return to the Emergency Department. We are available 24 hours a day.     Please take your discharge instructions with you when you go to your follow-up appointment.     If a prescription has been provided, please have it filled as soon as possible to prevent a delay in treatment. Read the entire medication instruction sheet provided to you by the pharmacy. If you have any questions or reservations about taking the medication due to side effects or interactions with other medications, please call your primary care physician or contact the ER to speak with the charge nurse.     Make an appointment with your family doctor or the physician you were referred to for follow-up of this visit as instructed on your discharge paperwork, as this is a mandatory follow-up. Return to the ER if you are unable to be seen or if you are unable to be seen in a timely manner.    If you have any problem arranging the follow-up visit, contact the Emergency Department immediately.

## 2024-01-27 NOTE — ED TRIAGE NOTES
Per EMS: pt c/o chest pain and SOB that started a couple of hours ago    0.4mg nitro given with EMS  324mg ASA given with EMS

## 2024-02-12 ENCOUNTER — HOSPITAL ENCOUNTER (EMERGENCY)
Facility: HOSPITAL | Age: 69
Discharge: HOME | End: 2024-02-12
Attending: STUDENT IN AN ORGANIZED HEALTH CARE EDUCATION/TRAINING PROGRAM
Payer: MEDICARE

## 2024-02-12 VITALS
RESPIRATION RATE: 18 BRPM | OXYGEN SATURATION: 97 % | HEIGHT: 67 IN | TEMPERATURE: 98.1 F | HEART RATE: 61 BPM | DIASTOLIC BLOOD PRESSURE: 79 MMHG | BODY MASS INDEX: 49.44 KG/M2 | SYSTOLIC BLOOD PRESSURE: 158 MMHG | WEIGHT: 315 LBS

## 2024-02-12 DIAGNOSIS — Z51.89 VISIT FOR WOUND CHECK: Primary | ICD-10-CM

## 2024-02-12 PROCEDURE — 99283 EMERGENCY DEPT VISIT LOW MDM: CPT

## 2024-02-12 NOTE — ED PROVIDER NOTES
[facility] EMERGENCY DEPT  EMERGENCY DEPARTMENT HISTORY AND PHYSICAL EXAM      Date: 2/12/2024  Patient Name: Yessy Puckett  MRN: [id number]  Birthdate 1955  Date of evaluation: 2/12/2024  Provider: Tre Mary MD   Note Started: 4:37 PM EST 2/12/24    HISTORY OF PRESENT ILLNESS     Chief Complaint   Patient presents with    Leg Pain       History Provided By: Patient    HPI: Yessy Puckett is a 68 y.o. male with PMH of COPD, DM, HTN, FELICITAS, morbid obesity, chronic bilateral venous stasis who has chronic wound of the left lower extremity being followed by the wound clinic comes to the ED with an episode of bleeding.  He reports history of chronic leg pain that has unchanged from before.  Bleeding has been controlled prior to arrival to the ED.  Patient denies any trauma to his leg.  There is no numbness or weakness.  There is no change in color from baseline.  He otherwise report that he has been in his normal state of health without any other concerns or symptoms today that would like to address.    PAST MEDICAL HISTORY   Past Medical History:  Past Medical History:   Diagnosis Date    Arthritis     Arthritis     Chronic obstructive pulmonary disease (HCC)     COPD (chronic obstructive pulmonary disease) (HCC)     Diabetes (HCC)     Diabetes (HCC)     HTN (hypertension)     Hypertension     Sleep apnea     Sleep apnea        Past Surgical History:  Past Surgical History:   Procedure Laterality Date    GI      HERNIA REPAIR         Family History:  Family History   Problem Relation Age of Onset    Hypertension Sister     Hypertension Sister     Hypertension Father     Hypertension Mother     Hypertension Child        Social History:  Social History     Tobacco Use    Smoking status: Never    Smokeless tobacco: Never   Substance Use Topics    Alcohol use: Never    Drug use: Never       Allergies:  No Known Allergies    PCP: Catherine Strauss MD    Current Meds:   No current facility-administered medications for this

## 2024-02-21 ENCOUNTER — HOSPITAL ENCOUNTER (OUTPATIENT)
Facility: HOSPITAL | Age: 69
Discharge: HOME | End: 2024-02-21
Attending: RADIOLOGY
Payer: MEDICARE

## 2024-02-21 VITALS
RESPIRATION RATE: 18 BRPM | OXYGEN SATURATION: 97 % | DIASTOLIC BLOOD PRESSURE: 83 MMHG | TEMPERATURE: 97.9 F | HEART RATE: 62 BPM | SYSTOLIC BLOOD PRESSURE: 156 MMHG

## 2024-02-21 DIAGNOSIS — L97.812 NON-PRESSURE CHRONIC ULCER OF OTHER PART OF RIGHT LOWER LEG WITH FAT LAYER EXPOSED (HCC): ICD-10-CM

## 2024-02-21 DIAGNOSIS — L97.822 NON-PRESSURE CHRONIC ULCER OF OTHER PART OF LEFT LOWER LEG WITH FAT LAYER EXPOSED (HCC): Primary | ICD-10-CM

## 2024-02-21 PROCEDURE — 11042 DBRDMT SUBQ TIS 1ST 20SQCM/<: CPT

## 2024-02-21 PROCEDURE — 11045 DBRDMT SUBQ TISS EACH ADDL: CPT

## 2024-02-21 PROCEDURE — 99214 OFFICE O/P EST MOD 30 MIN: CPT

## 2024-02-21 PROCEDURE — 99213 OFFICE O/P EST LOW 20 MIN: CPT

## 2024-02-21 NOTE — WOUND CARE
Unna Boot Application   Below Knee    NAME:  Yessy Puckett  YOB: 1955  MEDICAL RECORD NUMBER:  [id number]  DATE:  2/21/2024    Unna boot: Appied primary and secondary dressing as ordered.  Applied Unna roll from toes to knee overlapping each time.   Applied ace wrap or coban from toes to below the knee.   Secured with tape and/or metal clips covered with tape.   Instructed patient/caregiver to keep dressing dry and intact. DO NOT REMOVE DRESSING.   Instructed pt/family/caregiver to report excessive draining, loose bandage, wet dressing, severe pain or tingling in toes.  Applied Unna Boot dressing below the knee to right lower leg.  Applied Unna Boot dressing below the knee to left lower leg.    Unna Boot(s) were applied per  Guidelines.     Electronically signed by Karen Cifuentes RN on 2/21/2024 at 1:45 PM    
Information: Should you experience any significant changes in your wound(s) or have questions about your wound care, please contact the Wound Center at [phone]. Our hours are Monday - Friday 8am - 4:30pm, closed all major holidays. If you need help with your wound outside these hours and cannot wait until we are again available, contact your PCP or go to the hospital emergency room.     PLEASE NOTE: IF YOU ARE UNABLE TO OBTAIN WOUND SUPPLIES, CONTINUE TO USE THE SUPPLIES YOU HAVE AVAILABLE UNTIL YOU ARE ABLE TO REACH US. IT IS MOST IMPORTANT TO KEEP THE WOUND COVERED AT ALL TIMES.

## 2024-02-21 NOTE — DISCHARGE INSTRUCTIONS
Angélica Rodriguez RN  Registered Nurse     Wound Care      Signed     Date of Service: 2/21/2024  1:00 PM     Signed                          Wound Clinic Physician Orders and Discharge Instructions  [facility]  [facility] [address]  [place] [address]  Telephone: [phone]     FAX [phone]     NAME:  Yessy Puckett  YOB: 1955  MEDICAL RECORD NUMBER:  [id number]  DATE:  2/21/2024        Return Appointment:  [x] Dressing Supply Provider: HENRY   [] Home Healthcare:  [x] Return Appointment: 1 Week(s)  [] Nurse Visit:       [] Discharge from [facility]: [] Healed        [] Refer to Provider:         [] Consult     Follow-up Information:  [x] Ordered Tests: PVR TO BE DONE AT [facility]- PLEASE CALL [phone] TO SCHEDULE  [x] Referrals: DR GONZALES- PATIENT TO SCHEDULE A FOLLOW UP APPOINTMENT  [] Rx:   [] Would Culture:  [] Other:         Wound Cleansing:   Do not scrub or use excessive force.  Cleanse wound prior to applying a clean dressing with:  [x] Normal Saline OR    [x] Cleanse wound with Mild Soap & Water     [] Wound Cleanser   [] Keep Wound Dry in Shower  [] Wear a cast cover to shower  [] Other:        Topical Treatments:  Do not apply lotions, creams, or ointments to wound bed unless directed.   [x] Apply moisturizing lotion to skin surrounding the wound prior to dressing change.  [] Apply antifungal ointment to skin surrounding the wound prior to dressing change.  [] Apply thin film of moisture barrier ointment to skin immediately around wound.  [] Apply Betadine to skin immediately around wound   [] Apply Skin Prep to skin immediately around wound  [] Other:                 Dressings:                  Wound Location LEFT  LEG             [x] Apply Primary Dressing:                                          [] MediHoney Gel      [] MediHoney Alginate  [x] Calcium Alginate with Silver   [] Calcium Alginate without silver              [] Collagen with silver

## 2024-02-24 NOTE — CONSULTS
[facility]   History and Physical Note   Referring Provider: Catherine Strauss MD  Reason for Referral:  Non healing venous wounds in bilateral lower leg    Yessy Pucektt  MEDICAL RECORD NUMBER:  [id number]  AGE: 68 y.o.   GENDER: male  : 1955  EPISODE DATE:  2024    Chief complaint and reason for visit:     Chief Complaint   Patient presents with bilateral lower leg venous wound    Wound Check     Bilateral legs         HISTORY of PRESENT ILLNESS TIMI Puckett is a 68 y.o. male who presents today for an initial evaluation of a wound/ulcer. Patient is new to me but not to the wound center . Wound duration:  unclear as he was to have returned for further wound management back in  .    History of Wound Context:    Mr Yessy Puckett is a 69 yo male with history of COPD, DM2, HTN, morbid obesity, and chronic bilateral venous stasis and non healing venous ulcers.  Patient's bilateral venous wounds have been managed by Dr Griffin, with patient last seen in the [facility] on 2013 for dressing changes.  He had failed to return for follow up.      Most recently, the patient went to the ER with complaints of bleeding from a new left lower leg wound, claiming it started spontaneously.  Bleeding has stopped but is referred here for wound care of the left lower leg as well as wounds in the right leg..  There has been no trauma, falls rash, itch.  Denies any fever or chills.     Patient had been recommended to see Dr Albarado in the past.  He indicated he has an appointment to see him in 2 weeks.  Patient denies smoking. Is diabetic ; last A1c - is unknown.    Pertinent associated symptoms: drainage     PAST MEDICAL HISTORY        Diagnosis Date    Arthritis     Arthritis     Chronic obstructive pulmonary disease (HCC)     COPD (chronic obstructive pulmonary disease) (HCC)     Diabetes (HCC)     Diabetes (HCC)     HTN (hypertension)     Hypertension     Sleep

## 2024-02-28 ENCOUNTER — HOSPITAL ENCOUNTER (OUTPATIENT)
Facility: HOSPITAL | Age: 69
Discharge: HOME | End: 2024-02-28
Attending: RADIOLOGY
Payer: MEDICARE

## 2024-02-28 VITALS
HEART RATE: 70 BPM | RESPIRATION RATE: 20 BRPM | TEMPERATURE: 97.2 F | DIASTOLIC BLOOD PRESSURE: 65 MMHG | SYSTOLIC BLOOD PRESSURE: 142 MMHG | OXYGEN SATURATION: 96 %

## 2024-02-28 PROCEDURE — 11042 DBRDMT SUBQ TIS 1ST 20SQCM/<: CPT

## 2024-02-28 NOTE — WOUND CARE
Unna Boot Application   Below Knee    NAME:  Yessy Puckett  YOB: 1955  MEDICAL RECORD NUMBER:  [id number]  DATE:  2/28/2024    Unna boot: Applied moisturizing agent to dry skin as needed.   Appied primary and secondary dressing as ordered.  Applied Unna roll from toes to knee overlapping each time.   Applied ace wrap or coban from toes to below the knee.   Secured with tape and/or metal clips covered with tape.   Instructed patient/caregiver to keep dressing dry and intact. DO NOT REMOVE DRESSING.   Instructed pt/family/caregiver to report excessive draining, loose bandage, wet dressing, severe pain or tingling in toes.  Applied Unna Boot dressing below the knee to right lower leg.  Applied Unna Boot dressing below the knee to left lower leg.    Unna Boot(s) were applied per  Guidelines.     Electronically signed by Layne Blake LPN on 2/28/2024 at 1:45 PM    
dry    [] Hydrogel  [] Mepitel     [] Bactroban/Mupirocin   [] Iodoform Packing Strip [] Plain Packing Strip   [] Skin Sub:   [] Other:      [x] Cover and Secure with:     [x] Gauze [] Marysol [] Kerlix   [] Zetuvit Plus Silicone Border or Foam Border [x] Super Absorbant [] ABD     [] Ace Wrap [] Other:    Limit contact of tape with skin.    [x] Change dressing: [] Daily    [] Every Other Day   [] Twice per week   [] Three times per week   [] Once a week [x] Do Not Change Dressing   [] Other:      Dressings:           Wound Location RIGHT LEG    [x] Apply Primary Dressing:       [] MediHoney Gel [] MediHoney Alginate  [x] Calcium Alginate with Silver   [] Calcium Alginate without silver   [] Collagen with silver [] Collagen without Silver    [] Santyl with moist saline gauze     [] Hydrofera Blue (cut to size and moistened with normal saline)  [] Hydrofera Blue Ready (cut to size)      [] Normal Saline wet to dry  [] Betadine wet to dry    [] Hydrogel  [] Mepitel     [] Bactroban/Mupirocin   [] Iodoform Packing Strip [] Plain Packing Strip   [] Skin Sub:   [] Other:      [x] Cover and Secure with:     [x] Gauze [] Marysol [] Kerlix   [] Zetuvit Plus Silicone Border or Foam Border [x] Super Absorbant [] ABD     [] Ace Wrap [] Other:    Limit contact of tape with skin.    [x] Change dressing: [] Daily    [] Every Other Day   [] Twice per week   [] Three times per week   [] Once a week [x] Do Not Change Dressing   [] Other:     Negative Pressure:           Wound Location:   [] Pressure @   mm/Hg  []Continuous []Intermittent   [] Black Foam  [] White Foam [] Bridge  [] Change dressing 3 times per week   [] Skin Prep to patricia-wound  [] Other:     Pressure Relief:  [] When sitting, shift position or do seat lifts every 15 minutes.  [] Wheelchair cushion [] Specialty Bed/Mattress  [] Turn every 2 hours when in bed.  Avoid direct pressure on wound site.  Limit side lying to 30 degree tilt.  Limit HOB elevation to 30 degrees.  [] 
side lying to 30 degree tilt.  Limit HOB elevation to 30 degrees.  [] Other     Edema Control:  Apply: [] Compression Stocking:   mmHg  []Right Leg []Left Leg   [] Tubigrip []Right Leg Double Layer []Left Leg Double Layer      []Right Leg Single Layer []Left Leg Single Layer      [] Elevate leg(s) above the level of the heart when sitting.    [] Avoid prolonged standing in one place.     Compression:  Apply: [x] Compression Wrap to [x]RightLeg [x]Left Leg    []  Coflex [] Coflex Lite  [x] Unna with Calamine     [x] Do not get leg(s) with wrap wet. If wrap becomes too tight, call the wound center and remove wrap from leg by unrolling each layer.   [x] Elevate leg(s) above the level of the heart when sitting.    [x] Avoid prolonged standing in one place.    Off-Loading:   [] Off-loading when: [] walking  [] in bed [] sitting  [] Total non-weight bearing  [] Right Leg  [] Left Leg   [] Assistive Device [] Walker [] Cane  [] Wheelchair  [] Crutches   [] Surgical shoe    [] Wedge Shoe  [] Foam Boot(s)  [] Knee Scooter    [] Cast Boot [] CROW Boot     [] Diabetic Shoes    [] Offloading heel boot  [] Other:     Contact Cast:  Apply: [] Total Contact Cast Applied in Clinic to []RightLeg []Left Leg   [] Do not get cast wet.  Contact wound center or go to emergency room if there is a foul odor or becomes uncomfortable due to feeling tight or swelling.  Do not use objects inside of cast to scratch.      Dietary:  [x] Diet as tolerated: [] Diabetic Diet: [] No Added Salt:  [x] Increase Protein: [] Other:     Activity:  [x] Activity as tolerated  [] Patient has no activity restrictions      [] Strict Bedrest   [] Remain off Work:       [] May return to full duty work                                    [] Return to work with restrictions:     Physician:  [] Dr. Jazlyn Ambrosio  [] Dr. Stacie Atkinson   [] Dr. Gladys Pickens  [] Cesario Pace PA-C  [] Dr. Sesar Ford  [] Dr. Jose Hurst  [] Dr. Ashley Ziegler  [x] Dr. ANDRES

## 2024-02-28 NOTE — PROGRESS NOTES
[facility]   Wound Care and [facility] Therapy Center   Medical Staff Progress Note     Yessy Puckett  MEDICAL RECORD NUMBER:  [id number]  AGE: 68 y.o.   GENDER: male  : 1955  EPISODE DATE:  2024    Chief complaint and reason for visit:     Chief Complaint   Patient presents for follow up of his bilateral lower leg venous wounds    Wound Check     Bilateral legs      Patient presenting for follow up evaluation of wound(s) per chief complaint.      Subjective and ROS:      Mr Yessy Pucktet is a 67 yo male with history of COPD, DM2, HTN, morbid obesity, and chronic bilateral venous stasis and non healing venous ulcers.  Patient's bilateral venous wounds have been managed by Dr Griffin, with patient last seen in the [facility] on 2013 for dressing changes.  He had failed to return for follow up.       Most recently, the patient went to the ER with complaints of bleeding from a new left lower leg wound, claiming it started spontaneously.  Bleeding has stopped but is referred here for wound care of the left lower leg as well as wounds in the right leg..  There has been no trauma, falls rash, itch.  Denies any fever or chills.      Patient had been recommended to see Dr Albarado in the past.  He indicated he has an appointment to see him in 2 weeks.  Patient denies smoking. Is diabetic ; last A1c - is unknown.    2024-  On follow up, Mr Puckett states he has not have chance to contact Per but states he will shortly.  He feels his wounds are improved, joshua on the left.  The right still drains and more sensitive, but not painful.  Has no fever or chills.    History of Wound Context: Per original history and physical on this patient. Changes in history since last evaluation: none    Medical Decision Making:     Problem List Items Addressed This Visit    None      Wounds and Treatment Plan:  Left lower leg ulcers - clusters of open wounds circumferential have

## 2024-02-28 NOTE — DISCHARGE INSTRUCTIONS
Wound Clinic Physician Orders and Discharge Instructions  [facility]  [address], [address]  [place] [address]  Telephone: [phone]     FAX [phone]    NAME:  Yessy Puckett  YOB: 1955  MEDICAL RECORD NUMBER:  [id number]  DATE:  2/28/2024      Return Appointment:  [] Dressing Supply Provider:   [] Home Healthcare:  [x] Return Appointment:  4 Week(s) [facility]- TUESDAY  [x] Nurse Visit:  FOR THE NEXT 3 WEEKS ON TUESDAYS    [] Discharge from [facility]: [] Healed        [] Refer to Provider:         [] Consult    Follow-up Information:  [x] Ordered Tests: PVR TO BE DONE AT [facility]- PLEASE CALL [phone] TO SCHEDULE  [x] Referrals: DR GONZALES- PATIENT TO SCHEDULE A FOLLOW UP APPOINTMENT  [] Rx:   [] Would Culture:  [] Other:       Wound Cleansing:   Do not scrub or use excessive force.  Cleanse wound prior to applying a clean dressing with:  [x] Normal Saline OR   [x] Cleanse wound with Mild Soap & Water     [] Wound Cleanser   [] Keep Wound Dry in Shower  [] Wear a cast cover to shower  [] Other:       Topical Treatments:  Do not apply lotions, creams, or ointments to wound bed unless directed.   [x] Apply moisturizing lotion to skin surrounding the wound prior to dressing change.  [] Apply antifungal ointment to skin surrounding the wound prior to dressing change.  [] Apply thin film of moisture barrier ointment to skin immediately around wound.  [] Apply Betadine to skin immediately around wound   [] Apply Skin Prep to skin immediately around wound  [] Other:      Dressings:           Wound Location LEFT  LEG    [x] Apply Primary Dressing:       [] MediHoney Gel [] MediHoney Alginate  [x] Calcium Alginate with Silver   [] Calcium Alginate without silver   [x] Collagen with silver [] Collagen without Silver    [] Santyl with moist saline gauze     [] Hydrofera Blue (cut to size and moistened with normal saline)  [] Hydrofera Blue Ready (cut to size)

## 2024-03-12 ENCOUNTER — HOSPITAL ENCOUNTER (OUTPATIENT)
Facility: HOSPITAL | Age: 69
Discharge: HOME | End: 2024-03-12
Attending: RADIOLOGY
Payer: MEDICARE

## 2024-03-12 VITALS
TEMPERATURE: 97.3 F | RESPIRATION RATE: 20 BRPM | SYSTOLIC BLOOD PRESSURE: 103 MMHG | OXYGEN SATURATION: 97 % | DIASTOLIC BLOOD PRESSURE: 64 MMHG | HEART RATE: 65 BPM

## 2024-03-12 PROCEDURE — 29580 STRAPPING UNNA BOOT: CPT

## 2024-03-12 NOTE — WOUND CARE
Unna Boot Application   Below Knee    NAME:  Yessy Puckett  YOB: 1955  MEDICAL RECORD NUMBER:  [id number]  DATE:  3/12/2024    Unna boot: Applied moisturizing agent to dry skin as needed.   Appied primary and secondary dressing as ordered.  Applied Unna roll from toes to knee overlapping each time.   Applied ace wrap or coban from toes to below the knee.   Secured with tape and/or metal clips covered with tape.   Instructed patient/caregiver to keep dressing dry and intact. DO NOT REMOVE DRESSING.   Instructed pt/family/caregiver to report excessive draining, loose bandage, wet dressing, severe pain or tingling in toes.  Applied Unna Boot dressing below the knee to right lower leg.  Applied Unna Boot dressing below the knee to left lower leg.    Unna Boot(s) were applied per  Guidelines.     Electronically signed by Layne Blake LPN on 3/12/2024 at 1:20 PM

## 2024-03-19 ENCOUNTER — HOSPITAL ENCOUNTER (OUTPATIENT)
Facility: HOSPITAL | Age: 69
Discharge: HOME | End: 2024-03-19
Attending: RADIOLOGY
Payer: MEDICARE

## 2024-03-19 VITALS
HEART RATE: 57 BPM | TEMPERATURE: 97.3 F | DIASTOLIC BLOOD PRESSURE: 73 MMHG | RESPIRATION RATE: 20 BRPM | SYSTOLIC BLOOD PRESSURE: 112 MMHG

## 2024-03-19 PROCEDURE — 29580 STRAPPING UNNA BOOT: CPT

## 2024-03-19 ASSESSMENT — PAIN SCALES - GENERAL: PAINLEVEL_OUTOF10: 0

## 2024-03-19 NOTE — WOUND CARE
Unna Boot Application   Below Knee    NAME:  Yessy uPckett  YOB: 1955  MEDICAL RECORD NUMBER:  [id number]  DATE:  3/19/2024    Unna boot: Applied moisturizing agent to dry skin as needed.   Appied primary and secondary dressing as ordered.  Applied Unna roll from toes to knee overlapping each time.   Applied ace wrap or coban from toes to below the knee.   Secured with tape and/or metal clips covered with tape.   Instructed patient/caregiver to keep dressing dry and intact. DO NOT REMOVE DRESSING.   Instructed pt/family/caregiver to report excessive draining, loose bandage, wet dressing, severe pain or tingling in toes.  Applied Unna Boot dressing below the knee to right lower leg.  Applied Unna Boot dressing below the knee to left lower leg.    Unna Boot(s) were applied per  Guidelines.     Electronically signed by Angélica Rodriguez RN on 3/19/2024 at 12:36 PM

## 2024-03-19 NOTE — WOUND CARE
Wound Care Nurse Visit Note        Yessy Puckett  Age: 68 y.o.  YOB: 1955    Today's Date:  3/19/2024    Patient presents today per physician order for a scheduled nurse visit.    Orders to follow same dressings as previous visit and to report any changes in conditions if noted.    There were no vitals taken for this visit.      Wound Assessment:      Wound 12/12/22 Leg Left #1 Circumferential (Active)   Wound Image   02/28/24 1259   Wound Etiology Venous 03/19/24 1236   Dressing Status Other (Comment) 03/19/24 1236   Wound Cleansed Cleansed with saline 03/19/24 1236   Dressing/Treatment Collagen;Alginate with Ag;Other (comment) 03/19/24 1236   Wound Length (cm) 2.4 cm 03/19/24 1236   Wound Width (cm) 3 cm 03/19/24 1236   Wound Depth (cm) 0.1 cm 03/19/24 1236   Wound Surface Area (cm^2) 7.2 cm^2 03/19/24 1236   Change in Wound Size % (l*w) 89.33 03/19/24 1236   Wound Volume (cm^3) 0.72 cm^3 03/19/24 1236   Wound Healing % 89 03/19/24 1236   Post-Procedure Length (cm) 2.4 cm 02/28/24 1315   Post-Procedure Width (cm) 3 cm 02/28/24 1315   Post-Procedure Depth (cm) 0.1 cm 02/28/24 1315   Post-Procedure Surface Area (cm^2) 7.2 cm^2 02/28/24 1315   Post-Procedure Volume (cm^3) 0.72 cm^3 02/28/24 1315   Wound Assessment Slough;Granulation tissue 03/19/24 1236   Drainage Amount Moderate (25-50%) 03/19/24 1236   Drainage Description Serosanguinous 03/19/24 1236   Odor None 03/19/24 1236   Pia-wound Assessment Intact 03/19/24 1236   Margins Epibole (rolled edges) 03/19/24 1236   Wound Thickness Description not for Pressure Injury Full thickness 03/19/24 1236   Number of days: 462       Wound 07/24/23 Leg Right;Posterior #2 (Active)   Wound Image   02/28/24 1257   Wound Etiology Venous 03/19/24 1237   Dressing Status Other (Comment) 03/19/24 1237   Wound Cleansed Cleansed with saline 03/19/24 1237   Dressing/Treatment Collagen;Alginate with Ag;Other (comment)

## 2024-03-28 ENCOUNTER — HOSPITAL ENCOUNTER (OUTPATIENT)
Facility: HOSPITAL | Age: 69
Discharge: HOME | End: 2024-03-28
Attending: RADIOLOGY
Payer: MEDICARE

## 2024-03-28 VITALS
HEIGHT: 67 IN | WEIGHT: 315 LBS | TEMPERATURE: 97.6 F | BODY MASS INDEX: 49.44 KG/M2 | DIASTOLIC BLOOD PRESSURE: 92 MMHG | HEART RATE: 75 BPM | SYSTOLIC BLOOD PRESSURE: 159 MMHG | RESPIRATION RATE: 20 BRPM

## 2024-03-28 PROCEDURE — 11042 DBRDMT SUBQ TIS 1ST 20SQCM/<: CPT

## 2024-03-28 NOTE — DISCHARGE INSTRUCTIONS
Hydrogel  [] Mepitel  [] Xeroform    [] Adaptic   [] Iodoform Packing Strip [] Plain Packing Strip   [] Skin Sub:   [] Other:      [x] Cover and Secure with:     [x] Gauze             [] Mraysol  [] Kerlix   [] Foam Piece [] Foam Heel Cup     [] Super Absorbant   [] ABD  [] Ace Wrap             [x] Bordered Gauze Dressing      [] Mepilex Foam Dressing      [] Surgilast     [] Other:    Limit contact of tape with skin.    [x] Change dressing: [] Daily    [] Every Other Day   [] Twice per week   [x] Three times per week   [] Once a week [] Do Not Change Dressing   [] Other:       Skin Sub:           Wound Location:   [] Implanted on:    [] Only change outer dressing  [] Do not change dressing until:    [] Other:     Negative Pressure:           Wound Location:   [] Pressure @  mm/Hg  []Continuous []Intermittent   [] Black Foam  [] White Foam [] Bridge  [] Change dressing 3 times per week     [] Other:     Pressure Relief:  [] When sitting, shift position or do seat lifts every 15 minutes.  [] Wheelchair cushion [] Specialty Bed/Mattress  [] Turn every 2 hours when in bed.  Avoid direct pressure on wound site.  Limit side lying to 30 degree tilt.  Limit HOB elevation to 30 degrees.  [] Other:     Edema Control:  Apply: [] Compression Stocking:  mmHg  []Right Leg []Left Leg   [x] Tubigrip [x]Right Leg Double Layer [x]Left Leg Double Layer      []Right Leg Single Layer []Left Leg Single Layer   [] Ace Wrap []Right Leg  []Left Leg      [x] Elevate leg(s) above the level of the heart when sitting.    [x] Avoid prolonged standing in one place.     Compression:  Apply: [] Compression Wrap to []RightLeg []Left Leg    []  Coflex [] Coflex Lite  [] Unna with Calamine Lite     [] Elevate leg(s) above the level of the heart when sitting.    [] Avoid prolonged standing in one place.   [] Do not get leg(s) with wrap wet. If wrap becomes too tight, call the wound center and remove wrap from leg by unrolling each layer.    Off-Loading:

## 2024-03-28 NOTE — WOUND CARE
Wound Clinic Physician Orders and Discharge Instructions  [facility]  [address], [address]  [place] [address]  Telephone: [phone]     FAX [phone]    NAME:  Yessy Puckett  YOB: 1955  MEDICAL RECORD NUMBER:  [id number]  DATE:  3/28/2024      Return Appointment:  [x] Dressing Supply Provider: Jelani  [] Home Healthcare:  [] Facility:   [x] Return Appointment: 2 Week(s)  [] Nurse Visit:  Week(s)    [] Discharge from [facility]:   [] Healed          [] Refer to Provider:           [] Consult    Follow-up Information:  [x] Ordered Tests: Vasc ordered 2/21 by Dr. Mya  [x] Referrals: Dr. Albarado - appointment in April for procedure  [] Rx:   [] Culture:  [] Wound Vac:  [] Skin Sub:   [] OR:  [] Other:     Wound Cleansing:   Do not scrub or use excessive force.  Cleanse wound prior to applying a clean dressing with:  [x] Normal Saline   [] Keep Wound Dry in Shower     [] Wound Cleanser   [] Cleanse wound with Mild Soap & Water    [] Other:       Topical Treatments:  Do not apply lotions, creams, or ointments to wound bed unless directed.   [] Apply moisturizing lotion to skin surrounding the wound prior to dressing change.  [] Apply antifungal ointment to skin surrounding the wound prior to dressing change.  [] Apply thin film of moisture barrier ointment to skin immediately around wound.  [] Apply Betadine to skin immediately around wound   [] Other:      Dressings:           Wound Location: Left and Right leg     [x] Apply Primary Dressing:       [] MediHoney Gel [] MediHoney Alginate  [x] Calcium Alginate with Silver - 2nd  [] Calcium Alginate without silver   [x] Collagen with silver - 1st [] Collagen without Silver    [] Santyl with moist saline gauze     [] Hydrofera Blue (cut to size and moistened with normal saline)  [] Hydrofera Blue Ready (cut to size)      [] Normal Saline wet to dry  [] Betadine wet to dry [] Betadine to patricia wound   [] 
DRESSING CHANGES:  Every other day         ADDITIONAL ITEMS:  [] Gloves:   [] Small [] Medium [x] Large  [x] Tape 4\" Medipore  [x] Normal Saline  [] Skin Prep   [] Adhesive Remover   [] Cotton/Foam Tip Applicators  [] Tongue Depressor   [] Other:    Patient Wound(s) Debrided: [x] Yes. Date last debrided  03/28/2024   [] No    Debribement Type: Excisional/Sharp    Patient currently being seen by Home Health: [] Yes   [x] No    Duration for needed supplies:  []15  [x]30  []60  []90 Days    Electronically signed by Layne Blake LPN on 3/28/2024 at 1:51 PM     Provider Information:      PROVIDER'S NAME: MAVIS Hurst MD

## 2024-04-07 PROBLEM — L97.912 ULCER OF RIGHT LOWER EXTREMITY WITH FAT LAYER EXPOSED (HCC): Status: ACTIVE | Noted: 2024-04-07

## 2024-04-07 PROBLEM — L97.922 ULCER OF LEFT LOWER LEG, WITH FAT LAYER EXPOSED (HCC): Status: ACTIVE | Noted: 2024-04-07

## 2024-04-20 ENCOUNTER — HOSPITAL ENCOUNTER (EMERGENCY)
Facility: HOSPITAL | Age: 69
Discharge: HOME | End: 2024-04-20
Attending: EMERGENCY MEDICINE
Payer: MEDICARE

## 2024-04-20 VITALS
WEIGHT: 315 LBS | SYSTOLIC BLOOD PRESSURE: 150 MMHG | HEIGHT: 68 IN | RESPIRATION RATE: 21 BRPM | TEMPERATURE: 97.8 F | BODY MASS INDEX: 47.74 KG/M2 | OXYGEN SATURATION: 98 % | HEART RATE: 66 BPM | DIASTOLIC BLOOD PRESSURE: 80 MMHG

## 2024-04-20 DIAGNOSIS — I10 HYPERTENSION, UNSPECIFIED TYPE: Primary | ICD-10-CM

## 2024-04-20 DIAGNOSIS — T14.8XXA CHRONIC WOUND: ICD-10-CM

## 2024-04-20 LAB
ALBUMIN SERPL-MCNC: 2.6 G/DL (ref 3.5–5)
ALBUMIN/GLOB SERPL: 0.8 (ref 1.1–2.2)
ALP SERPL-CCNC: 66 U/L (ref 45–117)
ALT SERPL-CCNC: 16 U/L (ref 12–78)
ANION GAP SERPL CALC-SCNC: 4 MMOL/L (ref 5–15)
AST SERPL W P-5'-P-CCNC: 15 U/L (ref 15–37)
BASOPHILS # BLD: 0.1 K/UL (ref 0–0.1)
BASOPHILS NFR BLD: 1 % (ref 0–1)
BILIRUB SERPL-MCNC: 0.3 MG/DL (ref 0.2–1)
BUN SERPL-MCNC: 14 MG/DL (ref 6–20)
BUN/CREAT SERPL: 24 (ref 12–20)
CA-I BLD-MCNC: 7.3 MG/DL (ref 8.5–10.1)
CHLORIDE SERPL-SCNC: 111 MMOL/L (ref 97–108)
CO2 SERPL-SCNC: 27 MMOL/L (ref 21–32)
CREAT SERPL-MCNC: 0.59 MG/DL (ref 0.7–1.3)
DIFFERENTIAL METHOD BLD: ABNORMAL
EOSINOPHIL # BLD: 0.1 K/UL (ref 0–0.4)
EOSINOPHIL NFR BLD: 1 % (ref 0–7)
ERYTHROCYTE [DISTWIDTH] IN BLOOD BY AUTOMATED COUNT: 15 % (ref 11.5–14.5)
GLOBULIN SER CALC-MCNC: 3.1 G/DL (ref 2–4)
GLUCOSE SERPL-MCNC: 84 MG/DL (ref 65–100)
HCT VFR BLD AUTO: 38 % (ref 36.6–50.3)
HGB BLD-MCNC: 12.7 G/DL (ref 12.1–17)
IMM GRANULOCYTES # BLD AUTO: 0.1 K/UL (ref 0–0.04)
IMM GRANULOCYTES NFR BLD AUTO: 1 % (ref 0–0.5)
LYMPHOCYTES # BLD: 1.3 K/UL (ref 0.8–3.5)
LYMPHOCYTES NFR BLD: 12 % (ref 12–49)
MAGNESIUM SERPL-MCNC: 1.7 MG/DL (ref 1.6–2.4)
MCH RBC QN AUTO: 25.9 PG (ref 26–34)
MCHC RBC AUTO-ENTMCNC: 33.4 G/DL (ref 30–36.5)
MCV RBC AUTO: 77.4 FL (ref 80–99)
MONOCYTES # BLD: 1.2 K/UL (ref 0–1)
MONOCYTES NFR BLD: 11 % (ref 5–13)
NEUTS SEG # BLD: 8.3 K/UL (ref 1.8–8)
NEUTS SEG NFR BLD: 74 % (ref 32–75)
NRBC # BLD: 0 K/UL (ref 0–0.01)
NRBC BLD-RTO: 0 PER 100 WBC
PLATELET # BLD AUTO: 357 K/UL (ref 150–400)
PMV BLD AUTO: 9.8 FL (ref 8.9–12.9)
POTASSIUM SERPL-SCNC: 3.6 MMOL/L (ref 3.5–5.1)
PROT SERPL-MCNC: 5.7 G/DL (ref 6.4–8.2)
RBC # BLD AUTO: 4.91 M/UL (ref 4.1–5.7)
SODIUM SERPL-SCNC: 142 MMOL/L (ref 136–145)
WBC # BLD AUTO: 11.1 K/UL (ref 4.1–11.1)

## 2024-04-20 PROCEDURE — 36415 COLL VENOUS BLD VENIPUNCTURE: CPT

## 2024-04-20 PROCEDURE — 83735 ASSAY OF MAGNESIUM: CPT

## 2024-04-20 PROCEDURE — 80053 COMPREHEN METABOLIC PANEL: CPT

## 2024-04-20 PROCEDURE — 99284 EMERGENCY DEPT VISIT MOD MDM: CPT

## 2024-04-20 PROCEDURE — 85025 COMPLETE CBC W/AUTO DIFF WBC: CPT

## 2024-04-20 PROCEDURE — 93005 ELECTROCARDIOGRAM TRACING: CPT | Performed by: EMERGENCY MEDICINE

## 2024-04-20 ASSESSMENT — LIFESTYLE VARIABLES
HOW MANY STANDARD DRINKS CONTAINING ALCOHOL DO YOU HAVE ON A TYPICAL DAY: PATIENT DOES NOT DRINK
HOW OFTEN DO YOU HAVE A DRINK CONTAINING ALCOHOL: NEVER

## 2024-04-20 ASSESSMENT — PAIN - FUNCTIONAL ASSESSMENT: PAIN_FUNCTIONAL_ASSESSMENT: NONE - DENIES PAIN

## 2024-04-20 NOTE — ED PROVIDER NOTES
[facility] EMERGENCY DEPT  EMERGENCY DEPARTMENT HISTORY AND PHYSICAL EXAM      Date: 4/20/2024  Patient Name: Yessy Puckett  MRN: [id number]  YOB: 1955  Date of evaluation: 4/20/2024  Provider: Clarissa Peguero MD   Note Started: 6:06 PM EDT 4/20/24    HISTORY OF PRESENT ILLNESS     Chief Complaint   Patient presents with    Hypertension    Irregular Heart Beat       History Provided By: Patient    HPI: Yessy Puckett is a 68 y.o. male with history of COPD, diabetes, hypertension brought in for elevated blood pressures.  Patient called EMS because he has been wound to his lower legs that he sees wound care for.  He was bleeding today and could not get the bleeding to stop.  He states he usually has a friend who helps him with a friend was out of town today.  When EMS got there they noticed he was hypertensive to 200s over 100 systolic and brought him into the emergency department.  Patient denies any chest pain.  Reports chronic shortness of breath and is on 2 L of oxygen at baseline.  s    PAST MEDICAL HISTORY   Past Medical History:  Past Medical History:   Diagnosis Date    Arthritis     Arthritis     Chronic obstructive pulmonary disease (HCC)     COPD (chronic obstructive pulmonary disease) (HCC)     Diabetes (HCC)     Diabetes (HCC)     HTN (hypertension)     Hypertension     Sleep apnea     Sleep apnea        Past Surgical History:  Past Surgical History:   Procedure Laterality Date    GI      HERNIA REPAIR         Family History:  Family History   Problem Relation Age of Onset    Hypertension Sister     Hypertension Sister     Hypertension Father     Hypertension Mother     Hypertension Child        Social History:  Social History     Tobacco Use    Smoking status: Never    Smokeless tobacco: Never   Substance Use Topics    Alcohol use: Never    Drug use: Never       Allergies:  No Known Allergies    PCP: Catherine Strauss MD    Current Meds:   No current facility-administered medications for this  lb)      Height: 1.727 m (5' 8\")           ED COURSE       SEPSIS Reassessment: Sepsis reassessment not applicable    Clinical Management Tools:  Not Applicable    Patient was given the following medications:  Medications - No data to display    CONSULTS: See ED Course/MDM for further details.  None     Social Determinants affecting Diagnosis/Treatment: None    Smoking Cessation: Not Applicable    PROCEDURES   Unless otherwise noted above, none.  Procedures      CRITICAL CARE TIME   Patient does not meet Critical Care Time, 0 minutes    ED IMPRESSION     1. Hypertension, unspecified type    2. Chronic wound          DISPOSITION/PLAN   DISPOSITION Decision To Discharge 04/20/2024 06:47:42 PM    Discharge Note: The patient is stable for discharge home. The signs, symptoms, diagnosis, and discharge instructions have been discussed, understanding conveyed, and agreed upon. The patient is to follow up as recommended or return to ER should their symptoms worsen.      PATIENT REFERRED TO:  Catherine Strauss MD  [address] [place] [address]  [phone]              DISCHARGE MEDICATIONS:     Medication List        ASK your doctor about these medications      hydrALAZINE 25 MG tablet  Commonly known as: APRESOLINE     isosorbide mononitrate 30 MG extended release tablet  Commonly known as: IMDUR     lisinopril 20 MG tablet  Commonly known as: PRINIVIL;ZESTRIL     Trelegy Ellipta 100-62.5-25 MCG/ACT Aepb inhaler  Generic drug: fluticasone-umeclidin-vilant                DISCONTINUED MEDICATIONS:  Discharge Medication List as of 4/20/2024  6:50 PM          I am the Primary Clinician of Record: Clarissa Peguero MD (electronically signed)    (Please note that parts of this dictation were completed with voice recognition software. Quite often unanticipated grammatical, syntax, homophones, and other interpretive errors are inadvertently transcribed by the computer software. Please disregards these errors. Please excuse

## 2024-04-20 NOTE — ED TRIAGE NOTES
Per EMS: Patient called EMS to wrap left lower leg wound. When EMS arrived patient was hypertensive 200/130's. EKG was done patient was found to have a first degree heart block with no history of cardiac issues in the past.

## 2024-04-20 NOTE — DISCHARGE INSTRUCTIONS
Thank you!  Thank you for allowing me to care for you in the emergency department. It is my goal to provide you with excellent care.  Please fill out the survey that will come to you by mail or email since we listen to your feedback!     Below you will find a list of your tests from today's visit.  Should you have any questions, please do not hesitate to call the emergency department.    Labs  Recent Results (from the past 12 hour(s))   CBC with Auto Differential    Collection Time: 04/20/24  6:08 PM   Result Value Ref Range    WBC 11.1 4.1 - 11.1 K/uL    RBC 4.91 4.10 - 5.70 M/uL    Hemoglobin 12.7 12.1 - 17.0 g/dL    Hematocrit 38.0 36.6 - 50.3 %    MCV 77.4 (L) 80.0 - 99.0 FL    MCH 25.9 (L) 26.0 - 34.0 PG    MCHC 33.4 30.0 - 36.5 g/dL    RDW 15.0 (H) 11.5 - 14.5 %    Platelets 357 150 - 400 K/uL    MPV 9.8 8.9 - 12.9 FL    Nucleated RBCs 0.0 0.0  WBC    nRBC 0.00 0.00 - 0.01 K/uL    Neutrophils % 74 32 - 75 %    Lymphocytes % 12 12 - 49 %    Monocytes % 11 5 - 13 %    Eosinophils % 1 0 - 7 %    Basophils % 1 0 - 1 %    Immature Granulocytes % 1 (H) 0 - 0.5 %    Neutrophils Absolute 8.3 (H) 1.8 - 8.0 K/UL    Lymphocytes Absolute 1.3 0.8 - 3.5 K/UL    Monocytes Absolute 1.2 (H) 0.0 - 1.0 K/UL    Eosinophils Absolute 0.1 0.0 - 0.4 K/UL    Basophils Absolute 0.1 0.0 - 0.1 K/UL    Immature Granulocytes Absolute 0.1 (H) 0.00 - 0.04 K/UL    Differential Type AUTOMATED     CMP    Collection Time: 04/20/24  6:08 PM   Result Value Ref Range    Sodium 142 136 - 145 mmol/L    Potassium 3.6 3.5 - 5.1 mmol/L    Chloride 111 (H) 97 - 108 mmol/L    CO2 27 21 - 32 mmol/L    Anion Gap 4 (L) 5 - 15 mmol/L    Glucose 84 65 - 100 mg/dL    BUN 14 6 - 20 mg/dL    Creatinine 0.59 (L) 0.70 - 1.30 mg/dL    Bun/Cre Ratio 24 (H) 12 - 20      Est, Glom Filt Rate >90 >60 ml/min/1.73m2    Calcium 7.3 (L) 8.5 - 10.1 mg/dL    Total Bilirubin 0.3 0.2 - 1.0 mg/dL    AST 15 15 - 37 U/L    ALT 16 12 - 78 U/L    Alk Phosphatase 66 45 -  117 U/L    Total Protein 5.7 (L) 6.4 - 8.2 g/dL    Albumin 2.6 (L) 3.5 - 5.0 g/dL    Globulin 3.1 2.0 - 4.0 g/dL    Albumin/Globulin Ratio 0.8 (L) 1.1 - 2.2     Magnesium    Collection Time: 04/20/24  6:08 PM   Result Value Ref Range    Magnesium 1.7 1.6 - 2.4 mg/dL       Radiologic Studies  No orders to display     ------------------------------------------------------------------------------------------------------------  The exam and treatment you received in the Emergency Department were for an urgent problem and are not intended as complete care. It is important that you follow-up with a doctor, nurse practitioner, or physician assistant to:  (1) confirm your diagnosis,  (2) re-evaluation of changes in your illness and treatment, and (3) for ongoing care. Please take your discharge instructions with you when you go to your follow-up appointment.     If you have any problem arranging a follow-up appointment, contact the Emergency Department.  If your symptoms become worse or you do not improve as expected and you are unable to reach your health care provider, please return to the Emergency Department. We are available 24 hours a day.     If a prescription has been provided, please have it filled as soon as possible to prevent a delay in treatment. If you have any questions or reservations about taking the medication due to side effects or interactions with other medications, please call your primary care provider or contact the ER.

## 2024-04-22 LAB
EKG ATRIAL RATE: 66 BPM
EKG DIAGNOSIS: NORMAL
EKG P AXIS: 85 DEGREES
EKG P-R INTERVAL: 232 MS
EKG Q-T INTERVAL: 422 MS
EKG QRS DURATION: 96 MS
EKG QTC CALCULATION (BAZETT): 442 MS
EKG R AXIS: -29 DEGREES
EKG T AXIS: 28 DEGREES
EKG VENTRICULAR RATE: 66 BPM

## 2024-04-25 ENCOUNTER — HOSPITAL ENCOUNTER (OUTPATIENT)
Facility: HOSPITAL | Age: 69
Discharge: HOME | End: 2024-04-25
Attending: RADIOLOGY
Payer: MEDICARE

## 2024-04-25 VITALS
DIASTOLIC BLOOD PRESSURE: 77 MMHG | RESPIRATION RATE: 20 BRPM | HEART RATE: 76 BPM | TEMPERATURE: 97.4 F | SYSTOLIC BLOOD PRESSURE: 151 MMHG

## 2024-04-25 PROCEDURE — 11042 DBRDMT SUBQ TIS 1ST 20SQCM/<: CPT

## 2024-04-25 NOTE — WOUND CARE
Wound Clinic Physician Orders and Discharge Instructions  [facility]  [address], [address]  [place] [address]  Telephone: [phone]     FAX [phone]    NAME:  Yessy Puckett  YOB: 1955  MEDICAL RECORD NUMBER:  [id number]  DATE:  4/25/2024      Return Appointment:  [x] Dressing Supply Provider: Jelani  [] Home Healthcare:  [] Facility:   [x] Return Appointment: 4 Week(s)  [x] Nurse Visit: 2 Week(s)    [] Discharge from [facility]:   [] Healed          [] Refer to Provider:           [] Consult    Follow-up Information:  [x] Ordered Tests: Vasc ordered 2/21 by Dr. May  [x] Referrals: Dr. Albarado - appointment in May for procedure  [] Rx:   [] Culture:  [] Wound Vac:  [] Skin Sub:   [] OR:  [] Other:     Wound Cleansing:   Do not scrub or use excessive force.  Cleanse wound prior to applying a clean dressing with:  [x] Normal Saline   [] Keep Wound Dry in Shower     [] Wound Cleanser   [] Cleanse wound with Mild Soap & Water    [] Other:       Topical Treatments:  Do not apply lotions, creams, or ointments to wound bed unless directed.   [] Apply moisturizing lotion to skin surrounding the wound prior to dressing change.  [] Apply antifungal ointment to skin surrounding the wound prior to dressing change.  [] Apply thin film of moisture barrier ointment to skin immediately around wound.  [] Apply Betadine to skin immediately around wound   [] Other:      Dressings:           Wound Location: Left and Right leg     [x] Apply Primary Dressing:       [] MediHoney Gel [] MediHoney Alginate  [x] Calcium Alginate with Silver - 2nd  [] Calcium Alginate without silver   [x] Collagen with silver - 1st [] Collagen without Silver    [] Santyl with moist saline gauze     [] Hydrofera Blue (cut to size and moistened with normal saline)  [] Hydrofera Blue Ready (cut to size)      [] Normal Saline wet to dry  [] Betadine wet to dry [] Betadine to patricia wound   []

## 2024-04-25 NOTE — DISCHARGE INSTRUCTIONS
[] Off-loading when: [] walking  [] in bed [] sitting  [] Total non-weight bearing  [] Right Leg  [] Left Leg   [] Assistive Device [] Walker [] Cane  [] Wheelchair  [] Crutches   [] Surgical shoe    [] Wedge Shoe  [] Foam Boot(s)  [] Knee Scooter    [] Cast Boot [] CROW Boot     [] Diabetic Shoes   [] Offloading shoe [] Offloading heel boot  [] Other:     Contact Cast:  Apply: [] Total Contact Cast Applied in Clinic to []RightLeg []Left Leg   [] Do not get cast wet.  Contact wound center or go to emergency room if there is a foul odor or becomes uncomfortable due to feeling tight or swelling.  Do not use objects inside of cast to scratch.      Dietary:  [] Diet as tolerated: [x] Calorie Diabetic Diet: [x] No Added Salt:  [x] Increase Protein: [] Other:     Activity:  [x] Activity as tolerated: Elevate lower extremities with ulcers  [] Patient has no activity restrictions      [] Strict Bedrest   [] Remain off Work      [] May return to full duty work                                     [] Return to work with restrictions     Physician:  [] Dr. Gladys Pickens  [] Dr. Cesario Pace  [] Dr. Stacie Atkinson  [] Dr. Sesar Ford  [x] Dr. Jose Hurst  [] Dr. Jazlyn Abmrosio  [] Dr. Ashley Ziegler      Nurse Case Manger:  Karen      Wound Care Center Information: Should you experience any significant changes in your wound(s) or have questions about your wound care, please contact the Wound Center at [phone]. Our hours are Monday - Friday 8am - 4:30pm, closed all major holidays. If you need help with your wound outside these hours and cannot wait until we are again available, contact your PCP or go to the hospital emergency room.     PLEASE NOTE: IF YOU ARE UNABLE TO OBTAIN WOUND SUPPLIES, CONTINUE TO USE THE SUPPLIES YOU HAVE AVAILABLE UNTIL YOU ARE ABLE TO REACH US. IT IS MOST IMPORTANT TO KEEP THE WOUND COVERED AT ALL TIMES.

## 2024-04-26 NOTE — WOUND CARE
04/26/24 - Spoke with [facility] rep, order for supplies ordered 04/25 was received and will be sent out to the patient on 04/29 when patient is eligible for another shipment.

## 2024-05-04 PROBLEM — L97.912 ULCER OF RIGHT LEG, WITH FAT LAYER EXPOSED (HCC): Status: ACTIVE | Noted: 2024-04-07

## 2024-05-04 PROBLEM — I83.029: Status: ACTIVE | Noted: 2024-05-04

## 2024-05-04 PROBLEM — L97.929: Status: ACTIVE | Noted: 2024-05-04

## 2024-05-08 ENCOUNTER — HOSPITAL ENCOUNTER (OUTPATIENT)
Facility: HOSPITAL | Age: 69
Discharge: HOME | End: 2024-05-08
Attending: RADIOLOGY
Payer: MEDICARE

## 2024-05-08 VITALS
TEMPERATURE: 97.8 F | DIASTOLIC BLOOD PRESSURE: 81 MMHG | OXYGEN SATURATION: 93 % | SYSTOLIC BLOOD PRESSURE: 144 MMHG | RESPIRATION RATE: 20 BRPM | HEART RATE: 106 BPM

## 2024-05-08 PROCEDURE — 99213 OFFICE O/P EST LOW 20 MIN: CPT

## 2024-05-08 NOTE — WOUND CARE
Wound Care Nurse Visit Note        Yessy Puckett  Age: 68 y.o.  YOB: 1955    Today's Date:  5/8/2024    Patient presents today per physician order for a scheduled nurse visit.    Orders to follow same dressings as previous visit and to report any changes in conditions if noted.    BP (!) 144/81   Pulse (!) 106   Temp 97.8 °F (36.6 °C) (Temporal)   Resp 20   SpO2 93%       Wound Assessment:      Wound 12/12/22 Leg Left #1 Circumferential (Active)   Wound Image   04/25/24 1343   Wound Etiology Venous 05/08/24 1301   Dressing Status Other (Comment) 05/08/24 1301   Wound Cleansed Cleansed with saline 05/08/24 1301   Dressing/Treatment Collagen;Alginate with Ag;Tubular bandage 05/08/24 1302   Wound Length (cm) 2 cm 05/08/24 1301   Wound Width (cm) 2.8 cm 05/08/24 1301   Wound Depth (cm) 0.2 cm 05/08/24 1301   Wound Surface Area (cm^2) 5.6 cm^2 05/08/24 1301   Change in Wound Size % (l*w) 91.7 05/08/24 1301   Wound Volume (cm^3) 1.12 cm^3 05/08/24 1301   Wound Healing % 83 05/08/24 1301   Post-Procedure Length (cm) 2 cm 04/25/24 1402   Post-Procedure Width (cm) 2.8 cm 04/25/24 1402   Post-Procedure Depth (cm) 0.2 cm 04/25/24 1402   Post-Procedure Surface Area (cm^2) 5.6 cm^2 04/25/24 1402   Post-Procedure Volume (cm^3) 1.12 cm^3 04/25/24 1402   Wound Assessment Slough;Granulation tissue 05/08/24 1301   Drainage Amount Moderate (25-50%) 05/08/24 1301   Drainage Description Serosanguinous 05/08/24 1301   Odor None 05/08/24 1301   Pia-wound Assessment Intact 05/08/24 1301   Margins Attached edges 05/08/24 1301   Wound Thickness Description not for Pressure Injury Full thickness 05/08/24 1301   Number of days: 512       Wound 07/24/23 Leg Right;Posterior #2 (Active)   Wound Image   04/25/24 1342   Wound Etiology Venous 05/08/24 1302   Dressing Status Other (Comment) 05/08/24 1302   Wound Cleansed Cleansed with saline 05/08/24 1302   Dressing/Treatment

## 2024-05-23 ENCOUNTER — HOSPITAL ENCOUNTER (OUTPATIENT)
Facility: HOSPITAL | Age: 69
Discharge: HOME | End: 2024-05-23
Attending: RADIOLOGY
Payer: MEDICARE

## 2024-05-23 VITALS
HEART RATE: 75 BPM | SYSTOLIC BLOOD PRESSURE: 118 MMHG | DIASTOLIC BLOOD PRESSURE: 63 MMHG | TEMPERATURE: 97.4 F | OXYGEN SATURATION: 93 % | RESPIRATION RATE: 20 BRPM

## 2024-05-23 PROCEDURE — 11042 DBRDMT SUBQ TIS 1ST 20SQCM/<: CPT

## 2024-05-23 NOTE — DISCHARGE INSTRUCTIONS
Wound Clinic Physician Orders and Discharge Instructions  [facility]  [address], [address]  [place] [address]  Telephone: [phone]     FAX [phone]    NAME:  Yessy Puckett  YOB: 1955  MEDICAL RECORD NUMBER:  [id number]  DATE:  5/23/2024      Return Appointment:  [] Dressing Supply Provider: Jelani  [] Home Healthcare:  [] Facility:   [x] Return Appointment: 4 Week(s)  [] Nurse Visit:  Week(s)    [] Discharge from [facility]:   [] Healed          [] Refer to Provider:           [] Consult    Follow-up Information:  [] Ordered Tests:   [x] Referrals: Dr. Albarado - appointment in May for procedure  [] Rx:   [] Culture:  [] Wound Vac:  [] Skin Sub:   [] OR:  [] Other:     Wound Cleansing:   Do not scrub or use excessive force.  Cleanse wound prior to applying a clean dressing with:  [] Normal Saline   [] Keep Wound Dry in Shower     [] Wound Cleanser   [x] Cleanse wound with Mild Soap & Water    [] Other:       Topical Treatments:  Do not apply lotions, creams, or ointments to wound bed unless directed.   [x] Apply moisturizing lotion to skin surrounding the wound prior to dressing change.  [] Apply antifungal ointment to skin surrounding the wound prior to dressing change.  [] Apply thin film of moisture barrier ointment to skin immediately around wound.  [] Apply Betadine to skin immediately around wound   [] Other:      Dressings:           Wound Location: Left leg     [x] Apply Primary Dressing:       [] MediHoney Gel [] MediHoney Alginate  [x] Calcium Alginate with Silver - 2nd  [] Calcium Alginate without silver   [] Collagen with silver  [x] Collagen without Silver  1st  [] Santyl with moist saline gauze     [] Hydrofera Blue (cut to size and moistened with normal saline)  [] Hydrofera Blue Ready (cut to size)      [] Normal Saline wet to dry  [] Betadine wet to dry [] Betadine to patricia wound   [] Hydrogel  [] Mepitel  [] Xeroform    [] Adaptic   []

## 2024-05-23 NOTE — WOUND CARE
Wound Clinic Physician Orders and Discharge Instructions  [facility]  [address], [address]  [place] [address]  Telephone: [phone]     FAX [phone]    NAME:  Yessy Puckett  YOB: 1955  MEDICAL RECORD NUMBER:  [id number]  DATE:  5/23/2024      Return Appointment:  [] Dressing Supply Provider: Jelani  [] Home Healthcare:  [] Facility:   [x] Return Appointment: 4 Week(s)  [] Nurse Visit:  Week(s)    [] Discharge from [facility]:   [] Healed          [] Refer to Provider:           [] Consult    Follow-up Information:  [] Ordered Tests:   [x] Referrals: Dr. Albarado - appointment in May for procedure  [] Rx:   [] Culture:  [] Wound Vac:  [] Skin Sub:   [] OR:  [] Other:     Wound Cleansing:   Do not scrub or use excessive force.  Cleanse wound prior to applying a clean dressing with:  [] Normal Saline   [] Keep Wound Dry in Shower     [] Wound Cleanser   [x] Cleanse wound with Mild Soap & Water    [] Other:       Topical Treatments:  Do not apply lotions, creams, or ointments to wound bed unless directed.   [x] Apply moisturizing lotion to skin surrounding the wound prior to dressing change.  [] Apply antifungal ointment to skin surrounding the wound prior to dressing change.  [] Apply thin film of moisture barrier ointment to skin immediately around wound.  [] Apply Betadine to skin immediately around wound   [] Other:      Dressings:           Wound Location: Left leg     [x] Apply Primary Dressing:       [] MediHoney Gel [] MediHoney Alginate  [x] Calcium Alginate with Silver - 2nd  [] Calcium Alginate without silver   [] Collagen with silver  [x] Collagen without Silver  1st  [] Santyl with moist saline gauze     [] Hydrofera Blue (cut to size and moistened with normal saline)  [] Hydrofera Blue Ready (cut to size)      [] Normal Saline wet to dry  [] Betadine wet to dry [] Betadine to patricia wound   [] Hydrogel  [] Mepitel  [] Xeroform    [] Adaptic   []

## 2024-06-01 NOTE — ED TRIAGE NOTES
PT TO ED FOR WOUND FOLLOW-UP FROM Tuesday VISIT, RIGHT HAND LACERATION. Patient requests all Lab, Cardiology, and Radiology Results on their Discharge Instructions

## 2024-06-20 ENCOUNTER — HOSPITAL ENCOUNTER (OUTPATIENT)
Facility: HOSPITAL | Age: 69
Discharge: HOME | End: 2024-06-20
Attending: RADIOLOGY
Payer: MEDICARE

## 2024-06-20 VITALS
RESPIRATION RATE: 18 BRPM | DIASTOLIC BLOOD PRESSURE: 70 MMHG | HEART RATE: 70 BPM | SYSTOLIC BLOOD PRESSURE: 122 MMHG | TEMPERATURE: 98.4 F | OXYGEN SATURATION: 94 %

## 2024-06-20 PROCEDURE — 11042 DBRDMT SUBQ TIS 1ST 20SQCM/<: CPT

## 2024-06-20 NOTE — DISCHARGE INSTRUCTIONS
Wound Clinic Physician Orders and Discharge Instructions  [facility]  [address], [address]  [place] [address]  Telephone: [phone]     FAX [phone]    NAME:  Yessy Puckett  YOB: 1955  MEDICAL RECORD NUMBER:  [id number]  DATE:  6/20/2024      Return Appointment:  [] Dressing Supply Provider: Jelani  [] Home Healthcare:  [] Facility:   [x] Return Appointment: 3 Week(s)  [] Nurse Visit:  Week(s)    [] Discharge from [facility]:   [] Healed          [] Refer to Provider:           [] Consult    Follow-up Information:  [] Ordered Tests:   [x] Referrals: Dr. Albarado   [] Rx:   [] Culture:  [] Wound Vac:  [] Skin Sub:   [] OR:  [] Other:     Wound Cleansing:   Do not scrub or use excessive force.  Cleanse wound prior to applying a clean dressing with:  [] Normal Saline   [] Keep Wound Dry in Shower     [] Wound Cleanser   [x] Cleanse wound with Mild Soap & Water    [] Other:       Topical Treatments:  Do not apply lotions, creams, or ointments to wound bed unless directed.   [x] Apply moisturizing lotion to skin surrounding the wound prior to dressing change.  [] Apply antifungal ointment to skin surrounding the wound prior to dressing change.  [] Apply thin film of moisture barrier ointment to skin immediately around wound.  [] Apply Betadine to skin immediately around wound   [] Other:      Dressings:           Wound Location: Left leg     [x] Apply Primary Dressing:       [] MediHoney Gel [] MediHoney Alginate  [x] Calcium Alginate with Silver - 2nd  [] Calcium Alginate without silver   [] Collagen with silver  [x] Collagen without Silver  1st  [] Santyl with moist saline gauze     [] Hydrofera Blue (cut to size and moistened with normal saline)  [] Hydrofera Blue Ready (cut to size)      [] Normal Saline wet to dry  [] Betadine wet to dry [] Betadine to patricia wound   [] Hydrogel  [] Mepitel  [] Xeroform    [] Adaptic   [] Iodoform Packing Strip [] Plain

## 2024-06-20 NOTE — WOUND CARE
Wound Clinic Physician Orders and Discharge Instructions  [facility]  [address], [address]  [place] [address]  Telephone: [phone]     FAX [phone]    NAME:  Yessy Puckett  YOB: 1955  MEDICAL RECORD NUMBER:  [id number]  DATE:  6/20/2024      Return Appointment:  [] Dressing Supply Provider: Jelani  [] Home Healthcare:  [] Facility:   [x] Return Appointment: 3 Week(s)  [] Nurse Visit:  Week(s)    [] Discharge from [facility]:   [] Healed          [] Refer to Provider:           [] Consult    Follow-up Information:  [] Ordered Tests:   [x] Referrals: Dr. Albarado   [] Rx:   [] Culture:  [] Wound Vac:  [] Skin Sub:   [] OR:  [] Other:     Wound Cleansing:   Do not scrub or use excessive force.  Cleanse wound prior to applying a clean dressing with:  [] Normal Saline   [] Keep Wound Dry in Shower     [] Wound Cleanser   [x] Cleanse wound with Mild Soap & Water    [] Other:       Topical Treatments:  Do not apply lotions, creams, or ointments to wound bed unless directed.   [x] Apply moisturizing lotion to skin surrounding the wound prior to dressing change.  [] Apply antifungal ointment to skin surrounding the wound prior to dressing change.  [] Apply thin film of moisture barrier ointment to skin immediately around wound.  [] Apply Betadine to skin immediately around wound   [] Other:      Dressings:           Wound Location: Left leg     [x] Apply Primary Dressing:       [] MediHoney Gel [] MediHoney Alginate  [x] Calcium Alginate with Silver - 2nd  [] Calcium Alginate without silver   [] Collagen with silver  [x] Collagen without Silver  1st  [] Santyl with moist saline gauze     [] Hydrofera Blue (cut to size and moistened with normal saline)  [] Hydrofera Blue Ready (cut to size)      [] Normal Saline wet to dry  [] Betadine wet to dry [] Betadine to patricia wound   [] Hydrogel  [] Mepitel  [] Xeroform    [] Adaptic   [] Iodoform Packing Strip [] Plain 
   FAX [phone]    NAME:  Yessy Puckett  YOB: 1955  MEDICAL RECORD NUMBER:  [id number]  DATE:  6/20/2024      Return Appointment:  [] Dressing Supply Provider: Jelani  [] Home Healthcare:  [] Facility:   [x] Return Appointment: 3 Week(s)  [] Nurse Visit:  Week(s)    [] Discharge from [facility]:   [] Healed          [] Refer to Provider:           [] Consult    Follow-up Information:  [] Ordered Tests:   [x] Referrals: Dr. Albarado   [] Rx:   [] Culture:  [] Wound Vac:  [] Skin Sub:   [] OR:  [] Other:     Wound Cleansing:   Do not scrub or use excessive force.  Cleanse wound prior to applying a clean dressing with:  [] Normal Saline   [] Keep Wound Dry in Shower     [] Wound Cleanser   [x] Cleanse wound with Mild Soap & Water    [] Other:       Topical Treatments:  Do not apply lotions, creams, or ointments to wound bed unless directed.   [x] Apply moisturizing lotion to skin surrounding the wound prior to dressing change.  [] Apply antifungal ointment to skin surrounding the wound prior to dressing change.  [] Apply thin film of moisture barrier ointment to skin immediately around wound.  [] Apply Betadine to skin immediately around wound   [] Other:      Dressings:           Wound Location: Left leg     [x] Apply Primary Dressing:       [] MediHoney Gel [] MediHoney Alginate  [x] Calcium Alginate with Silver - 2nd  [] Calcium Alginate without silver   [] Collagen with silver  [x] Collagen without Silver  1st  [] Santyl with moist saline gauze     [] Hydrofera Blue (cut to size and moistened with normal saline)  [] Hydrofera Blue Ready (cut to size)      [] Normal Saline wet to dry  [] Betadine wet to dry [] Betadine to patricia wound   [] Hydrogel  [] Mepitel  [] Xeroform    [] Adaptic   [] Iodoform Packing Strip [] Plain Packing Strip   [] Skin Sub:   [] Other:      [x] Cover and Secure with:     [x] Gauze             [x] Marysol  [] Kerlix   [] Foam Piece [] Foam Heel Cup     [] Super Absorbant   []

## 2024-07-11 ENCOUNTER — HOSPITAL ENCOUNTER (OUTPATIENT)
Facility: HOSPITAL | Age: 69
Discharge: HOME | End: 2024-07-11
Attending: RADIOLOGY
Payer: MEDICARE

## 2024-07-11 VITALS
SYSTOLIC BLOOD PRESSURE: 139 MMHG | HEART RATE: 100 BPM | DIASTOLIC BLOOD PRESSURE: 70 MMHG | TEMPERATURE: 98.2 F | RESPIRATION RATE: 20 BRPM

## 2024-07-11 PROCEDURE — 11045 DBRDMT SUBQ TISS EACH ADDL: CPT

## 2024-07-11 PROCEDURE — 11042 DBRDMT SUBQ TIS 1ST 20SQCM/<: CPT

## 2024-07-11 NOTE — WOUND CARE
Wound Clinic Physician Orders and Discharge Instructions  [facility]  [address], [address]  [place] [address]  Telephone: [phone]     FAX [phone]    NAME:  Yessy Puckett  YOB: 1955  MEDICAL RECORD NUMBER:  [id number]  DATE:  7/11/2024      Return Appointment:  [] Dressing Supply Provider: Jelani  [] Home Healthcare:  [] Facility:   [x] Return Appointment: 1 Week(s)  [] Nurse Visit:  Week(s)    [] Discharge from [facility]:   [] Healed          [] Refer to Provider:           [] Consult    Follow-up Information:  [] Ordered Tests:   [x] Referrals: Dr. Albarado   [] Rx:   [] Culture:  [] Wound Vac:  [] Skin Sub:   [] OR:  [] Other:     Wound Cleansing:   Do not scrub or use excessive force.  Cleanse wound prior to applying a clean dressing with:  [] Normal Saline   [x] Keep Wound Dry in Shower     [] Wound Cleanser   [x] Cleanse wound with Mild Soap & Water on dressing change days   [] Other:       Topical Treatments:  Do not apply lotions, creams, or ointments to wound bed unless directed.   [x] Apply moisturizing lotion to skin surrounding the wound prior to dressing change.  [] Apply antifungal ointment to skin surrounding the wound prior to dressing change.  [] Apply thin film of moisture barrier ointment to skin immediately around wound.  [] Apply Betadine to skin immediately around wound   [] Other:      Dressings:           Wound Location: Left leg     [x] Apply Primary Dressing:       [] MediHoney Gel [] MediHoney Alginate  [x] Calcium Alginate with Silver - 2nd  [] Calcium Alginate without silver   [] Collagen with silver  [x] Collagen without Silver  1st  [] Santyl with moist saline gauze     [] Hydrofera Blue (cut to size and moistened with normal saline)  [] Hydrofera Blue Ready (cut to size)      [] Normal Saline wet to dry  [] Betadine wet to dry [] Betadine to patricia wound   [] Hydrogel  [] Mepitel  [] Xeroform    [] Adaptic   [] Iodoform

## 2024-07-11 NOTE — DISCHARGE INSTRUCTIONS
Strip [] Plain Packing Strip   [] Skin Sub:   [] Other:      [x] Cover and Secure with:     [x] Gauze             [] Marysol  [] Kerlix   [] Foam Piece [] Foam Heel Cup     [] Super Absorbant   [x] ABD  [] Ace Wrap             [] Bordered Gauze Dressing      [] Mepilex Foam Dressing      [] Surgilast     [] Other:    Limit contact of tape with skin.    [x] Change dressing: [] Daily    [] Every Other Day   [] Twice per week   [] Three times per week   [] Once a week [x] Do Not Change Dressing   [] Other:       Skin Sub:           Wound Location:   [] Implanted on:    [] Only change outer dressing  [] Do not change dressing until:    [] Other:     Negative Pressure:           Wound Location:   [] Pressure @  mm/Hg  []Continuous []Intermittent   [] Black Foam  [] White Foam [] Bridge  [] Change dressing 3 times per week     [] Other:     Pressure Relief:  [] When sitting, shift position or do seat lifts every 15 minutes.  [] Wheelchair cushion [] Specialty Bed/Mattress  [] Turn every 2 hours when in bed.  Avoid direct pressure on wound site.  Limit side lying to 30 degree tilt.  Limit HOB elevation to 30 degrees.  [] Other:     Edema Control:  Apply: [] Compression Stocking:  mmHg  []Right Leg []Left Leg   [] Tubigrip []Right Leg Double Layer []Left Leg Double Layer      []Right Leg Single Layer []Left Leg Single Layer   [] Ace Wrap []Right Leg  []Left Leg      [] Elevate leg(s) above the level of the heart when sitting.    [] Avoid prolonged standing in one place.     Compression:  Apply: [x] Compression Wrap to []RightLeg [x]Left Leg    [x]  Coflex [] Coflex Lite  [] Unna with Calamine Lite     [x] Elevate leg(s) above the level of the heart when sitting.    [x] Avoid prolonged standing in one place.   [x] Do not get leg(s) with wrap wet. If wrap becomes too tight, call the wound center and remove wrap from leg by unrolling each layer.    Dietary:  [] Diet as tolerated: [x] Calorie Diabetic Diet: [x] No Added

## 2024-07-11 NOTE — WOUND CARE
Multilayer Compression Wrap   (Not Unna) Below the Knee    NAME:  Yessy Puckett  YOB: 1955  MEDICAL RECORD NUMBER:  [id number]  DATE:  7/11/2024    Multilayer compression wrap: Removed old Multilayer wrap if indicated and wash leg with mild soap/water.  Applied moisturizing agent to dry skin as needed.   Applied primary and secondary dressing as ordered.  Applied multilayered dressing below the knee to left lower leg.  Instructed patient/caregiver not to remove dressing and to keep it clean and dry.   Instructed patient/caregiver on complications to report to provider, such as pain, numbness in toes, heavy drainage, and slippage of dressing.  Instructed patient on purpose of compression dressing and on activity and exercise recommendations.      Electronically signed by Layne Blake LPN on 7/11/2024 at 3:42 PM

## 2024-07-25 ENCOUNTER — HOSPITAL ENCOUNTER (OUTPATIENT)
Facility: HOSPITAL | Age: 69
Discharge: HOME | End: 2024-07-25
Attending: RADIOLOGY
Payer: MEDICARE

## 2024-07-25 VITALS
HEART RATE: 78 BPM | DIASTOLIC BLOOD PRESSURE: 76 MMHG | RESPIRATION RATE: 18 BRPM | TEMPERATURE: 98.1 F | SYSTOLIC BLOOD PRESSURE: 143 MMHG

## 2024-07-25 PROCEDURE — 11042 DBRDMT SUBQ TIS 1ST 20SQCM/<: CPT

## 2024-07-25 NOTE — DISCHARGE INSTRUCTIONS
Salt:  [x] Increase Protein: [] Other:     Activity:  [x] Activity as tolerated: Elevate lower extremities with ulcers  [] Patient has no activity restrictions      [] Strict Bedrest   [] Remain off Work      [] May return to full duty work                                     [] Return to work with restrictions     Physician:  [] Dr. Gladys Pickens  [] Dr. Cesario Pace  [] Dr. Stacie Atkinson  [] Dr. Sesar Ford  [x] Dr. Jose Hurst  [] Dr. Jazlyn Ambrosio  [] Dr. Ashley Ziegler      Nurse Case Manger:  Rubi      Wound Care Center Information: Should you experience any significant changes in your wound(s) or have questions about your wound care, please contact the Wound Center at [phone]. Our hours are Monday - Friday 8am - 4:30pm, closed all major holidays. If you need help with your wound outside these hours and cannot wait until we are again available, contact your PCP or go to the hospital emergency room.     PLEASE NOTE: IF YOU ARE UNABLE TO OBTAIN WOUND SUPPLIES, CONTINUE TO USE THE SUPPLIES YOU HAVE AVAILABLE UNTIL YOU ARE ABLE TO REACH US. IT IS MOST IMPORTANT TO KEEP THE WOUND COVERED AT ALL TIMES.

## 2024-07-25 NOTE — WOUND CARE
Multilayer Compression Wrap   (Not Unna) Below the Knee    NAME:  Yessy Puckett  YOB: 1955  MEDICAL RECORD NUMBER:  [id number]  DATE:  7/25/2024    Multilayer compression wrap: Removed old Multilayer wrap if indicated and wash leg with mild soap/water.  Applied moisturizing agent to dry skin as needed.   Applied primary and secondary dressing as ordered.  Applied multilayered dressing below the knee to left lower leg.  Instructed patient/caregiver not to remove dressing and to keep it clean and dry.   Instructed patient/caregiver on complications to report to provider, such as pain, numbness in toes, heavy drainage, and slippage of dressing.  Instructed patient on purpose of compression dressing and on activity and exercise recommendations.      Electronically signed by Layne Blake LPN on 7/25/2024 at 3:04 PM  
Salt:  [x] Increase Protein: [] Other:     Activity:  [x] Activity as tolerated: Elevate lower extremities with ulcers  [] Patient has no activity restrictions      [] Strict Bedrest   [] Remain off Work      [] May return to full duty work                                     [] Return to work with restrictions     Physician:  [] Dr. Gladys Pickens  [] Dr. Cesario Pace  [] Dr. Stacie Atkinson  [] Dr. Sesar Ford  [x] Dr. Jose Hurst  [] Dr. Jazlyn Ambrosio  [] Dr. Ashley Ziegler      Nurse Case Manger:  Rubi      Wound Care Center Information: Should you experience any significant changes in your wound(s) or have questions about your wound care, please contact the Wound Center at [phone]. Our hours are Monday - Friday 8am - 4:30pm, closed all major holidays. If you need help with your wound outside these hours and cannot wait until we are again available, contact your PCP or go to the hospital emergency room.     PLEASE NOTE: IF YOU ARE UNABLE TO OBTAIN WOUND SUPPLIES, CONTINUE TO USE THE SUPPLIES YOU HAVE AVAILABLE UNTIL YOU ARE ABLE TO REACH US. IT IS MOST IMPORTANT TO KEEP THE WOUND COVERED AT ALL TIMES.

## 2024-08-01 ENCOUNTER — HOSPITAL ENCOUNTER (OUTPATIENT)
Facility: HOSPITAL | Age: 69
Discharge: HOME | End: 2024-08-01
Attending: RADIOLOGY
Payer: MEDICARE

## 2024-08-01 VITALS
TEMPERATURE: 98.3 F | SYSTOLIC BLOOD PRESSURE: 145 MMHG | RESPIRATION RATE: 18 BRPM | HEART RATE: 83 BPM | DIASTOLIC BLOOD PRESSURE: 86 MMHG

## 2024-08-01 PROCEDURE — 11042 DBRDMT SUBQ TIS 1ST 20SQCM/<: CPT

## 2024-08-01 NOTE — WOUND CARE
Multilayer Compression Wrap   (Not Unna) Below the Knee    NAME:  Yessy Puckett  YOB: 1955  MEDICAL RECORD NUMBER:  [id number]  DATE:  8/1/2024    Multilayer compression wrap: Removed old Multilayer wrap if indicated and wash leg with mild soap/water.  Applied moisturizing agent to dry skin as needed.   Applied primary and secondary dressing as ordered.  Applied multilayered dressing below the knee to left lower leg.  Instructed patient/caregiver not to remove dressing and to keep it clean and dry.   Instructed patient/caregiver on complications to report to provider, such as pain, numbness in toes, heavy drainage, and slippage of dressing.  Instructed patient on purpose of compression dressing and on activity and exercise recommendations.      Electronically signed by Beronica Velez RN on 8/1/2024 at 3:47 PM

## 2024-08-01 NOTE — WOUND CARE
Wound Clinic Physician Orders and Discharge Instructions  [facility]  [address], [address]  [place] [address]  Telephone: [phone]     FAX [phone]    NAME:  Yessy Puckett  YOB: 1955  MEDICAL RECORD NUMBER:  [id number]  DATE:  8/1/2024      Return Appointment:  [] Dressing Supply Provider: Jelani  [] Home Healthcare:  [] Facility:   [x] Return Appointment: 2 Week(s)  [x] Nurse Visit:  1 Week(s)    [] Discharge from [facility]:   [] Healed          [] Refer to Provider:           [] Consult    Follow-up Information:  [] Ordered Tests:   [x] Referrals: Dr. Albarado   [] Rx:   [] Culture:  [] Wound Vac:  [] Skin Sub:   [] OR:  [] Other:     Wound Cleansing:   Do not scrub or use excessive force.  Cleanse wound prior to applying a clean dressing with:  [] Normal Saline   [x] Keep Wound Dry in Shower     [] Wound Cleanser   [x] Cleanse wound with Mild Soap & Water on dressing change days   [] Other:       Topical Treatments:  Do not apply lotions, creams, or ointments to wound bed unless directed.   [x] Apply moisturizing lotion to skin surrounding the wound prior to dressing change.  [] Apply antifungal ointment to skin surrounding the wound prior to dressing change.  [] Apply thin film of moisture barrier ointment to skin immediately around wound.  [] Apply Betadine to skin immediately around wound   [] Other:      Dressings:           Wound Location: Left leg     [x] Apply Primary Dressing:       [x] MediHoney Gel [] MediHoney Alginate  [] Calcium Alginate with Silver -   [] Calcium Alginate without silver   [] Collagen with silver  [] Collagen without Silver    [] Santyl with moist saline gauze     [] Hydrofera Blue (cut to size and moistened with normal saline)  [] Hydrofera Blue Ready (cut to size)      [] Normal Saline wet to dry  [] Betadine wet to dry [] Betadine to patricia wound   [] Hydrogel  [] Mepitel  [] Xeroform    [] Adaptic   [] Iodoform Packing

## 2024-08-01 NOTE — DISCHARGE INSTRUCTIONS
Wound Clinic Physician Orders and Discharge Instructions  [facility]  [address], [address]  [place] [address]  Telephone: [phone]     FAX [phone]    NAME:  Yessy Puckett  YOB: 1955  MEDICAL RECORD NUMBER:  [id number]  DATE:  8/1/2024      Return Appointment:  [] Dressing Supply Provider: Jelani  [] Home Healthcare:  [] Facility:   [x] Return Appointment: 2 Week(s)  [x] Nurse Visit:  1 Week(s)    [] Discharge from [facility]:   [] Healed          [] Refer to Provider:           [] Consult    Follow-up Information:  [] Ordered Tests:   [x] Referrals: Dr. Albarado   [] Rx:   [] Culture:  [] Wound Vac:  [] Skin Sub:   [] OR:  [] Other:     Wound Cleansing:   Do not scrub or use excessive force.  Cleanse wound prior to applying a clean dressing with:  [] Normal Saline   [x] Keep Wound Dry in Shower     [] Wound Cleanser   [x] Cleanse wound with Mild Soap & Water on dressing change days   [] Other:       Topical Treatments:  Do not apply lotions, creams, or ointments to wound bed unless directed.   [x] Apply moisturizing lotion to skin surrounding the wound prior to dressing change.  [] Apply antifungal ointment to skin surrounding the wound prior to dressing change.  [] Apply thin film of moisture barrier ointment to skin immediately around wound.  [] Apply Betadine to skin immediately around wound   [] Other:      Dressings:           Wound Location: Left leg     [x] Apply Primary Dressing:       [x] MediHoney Gel [] MediHoney Alginate  [] Calcium Alginate with Silver -   [] Calcium Alginate without silver   [] Collagen with silver  [] Collagen without Silver    [] Santyl with moist saline gauze     [] Hydrofera Blue (cut to size and moistened with normal saline)  [] Hydrofera Blue Ready (cut to size)      [] Normal Saline wet to dry  [] Betadine wet to dry [] Betadine to patricia wound   [] Hydrogel  [] Mepitel  [] Xeroform    [] Adaptic   [] Iodoform Packing  Physical Exam: VS evaluated..   General: Pt is well appearing, in no respiratory distress. MMM.   HEENT: TMs normal b/l, no erythema, no dullness, no hemotympanum. Eyes normal with no injection, no discharge, EOMI.  Pharynx with no erythema, no exudates, no stomatitis. No anterior cervical lymph nodes appreciated.   Extremities/Derm: No skin rash noted. Cap refill <2 seconds.   Cardiopulmonary: Chest is clear, no wheezing, rales or crackles. No retractions, no distress. Normal and equal breath sounds. Normal heart sounds, no muffling, no murmur appreciated.   GI: Abdomen soft, NT/ND, no guarding, no localized tenderness.   Neuro: Neuro exam grossly intact.

## 2024-08-02 ASSESSMENT — ENCOUNTER SYMPTOMS
ABDOMINAL PAIN: 0
SHORTNESS OF BREATH: 0
DIARRHEA: 0
VOMITING: 0

## 2024-08-02 NOTE — WOUND CARE
[facility]   Wound Care and [facility] O[facility] Therapy Center   Medical Staff Progress Note     Yessy Puckett  MEDICAL RECORD NUMBER:  [id number]  AGE: 68 y.o.   GENDER: male  : 1955  EPISODE DATE:  2024    Chief complaint and reason for visit:     Chief Complaint   Patient presents with    Wound Check     L leg        HISTORY of PRESENT ILLNESS HPI     Yessy Puckett is a 68 y.o. male who presents today for wound/ulcer evaluation.     History of Wound Context: Per original history and physical on this patient. Changes in history since last evaluation: None    PAST MEDICAL HISTORY        Diagnosis Date    Arthritis     Arthritis     Chronic obstructive pulmonary disease (HCC)     COPD (chronic obstructive pulmonary disease) (HCC)     Diabetes (HCC)     Diabetes (HCC)     HTN (hypertension)     Hypertension     Sleep apnea     Sleep apnea        PAST SURGICAL HISTORY    Past Surgical History:   Procedure Laterality Date    GI      HERNIA REPAIR         FAMILY HISTORY    Family History   Problem Relation Age of Onset    Hypertension Sister     Hypertension Sister     Hypertension Father     Hypertension Mother     Hypertension Child        SOCIAL HISTORY    Social History     Tobacco Use    Smoking status: Never    Smokeless tobacco: Never   Substance Use Topics    Alcohol use: Never    Drug use: Never       ALLERGIES    No Known Allergies    MEDICATIONS    Current Outpatient Medications on File Prior to Encounter   Medication Sig Dispense Refill    hydrALAZINE (APRESOLINE) 25 MG tablet Take 1 tablet by mouth 3 times daily      isosorbide mononitrate (IMDUR) 30 MG extended release tablet Take 1 tablet by mouth daily      fluticasone-umeclidin-vilant (TRELEGY ELLIPTA) 100-62.5-25 MCG/ACT AEPB inhaler Inhale 1 puff into the lungs daily      lisinopril (PRINIVIL;ZESTRIL) 20 MG tablet Take 1 tablet by mouth daily       No current facility-administered medications on file prior to

## 2024-08-08 ENCOUNTER — HOSPITAL ENCOUNTER (OUTPATIENT)
Facility: HOSPITAL | Age: 69
Discharge: HOME | End: 2024-08-08
Attending: RADIOLOGY
Payer: MEDICARE

## 2024-08-08 VITALS
DIASTOLIC BLOOD PRESSURE: 87 MMHG | SYSTOLIC BLOOD PRESSURE: 176 MMHG | RESPIRATION RATE: 18 BRPM | HEART RATE: 81 BPM | TEMPERATURE: 97.9 F

## 2024-08-08 PROCEDURE — 29581 APPL MULTLAYER CMPRN SYS LEG: CPT

## 2024-08-08 NOTE — WOUND CARE
Multilayer Compression Wrap   (Not Unna) Below the Knee    NAME:  Yessy Puckett  YOB: 1955  MEDICAL RECORD NUMBER:  [id number]  DATE:  8/8/2024    Multilayer compression wrap: Removed old Multilayer wrap if indicated and wash leg with mild soap/water.  Applied moisturizing agent to dry skin as needed.   Applied primary and secondary dressing as ordered.  Applied multilayered dressing below the knee to left lower leg.  Instructed patient/caregiver not to remove dressing and to keep it clean and dry.   Instructed patient/caregiver on complications to report to provider, such as pain, numbness in toes, heavy drainage, and slippage of dressing.  Instructed patient on purpose of compression dressing and on activity and exercise recommendations.      Electronically signed by Layne Blake LPN on 8/8/2024 at 1:03 PM

## 2024-08-08 NOTE — WOUND CARE
Wound Care Nurse Visit Note        Yessy Puckett  Age: 68 y.o.  YOB: 1955    Today's Date:  8/8/2024    Patient presents today per physician order for a scheduled nurse visit.    Orders to follow same dressings as previous visit and to report any changes in conditions if noted.    BP (!) 176/87   Pulse 81   Temp 97.9 °F (36.6 °C) (Temporal)   Resp 18       Wound Assessment:      Wound 12/12/22 Leg Left #1 Circumferential (Active)   Wound Image   08/01/24 1459   Wound Etiology Venous 08/08/24 1302   Dressing Status Other (Comment) 08/08/24 1302   Wound Cleansed Cleansed with saline 08/08/24 1302   Dressing/Treatment Other (comment) 08/01/24 1546   Wound Length (cm) 10.5 cm 08/08/24 1302   Wound Width (cm) 5.5 cm 08/08/24 1302   Wound Depth (cm) 0.3 cm 08/08/24 1302   Wound Surface Area (cm^2) 57.75 cm^2 08/08/24 1302   Change in Wound Size % (l*w) 14.44 08/08/24 1302   Wound Volume (cm^3) 17.325 cm^3 08/08/24 1302   Wound Healing % -157 08/08/24 1302   Post-Procedure Length (cm) 10.5 cm 08/01/24 1523   Post-Procedure Width (cm) 5.5 cm 08/01/24 1523   Post-Procedure Depth (cm) 0.3 cm 08/01/24 1523   Post-Procedure Surface Area (cm^2) 57.75 cm^2 08/01/24 1523   Post-Procedure Volume (cm^3) 17.325 cm^3 08/01/24 1523   Wound Assessment Slough;Granulation tissue 08/08/24 1302   Drainage Amount Moderate (25-50%) 08/08/24 1302   Drainage Description Serosanguinous 08/08/24 1302   Odor None 08/08/24 1302   Pia-wound Assessment Dry/flaky;Intact 08/08/24 1302   Margins Attached edges 08/08/24 1302   Wound Thickness Description not for Pressure Injury Full thickness 08/08/24 1302   Number of days: 604        Wound was redressed per order and patient discharged after instructions given.      Layne Blake, YODITN

## 2024-08-15 ENCOUNTER — HOSPITAL ENCOUNTER (OUTPATIENT)
Facility: HOSPITAL | Age: 69
Discharge: HOME | End: 2024-08-15
Attending: RADIOLOGY
Payer: MEDICARE

## 2024-08-15 VITALS
DIASTOLIC BLOOD PRESSURE: 90 MMHG | SYSTOLIC BLOOD PRESSURE: 165 MMHG | TEMPERATURE: 98.2 F | HEART RATE: 105 BPM | RESPIRATION RATE: 18 BRPM

## 2024-08-15 PROCEDURE — 11042 DBRDMT SUBQ TIS 1ST 20SQCM/<: CPT

## 2024-08-15 PROCEDURE — 11720 DEBRIDE NAIL 1-5: CPT

## 2024-08-15 NOTE — WOUND CARE
Wound Clinic Physician Orders and Discharge Instructions  [facility]  [address], [address]  [place] [address]  Telephone: [phone]     FAX [phone]    NAME:  Yessy Puckett  YOB: 1955  MEDICAL RECORD NUMBER:  [id number]  DATE:  8/15/2024      Return Appointment:  [] Dressing Supply Provider: Jelani  [] Home Healthcare:  [] Facility:   [x] Return Appointment: 2 Week(s)  [x] Nurse Visit:  1 Week(s)    [] Discharge from [facility]:   [] Healed          [] Refer to Provider:           [] Consult    Follow-up Information:  [] Ordered Tests:   [x] Referrals: Dr. Albarado   [] Rx:   [] Culture:  [] Wound Vac:  [] Skin Sub:   [] OR:  [] Other:     Wound Cleansing:   Do not scrub or use excessive force.  Cleanse wound prior to applying a clean dressing with:  [] Normal Saline   [x] Keep Wound Dry in Shower     [] Wound Cleanser   [x] Cleanse wound with Mild Soap & Water on dressing change days   [] Other:       Topical Treatments:  Do not apply lotions, creams, or ointments to wound bed unless directed.   [x] Apply moisturizing lotion to skin surrounding the wound prior to dressing change.  [] Apply antifungal ointment to skin surrounding the wound prior to dressing change.  [] Apply thin film of moisture barrier ointment to skin immediately around wound.  [] Apply Betadine to skin immediately around wound   [] Other:      Dressings:           Wound Location: Left leg     [x] Apply Primary Dressing:       [x] MediHoney Gel [] MediHoney Alginate  [] Calcium Alginate with Silver -   [] Calcium Alginate without silver   [] Collagen with silver  [] Collagen without Silver    [] Santyl with moist saline gauze     [] Hydrofera Blue (cut to size and moistened with normal saline)  [] Hydrofera Blue Ready (cut to size)      [] Normal Saline wet to dry  [] Betadine wet to dry [] Betadine to patricia wound   [] Hydrogel  [] Mepitel  [] Xeroform    [] Adaptic   [] Iodoform Packing

## 2024-08-15 NOTE — DISCHARGE INSTRUCTIONS
Wound Clinic Physician Orders and Discharge Instructions  [facility]  [address], [address]  [place] [address]  Telephone: [phone]     FAX [phone]    NAME:  Yessy Puckett  YOB: 1955  MEDICAL RECORD NUMBER:  [id number]  DATE:  8/15/2024      Return Appointment:  [] Dressing Supply Provider: Jelani  [] Home Healthcare:  [] Facility:   [x] Return Appointment: 2 Week(s)  [x] Nurse Visit:  1 Week(s)    [] Discharge from [facility]:   [] Healed          [] Refer to Provider:           [] Consult    Follow-up Information:  [] Ordered Tests:   [x] Referrals: Dr. Albarado   [] Rx:   [] Culture:  [] Wound Vac:  [] Skin Sub:   [] OR:  [] Other:     Wound Cleansing:   Do not scrub or use excessive force.  Cleanse wound prior to applying a clean dressing with:  [] Normal Saline   [x] Keep Wound Dry in Shower     [] Wound Cleanser   [x] Cleanse wound with Mild Soap & Water on dressing change days   [] Other:       Topical Treatments:  Do not apply lotions, creams, or ointments to wound bed unless directed.   [x] Apply moisturizing lotion to skin surrounding the wound prior to dressing change.  [] Apply antifungal ointment to skin surrounding the wound prior to dressing change.  [] Apply thin film of moisture barrier ointment to skin immediately around wound.  [] Apply Betadine to skin immediately around wound   [] Other:      Dressings:           Wound Location: Left leg     [x] Apply Primary Dressing:       [x] MediHoney Gel [] MediHoney Alginate  [] Calcium Alginate with Silver -   [] Calcium Alginate without silver   [] Collagen with silver  [] Collagen without Silver    [] Santyl with moist saline gauze     [] Hydrofera Blue (cut to size and moistened with normal saline)  [] Hydrofera Blue Ready (cut to size)      [] Normal Saline wet to dry  [] Betadine wet to dry [] Betadine to patricia wound   [] Hydrogel  [] Mepitel  [] Xeroform    [] Adaptic   [] Iodoform Packing

## 2024-08-29 ENCOUNTER — HOSPITAL ENCOUNTER (OUTPATIENT)
Facility: HOSPITAL | Age: 69
Discharge: HOME | End: 2024-08-29
Attending: RADIOLOGY
Payer: MEDICARE

## 2024-08-29 VITALS
DIASTOLIC BLOOD PRESSURE: 71 MMHG | RESPIRATION RATE: 18 BRPM | HEART RATE: 100 BPM | SYSTOLIC BLOOD PRESSURE: 154 MMHG | TEMPERATURE: 97.3 F

## 2024-08-29 PROCEDURE — 11042 DBRDMT SUBQ TIS 1ST 20SQCM/<: CPT

## 2024-08-29 NOTE — DISCHARGE INSTRUCTIONS
Wound Clinic Physician Orders and Discharge Instructions  [facility]  [address], [address]  [place] [address]  Telephone: [phone]     FAX [phone]    NAME:  Yessy Puckett  YOB: 1955  MEDICAL RECORD NUMBER:  [id number]  DATE:  8/29/2024      Return Appointment:  [] Dressing Supply Provider: Jelani  [] Home Healthcare:  [] Facility:   [x] Return Appointment: 2 Week(s)  [x] Nurse Visit:  1 Week(s) on Wednesday    [] Discharge from [facility]:   [] Healed          [] Refer to Provider:           [] Consult    Follow-up Information:  [] Ordered Tests:   [x] Referrals: Dr. Albarado   [] Rx:   [] Culture:  [] Wound Vac:  [] Skin Sub:   [] OR:  [] Other:     Wound Cleansing:   Do not scrub or use excessive force.  Cleanse wound prior to applying a clean dressing with:  [] Normal Saline   [x] Keep Wound Dry in Shower     [] Wound Cleanser   [x] Cleanse wound with Mild Soap & Water on dressing change days   [] Other:       Topical Treatments:  Do not apply lotions, creams, or ointments to wound bed unless directed.   [x] Apply moisturizing lotion to skin surrounding the wound prior to dressing change.  [] Apply antifungal ointment to skin surrounding the wound prior to dressing change.  [] Apply thin film of moisture barrier ointment to skin immediately around wound.  [] Apply Betadine to skin immediately around wound   [] Other:      Dressings:           Wound Location: Left leg and Right Leg   [x] Apply Primary Dressing:       [x] MediHoney Gel [] MediHoney Alginate  [] Calcium Alginate with Silver -   [] Calcium Alginate without silver   [] Collagen with silver  [] Collagen without Silver    [] Santyl with moist saline gauze     [] Hydrofera Blue (cut to size and moistened with normal saline)  [] Hydrofera Blue Ready (cut to size)      [] Normal Saline wet to dry  [] Betadine wet to dry [] Betadine to patricia wound   [] Hydrogel  [] Mepitel  [] Xeroform    []  Adaptic   [] Iodoform Packing Strip [] Plain Packing Strip   [] Skin Sub:   [] Other:      [x] Cover and Secure with:     [x] Gauze             [x] Marysol  [] Kerlix   [] Foam Piece [] Foam Heel Cup     [] Super Absorbant   [] ABD  [] Ace Wrap             [] Bordered Gauze Dressing      [] Mepilex Foam Dressing      [] Surgilast     [] Other:    Limit contact of tape with skin.    [x] Change dressing: [] Daily    [] Every Other Day   [] Twice per week   [] Three times per week   [x] Once a week [] Do Not Change Dressing   [] Other:       Skin Sub:           Wound Location:   [] Implanted on:    [] Only change outer dressing  [] Do not change dressing until:    [] Other:      Pressure Relief:  [] When sitting, shift position or do seat lifts every 15 minutes.  [] Wheelchair cushion [] Specialty Bed/Mattress  [x]  Avoid direct pressure on wound site.    [] Other:     Edema Control:  Apply: [] Compression Stocking:  mmHg  []Right Leg []Left Leg   [x] Tubigrip [x]Right Leg Double Layer [x]Left Leg Double Layer      []Right Leg Single Layer []Left Leg Single Layer   [] Ace Wrap []Right Leg  []Left Leg      [x] Elevate leg(s) above the level of the heart when sitting.    [x] Avoid prolonged standing in one place.     Compression:  Apply: [] Compression Wrap to []RightLeg []Left Leg    []  Coflex [] Coflex Lite  [] Unna with Calamine Lite     [] Elevate leg(s) above the level of the heart when sitting.    [] Avoid prolonged standing in one place.   [] Do not get leg(s) with wrap wet. If wrap becomes too tight, call the wound center and remove wrap from leg by unrolling each layer.    Dietary:  [] Diet as tolerated: [x] Calorie Diabetic Diet: [x] No Added Salt:  [x] Increase Protein: [] Other:     Activity:  [x] Activity as tolerated: Elevate lower extremities with ulcers  [] Patient has no activity restrictions      [] Strict Bedrest   [] Remain off Work      [] May return to full duty work

## 2024-09-04 ENCOUNTER — HOSPITAL ENCOUNTER (OUTPATIENT)
Facility: HOSPITAL | Age: 69
Discharge: HOME | End: 2024-09-04
Attending: RADIOLOGY
Payer: MEDICARE

## 2024-09-04 VITALS
OXYGEN SATURATION: 95 % | SYSTOLIC BLOOD PRESSURE: 145 MMHG | TEMPERATURE: 97.7 F | RESPIRATION RATE: 20 BRPM | DIASTOLIC BLOOD PRESSURE: 74 MMHG | HEART RATE: 89 BPM

## 2024-09-04 PROCEDURE — 99213 OFFICE O/P EST LOW 20 MIN: CPT

## 2024-09-04 NOTE — WOUND CARE
Wound Care Nurse Visit Note        Yessy Puckett  Age: 69 y.o.  YOB: 1955    Today's Date:  9/4/2024    Patient presents today per physician order for a scheduled nurse visit.    Orders to follow same dressings as previous visit and to report any changes in conditions if noted.    BP (!) 145/74   Pulse 89   Temp 97.7 °F (36.5 °C) (Temporal)   Resp 20   SpO2 95%       Wound Assessment:      Wound 12/12/22 Leg Left #1 Circumferential (Active)   Wound Image   08/29/24 1344   Wound Etiology Venous 09/04/24 1311   Dressing Status Clean;Dry;Intact 09/04/24 1311   Wound Cleansed Cleansed with saline 09/04/24 1311   Dressing/Treatment Honey gel/honey paste;Gauze dressing/dressing sponge 08/15/24 1416   Wound Length (cm) 0.8 cm 09/04/24 1311   Wound Width (cm) 1 cm 09/04/24 1311   Wound Depth (cm) 0.1 cm 09/04/24 1311   Wound Surface Area (cm^2) 0.8 cm^2 09/04/24 1311   Change in Wound Size % (l*w) 98.81 09/04/24 1311   Wound Volume (cm^3) 0.08 cm^3 09/04/24 1311   Wound Healing % 99 09/04/24 1311   Post-Procedure Length (cm) 0.8 cm 08/29/24 1355   Post-Procedure Width (cm) 1 cm 08/29/24 1355   Post-Procedure Depth (cm) 0.1 cm 08/29/24 1355   Post-Procedure Surface Area (cm^2) 0.8 cm^2 08/29/24 1355   Post-Procedure Volume (cm^3) 0.08 cm^3 08/29/24 1355   Wound Assessment Slough;Granulation tissue 09/04/24 1311   Drainage Amount Moderate (25-50%) 09/04/24 1311   Drainage Description Serosanguinous 09/04/24 1311   Odor None 09/04/24 1311   Pia-wound Assessment Hypopigmented;Maceration 09/04/24 1311   Margins Attached edges 09/04/24 1311   Wound Thickness Description not for Pressure Injury Full thickness 09/04/24 1311   Number of days: 631       Wound 08/29/24 Leg Right;Posterior #2 cluster (Active)   Wound Image   08/29/24 1344   Wound Etiology Venous 09/04/24 1311   Dressing Status Clean;Dry;Intact 09/04/24 1311   Wound Cleansed Cleansed with saline

## 2024-09-12 ENCOUNTER — HOSPITAL ENCOUNTER (OUTPATIENT)
Facility: HOSPITAL | Age: 69
Discharge: HOME | End: 2024-09-12
Attending: RADIOLOGY
Payer: MEDICARE

## 2024-09-12 VITALS
TEMPERATURE: 98.1 F | RESPIRATION RATE: 18 BRPM | SYSTOLIC BLOOD PRESSURE: 148 MMHG | DIASTOLIC BLOOD PRESSURE: 84 MMHG | HEART RATE: 76 BPM

## 2024-09-12 PROCEDURE — 11042 DBRDMT SUBQ TIS 1ST 20SQCM/<: CPT

## 2024-09-12 RX ORDER — AMMONIUM LACTATE 12 G/100G
CREAM TOPICAL
Qty: 385 G | Refills: 4 | Status: SHIPPED | OUTPATIENT
Start: 2024-09-12

## 2024-09-18 ENCOUNTER — HOSPITAL ENCOUNTER (OUTPATIENT)
Facility: HOSPITAL | Age: 69
Discharge: HOME | End: 2024-09-18
Attending: RADIOLOGY
Payer: MEDICARE

## 2024-09-18 VITALS
TEMPERATURE: 97.9 F | OXYGEN SATURATION: 93 % | DIASTOLIC BLOOD PRESSURE: 82 MMHG | RESPIRATION RATE: 18 BRPM | SYSTOLIC BLOOD PRESSURE: 155 MMHG | HEART RATE: 86 BPM

## 2024-09-18 PROCEDURE — 99213 OFFICE O/P EST LOW 20 MIN: CPT

## 2024-09-26 ENCOUNTER — HOSPITAL ENCOUNTER (OUTPATIENT)
Facility: HOSPITAL | Age: 69
Discharge: HOME | End: 2024-09-26
Attending: RADIOLOGY
Payer: MEDICARE

## 2024-09-26 VITALS
RESPIRATION RATE: 18 BRPM | SYSTOLIC BLOOD PRESSURE: 159 MMHG | TEMPERATURE: 98.2 F | OXYGEN SATURATION: 97 % | HEART RATE: 87 BPM | DIASTOLIC BLOOD PRESSURE: 87 MMHG

## 2024-09-26 PROCEDURE — 11045 DBRDMT SUBQ TISS EACH ADDL: CPT

## 2024-09-26 PROCEDURE — 11042 DBRDMT SUBQ TIS 1ST 20SQCM/<: CPT

## 2024-10-04 ENCOUNTER — HOSPITAL ENCOUNTER (OUTPATIENT)
Facility: HOSPITAL | Age: 69
Discharge: HOME | End: 2024-10-04
Attending: RADIOLOGY

## 2024-10-04 VITALS
DIASTOLIC BLOOD PRESSURE: 73 MMHG | HEART RATE: 79 BPM | TEMPERATURE: 97.2 F | SYSTOLIC BLOOD PRESSURE: 140 MMHG | RESPIRATION RATE: 20 BRPM

## 2024-10-04 NOTE — WOUND CARE
Wound Care Nurse Visit Note        Yessy Puckett  Age: 69 y.o.  YOB: 1955    Today's Date:  10/4/2024    Patient presents today per physician order for a scheduled nurse visit.    Orders to follow same dressings as previous visit and to report any changes in conditions if noted.    BP (!) 140/73   Pulse 79   Temp 97.2 °F (36.2 °C) (Temporal)   Resp 20       Wound Assessment:      Wound 12/12/22 Leg Left;Posterior #1 (Active)   Wound Image   09/26/24 1353   Wound Etiology Venous 10/04/24 1303   Dressing Status Old drainage noted 10/04/24 1303   Wound Cleansed Cleansed with saline 10/04/24 1303   Dressing/Treatment Hydrofera blue;Gauze dressing/dressing sponge;Dry dressing;Tubular bandage 10/04/24 1303   Wound Length (cm) 0.5 cm 10/04/24 1303   Wound Width (cm) 1 cm 10/04/24 1303   Wound Depth (cm) 0.1 cm 10/04/24 1303   Wound Surface Area (cm^2) 0.5 cm^2 10/04/24 1303   Change in Wound Size % (l*w) 99.26 10/04/24 1303   Wound Volume (cm^3) 0.05 cm^3 10/04/24 1303   Wound Healing % 99 10/04/24 1303   Post-Procedure Length (cm) 0.5 cm 09/26/24 1408   Post-Procedure Width (cm) 1 cm 09/26/24 1408   Post-Procedure Depth (cm) 0.1 cm 09/26/24 1408   Post-Procedure Surface Area (cm^2) 0.5 cm^2 09/26/24 1408   Post-Procedure Volume (cm^3) 0.05 cm^3 09/26/24 1408   Wound Assessment Slough;Granulation tissue 10/04/24 1303   Drainage Amount Moderate (25-50%) 10/04/24 1303   Drainage Description Serosanguinous 10/04/24 1303   Odor None 10/04/24 1303   Pia-wound Assessment Hypopigmented 10/04/24 1303   Margins Attached edges 10/04/24 1303   Wound Thickness Description not for Pressure Injury Full thickness 10/04/24 1303   Number of days: 661       Wound 08/29/24 Leg Right;Posterior #2 cluster (Active)   Wound Image   09/26/24 1353   Wound Etiology Venous 10/04/24 1303   Dressing Status Old drainage noted 10/04/24 1303   Wound Cleansed Cleansed with saline

## 2024-10-10 ENCOUNTER — HOSPITAL ENCOUNTER (OUTPATIENT)
Facility: HOSPITAL | Age: 69
Discharge: HOME | End: 2024-10-10
Attending: RADIOLOGY
Payer: MEDICARE

## 2024-10-10 VITALS
DIASTOLIC BLOOD PRESSURE: 79 MMHG | TEMPERATURE: 97.3 F | HEART RATE: 67 BPM | RESPIRATION RATE: 22 BRPM | SYSTOLIC BLOOD PRESSURE: 160 MMHG | OXYGEN SATURATION: 92 %

## 2024-10-10 PROCEDURE — 11042 DBRDMT SUBQ TIS 1ST 20SQCM/<: CPT

## 2024-10-10 NOTE — DISCHARGE INSTRUCTIONS
[] Santyl with moist saline gauze                [x] Hydrofera Blue (cut to size and moistened with normal saline)    [] Hydrofera Blue Ready (cut to size)                      [] Normal Saline wet to dry    [] Betadine wet to dry            [] Betadine to patricia wound              [] Hydrogel                 [] Mepitel         [] Xeroform    [] Adaptic              [] Iodoform Packing Strip      [] Plain Packing Strip              [] Skin Sub:   [] Other:       [x] Cover and Secure with:                   [x] Gauze                     [x] Marysol                       [] Kerlix              [] Foam Piece            [] Foam Heel Cup     [] Super Absorbant      [] ABD                        [] Ace Wrap               [] Bordered Gauze Dressing                 [] Mepilex Foam Dressing      [] Surgilast     [] Other:               Limit contact of tape with skin.     [x] Change dressing:   [] Daily           [] Every Other Day    [x] Three times per week (home health)               [] Once a week          [] Do Not Change Dressing     [] Other:                         Pressure Relief:  [] When sitting, shift position or do seat lifts every 15 minutes.  [] Wheelchair cushion           [] Specialty Bed/Mattress  [x]  Avoid direct pressure on wound site.    [] Other:                Edema Control:  Apply:  [] Compression Stocking:  mmHg     []Right Leg     []Left Leg              [x] Tubigrip      [x]Right Leg Double Layer      [x]Left Leg Double Layer                                      []Right Leg Single Layer       []Left Leg Single Layer              [] Ace Wrap   []Right Leg     []Left Leg                  [x] Elevate leg(s) above the level of the heart when sitting.                 [x] Avoid prolonged standing in one place.         Compression:  Apply:  [] Compression Wrap to        []RightLeg      []Left Leg                          []  Coflex        [] Coflex Lite              [] Unna with Calamine Lite

## 2024-10-11 NOTE — WOUND CARE
Wound Clinic Physician Orders and Discharge Instructions  [facility]  [address], [address]  [place] [address]  Telephone: [phone]     FAX [phone]    NAME:  Yessy Puckett  YOB: 1955  MEDICAL RECORD NUMBER:  [id number]  DATE:  10/10/2024      Return Appointment:  [] Dressing Supply Provider: Jelani  [x] Home Healthcare:   [] Facility:   [x] Return Appointment: 2 Week(s)  [x] Nurse Visit:  1 Week(s) on Wednesday UNTIL home health can start     [] Discharge from [facility]:   [] Healed          [] Refer to Provider:           [] Consult    Follow-up Information:  [] Ordered Tests:   [] Referrals:   [] Rx:   [] Culture:  [] Wound Vac:  [] Skin Sub:   [] OR:  [] Other:     Wound Cleansing:   Do not scrub or use excessive force.  Cleanse wound prior to applying a clean dressing with:  [] Normal Saline   [x] Keep Wound Dry in Shower     [] Wound Cleanser   [x] Cleanse wound with Mild Soap & Water on dressing change days   [] Other:       Topical Treatments:  Do not apply lotions, creams, or ointments to wound bed unless directed.   [x] Apply moisturizing lotion (Ammonium Lactate) to skin surrounding the wound prior to dressing change.  [] Apply antifungal ointment to skin surrounding the wound prior to dressing change.  [] Apply thin film of moisture barrier ointment to skin immediately around wound.  [] Apply Betadine to skin immediately around wound   [] Other:      Dressings:           Wound Location: Left leg and Right Leg wounds   [x] Apply Primary Dressing:       [] MediHoney Gel [] MediHoney Alginate  [] Calcium Alginate with Silver -   [] Calcium Alginate without silver   [] Collagen with silver  [] Collagen without Silver    [] Santyl with moist saline gauze     [x] Hydrofera Blue (cut to size and moistened with normal saline)  [] Hydrofera Blue Ready (cut to size)      [] Normal Saline wet to dry  [] Betadine wet to dry [] Betadine to patricia 
Strict Bedrest           [] Remain off Work      [] May return to full duty work                                     [] Return to work with restrictions          Physician:  [] Dr. Gladys Pickens  [] Dr. Cesario Pace  [] Dr. Stacie Atkinson  [] Dr. Sesar Ford  [x] Dr. Jose Hurst  [] Dr. Jazlyn Ambrosio  [] Dr. Ashley Ziegler        Nurse Case Manger:  Beronica \"T\"         Wound Care Center Information: Should you experience any significant changes in your wound(s) or have questions about your wound care, please contact the Wound Center at [phone]. Our hours are Monday - Friday 8am - 4:30pm, closed all major holidays. If you need help with your wound outside these hours and cannot wait until we are again available, contact your PCP or go to the hospital emergency room.      PLEASE NOTE: IF YOU ARE UNABLE TO OBTAIN WOUND SUPPLIES, CONTINUE TO USE THE SUPPLIES YOU HAVE AVAILABLE UNTIL YOU ARE ABLE TO REACH US. IT IS MOST IMPORTANT TO KEEP THE WOUND COVERED AT ALL TIMES.                          Electronically signed by Jose Hurst DPM on 10/11/2024 at 11:35 AM

## 2024-10-16 ENCOUNTER — HOSPITAL ENCOUNTER (OUTPATIENT)
Facility: HOSPITAL | Age: 69
Discharge: HOME | End: 2024-10-16
Attending: RADIOLOGY
Payer: MEDICARE

## 2024-10-16 VITALS
SYSTOLIC BLOOD PRESSURE: 155 MMHG | TEMPERATURE: 98 F | HEART RATE: 75 BPM | DIASTOLIC BLOOD PRESSURE: 73 MMHG | RESPIRATION RATE: 22 BRPM

## 2024-10-16 PROCEDURE — 99213 OFFICE O/P EST LOW 20 MIN: CPT

## 2024-10-16 NOTE — WOUND CARE
Wound Care Nurse Visit Note        Yessy Puckett  Age: 69 y.o.  YOB: 1955    Today's Date:  10/16/2024    Patient presents today per physician order for a scheduled nurse visit.    Orders to follow same dressings as previous visit and to report any changes in conditions if noted.    BP (!) 155/73   Pulse 75   Temp 98 °F (36.7 °C) (Temporal)   Resp 22       Wound Assessment:      Wound 12/12/22 Leg Left;Posterior #1 (Active)   Wound Image   10/10/24 1419   Wound Etiology Venous 10/16/24 1316   Dressing Status Clean;Dry;Intact 10/16/24 1316   Wound Cleansed Cleansed with saline 10/16/24 1316   Dressing/Treatment Hydrofera blue;Gauze dressing/dressing sponge;Dry dressing;Tubular bandage 10/04/24 1303   Wound Length (cm) 0 cm 10/16/24 1316   Wound Width (cm) 0 cm 10/16/24 1316   Wound Depth (cm) 0 cm 10/16/24 1316   Wound Surface Area (cm^2) 0 cm^2 10/16/24 1316   Change in Wound Size % (l*w) 100 10/16/24 1316   Wound Volume (cm^3) 0 cm^3 10/16/24 1316   Wound Healing % 100 10/16/24 1316   Post-Procedure Length (cm) 1.5 cm 10/10/24 1453   Post-Procedure Width (cm) 5.5 cm 10/10/24 1453   Post-Procedure Depth (cm) 0.1 cm 10/10/24 1453   Post-Procedure Surface Area (cm^2) 8.25 cm^2 10/10/24 1453   Post-Procedure Volume (cm^3) 0.825 cm^3 10/10/24 1453   Wound Assessment Epithelialization;Dry 10/16/24 1316   Drainage Amount None (dry) 10/16/24 1316   Drainage Description Serous 10/10/24 1419   Odor None 10/16/24 1316   Pia-wound Assessment Hypopigmented;Intact 10/16/24 1316   Margins Attached edges 10/16/24 1316   Wound Thickness Description not for Pressure Injury Full thickness 10/10/24 1419   Number of days: 673       Wound 08/29/24 Leg Right;Posterior #2 (Active)   Wound Image   10/10/24 1419   Wound Etiology Venous 10/16/24 1316   Dressing Status Clean;Dry;Intact 10/16/24 1316   Wound Cleansed Cleansed with saline 10/16/24 1316   Dressing/Treatment

## 2024-10-24 ENCOUNTER — HOSPITAL ENCOUNTER (OUTPATIENT)
Facility: HOSPITAL | Age: 69
Discharge: HOME | End: 2024-10-24
Attending: RADIOLOGY
Payer: MEDICARE

## 2024-10-24 VITALS
SYSTOLIC BLOOD PRESSURE: 119 MMHG | DIASTOLIC BLOOD PRESSURE: 72 MMHG | TEMPERATURE: 97.2 F | OXYGEN SATURATION: 99 % | RESPIRATION RATE: 20 BRPM | HEART RATE: 82 BPM

## 2024-10-24 PROCEDURE — 11042 DBRDMT SUBQ TIS 1ST 20SQCM/<: CPT

## 2024-10-24 RX ORDER — ATORVASTATIN CALCIUM 20 MG/1
20 TABLET, FILM COATED ORAL DAILY
COMMUNITY

## 2024-10-24 NOTE — DISCHARGE INSTRUCTIONS
Beronica Velez RN  Registered Nurse     Wound Care      Signed     Date of Service: 10/24/2024  2:00 PM     Signed                          Wound Clinic Physician Orders and Discharge Instructions  [facility]  [facility], [address]  [place] [id number]  Telephone: [phone]     FAX [phone]     NAME:  Yessy Puckett  YOB: 1955  MEDICAL RECORD NUMBER:  [id number]  DATE:  10/24/2024        Return Appointment:  [] Dressing Supply Provider: Jelani  [] Home Healthcare:   [] Facility:   [x] Return Appointment: 2 Week(s)  [x] Nurse Visit:  1 Week(s) on Wednesday      [] Discharge from [facility]:   [] Healed          [] Refer to Provider:           [] Consult     Follow-up Information:  [] Ordered Tests:   [] Referrals:   [] Rx:   [] Culture:  [] Wound Vac:  [] Skin Sub:   [] OR:  [] Other:      Wound Cleansing:   Do not scrub or use excessive force.  Cleanse wound prior to applying a clean dressing with:  [] Normal Saline          [x] Keep Wound Dry in Shower     [] Wound Cleanser   [x] Cleanse wound with Mild Soap & Water on dressing change days   [] Other:        Topical Treatments:  Do not apply lotions, creams, or ointments to wound bed unless directed.   [x] Apply moisturizing lotion (Ammonium Lactate) to skin surrounding the wound prior to dressing change.  [] Apply antifungal ointment to skin surrounding the wound prior to dressing change.  [] Apply thin film of moisture barrier ointment to skin immediately around wound.  [] Apply Betadine to skin immediately around wound   [] Other:                 Dressings:                  Wound Location: Left leg and Right Leg wounds   [x] Apply Primary Dressing:                                          [] MediHoney Gel      [] MediHoney Alginate  [] Calcium Alginate with Silver -   [] Calcium Alginate without silver              [] Collagen with silver            [] Collagen without Silver    [] Santyl with moist

## 2024-10-24 NOTE — WOUND CARE
Wound Clinic Physician Orders and Discharge Instructions  [facility]  [address], [address]  [place] [address]  Telephone: [phone]     FAX [phone]    NAME:  Yessy Puckett  YOB: 1955  MEDICAL RECORD NUMBER:  [id number]  DATE:  10/24/2024      Return Appointment:  [] Dressing Supply Provider: Jelani  [] Home Healthcare:   [] Facility:   [x] Return Appointment: 2 Week(s)  [x] Nurse Visit:  1 Week(s) on Wednesday     [] Discharge from [facility]:   [] Healed          [] Refer to Provider:           [] Consult    Follow-up Information:  [] Ordered Tests:   [] Referrals:   [] Rx:   [] Culture:  [] Wound Vac:  [] Skin Sub:   [] OR:  [] Other:     Wound Cleansing:   Do not scrub or use excessive force.  Cleanse wound prior to applying a clean dressing with:  [] Normal Saline   [x] Keep Wound Dry in Shower     [] Wound Cleanser   [x] Cleanse wound with Mild Soap & Water on dressing change days   [] Other:       Topical Treatments:  Do not apply lotions, creams, or ointments to wound bed unless directed.   [x] Apply moisturizing lotion (Ammonium Lactate) to skin surrounding the wound prior to dressing change.  [] Apply antifungal ointment to skin surrounding the wound prior to dressing change.  [] Apply thin film of moisture barrier ointment to skin immediately around wound.  [] Apply Betadine to skin immediately around wound   [] Other:      Dressings:           Wound Location: Left leg and Right Leg wounds   [x] Apply Primary Dressing:       [] MediHoney Gel [] MediHoney Alginate  [] Calcium Alginate with Silver -   [] Calcium Alginate without silver   [] Collagen with silver  [] Collagen without Silver    [] Santyl with moist saline gauze     [x] Hydrofera Blue (cut to size and moistened with normal saline)  [] Hydrofera Blue Ready (cut to size)      [] Normal Saline wet to dry  [] Betadine wet to dry [] Betadine to patricia wound   [] Hydrogel  [] Mepitel  []

## 2024-10-30 ENCOUNTER — HOSPITAL ENCOUNTER (OUTPATIENT)
Facility: HOSPITAL | Age: 69
Discharge: HOME | End: 2024-10-30
Attending: RADIOLOGY
Payer: MEDICARE

## 2024-10-30 VITALS
HEART RATE: 103 BPM | SYSTOLIC BLOOD PRESSURE: 141 MMHG | TEMPERATURE: 97.3 F | RESPIRATION RATE: 20 BRPM | DIASTOLIC BLOOD PRESSURE: 73 MMHG

## 2024-10-30 PROCEDURE — 99213 OFFICE O/P EST LOW 20 MIN: CPT

## 2024-10-30 NOTE — WOUND CARE
Wound Care Nurse Visit Note        Yessy Puckett  Age: 69 y.o.  YOB: 1955    Today's Date:  10/30/2024    Patient presents today per physician order for a scheduled nurse visit.    Orders to follow same dressings as previous visit and to report any changes in conditions if noted.    BP (!) 141/73   Pulse (!) 103   Temp 97.3 °F (36.3 °C) (Temporal)   Resp 20       Wound Assessment:      Wound 08/29/24 Leg Right;Posterior #2 (Active)   Wound Image   10/24/24 1424   Wound Etiology Venous 10/30/24 1158   Dressing Status Clean;Dry;Intact 10/30/24 1158   Wound Cleansed Cleansed with saline 10/30/24 1158   Dressing/Treatment Hydrofera blue;Gauze dressing/dressing sponge;Dry dressing;Tubular bandage 10/30/24 1158   Wound Length (cm) 1 cm 10/30/24 1158   Wound Width (cm) 0.7 cm 10/30/24 1158   Wound Depth (cm) 0.1 cm 10/30/24 1158   Wound Surface Area (cm^2) 0.7 cm^2 10/30/24 1158   Change in Wound Size % (l*w) 93.14 10/30/24 1158   Wound Volume (cm^3) 0.07 cm^3 10/30/24 1158   Wound Healing % 97 10/30/24 1158   Post-Procedure Length (cm) 1 cm 10/24/24 1449   Post-Procedure Width (cm) 0.7 cm 10/24/24 1449   Post-Procedure Depth (cm) 0.1 cm 10/24/24 1449   Post-Procedure Surface Area (cm^2) 0.7 cm^2 10/24/24 1449   Post-Procedure Volume (cm^3) 0.07 cm^3 10/24/24 1449   Wound Assessment Slough;Granulation tissue 10/30/24 1158   Drainage Amount Moderate (25-50%) 10/30/24 1158   Drainage Description Serosanguinous 10/30/24 1158   Odor None 10/30/24 1158   Pia-wound Assessment Dry/flaky 10/30/24 1158   Margins Attached edges 10/30/24 1158   Wound Thickness Description not for Pressure Injury Full thickness 10/30/24 1158   Number of days: 61       Wound 09/26/24 Leg Lateral;Left #3 (Active)   Wound Image   10/24/24 1425   Wound Etiology Venous 10/30/24 1158   Dressing Status Clean;Dry;Intact 10/30/24 1158   Wound Cleansed Cleansed with saline 10/30/24 1158

## 2024-11-14 ENCOUNTER — HOSPITAL ENCOUNTER (OUTPATIENT)
Facility: HOSPITAL | Age: 69
Discharge: HOME | End: 2024-11-14
Attending: RADIOLOGY
Payer: MEDICARE

## 2024-11-14 VITALS
SYSTOLIC BLOOD PRESSURE: 122 MMHG | RESPIRATION RATE: 20 BRPM | HEART RATE: 85 BPM | OXYGEN SATURATION: 96 % | TEMPERATURE: 97.5 F | DIASTOLIC BLOOD PRESSURE: 69 MMHG

## 2024-11-14 PROCEDURE — 11042 DBRDMT SUBQ TIS 1ST 20SQCM/<: CPT

## 2024-11-14 NOTE — WOUND CARE
Multilayer Compression Wrap   (Not Unna) Below the Knee    NAME:  Yessy Puckett  YOB: 1955  MEDICAL RECORD NUMBER:  [id number]  DATE:  11/14/2024    Multilayer compression wrap: Applied moisturizing agent to dry skin as needed.   Applied primary and secondary dressing as ordered.  Applied multilayered dressing below the knee to right lower leg.  Applied multilayered dressing below the knee to left lower leg.  Instructed patient/caregiver not to remove dressing and to keep it clean and dry.   Instructed patient/caregiver on complications to report to provider, such as pain, numbness in toes, heavy drainage, and slippage of dressing.  Instructed patient on purpose of compression dressing and on activity and exercise recommendations.      Electronically signed by Rubi Quinn RN on 11/14/2024 at 2:08 PM

## 2024-11-14 NOTE — DISCHARGE INSTRUCTIONS
[] Hydrofera Blue (cut to size and moistened with normal saline)    [] Hydrofera Blue Ready (cut to size)                      [] Normal Saline wet to dry    [] Betadine wet to dry            [] Betadine to patricia wound              [] Hydrogel                 [] Mepitel         [x] Xeroform    [] Adaptic              [] Iodoform Packing Strip      [] Plain Packing Strip              [] Skin Sub:   [] Other:       [x] Cover and Secure with:                   [x] Gauze                     [x] Marysol                       [] Kerlix              [] Foam Piece            [] Foam Heel Cup     [] Super Absorbant      [x] ABD                        [] Ace Wrap               [] Bordered Gauze Dressing                 [] Mepilex Foam Dressing      [] Surgilast     [] Other:               Limit contact of tape with skin.     [x] Change dressing:   [] Daily           [] Every Other Day    [] Three times per week (home health)               [x] Once a week          [] Do Not Change Dressing     [] Other:                         Pressure Relief:  [] When sitting, shift position or do seat lifts every 15 minutes.  [] Wheelchair cushion           [] Specialty Bed/Mattress  [x]  Avoid direct pressure on wound site.    [] Other:                Edema Control:  Apply:  [] Compression Stocking:  mmHg     []Right Leg     []Left Leg              [x] Tubigrip      [x]Right Leg Double Layer      [x]Left Leg Double Layer                                      []Right Leg Single Layer       []Left Leg Single Layer              [] Ace Wrap   []Right Leg     []Left Leg                  [x] Elevate leg(s) above the level of the heart when sitting.                 [x] Avoid prolonged standing in one place.         Compression:  Apply:  [x] Compression Wrap to        [x]RightLeg      [x]Left Leg                          [x]  Coflex        [] Coflex Lite              [] Unna with Calamine Lite                 [x] Elevate leg(s) above the level of the

## 2024-11-14 NOTE — WOUND CARE
Wound Clinic Physician Orders and Discharge Instructions  [facility]  [address], [address]  [place] [address]  Telephone: [phone]     FAX [phone]    NAME:  Yessy Puckett  YOB: 1955  MEDICAL RECORD NUMBER:  [id number]  DATE:  11/14/2024      Return Appointment:  [] Dressing Supply Provider: Jelani  [] Home Healthcare:   [] Facility:   [x] Return Appointment: 1 Week(s)  [] Nurse Visit:      [] Discharge from [facility]:   [] Healed          [] Refer to Provider:           [] Consult    Follow-up Information:  [] Ordered Tests:   [] Referrals:   [] Rx:   [] Culture:  [] Wound Vac:  [] Skin Sub:   [] OR:  [] Other:     Wound Cleansing:   Do not scrub or use excessive force.  Cleanse wound prior to applying a clean dressing with:  [] Normal Saline   [x] Keep Wound Dry in Shower     [] Wound Cleanser   [x] Cleanse wound with Mild Soap & Water on dressing change days   [] Other:       Topical Treatments:  Do not apply lotions, creams, or ointments to wound bed unless directed.   [x] Apply moisturizing lotion (Ammonium Lactate) to skin surrounding the wound prior to dressing change.  [] Apply antifungal ointment to skin surrounding the wound prior to dressing change.  [] Apply thin film of moisture barrier ointment to skin immediately around wound.  [] Apply Betadine to skin immediately around wound   [] Other:      Dressings:           Wound Location: Left leg and Right Leg wounds   [x] Apply Primary Dressing:       [] MediHoney Gel [] MediHoney Alginate  [] Calcium Alginate with Silver -   [] Calcium Alginate without silver   [] Collagen with silver  [] Collagen without Silver    [] Santyl with moist saline gauze     [] Hydrofera Blue (cut to size and moistened with normal saline)  [] Hydrofera Blue Ready (cut to size)      [] Normal Saline wet to dry  [] Betadine wet to dry [] Betadine to patricia wound   [] Hydrogel  [] Mepitel  [x] Xeroform    []

## 2024-11-21 ENCOUNTER — HOSPITAL ENCOUNTER (OUTPATIENT)
Facility: HOSPITAL | Age: 69
Discharge: HOME | End: 2024-11-21
Attending: RADIOLOGY
Payer: MEDICARE

## 2024-11-21 VITALS
TEMPERATURE: 97.3 F | RESPIRATION RATE: 22 BRPM | SYSTOLIC BLOOD PRESSURE: 146 MMHG | DIASTOLIC BLOOD PRESSURE: 73 MMHG | HEART RATE: 81 BPM

## 2024-11-21 PROCEDURE — 11042 DBRDMT SUBQ TIS 1ST 20SQCM/<: CPT

## 2024-11-21 PROCEDURE — 11045 DBRDMT SUBQ TISS EACH ADDL: CPT

## 2024-11-21 NOTE — DISCHARGE INSTRUCTIONS
Beronica Velez RN  Registered Nurse     Wound Care      Signed     Date of Service: 11/21/2024  2:00 PM     Signed                          Wound Clinic Physician Orders and Discharge Instructions  [facility]  [facility], [address] [place] [address]  Telephone: [phone]     FAX [phone]     NAME:  Yessy Puckett  YOB: 1955  MEDICAL RECORD NUMBER:  [id number]  DATE:  11/21/2024        Return Appointment:  [] Dressing Supply Provider: Jelani  [] Home Healthcare:   [] Facility:   [x] Return Appointment: 2 Week(s)   [] Nurse Visit:       [] Discharge from [facility]:   [] Healed          [] Refer to Provider:           [] Consult     Follow-up Information:  [] Ordered Tests:   [] Referrals:   [] Rx:   [] Culture:  [] Wound Vac:  [] Skin Sub:   [] OR:  [] Other:      Wound Cleansing:   Do not scrub or use excessive force.  Cleanse wound prior to applying a clean dressing with:  [] Normal Saline          [x] Keep Wound Dry in Shower     [] Wound Cleanser   [x] Cleanse wound with Mild Soap & Water on dressing change days   [] Other:        Topical Treatments:  Do not apply lotions, creams, or ointments to wound bed unless directed.   [x] Apply moisturizing lotion (Ammonium Lactate) to skin surrounding the wound prior to dressing change.  [] Apply antifungal ointment to skin surrounding the wound prior to dressing change.  [] Apply thin film of moisture barrier ointment to skin immediately around wound.  [] Apply Betadine to skin immediately around wound   [x] Other: Apply lotion daily to right lower leg                 Dressings:                  Wound Location: Left leg wound   [x] Apply Primary Dressing:                                          [] MediHoney Gel      [] MediHoney Alginate  [] Calcium Alginate with Silver -   [] Calcium Alginate without silver              [] Collagen with silver            [] Collagen without Silver    [] Santyl with moist

## 2024-11-21 NOTE — WOUND CARE
Wound Care Supplies      Supply Company:     [employer] [address] [place] [address] f: [phone] f: [phone] p: [phone] [email or web address]      Ordering Center:     [facility]  [address] [place] [address], [address] [address]    Phone: [phone]  Fax: [phone]    Patient Information:      Yessy Gomez [facility] [id number] [phone]   : 1955  AGE: 69 y.o.     GENDER: male   EPISODE DATE: 2024    Insurance:      PRIMARY INSURANCE:  Plan: HUMANA GOLD PLUS HMO  Coverage: HUMANA MEDICARE  Effective Date: 2022  Group Number: [unfilled]  Subscriber Number: [id number] - (Medicare Managed)    Payer/Plan Subscr  Sex Relation Sub. Ins. ID Effective Group Num   1. [employer]NA MEDICA* YESSY ALFARO 1955 Male Self [id number]                                   [address] [address]       Patient Wound Information:      Problem List Items Addressed This Visit    None      WOUNDS REQUIRING DRESSING SUPPLIES:     Wound 24 Leg Left;Posterior #3 (Active)   Wound Image   24 1429   Wound Etiology Venous 24 1429   Dressing Status Other (Comment) 24 1429   Wound Cleansed Cleansed with saline 24 1429   Dressing/Treatment Xeroform;Gauze dressing/dressing sponge;Tubular bandage 24 1525   Wound Length (cm) 6 cm 24 1429   Wound Width (cm) 4.5 cm 24 1429   Wound Depth (cm) 0.1 cm 24 1429   Wound Surface Area (cm^2) 27 cm^2 24 1429   Change in Wound Size % (l*w) -285.71 24 1429   Wound Volume (cm^3) 2.7 cm^3 24 1429   Wound Healing % -286 24 1429   Post-Procedure Length (cm) 6 cm 24 1456   Post-Procedure Width (cm) 4.5 cm 24 145   Post-Procedure Depth (cm) 0.1 cm 24   Post-Procedure Surface Area (cm^2) 27 cm^2 24 1456   Post-Procedure Volume (cm^3) 2.7 cm^3 24   Wound Assessment 
Xeroform    [] Adaptic   [] Iodoform Packing Strip [] Plain Packing Strip   [] Skin Sub:   [] Other:      [x] Cover and Secure with:     [x] Gauze             [x] Marysol  [] Kerlix   [] Foam Piece [] Foam Heel Cup     [] Super Absorbant   [x] ABD  [] Ace Wrap             [] Bordered Gauze Dressing      [] Mepilex Foam Dressing      [] Surgilast     [] Other:    Limit contact of tape with skin.    [x] Change dressing: [] Daily    [] Every Other Day   [x] Three times per week (home health)    [] Once a week [] Do Not Change Dressing   [] Other:           Pressure Relief:  [] When sitting, shift position or do seat lifts every 15 minutes.  [] Wheelchair cushion [] Specialty Bed/Mattress  [x]  Avoid direct pressure on wound site.    [] Other:     Edema Control:  Apply: [] Compression Stocking:  mmHg  []Right Leg []Left Leg   [x] Tubigrip [x]Right Leg Double Layer [x]Left Leg Double Layer      []Right Leg Single Layer []Left Leg Single Layer   [] Ace Wrap []Right Leg  []Left Leg      [x] Elevate leg(s) above the level of the heart when sitting.    [x] Avoid prolonged standing in one place.     Compression:  Apply: [] Compression Wrap to []RightLeg []Left Leg    []  Coflex [] Coflex Lite  [] Unna with Calamine Lite     [] Elevate leg(s) above the level of the heart when sitting.    [] Avoid prolonged standing in one place.   [] Do not get leg(s) with wrap wet. If wrap becomes too tight, call the wound center and remove wrap from leg by unrolling each layer.    Dietary:  [] Diet as tolerated: [x] Calorie Diabetic Diet: [x] No Added Salt:  [x] Increase Protein: [] Other:     Activity:  [x] Activity as tolerated: Elevate lower extremities with ulcers  [] Patient has no activity restrictions      [] Strict Bedrest   [] Remain off Work      [] May return to full duty work                                     [] Return to work with restrictions     Physician:  [] Dr. Gladys Pickens  [] Dr. Cesario Pace  [] Dr. Quinones

## 2024-12-05 ENCOUNTER — HOSPITAL ENCOUNTER (OUTPATIENT)
Facility: HOSPITAL | Age: 69
Discharge: HOME | End: 2024-12-05
Attending: RADIOLOGY
Payer: MEDICARE

## 2024-12-05 VITALS
SYSTOLIC BLOOD PRESSURE: 141 MMHG | HEART RATE: 91 BPM | DIASTOLIC BLOOD PRESSURE: 62 MMHG | RESPIRATION RATE: 20 BRPM | TEMPERATURE: 97.3 F

## 2024-12-05 PROCEDURE — 99213 OFFICE O/P EST LOW 20 MIN: CPT

## 2024-12-05 NOTE — DISCHARGE INSTRUCTIONS
Beronica Velez RN  Registered Nurse     Wound Care      Signed     Date of Service: 12/5/2024  1:45 PM     Signed                          Wound Clinic Physician Orders and Discharge Instructions  [facility]  [facility], [address] [place] [address]  Telephone: [phone]     FAX [phone]     NAME:  Yessy Puckett  YOB: 1955  MEDICAL RECORD NUMBER:  [id number]  DATE:  12/5/2024        Return Appointment:  [] Dressing Supply Provider: Jelani  [] Home Healthcare:   [] Facility:   [x] Return Appointment: 2 Week(s)   [x] Nurse Visit:  1 WEEK      [] Discharge from [facility]:   [] Healed          [] Refer to Provider:           [] Consult     Follow-up Information:  [] Ordered Tests:   [] Referrals:   [] Rx:   [] Culture:  [] Wound Vac:  [] Skin Sub:   [] OR:  [] Other:      Wound Cleansing:   Do not scrub or use excessive force.  Cleanse wound prior to applying a clean dressing with:  [] Normal Saline          [x] Keep Wound Dry in Shower     [] Wound Cleanser   [x] Cleanse wound with Mild Soap & Water on dressing change days   [] Other:        Topical Treatments:  Do not apply lotions, creams, or ointments to wound bed unless directed.   [x] Apply moisturizing lotion (Ammonium Lactate) to skin surrounding the wound prior to dressing change.  [] Apply antifungal ointment to skin surrounding the wound prior to dressing change.  [] Apply thin film of moisture barrier ointment to skin immediately around wound.  [] Apply Betadine to skin immediately around wound   [x] Other: Apply lotion daily to right lower leg                 Dressings:                  Wound Location: Left leg wound   [x] Apply Primary Dressing:                                          [] MediHoney Gel      [] MediHoney Alginate  [] Calcium Alginate with Silver -   [] Calcium Alginate without silver              [] Collagen with silver            [] Collagen without Silver    [] Santyl with

## 2024-12-05 NOTE — WOUND CARE
Wound Clinic Physician Orders and Discharge Instructions  [facility]  [address], [address]  [place] [address]  Telephone: [phone]     FAX [phone]    NAME:  Yessy Puckett  YOB: 1955  MEDICAL RECORD NUMBER:  [id number]  DATE:  12/5/2024      Return Appointment:  [] Dressing Supply Provider: Jelani  [] Home Healthcare:   [] Facility:   [x] Return Appointment: 2 Week(s)   [x] Nurse Visit:  1 WEEK     [] Discharge from [facility]:   [] Healed          [] Refer to Provider:           [] Consult    Follow-up Information:  [] Ordered Tests:   [] Referrals:   [] Rx:   [] Culture:  [] Wound Vac:  [] Skin Sub:   [] OR:  [] Other:     Wound Cleansing:   Do not scrub or use excessive force.  Cleanse wound prior to applying a clean dressing with:  [] Normal Saline   [x] Keep Wound Dry in Shower     [] Wound Cleanser   [x] Cleanse wound with Mild Soap & Water on dressing change days   [] Other:       Topical Treatments:  Do not apply lotions, creams, or ointments to wound bed unless directed.   [x] Apply moisturizing lotion (Ammonium Lactate) to skin surrounding the wound prior to dressing change.  [] Apply antifungal ointment to skin surrounding the wound prior to dressing change.  [] Apply thin film of moisture barrier ointment to skin immediately around wound.  [] Apply Betadine to skin immediately around wound   [x] Other: Apply lotion daily to right lower leg      Dressings:           Wound Location: Left leg wound   [x] Apply Primary Dressing:       [] MediHoney Gel [] MediHoney Alginate  [] Calcium Alginate with Silver -   [] Calcium Alginate without silver   [] Collagen with silver  [] Collagen without Silver    [] Santyl with moist saline gauze     [] Hydrofera Blue (cut to size and moistened with normal saline)  [] Hydrofera Blue Ready (cut to size)      [] Normal Saline wet to dry  [] Betadine wet to dry [] Betadine to patricia wound   [] Hydrogel  []

## 2024-12-07 NOTE — WOUND CARE
[facility]   Wound Care and Hyperbaric Oxygen Therapy Center   Medical Staff Progress Note     Yessy Puckett  MEDICAL RECORD NUMBER:  [id number]  AGE: 69 y.o.   GENDER: male  : 1955  EPISODE DATE:  2024    Chief complaint and reason for visit:     Chief Complaint   Patient presents with    Wound Check     Left leg        HISTORY of PRESENT ILLNESS HPI     Yessy Puckett is a 69 y.o. male who presents today for wound/ulcer evaluation.     History of Wound Context: Per original history and physical on this patient. Changes in history since last evaluation: None     PAST MEDICAL HISTORY        Diagnosis Date    Arthritis     Arthritis     Chronic obstructive pulmonary disease (HCC)     COPD (chronic obstructive pulmonary disease) (HCC)     Diabetes (HCC)     Diabetes (HCC)     HTN (hypertension)     Hypertension     Sleep apnea     Sleep apnea        PAST SURGICAL HISTORY    Past Surgical History:   Procedure Laterality Date    GI      HERNIA REPAIR         FAMILY HISTORY    Family History   Problem Relation Age of Onset    Hypertension Sister     Hypertension Sister     Hypertension Father     Hypertension Mother     Hypertension Child        SOCIAL HISTORY    Social History     Tobacco Use    Smoking status: Never    Smokeless tobacco: Never   Substance Use Topics    Alcohol use: Never    Drug use: Never       ALLERGIES    No Known Allergies    MEDICATIONS    Current Outpatient Medications on File Prior to Encounter   Medication Sig Dispense Refill    atorvastatin (LIPITOR) 20 MG tablet Take 1 tablet by mouth daily      ammonium lactate (AMLACTIN) 12 % cream Apply topically as needed to the bilateral feet 385 g 4    hydrALAZINE (APRESOLINE) 25 MG tablet Take 2 tablets by mouth 3 times daily      isosorbide mononitrate (IMDUR) 30 MG extended release tablet Take 1 tablet by mouth daily      fluticasone-umeclidin-vilant (TRELEGY ELLIPTA) 100-62.5-25 MCG/ACT AEPB inhaler Inhale 1 puff

## 2024-12-12 ENCOUNTER — HOSPITAL ENCOUNTER (OUTPATIENT)
Facility: HOSPITAL | Age: 69
Discharge: HOME | End: 2024-12-12
Attending: RADIOLOGY
Payer: MEDICARE

## 2024-12-12 VITALS
RESPIRATION RATE: 20 BRPM | DIASTOLIC BLOOD PRESSURE: 86 MMHG | TEMPERATURE: 97.7 F | HEART RATE: 90 BPM | SYSTOLIC BLOOD PRESSURE: 174 MMHG

## 2024-12-12 PROCEDURE — 99213 OFFICE O/P EST LOW 20 MIN: CPT

## 2024-12-12 NOTE — WOUND CARE
Wound Care Nurse Visit Note        Yessy Puckett  Age: 69 y.o.  YOB: 1955    Today's Date:  12/12/2024    Patient presents today per physician order for a scheduled nurse visit.    Orders to follow same dressings as previous visit and to report any changes in conditions if noted.    BP (!) 174/86   Pulse 90   Temp 97.7 °F (36.5 °C) (Temporal)   Resp 20       Wound Assessment:      Wound 09/26/24 Leg Left;Posterior #3 (Active)   Wound Image   12/05/24 1336   Wound Etiology Venous 12/12/24 1350   Dressing Status Other (Comment) 12/12/24 1350   Wound Cleansed Cleansed with saline 12/12/24 1350   Dressing/Treatment Xeroform;Gauze dressing/dressing sponge;Tubular bandage 11/21/24 1525   Wound Length (cm) 8.5 cm 12/12/24 1350   Wound Width (cm) 5.5 cm 12/12/24 1350   Wound Depth (cm) 0.1 cm 12/12/24 1350   Wound Surface Area (cm^2) 46.75 cm^2 12/12/24 1350   Change in Wound Size % (l*w) -567.86 12/12/24 1350   Wound Volume (cm^3) 4.675 cm^3 12/12/24 1350   Wound Healing % -568 12/12/24 1350   Post-Procedure Length (cm) 6 cm 11/21/24 1456   Post-Procedure Width (cm) 4.5 cm 11/21/24 1456   Post-Procedure Depth (cm) 0.1 cm 11/21/24 1456   Post-Procedure Surface Area (cm^2) 27 cm^2 11/21/24 1456   Post-Procedure Volume (cm^3) 2.7 cm^3 11/21/24 1456   Wound Assessment Slough;Granulation tissue 12/12/24 1350   Drainage Amount Moderate (25-50%) 12/12/24 1350   Drainage Description Serous 12/12/24 1350   Odor None 12/12/24 1350   Pia-wound Assessment Fragile;Hypopigmented;Maceration 12/12/24 1350   Margins Attached edges 12/12/24 1350   Wound Thickness Description not for Pressure Injury Full thickness 12/12/24 1350   Number of days: 77        Wound was redressed per order and patient discharged after instructions given.      Layne Blaek LPN

## 2024-12-19 ENCOUNTER — HOSPITAL ENCOUNTER (OUTPATIENT)
Facility: HOSPITAL | Age: 69
Discharge: HOME | End: 2024-12-19
Attending: RADIOLOGY
Payer: MEDICARE

## 2024-12-19 VITALS
TEMPERATURE: 97.5 F | DIASTOLIC BLOOD PRESSURE: 62 MMHG | RESPIRATION RATE: 20 BRPM | HEART RATE: 52 BPM | SYSTOLIC BLOOD PRESSURE: 123 MMHG

## 2024-12-19 PROCEDURE — 11042 DBRDMT SUBQ TIS 1ST 20SQCM/<: CPT

## 2024-12-19 PROCEDURE — 11045 DBRDMT SUBQ TISS EACH ADDL: CPT

## 2024-12-19 NOTE — DISCHARGE INSTRUCTIONS
Beronica Velez RN  Registered Nurse     Wound Care      Signed     Date of Service: 12/19/2024  2:00 PM     Signed                          Wound Clinic Physician Orders and Discharge Instructions  [facility]  [facility], [address] [place] [address]  Telephone: [phone]     FAX [phone]     NAME:  Yessy Puckett  YOB: 1955  MEDICAL RECORD NUMBER:  [id number]  DATE:  12/19/2024        Return Appointment:  [] Dressing Supply Provider: Jelani  [] Home Healthcare:   [] Facility:   [x] Return Appointment: 2 Week(s)   [x] Nurse Visit:  1 WEEK      [] Discharge from [facility]:   [] Healed          [] Refer to Provider:           [] Consult     Follow-up Information:  [] Ordered Tests:   [] Referrals:   [] Rx:   [] Culture:  [] Wound Vac:  [] Skin Sub:   [] OR:  [] Other:      Wound Cleansing:   Do not scrub or use excessive force.  Cleanse wound prior to applying a clean dressing with:  [] Normal Saline          [x] Keep Wound Dry in Shower     [] Wound Cleanser   [x] Cleanse wound with Mild Soap & Water on dressing change days   [] Other:        Topical Treatments:  Do not apply lotions, creams, or ointments to wound bed unless directed.   [x] Apply moisturizing lotion (Ammonium Lactate) to skin surrounding the wound prior to dressing change.  [] Apply antifungal ointment to skin surrounding the wound prior to dressing change.  [] Apply thin film of moisture barrier ointment to skin immediately around wound.  [] Apply Betadine to skin immediately around wound   [x] Other: Apply lotion daily to right lower leg                 Dressings:                  Wound Location: Right leg    [x] Apply Primary Dressing:                                          [] MediHoney Gel      [] MediHoney Alginate  [] Calcium Alginate with Silver -   [] Calcium Alginate without silver              [] Collagen with silver            [] Collagen without Silver    [] Santyl with moist

## 2024-12-19 NOTE — WOUND CARE
Tubigrip [x]Right Leg Double Layer [x]Left Leg Double Layer      []Right Leg Single Layer []Left Leg Single Layer   [] Ace Wrap []Right Leg  []Left Leg      [x] Elevate leg(s) above the level of the heart when sitting.    [x] Avoid prolonged standing in one place.     Compression:  Apply: [] Compression Wrap to []RightLeg []Left Leg    []  Coflex [] Coflex Lite  [] Unna with Calamine Lite     [] Elevate leg(s) above the level of the heart when sitting.    [] Avoid prolonged standing in one place.   [] Do not get leg(s) with wrap wet. If wrap becomes too tight, call the wound center and remove wrap from leg by unrolling each layer.    Dietary:  [] Diet as tolerated: [x] Calorie Diabetic Diet: [x] No Added Salt:  [x] Increase Protein: [] Other:     Activity:  [x] Activity as tolerated: Elevate lower extremities with ulcers  [] Patient has no activity restrictions      [] Strict Bedrest   [] Remain off Work      [] May return to full duty work                                     [] Return to work with restrictions     Physician:  [] Dr. Gladys Pickens  [] Dr. Cesario Pace  [] Dr. Stacie Atkinson  [] Dr. Sesar Ford  [x] Dr. Jose Hurst  [] Dr. Jazlyn Ambrosio  [] Dr. Ashley Ziegler      Nurse Case Manger:  Beronica \"T\"       Wound Care Center Information: Should you experience any significant changes in your wound(s) or have questions about your wound care, please contact the Wound Center at [phone]. Our hours are Monday - Friday 8am - 4:30pm, closed all major holidays. If you need help with your wound outside these hours and cannot wait until we are again available, contact your PCP or go to the hospital emergency room.     PLEASE NOTE: IF YOU ARE UNABLE TO OBTAIN WOUND SUPPLIES, CONTINUE TO USE THE SUPPLIES YOU HAVE AVAILABLE UNTIL YOU ARE ABLE TO REACH US. IT IS MOST IMPORTANT TO KEEP THE WOUND COVERED AT ALL TIMES.

## 2024-12-23 ENCOUNTER — HOSPITAL ENCOUNTER (OUTPATIENT)
Facility: HOSPITAL | Age: 69
Discharge: HOME | End: 2024-12-23
Attending: RADIOLOGY
Payer: MEDICARE

## 2024-12-23 VITALS
DIASTOLIC BLOOD PRESSURE: 71 MMHG | RESPIRATION RATE: 20 BRPM | HEART RATE: 68 BPM | TEMPERATURE: 97.3 F | SYSTOLIC BLOOD PRESSURE: 137 MMHG

## 2024-12-23 PROCEDURE — 99213 OFFICE O/P EST LOW 20 MIN: CPT

## 2024-12-23 NOTE — WOUND CARE
Wound Care Nurse Visit Note        Yessy Puckett  Age: 69 y.o.  YOB: 1955    Today's Date:  12/23/2024    Patient presents today per physician order for a scheduled nurse visit.    Orders to follow same dressings as previous visit and to report any changes in conditions if noted.    /71   Pulse 68   Temp 97.3 °F (36.3 °C) (Temporal)   Resp 20       Wound Assessment:      Wound 09/26/24 Leg Left;Posterior #3 (Active)   Wound Image   12/19/24 1408   Wound Etiology Venous 12/23/24 1319   Dressing Status Other (Comment) 12/23/24 1319   Wound Cleansed Cleansed with saline 12/23/24 1319   Dressing/Treatment Xeroform;Gauze dressing/dressing sponge;Tubular bandage 11/21/24 1525   Wound Length (cm) 10.8 cm 12/23/24 1319   Wound Width (cm) 7 cm 12/23/24 1319   Wound Depth (cm) 0.1 cm 12/23/24 1319   Wound Surface Area (cm^2) 75.6 cm^2 12/23/24 1319   Change in Wound Size % (l*w) -980 12/23/24 1319   Wound Volume (cm^3) 7.56 cm^3 12/23/24 1319   Wound Healing % -980 12/23/24 1319   Post-Procedure Length (cm) 10.8 cm 12/19/24 1433   Post-Procedure Width (cm) 7 cm 12/19/24 1433   Post-Procedure Depth (cm) 0.1 cm 12/19/24 1433   Post-Procedure Surface Area (cm^2) 75.6 cm^2 12/19/24 1433   Post-Procedure Volume (cm^3) 7.56 cm^3 12/19/24 1433   Wound Assessment Slough;Granulation tissue;Blood filled blister 12/23/24 1319   Drainage Amount Moderate (25-50%) 12/23/24 1319   Drainage Description Serosanguinous 12/23/24 1319   Odor None 12/23/24 1319   Pia-wound Assessment Maceration 12/23/24 1319   Margins Undefined edges 12/23/24 1319   Wound Thickness Description not for Pressure Injury Full thickness 12/23/24 1319   Number of days: 88       Wound 12/19/24 Leg Posterior;Right #2 (Active)   Wound Image   12/19/24 1408   Wound Etiology Venous 12/23/24 1319   Dressing Status Other (Comment) 12/23/24 1319   Wound Cleansed Cleansed with saline 12/23/24 1311

## 2025-01-02 ENCOUNTER — HOSPITAL ENCOUNTER (OUTPATIENT)
Facility: HOSPITAL | Age: 70
Discharge: HOME | End: 2025-01-02
Attending: RADIOLOGY
Payer: MEDICARE

## 2025-01-02 VITALS
HEART RATE: 70 BPM | TEMPERATURE: 97.2 F | SYSTOLIC BLOOD PRESSURE: 140 MMHG | RESPIRATION RATE: 20 BRPM | DIASTOLIC BLOOD PRESSURE: 76 MMHG

## 2025-01-02 PROCEDURE — 99213 OFFICE O/P EST LOW 20 MIN: CPT

## 2025-01-02 NOTE — WOUND CARE
[facility]   Wound Care and Hyperbaric Oxygen Therapy Center   Medical Staff Progress Note     Yessy Puckett  MEDICAL RECORD NUMBER:  [id number]  AGE: 69 y.o.   GENDER: male  : 1955  EPISODE DATE:  2025    Chief complaint and reason for visit:     Chief Complaint   Patient presents with    Wound Check     Bilateral legs        HISTORY of PRESENT ILLNESS HPI     Yessy Puckett is a 69 y.o. male who presents today for wound/ulcer evaluation.     History of Wound Context: Per original history and physical on this patient. Changes in history since last evaluation: None     PAST MEDICAL HISTORY        Diagnosis Date    Arthritis     Arthritis     Chronic obstructive pulmonary disease (HCC)     COPD (chronic obstructive pulmonary disease) (HCC)     Diabetes (HCC)     Diabetes (HCC)     HTN (hypertension)     Hypertension     Sleep apnea     Sleep apnea        PAST SURGICAL HISTORY    Past Surgical History:   Procedure Laterality Date    GI      HERNIA REPAIR         FAMILY HISTORY    Family History   Problem Relation Age of Onset    Hypertension Sister     Hypertension Sister     Hypertension Father     Hypertension Mother     Hypertension Child        SOCIAL HISTORY    Social History     Tobacco Use    Smoking status: Never    Smokeless tobacco: Never   Substance Use Topics    Alcohol use: Never    Drug use: Never       ALLERGIES    No Known Allergies    MEDICATIONS    Current Outpatient Medications on File Prior to Encounter   Medication Sig Dispense Refill    atorvastatin (LIPITOR) 20 MG tablet Take 1 tablet by mouth daily      ammonium lactate (AMLACTIN) 12 % cream Apply topically as needed to the bilateral feet 385 g 4    hydrALAZINE (APRESOLINE) 25 MG tablet Take 2 tablets by mouth 3 times daily      isosorbide mononitrate (IMDUR) 30 MG extended release tablet Take 1 tablet by mouth daily      fluticasone-umeclidin-vilant (TRELEGY ELLIPTA) 100-62.5-25 MCG/ACT AEPB inhaler Inhale 1 
Xeroform    [] Adaptic   [] Iodoform Packing Strip [] Plain Packing Strip   [] Skin Sub:   [] Other:      [x] Cover and Secure with:     [x] Gauze             [x] Marysol  [] Kerlix   [] Foam Piece [] Foam Heel Cup     [] Super Absorbant   [x] ABD  [] Ace Wrap             [] Bordered Gauze Dressing      [] Mepilex Foam Dressing      [] Surgilast     [] Other:    Limit contact of tape with skin.    [x] Change dressing: [] Daily    [] Every Other Day   [x] Three times per week    [] Once a week [] Do Not Change Dressing   [] Other:    Dressings:           Wound Location: Left leg   [x] Apply Primary Dressing:       [] MediHoney Gel [] MediHoney Alginate  [] Calcium Alginate with Silver -   [] Calcium Alginate without silver   [x] Collagen with silver  [] Collagen without Silver    [] Santyl with moist saline gauze     [] Hydrofera Blue (cut to size and moistened with normal saline)  [] Hydrofera Blue Ready (cut to size)      [] Normal Saline wet to dry  [] Betadine wet to dry [] Betadine to patricia wound   [] Hydrogel  [] Mepitel  [] Xeroform    [] Adaptic   [] Iodoform Packing Strip [] Plain Packing Strip   [] Skin Sub:   [] Other:      [x] Cover and Secure with:     [x] Gauze             [x] Marysol  [] Kerlix   [] Foam Piece [] Foam Heel Cup     [] Super Absorbant   [x] ABD  [] Ace Wrap             [] Bordered Gauze Dressing      [] Mepilex Foam Dressing      [] Surgilast     [] Other:    Limit contact of tape with skin.    [x] Change dressing: [] Daily    [] Every Other Day   [x] Three times per week    [] Once a week [] Do Not Change Dressing   [] Other:       Pressure Relief:  [] When sitting, shift position or do seat lifts every 15 minutes.  [] Wheelchair cushion [] Specialty Bed/Mattress  [x]  Avoid direct pressure on wound site.  FLOAT LOWER LEG TO AVOID PRESSURE ON BACK OF LEFT LEG, USE A PILLOW UNDER LEFT LEG   [] Other:     Edema Control:  Apply: [] Compression Stocking:  mmHg  []Right Leg []Left Leg   [x]

## 2025-01-02 NOTE — DISCHARGE INSTRUCTIONS
[] Foam Heel Cup     [] Super Absorbant      [x] ABD                        [] Ace Wrap               [] Bordered Gauze Dressing                 [] Mepilex Foam Dressing      [] Surgilast     [] Other:               Limit contact of tape with skin.     [x] Change dressing:  [] Daily           [] Every Other Day    [x] Three times per week               [] Once a week          [] Do Not Change Dressing    [] Other:               Pressure Relief:  [] When sitting, shift position or do seat lifts every 15 minutes.  [] Wheelchair cushion           [] Specialty Bed/Mattress  [x]  Avoid direct pressure on wound site.  FLOAT LOWER LEG TO AVOID PRESSURE ON BACK OF LEFT LEG, USE A PILLOW UNDER LEFT LEG   [] Other:                Edema Control:  Apply:  [] Compression Stocking:  mmHg     []Right Leg     []Left Leg              [x] Tubigrip      [x]Right Leg Double Layer      [x]Left Leg Double Layer                                      []Right Leg Single Layer       []Left Leg Single Layer              [] Ace Wrap   []Right Leg     []Left Leg                  [x] Elevate leg(s) above the level of the heart when sitting.                 [x] Avoid prolonged standing in one place.         Compression:  Apply:  [] Compression Wrap to        []RightLeg      []Left Leg                          []  Coflex        [] Coflex Lite              [] Unna with Calamine Lite                 [] Elevate leg(s) above the level of the heart when sitting.                 [] Avoid prolonged standing in one place.              [] Do not get leg(s) with wrap wet. If wrap becomes too tight, call the wound center and remove wrap from leg by unrolling each layer.     Dietary:  [] Diet as tolerated:    [x] Calorie Diabetic Diet:         [x] No Added Salt:  [x] Increase Protein:    [] Other:              Activity:  [x] Activity as tolerated: Elevate lower extremities with ulcers  [] Patient has no activity restrictions      [] Strict Bedrest

## 2025-01-08 ENCOUNTER — HOSPITAL ENCOUNTER (OUTPATIENT)
Facility: HOSPITAL | Age: 70
Discharge: HOME | End: 2025-01-08
Attending: RADIOLOGY
Payer: MEDICARE

## 2025-01-08 VITALS
SYSTOLIC BLOOD PRESSURE: 144 MMHG | HEART RATE: 69 BPM | DIASTOLIC BLOOD PRESSURE: 78 MMHG | TEMPERATURE: 97.3 F | RESPIRATION RATE: 20 BRPM

## 2025-01-08 PROCEDURE — 99213 OFFICE O/P EST LOW 20 MIN: CPT

## 2025-01-08 NOTE — WOUND CARE
Wound Care Nurse Visit Note        Yessy Puckett  Age: 69 y.o.  YOB: 1955    Today's Date:  1/8/2025    Patient presents today per physician order for a scheduled nurse visit.    Orders to follow same dressings as previous visit and to report any changes in conditions if noted.    BP (!) 144/78   Pulse 69   Temp 97.3 °F (36.3 °C) (Temporal)   Resp 20       Wound Assessment:      Wound 09/26/24 Leg Left;Posterior #3 (Active)   Wound Image   01/02/25 1430   Wound Etiology Venous 01/08/25 1344   Dressing Status Other (Comment) 01/08/25 1344   Wound Cleansed Cleansed with saline 01/02/25 1430   Dressing/Treatment Xeroform;Gauze dressing/dressing sponge;Tubular bandage 11/21/24 1525   Wound Length (cm) 14 cm 01/08/25 1344   Wound Width (cm) 11.5 cm 01/08/25 1344   Wound Depth (cm) 0.2 cm 01/08/25 1344   Wound Surface Area (cm^2) 161 cm^2 01/08/25 1344   Change in Wound Size % (l*w) -2200 01/08/25 1344   Wound Volume (cm^3) 32.2 cm^3 01/08/25 1344   Wound Healing % -4500 01/08/25 1344   Post-Procedure Length (cm) 10.8 cm 12/19/24 1433   Post-Procedure Width (cm) 7 cm 12/19/24 1433   Post-Procedure Depth (cm) 0.1 cm 12/19/24 1433   Post-Procedure Surface Area (cm^2) 75.6 cm^2 12/19/24 1433   Post-Procedure Volume (cm^3) 7.56 cm^3 12/19/24 1433   Wound Assessment Granulation tissue;Slough 01/08/25 1344   Drainage Amount Moderate (25-50%) 01/08/25 1344   Drainage Description Serosanguinous 01/08/25 1344   Odor None 01/08/25 1344   Pia-wound Assessment Intact;Maceration 01/08/25 1344   Margins Attached edges 01/08/25 1344   Wound Thickness Description not for Pressure Injury Full thickness 01/08/25 1344   Number of days: 104       Wound 12/19/24 Leg Posterior;Right #2 (Active)   Wound Image   01/02/25 1430   Wound Etiology Venous 01/08/25 1345   Dressing Status Other (Comment) 01/08/25 1345   Wound Cleansed Cleansed with saline 01/08/25 1345   Wound Length

## 2025-01-13 NOTE — WOUND CARE
Wound Care Supplies      Supply Company:     [employer] [address] [place] [address] f: [phone] f: [phone] p: [phone] [email or web address] [employer] #2      Ordering Center:     [facility]  [address] [place] [address], [address] [address]    Phone: [phone]  Fax: [phone]    [facility] Information:      [facility] [address] [phone]   : 1955  AGE: 68 y.o.     GENDER: male   EPISODE DATE: 2024    Insurance:      PRIMARY INSURANCE:  Plan: HU[employer] GOLD PLUS HMO  Coverage: HU[employer]NA MEDICARE  Effective Date: 2022  Group Number: [unfilled]  Subscriber Number: [id number] - (Medicare Managed)    Payer/Plan Subscr  Sex Relation Sub. Ins. ID Effective Group Num   1. [employer] MEDICA* KILO ALFARO 1955 Male Self [id number]                                   [address] [address]       Patient Wound Information:      Problem List Items Addressed This Visit    None      WOUNDS REQUIRING DRESSING SUPPLIES:     Wound 22 Leg Left #1 Circumferential (Active)   Wound Image   24 1343   Wound Etiology Venous 24 1343   Dressing Status Clean;Dry;Intact 24 1343   Wound Cleansed Cleansed with saline 24 1343   Dressing/Treatment Collagen;Alginate with Ag;Other (comment) 24 1236   Wound Length (cm) 2 cm 24 1343   Wound Width (cm) 2.8 cm 24 1343   Wound Depth (cm) 0.2 cm 24 1343   Wound Surface Area (cm^2) 5.6 cm^2 24 1343   Change in Wound Size % (l*w) 91.7 24 1343   Wound Volume (cm^3) 1.12 cm^3 24 1343   Wound Healing % 83 24 1343   Post-Procedure Length (cm) 2 cm 24 1402   Post-Procedure Width (cm) 2.8 cm 24 1402   Post-Procedure Depth (cm) 0.2 cm 24 1402   Post-Procedure Surface Area (cm^2) 5.6 cm^2 24 1402   Post-Procedure Volume (cm^3) 1.12 cm^3 24 1402   Wound Assessment Slough;Granulation tissue  syncope, low BP, hit head after syncope on Coumadin

## 2025-01-16 ENCOUNTER — HOSPITAL ENCOUNTER (OUTPATIENT)
Facility: HOSPITAL | Age: 70
Discharge: HOME | End: 2025-01-16
Attending: RADIOLOGY
Payer: MEDICARE

## 2025-01-16 VITALS
RESPIRATION RATE: 20 BRPM | TEMPERATURE: 97.9 F | SYSTOLIC BLOOD PRESSURE: 139 MMHG | DIASTOLIC BLOOD PRESSURE: 78 MMHG | HEART RATE: 67 BPM

## 2025-01-16 PROCEDURE — 29580 STRAPPING UNNA BOOT: CPT

## 2025-01-16 NOTE — WOUND CARE
Unna Boot Application   Below Knee    NAME:  Yessy Puckett  YOB: 1955  MEDICAL RECORD NUMBER:  [id number]  DATE:  1/16/2025    Unna boot: Appied primary and secondary dressing as ordered.  Applied Unna roll from toes to knee overlapping each time.   Applied ace wrap or coban from toes to below the knee.   Secured with tape and/or metal clips covered with tape.   Instructed patient/caregiver to keep dressing dry and intact. DO NOT REMOVE DRESSING.   Instructed pt/family/caregiver to report excessive draining, loose bandage, wet dressing, severe pain or tingling in toes.  Applied Unna Boot dressing below the knee to right lower leg.  Applied Unna Boot dressing below the knee to left lower leg.    Unna Boot(s) were applied per  Guidelines.     Electronically signed by Angélica Rodriguez RN on 1/16/2025 at 2:26 PM

## 2025-01-16 NOTE — WOUND CARE
Wound Clinic Physician Orders and Discharge Instructions  [facility]  [address], [address]  [place] [address]  Telephone: [phone]     FAX [phone]    NAME:  Yessy Puckett  YOB: 1955  MEDICAL RECORD NUMBER:  [id number]  DATE:  1/16/2025      Return Appointment:  [] Dressing Supply Provider: Jelani  [] Home Healthcare:   [] Facility:   [x] Return Appointment: 1 Week(s)   [] Nurse Visit:    [] Discharge from [facility]:   [] Healed          [] Refer to Provider:           [] Consult    Follow-up Information:  [x] Ordered Tests:    [x] Vascular/Arterial Testing (Vascular lower extremeity arterial segmental pressures with PPG) PLEASE CALL and make appointment to complete this study.    [x] Please call [phone] to schedule at any [facility] facility   [] Referrals:   [] Rx:   [] Culture:  [] Wound Vac:  [] Skin Sub:   [] OR:  [] Other:     Wound Cleansing:   Do not scrub or use excessive force.  Cleanse wound prior to applying a clean dressing with:  [] Normal Saline   [x] Keep Wound Dry in Shower     [] Wound Cleanser   [x] Cleanse wound with Mild Soap & Water on dressing change days   [] Other:       Topical Treatments:  Do not apply lotions, creams, or ointments to wound bed unless directed.   [x] Apply moisturizing lotion (Ammonium Lactate) to skin surrounding the wound prior to dressing change.  [] Apply antifungal ointment to skin surrounding the wound prior to dressing change.  [] Apply thin film of moisture barrier ointment to skin immediately around wound.  [] Apply Betadine to skin immediately around wound   [x] Other: Apply lotion daily to right lower leg      Dressings:           Wound Location: RIGHT LEG AND LEFT LEG   [x] Apply Primary Dressing:       [] MediHoney Gel [] MediHoney Alginate  [] Calcium Alginate with Silver -   [] Calcium Alginate without silver   [x] Collagen with silver  [] Collagen without Silver    [] Santyl with moist saline

## 2025-01-16 NOTE — WOUND CARE
[facility]   Wound Care and Hyperbaric Oxygen Therapy Center   Medical Staff Progress Note     Yessy Puckett  MEDICAL RECORD NUMBER:  [id number]  AGE: 69 y.o.   GENDER: male  : 1955  EPISODE DATE:  2025    Chief complaint and reason for visit:     Chief Complaint   Patient presents with    Wound Check     Bilateral legs        HISTORY of PRESENT ILLNESS HPI     Yessy Puckett is a 69 y.o. male who presents today for wound/ulcer evaluation.     History of Wound Context: Per original history and physical on this patient. Changes in history since last evaluation: None     PAST MEDICAL HISTORY        Diagnosis Date    Arthritis     Arthritis     Chronic obstructive pulmonary disease (HCC)     COPD (chronic obstructive pulmonary disease) (HCC)     Diabetes (HCC)     Diabetes (HCC)     HTN (hypertension)     Hypertension     Sleep apnea     Sleep apnea        PAST SURGICAL HISTORY    Past Surgical History:   Procedure Laterality Date    GI      HERNIA REPAIR         FAMILY HISTORY    Family History   Problem Relation Age of Onset    Hypertension Sister     Hypertension Sister     Hypertension Father     Hypertension Mother     Hypertension Child        SOCIAL HISTORY    Social History     Tobacco Use    Smoking status: Never    Smokeless tobacco: Never   Substance Use Topics    Alcohol use: Never    Drug use: Never       ALLERGIES    No Known Allergies    MEDICATIONS    Current Outpatient Medications on File Prior to Encounter   Medication Sig Dispense Refill    atorvastatin (LIPITOR) 20 MG tablet Take 1 tablet by mouth daily      ammonium lactate (AMLACTIN) 12 % cream Apply topically as needed to the bilateral feet 385 g 4    hydrALAZINE (APRESOLINE) 25 MG tablet Take 2 tablets by mouth 3 times daily      isosorbide mononitrate (IMDUR) 30 MG extended release tablet Take 1 tablet by mouth daily      fluticasone-umeclidin-vilant (TRELEGY ELLIPTA) 100-62.5-25 MCG/ACT AEPB inhaler Inhale

## 2025-01-16 NOTE — DISCHARGE INSTRUCTIONS
Unna with Calamine Lite                 [] Elevate leg(s) above the level of the heart when sitting.                 [] Avoid prolonged standing in one place.              [] Do not get leg(s) with wrap wet. If wrap becomes too tight, call the wound center and remove wrap from leg by unrolling each layer.     Dietary:  [] Diet as tolerated:    [x] Calorie Diabetic Diet:         [x] No Added Salt:  [x] Increase Protein:    [] Other:              Activity:  [x] Activity as tolerated: Elevate lower extremities with ulcers  [] Patient has no activity restrictions      [] Strict Bedrest           [] Remain off Work      [] May return to full duty work                                     [] Return to work with restrictions          Physician:  [] Dr. Gladys Pickens  [] Dr. Cesario Pace  [] Dr. Stacie Atkinson  [] Dr. Sesar Ford  [x] Dr. Jose Hurst  [] Dr. Jazlyn Ambrosio  [] Dr. Ashley Ziegler        Nurse Case Manger:  Beronica \"T\" , RN         Wound Care Center Information: Should you experience any significant changes in your wound(s) or have questions about your wound care, please contact the Wound Center at [phone]. Our hours are Monday - Friday 8am - 4:30pm, closed all major holidays. If you need help with your wound outside these hours and cannot wait until we are again available, contact your PCP or go to the hospital emergency room.      PLEASE NOTE: IF YOU ARE UNABLE TO OBTAIN WOUND SUPPLIES, CONTINUE TO USE THE SUPPLIES YOU HAVE AVAILABLE UNTIL YOU ARE ABLE TO REACH US. IT IS MOST IMPORTANT TO KEEP THE WOUND COVERED AT ALL TIMES.

## 2025-01-27 ENCOUNTER — TELEPHONE (OUTPATIENT)
Facility: HOSPITAL | Age: 70
End: 2025-01-27

## 2025-01-27 NOTE — TELEPHONE ENCOUNTER
Attempted to contact patient regarding missed appointment. No answer and voicemail set up in Danish. Will try again to reach patient

## 2025-01-29 ENCOUNTER — HOSPITAL ENCOUNTER (OUTPATIENT)
Facility: HOSPITAL | Age: 70
Discharge: HOME | End: 2025-01-29
Attending: RADIOLOGY
Payer: MEDICARE

## 2025-01-29 VITALS
RESPIRATION RATE: 20 BRPM | DIASTOLIC BLOOD PRESSURE: 77 MMHG | HEART RATE: 68 BPM | TEMPERATURE: 97.7 F | OXYGEN SATURATION: 96 % | SYSTOLIC BLOOD PRESSURE: 145 MMHG

## 2025-01-29 PROCEDURE — 29580 STRAPPING UNNA BOOT: CPT

## 2025-01-29 NOTE — WOUND CARE
Wound Care Nurse Visit Note        Yessy Puckett  Age: 69 y.o.  YOB: 1955    Today's Date:  1/29/2025    Patient presents today per physician order for a scheduled nurse visit.    Orders to follow same dressings as previous visit and to report any changes in conditions if noted.    BP (!) 145/77   Pulse 68   Temp 97.7 °F (36.5 °C) (Temporal)   Resp 20   SpO2 96%       Wound Assessment:      Wound 09/26/24 Leg Left;Posterior #3 (Active)   Wound Image   01/16/25 1405   Wound Etiology Venous 01/29/25 1321   Dressing Status Other (Comment) 01/29/25 1321   Wound Cleansed Cleansed with saline 01/29/25 1321   Dressing/Treatment Collagen with Ag;Dry dressing;Other (comment) 01/16/25 1424   Wound Length (cm) 4 cm 01/29/25 1321   Wound Width (cm) 8 cm 01/29/25 1321   Wound Depth (cm) 0.2 cm 01/29/25 1321   Wound Surface Area (cm^2) 32 cm^2 01/29/25 1321   Change in Wound Size % (l*w) -357.14 01/29/25 1321   Wound Volume (cm^3) 6.4 cm^3 01/29/25 1321   Wound Healing % -814 01/29/25 1321   Post-Procedure Length (cm) 10.8 cm 12/19/24 1433   Post-Procedure Width (cm) 7 cm 12/19/24 1433   Post-Procedure Depth (cm) 0.1 cm 12/19/24 1433   Post-Procedure Surface Area (cm^2) 75.6 cm^2 12/19/24 1433   Post-Procedure Volume (cm^3) 7.56 cm^3 12/19/24 1433   Wound Assessment Granulation tissue;Slough 01/29/25 1321   Drainage Amount Moderate (25-50%) 01/29/25 1321   Drainage Description Serosanguinous 01/29/25 1321   Odor None 01/29/25 1321   Pia-wound Assessment Maceration 01/29/25 1321   Margins Undefined edges 01/29/25 1321   Wound Thickness Description not for Pressure Injury Full thickness 01/29/25 1321   Number of days: 125       Wound 12/19/24 Leg Posterior;Right #2 (Active)   Wound Image   01/16/25 1402   Wound Etiology Venous 01/29/25 1321   Dressing Status Other (Comment) 01/29/25 1321   Wound Cleansed Cleansed with saline 01/29/25 1321   Dressing/Treatment

## 2025-02-05 ENCOUNTER — HOSPITAL ENCOUNTER (OUTPATIENT)
Facility: HOSPITAL | Age: 70
Discharge: HOME | End: 2025-02-05
Attending: RADIOLOGY
Payer: MEDICARE

## 2025-02-05 VITALS
DIASTOLIC BLOOD PRESSURE: 77 MMHG | RESPIRATION RATE: 20 BRPM | SYSTOLIC BLOOD PRESSURE: 140 MMHG | TEMPERATURE: 97.1 F | HEART RATE: 92 BPM

## 2025-02-05 PROCEDURE — 29580 STRAPPING UNNA BOOT: CPT

## 2025-02-05 NOTE — WOUND CARE
Wound Care Nurse Visit Note        Yessy Puckett  Age: 69 y.o.  YOB: 1955    Today's Date:  2/5/2025    Patient presents today per physician order for a scheduled nurse visit.    Orders to follow same dressings as previous visit and to report any changes in conditions if noted.    BP (!) 140/77   Pulse 92   Temp 97.1 °F (36.2 °C) (Temporal)   Resp 20       Wound Assessment:      Wound 09/26/24 Leg Left;Posterior #3 (Active)   Wound Image   01/16/25 1405   Wound Etiology Venous 02/05/25 1311   Dressing Status Other (Comment) 01/29/25 1321   Wound Cleansed Cleansed with saline 01/29/25 1321   Dressing/Treatment Collagen with Ag;Dry dressing;Other (comment) 01/16/25 1424   Wound Length (cm) 4 cm 01/29/25 1321   Wound Width (cm) 8 cm 01/29/25 1321   Wound Depth (cm) 0.2 cm 01/29/25 1321   Wound Surface Area (cm^2) 32 cm^2 01/29/25 1321   Change in Wound Size % (l*w) -357.14 01/29/25 1321   Wound Volume (cm^3) 6.4 cm^3 01/29/25 1321   Wound Healing % -814 01/29/25 1321   Post-Procedure Length (cm) 10.8 cm 12/19/24 1433   Post-Procedure Width (cm) 7 cm 12/19/24 1433   Post-Procedure Depth (cm) 0.1 cm 12/19/24 1433   Post-Procedure Surface Area (cm^2) 75.6 cm^2 12/19/24 1433   Post-Procedure Volume (cm^3) 7.56 cm^3 12/19/24 1433   Wound Assessment Granulation tissue;Slough 01/29/25 1321   Drainage Amount Moderate (25-50%) 01/29/25 1321   Drainage Description Serosanguinous 01/29/25 1321   Odor None 01/29/25 1321   Pia-wound Assessment Maceration 01/29/25 1321   Margins Undefined edges 01/29/25 1321   Wound Thickness Description not for Pressure Injury Full thickness 01/29/25 1321   Number of days: 132       Wound 12/19/24 Leg Posterior;Right #2 (Active)   Wound Image   01/16/25 1402   Wound Etiology Venous 02/05/25 1311   Dressing Status Other (Comment) 01/29/25 1321   Wound Cleansed Cleansed with saline 01/29/25 1321   Dressing/Treatment Collagen with

## 2025-02-05 NOTE — WOUND CARE
Unna Boot Application   Below Knee    NAME:  Yessy Puckett  YOB: 1955  MEDICAL RECORD NUMBER:  [id number]  DATE:  2/5/2025    Unna boot: Appied primary and secondary dressing as ordered.  Applied Unna roll from toes to knee overlapping each time.   Applied ace wrap or coban from toes to below the knee.   Secured with tape and/or metal clips covered with tape.   Instructed patient/caregiver to keep dressing dry and intact. DO NOT REMOVE DRESSING.   Instructed pt/family/caregiver to report excessive draining, loose bandage, wet dressing, severe pain or tingling in toes.  Applied Unna Boot dressing below the knee to right lower leg.  Applied Unna Boot dressing below the knee to left lower leg.    Unna Boot(s) were applied per  Guidelines.     Electronically signed by Karen Cifuentes RN on 2/5/2025 at 1:22 PM

## 2025-02-13 ENCOUNTER — HOSPITAL ENCOUNTER (OUTPATIENT)
Facility: HOSPITAL | Age: 70
Discharge: HOME | End: 2025-02-13
Attending: RADIOLOGY
Payer: MEDICARE

## 2025-02-13 VITALS
HEART RATE: 98 BPM | TEMPERATURE: 97.1 F | DIASTOLIC BLOOD PRESSURE: 69 MMHG | RESPIRATION RATE: 20 BRPM | SYSTOLIC BLOOD PRESSURE: 106 MMHG

## 2025-02-13 PROCEDURE — 11045 DBRDMT SUBQ TISS EACH ADDL: CPT

## 2025-02-13 PROCEDURE — 11042 DBRDMT SUBQ TIS 1ST 20SQCM/<: CPT

## 2025-02-13 NOTE — DISCHARGE INSTRUCTIONS
Beronica Velez RN  Registered Nurse     Wound Care      Signed     Date of Service: 2/13/2025  2:00 PM     Signed                          Wound Clinic Physician Orders and Discharge Instructions  [facility]  [facility], [address] [place], [place] [address]  Telephone: [phone]     FAX [phone]     NAME:  Yessy Puckett  YOB: 1955  MEDICAL RECORD NUMBER:  [id number]  DATE:  2/13/2025        Return Appointment:  [] Dressing Supply Provider: Jelani  [] Home Healthcare:   [] Facility:   [x] Return Appointment: 2 Week(s)   [x] Nurse Visit:  1 week Wednesday   [] Discharge from [facility]:   [] Healed          [] Refer to Provider:           [] Consult     Follow-up Information:  [x] Ordered Tests:    [x] Vascular/Arterial Testing (Vascular lower extremeity arterial segmental pressures with PPG) PLEASE CALL and make appointment to complete this study.             [x] Please call [phone] to schedule arterial test at any [facility] facility   [] Referrals:   [] Rx:   [] Culture:  [] Wound Vac:  [] Skin Sub:   [] OR:  [] Other:      Wound Cleansing:   Do not scrub or use excessive force.  Cleanse wound prior to applying a clean dressing with:  [] Normal Saline          [x] Keep Wound Dry in Shower     [] Wound Cleanser   [x] Cleanse wound with Mild Soap & Water on dressing change days   [] Other:        Topical Treatments:  Do not apply lotions, creams, or ointments to wound bed unless directed.   [x] Apply moisturizing lotion (Ammonium Lactate) to skin surrounding the wound prior to dressing change.  [] Apply antifungal ointment to skin surrounding the wound prior to dressing change.  [] Apply thin film of moisture barrier ointment to skin immediately around wound.  [] Apply Betadine to skin immediately around wound   [] Other:                 Dressings:                  Wound Location: RIGHT LEG AND LEFT LEG   [x] Apply Primary Dressing:

## 2025-02-13 NOTE — WOUND CARE
Unna Boot Application   Below Knee    NAME:  Yessy Puckett  YOB: 1955  MEDICAL RECORD NUMBER:  [id number]  DATE:  2/13/2025    Unna boot: Appied primary and secondary dressing as ordered.  Applied Unna roll from toes to knee overlapping each time.   Applied ace wrap or coban from toes to below the knee.   Secured with tape and/or metal clips covered with tape.   Instructed patient/caregiver to keep dressing dry and intact. DO NOT REMOVE DRESSING.   Instructed pt/family/caregiver to report excessive draining, loose bandage, wet dressing, severe pain or tingling in toes.  Applied Unna Boot dressing below the knee to right lower leg.  Applied Unna Boot dressing below the knee to left lower leg.    Unna Boot(s) were applied per  Guidelines.     Electronically signed by Angélica Rodriguez RN on 2/13/2025 at 2:45 PM

## 2025-02-13 NOTE — WOUND CARE
[facility]   Wound Care and Hyperbaric Oxygen Therapy Center   Medical Staff Progress Note     Yessy Puckett  MEDICAL RECORD NUMBER:  [id number]  AGE: 69 y.o.   GENDER: male  : 1955  EPISODE DATE:  2025    Chief complaint and reason for visit:     Chief Complaint   Patient presents with    Wound Check     BLE        HISTORY of PRESENT ILLNESS HPI     Yessy Puckett is a 69 y.o. male who presents today for wound/ulcer evaluation.     History of Wound Context: Per original history and physical on this patient. Changes in history since last evaluation: ***    PAST MEDICAL HISTORY        Diagnosis Date    Arthritis     Arthritis     Chronic obstructive pulmonary disease (HCC)     COPD (chronic obstructive pulmonary disease) (HCC)     Diabetes (HCC)     Diabetes (HCC)     HTN (hypertension)     Hypertension     Sleep apnea     Sleep apnea        PAST SURGICAL HISTORY    Past Surgical History:   Procedure Laterality Date    GI      HERNIA REPAIR         FAMILY HISTORY    Family History   Problem Relation Age of Onset    Hypertension Sister     Hypertension Sister     Hypertension Father     Hypertension Mother     Hypertension Child        SOCIAL HISTORY    Social History     Tobacco Use    Smoking status: Never    Smokeless tobacco: Never   Substance Use Topics    Alcohol use: Never    Drug use: Never       ALLERGIES    No Known Allergies    MEDICATIONS    Current Outpatient Medications on File Prior to Encounter   Medication Sig Dispense Refill    atorvastatin (LIPITOR) 20 MG tablet Take 1 tablet by mouth daily      ammonium lactate (AMLACTIN) 12 % cream Apply topically as needed to the bilateral feet 385 g 4    hydrALAZINE (APRESOLINE) 25 MG tablet Take 2 tablets by mouth 3 times daily      isosorbide mononitrate (IMDUR) 30 MG extended release tablet Take 1 tablet by mouth daily      fluticasone-umeclidin-vilant (TRELEGY ELLIPTA) 100-62.5-25 MCG/ACT AEPB inhaler Inhale 1 puff into

## 2025-02-13 NOTE — WOUND CARE
Wound Clinic Physician Orders and Discharge Instructions  [facility]  [address], [address]  [place] [address]  Telephone: [phone]     FAX [phone]    NAME:  Yessy Puckett  YOB: 1955  MEDICAL RECORD NUMBER:  [id number]  DATE:  2/13/2025      Return Appointment:  [] Dressing Supply Provider: Jelani  [] Home Healthcare:   [] Facility:   [x] Return Appointment: 2 Week(s)   [x] Nurse Visit:  1 week Wednesday   [] Discharge from [facility]:   [] Healed          [] Refer to Provider:           [] Consult    Follow-up Information:  [x] Ordered Tests:    [x] Vascular/Arterial Testing (Vascular lower extremeity arterial segmental pressures with PPG) PLEASE CALL and make appointment to complete this study.    [x] Please call [phone] to schedule arterial test at any [facility] facility   [] Referrals:   [] Rx:   [] Culture:  [] Wound Vac:  [] Skin Sub:   [] OR:  [] Other:     Wound Cleansing:   Do not scrub or use excessive force.  Cleanse wound prior to applying a clean dressing with:  [] Normal Saline   [x] Keep Wound Dry in Shower     [] Wound Cleanser   [x] Cleanse wound with Mild Soap & Water on dressing change days   [] Other:       Topical Treatments:  Do not apply lotions, creams, or ointments to wound bed unless directed.   [x] Apply moisturizing lotion (Ammonium Lactate) to skin surrounding the wound prior to dressing change.  [] Apply antifungal ointment to skin surrounding the wound prior to dressing change.  [] Apply thin film of moisture barrier ointment to skin immediately around wound.  [] Apply Betadine to skin immediately around wound   [] Other:      Dressings:           Wound Location: RIGHT LEG AND LEFT LEG   [x] Apply Primary Dressing:       [] MediHoney Gel [] MediHoney Alginate  [] Calcium Alginate with Silver -   [] Calcium Alginate without silver   [x] Collagen with silver  [] Collagen without Silver    [] Santyl with moist saline gauze

## 2025-02-15 ENCOUNTER — APPOINTMENT (OUTPATIENT)
Facility: HOSPITAL | Age: 70
End: 2025-02-15
Payer: MEDICARE

## 2025-02-15 ENCOUNTER — HOSPITAL ENCOUNTER (EMERGENCY)
Facility: HOSPITAL | Age: 70
Discharge: HOME | End: 2025-02-18
Attending: EMERGENCY MEDICINE
Payer: MEDICARE

## 2025-02-15 DIAGNOSIS — M15.9 OSTEOARTHRITIS OF MULTIPLE JOINTS, UNSPECIFIED OSTEOARTHRITIS TYPE: Primary | ICD-10-CM

## 2025-02-15 LAB
ANION GAP SERPL CALC-SCNC: 5 MMOL/L (ref 2–12)
BASOPHILS # BLD: 0.08 K/UL (ref 0–0.1)
BASOPHILS NFR BLD: 0.6 % (ref 0–1)
BUN SERPL-MCNC: 17 MG/DL (ref 6–20)
BUN/CREAT SERPL: 21 (ref 12–20)
CA-I BLD-MCNC: 8.7 MG/DL (ref 8.5–10.1)
CHLORIDE SERPL-SCNC: 102 MMOL/L (ref 97–108)
CK SERPL-CCNC: 75 U/L (ref 39–308)
CO2 SERPL-SCNC: 31 MMOL/L (ref 21–32)
CREAT SERPL-MCNC: 0.82 MG/DL (ref 0.7–1.3)
DIFFERENTIAL METHOD BLD: ABNORMAL
EOSINOPHIL # BLD: 0.16 K/UL (ref 0–0.4)
EOSINOPHIL NFR BLD: 1.2 % (ref 0–7)
ERYTHROCYTE [DISTWIDTH] IN BLOOD BY AUTOMATED COUNT: 15.4 % (ref 11.5–14.5)
FLUAV RNA SPEC QL NAA+PROBE: NOT DETECTED
FLUBV RNA SPEC QL NAA+PROBE: NOT DETECTED
GLUCOSE SERPL-MCNC: 109 MG/DL (ref 65–100)
HCT VFR BLD AUTO: 32.7 % (ref 36.6–50.3)
HGB BLD-MCNC: 10.5 G/DL (ref 12.1–17)
IMM GRANULOCYTES # BLD AUTO: 0.05 K/UL (ref 0–0.04)
IMM GRANULOCYTES NFR BLD AUTO: 0.4 % (ref 0–0.5)
LYMPHOCYTES # BLD: 0.89 K/UL (ref 0.8–3.5)
LYMPHOCYTES NFR BLD: 6.4 % (ref 12–49)
MCH RBC QN AUTO: 23.2 PG (ref 26–34)
MCHC RBC AUTO-ENTMCNC: 32.1 G/DL (ref 30–36.5)
MCV RBC AUTO: 72.2 FL (ref 80–99)
MONOCYTES # BLD: 1.34 K/UL (ref 0–1)
MONOCYTES NFR BLD: 9.7 % (ref 5–13)
NEUTS SEG # BLD: 11.35 K/UL (ref 1.8–8)
NEUTS SEG NFR BLD: 81.7 % (ref 32–75)
NRBC # BLD: 0 K/UL (ref 0–0.01)
NRBC BLD-RTO: 0 PER 100 WBC
PLATELET # BLD AUTO: 379 K/UL (ref 150–400)
PMV BLD AUTO: 9.7 FL (ref 8.9–12.9)
POTASSIUM SERPL-SCNC: 2.8 MMOL/L (ref 3.5–5.1)
RBC # BLD AUTO: 4.53 M/UL (ref 4.1–5.7)
SARS-COV-2 RNA RESP QL NAA+PROBE: NOT DETECTED
SODIUM SERPL-SCNC: 138 MMOL/L (ref 136–145)
WBC # BLD AUTO: 13.9 K/UL (ref 4.1–11.1)

## 2025-02-15 PROCEDURE — 6370000000 HC RX 637 (ALT 250 FOR IP): Performed by: EMERGENCY MEDICINE

## 2025-02-15 PROCEDURE — 96366 THER/PROPH/DIAG IV INF ADDON: CPT

## 2025-02-15 PROCEDURE — 82550 ASSAY OF CK (CPK): CPT

## 2025-02-15 PROCEDURE — 99284 EMERGENCY DEPT VISIT MOD MDM: CPT

## 2025-02-15 PROCEDURE — 6360000002 HC RX W HCPCS: Performed by: EMERGENCY MEDICINE

## 2025-02-15 PROCEDURE — 73552 X-RAY EXAM OF FEMUR 2/>: CPT

## 2025-02-15 PROCEDURE — 73590 X-RAY EXAM OF LOWER LEG: CPT

## 2025-02-15 PROCEDURE — 36415 COLL VENOUS BLD VENIPUNCTURE: CPT

## 2025-02-15 PROCEDURE — 2700000000 HC OXYGEN THERAPY PER DAY

## 2025-02-15 PROCEDURE — 96365 THER/PROPH/DIAG IV INF INIT: CPT

## 2025-02-15 PROCEDURE — 71045 X-RAY EXAM CHEST 1 VIEW: CPT

## 2025-02-15 PROCEDURE — 85025 COMPLETE CBC W/AUTO DIFF WBC: CPT

## 2025-02-15 PROCEDURE — 80048 BASIC METABOLIC PNL TOTAL CA: CPT

## 2025-02-15 PROCEDURE — 87636 SARSCOV2 & INF A&B AMP PRB: CPT

## 2025-02-15 RX ORDER — ACETAMINOPHEN 500 MG
1000 TABLET ORAL
Status: COMPLETED | OUTPATIENT
Start: 2025-02-15 | End: 2025-02-15

## 2025-02-15 RX ORDER — POTASSIUM CHLORIDE 7.45 MG/ML
10 INJECTION INTRAVENOUS
Status: COMPLETED | OUTPATIENT
Start: 2025-02-15 | End: 2025-02-15

## 2025-02-15 RX ORDER — POTASSIUM CHLORIDE 750 MG/1
40 TABLET, EXTENDED RELEASE ORAL ONCE
Status: COMPLETED | OUTPATIENT
Start: 2025-02-15 | End: 2025-02-15

## 2025-02-15 RX ADMIN — POTASSIUM CHLORIDE 10 MEQ: 7.46 INJECTION, SOLUTION INTRAVENOUS at 20:03

## 2025-02-15 RX ADMIN — ACETAMINOPHEN 1000 MG: 500 TABLET, FILM COATED ORAL at 20:29

## 2025-02-15 RX ADMIN — POTASSIUM CHLORIDE 40 MEQ: 750 TABLET, EXTENDED RELEASE ORAL at 20:01

## 2025-02-15 ASSESSMENT — PAIN - FUNCTIONAL ASSESSMENT: PAIN_FUNCTIONAL_ASSESSMENT: 0-10

## 2025-02-15 ASSESSMENT — PAIN SCALES - GENERAL: PAINLEVEL_OUTOF10: 8

## 2025-02-15 NOTE — ED PROVIDER NOTES
[facility] EMERGENCY DEPT  EMERGENCY DEPARTMENT HISTORY AND PHYSICAL EXAM      Date: 2/15/2025  Patient Name: Yessy Puckett  MRN: [id number]  Birthdate 1955  Date of evaluation: 2/15/2025  Provider: Dereck Iraheta MD   Note Started: 2:38 PM EST 2/15/25    HISTORY OF PRESENT ILLNESS     Chief Complaint   Patient presents with    Leg Pain    Shortness of Breath       History Provided By: Patient and EMS    HPI: Yessy Puckett is a 69 y.o. male presents for evaluation of multiple complaints.  EMS states they were called for shortness of breath the patient states his shortness of breath is at his baseline, he is on his 3 L nasal cannula.  Patient also reports left leg pain which is chronic but worse.  Patient also reports diarrhea.  Denies fevers or chills, denies nausea or vomiting.    PAST MEDICAL HISTORY   Past Medical History:  Past Medical History:   Diagnosis Date    Arthritis     Arthritis     Chronic obstructive pulmonary disease (HCC)     COPD (chronic obstructive pulmonary disease) (HCC)     Diabetes (HCC)     Diabetes (HCC)     HTN (hypertension)     Hypertension     Sleep apnea     Sleep apnea        Past Surgical History:  Past Surgical History:   Procedure Laterality Date    GI      HERNIA REPAIR         Family History:  Family History   Problem Relation Age of Onset    Hypertension Sister     Hypertension Sister     Hypertension Father     Hypertension Mother     Hypertension Child        Social History:  Social History     Tobacco Use    Smoking status: Never    Smokeless tobacco: Never   Substance Use Topics    Alcohol use: Never    Drug use: Never       Allergies:  No Known Allergies    PCP: Catherine Strauss MD    Current Meds:   Current Facility-Administered Medications   Medication Dose Route Frequency Provider Last Rate Last Admin    acetaminophen (TYLENOL) tablet 650 mg  650 mg Oral Q4H PRN Gale Pelletier PA-C   650 mg at 02/16/25 1242    oxyCODONE (ROXICODONE) immediate release tablet 5 mg  5

## 2025-02-15 NOTE — ED TRIAGE NOTES
Pt complaint of left leg pain that prevents ambulation. EMS reports initial call was for SOB. Pt uses 3L o2 at baseline. Pt also presents with diarrhea

## 2025-02-16 LAB
ANION GAP SERPL CALC-SCNC: 3 MMOL/L (ref 2–12)
BASOPHILS # BLD: 0.08 K/UL (ref 0–0.1)
BASOPHILS NFR BLD: 0.7 % (ref 0–1)
BUN SERPL-MCNC: 15 MG/DL (ref 6–20)
BUN/CREAT SERPL: 21 (ref 12–20)
CA-I BLD-MCNC: 8.4 MG/DL (ref 8.5–10.1)
CHLORIDE SERPL-SCNC: 104 MMOL/L (ref 97–108)
CO2 SERPL-SCNC: 32 MMOL/L (ref 21–32)
CREAT SERPL-MCNC: 0.7 MG/DL (ref 0.7–1.3)
DIFFERENTIAL METHOD BLD: ABNORMAL
EOSINOPHIL # BLD: 0.41 K/UL (ref 0–0.4)
EOSINOPHIL NFR BLD: 3.7 % (ref 0–7)
ERYTHROCYTE [DISTWIDTH] IN BLOOD BY AUTOMATED COUNT: 15.3 % (ref 11.5–14.5)
GLUCOSE SERPL-MCNC: 103 MG/DL (ref 65–100)
HCT VFR BLD AUTO: 30.8 % (ref 36.6–50.3)
HGB BLD-MCNC: 10 G/DL (ref 12.1–17)
IMM GRANULOCYTES # BLD AUTO: 0.03 K/UL (ref 0–0.04)
IMM GRANULOCYTES NFR BLD AUTO: 0.3 % (ref 0–0.5)
LYMPHOCYTES # BLD: 1.23 K/UL (ref 0.8–3.5)
LYMPHOCYTES NFR BLD: 11.1 % (ref 12–49)
MCH RBC QN AUTO: 23.8 PG (ref 26–34)
MCHC RBC AUTO-ENTMCNC: 32.5 G/DL (ref 30–36.5)
MCV RBC AUTO: 73.3 FL (ref 80–99)
MONOCYTES # BLD: 1.3 K/UL (ref 0–1)
MONOCYTES NFR BLD: 11.7 % (ref 5–13)
NEUTS SEG # BLD: 8.06 K/UL (ref 1.8–8)
NEUTS SEG NFR BLD: 72.5 % (ref 32–75)
NRBC # BLD: 0 K/UL (ref 0–0.01)
NRBC BLD-RTO: 0 PER 100 WBC
PLATELET # BLD AUTO: 343 K/UL (ref 150–400)
PMV BLD AUTO: 9.4 FL (ref 8.9–12.9)
POTASSIUM SERPL-SCNC: 3.7 MMOL/L (ref 3.5–5.1)
RBC # BLD AUTO: 4.2 M/UL (ref 4.1–5.7)
SODIUM SERPL-SCNC: 139 MMOL/L (ref 136–145)
WBC # BLD AUTO: 11.1 K/UL (ref 4.1–11.1)

## 2025-02-16 PROCEDURE — 83036 HEMOGLOBIN GLYCOSYLATED A1C: CPT

## 2025-02-16 PROCEDURE — 85025 COMPLETE CBC W/AUTO DIFF WBC: CPT

## 2025-02-16 PROCEDURE — 6370000000 HC RX 637 (ALT 250 FOR IP)

## 2025-02-16 PROCEDURE — 80048 BASIC METABOLIC PNL TOTAL CA: CPT

## 2025-02-16 PROCEDURE — 36415 COLL VENOUS BLD VENIPUNCTURE: CPT

## 2025-02-16 PROCEDURE — 6360000002 HC RX W HCPCS

## 2025-02-16 PROCEDURE — 94640 AIRWAY INHALATION TREATMENT: CPT

## 2025-02-16 PROCEDURE — 2700000000 HC OXYGEN THERAPY PER DAY

## 2025-02-16 PROCEDURE — 96372 THER/PROPH/DIAG INJ SC/IM: CPT

## 2025-02-16 PROCEDURE — 6370000000 HC RX 637 (ALT 250 FOR IP): Performed by: STUDENT IN AN ORGANIZED HEALTH CARE EDUCATION/TRAINING PROGRAM

## 2025-02-16 RX ORDER — ENOXAPARIN SODIUM 100 MG/ML
40 INJECTION SUBCUTANEOUS 2 TIMES DAILY
Status: DISCONTINUED | OUTPATIENT
Start: 2025-02-16 | End: 2025-02-18 | Stop reason: HOSPADM

## 2025-02-16 RX ORDER — ACETAMINOPHEN 500 MG
1000 TABLET ORAL
Status: COMPLETED | OUTPATIENT
Start: 2025-02-16 | End: 2025-02-16

## 2025-02-16 RX ORDER — OXYCODONE HYDROCHLORIDE 5 MG/1
5 TABLET ORAL EVERY 4 HOURS PRN
Status: DISCONTINUED | OUTPATIENT
Start: 2025-02-16 | End: 2025-02-18

## 2025-02-16 RX ORDER — ACETAMINOPHEN 325 MG/1
650 TABLET ORAL EVERY 4 HOURS PRN
Status: DISCONTINUED | OUTPATIENT
Start: 2025-02-16 | End: 2025-02-18 | Stop reason: HOSPADM

## 2025-02-16 RX ORDER — NEBIVOLOL 10 MG/1
10 TABLET ORAL DAILY
COMMUNITY
Start: 2024-12-24

## 2025-02-16 RX ORDER — LISINOPRIL AND HYDROCHLOROTHIAZIDE 20; 25 MG/1; MG/1
1 TABLET ORAL DAILY
Status: DISCONTINUED | OUTPATIENT
Start: 2025-02-16 | End: 2025-02-16

## 2025-02-16 RX ORDER — POTASSIUM CHLORIDE 1500 MG/1
40 TABLET, EXTENDED RELEASE ORAL PRN
Status: DISCONTINUED | OUTPATIENT
Start: 2025-02-16 | End: 2025-02-18 | Stop reason: HOSPADM

## 2025-02-16 RX ORDER — POTASSIUM CHLORIDE 7.45 MG/ML
10 INJECTION INTRAVENOUS PRN
Status: DISCONTINUED | OUTPATIENT
Start: 2025-02-16 | End: 2025-02-18 | Stop reason: HOSPADM

## 2025-02-16 RX ORDER — HYDRALAZINE HYDROCHLORIDE 50 MG/1
50 TABLET, FILM COATED ORAL 3 TIMES DAILY
Status: DISCONTINUED | OUTPATIENT
Start: 2025-02-16 | End: 2025-02-18 | Stop reason: HOSPADM

## 2025-02-16 RX ORDER — ISOSORBIDE MONONITRATE 30 MG/1
30 TABLET, EXTENDED RELEASE ORAL DAILY
Status: DISCONTINUED | OUTPATIENT
Start: 2025-02-16 | End: 2025-02-18 | Stop reason: HOSPADM

## 2025-02-16 RX ORDER — ONDANSETRON 4 MG/1
4 TABLET, ORALLY DISINTEGRATING ORAL EVERY 8 HOURS PRN
Status: DISCONTINUED | OUTPATIENT
Start: 2025-02-16 | End: 2025-02-18 | Stop reason: HOSPADM

## 2025-02-16 RX ORDER — ALBUTEROL SULFATE 90 UG/1
2 INHALANT RESPIRATORY (INHALATION) EVERY 6 HOURS PRN
Status: DISCONTINUED | OUTPATIENT
Start: 2025-02-16 | End: 2025-02-18 | Stop reason: HOSPADM

## 2025-02-16 RX ORDER — ATORVASTATIN CALCIUM 20 MG/1
20 TABLET, FILM COATED ORAL NIGHTLY
Status: DISCONTINUED | OUTPATIENT
Start: 2025-02-16 | End: 2025-02-18 | Stop reason: HOSPADM

## 2025-02-16 RX ORDER — HYDROCHLOROTHIAZIDE 25 MG/1
25 TABLET ORAL DAILY
Status: DISCONTINUED | OUTPATIENT
Start: 2025-02-16 | End: 2025-02-18 | Stop reason: HOSPADM

## 2025-02-16 RX ORDER — BUDESONIDE AND FORMOTEROL FUMARATE DIHYDRATE 80; 4.5 UG/1; UG/1
2 AEROSOL RESPIRATORY (INHALATION)
Status: DISCONTINUED | OUTPATIENT
Start: 2025-02-16 | End: 2025-02-18 | Stop reason: HOSPADM

## 2025-02-16 RX ORDER — LISINOPRIL 10 MG/1
20 TABLET ORAL DAILY
Status: DISCONTINUED | OUTPATIENT
Start: 2025-02-16 | End: 2025-02-18 | Stop reason: HOSPADM

## 2025-02-16 RX ORDER — LISINOPRIL AND HYDROCHLOROTHIAZIDE 20; 25 MG/1; MG/1
1 TABLET ORAL DAILY
COMMUNITY
Start: 2024-12-24

## 2025-02-16 RX ORDER — METOPROLOL SUCCINATE 50 MG/1
100 TABLET, EXTENDED RELEASE ORAL DAILY
Status: DISCONTINUED | OUTPATIENT
Start: 2025-02-16 | End: 2025-02-18 | Stop reason: HOSPADM

## 2025-02-16 RX ADMIN — ACETAMINOPHEN 650 MG: 325 TABLET ORAL at 12:42

## 2025-02-16 RX ADMIN — ENOXAPARIN SODIUM 40 MG: 100 INJECTION SUBCUTANEOUS at 22:43

## 2025-02-16 RX ADMIN — METOPROLOL SUCCINATE 100 MG: 50 TABLET, EXTENDED RELEASE ORAL at 12:42

## 2025-02-16 RX ADMIN — ACETAMINOPHEN 1000 MG: 500 TABLET, FILM COATED ORAL at 05:40

## 2025-02-16 RX ADMIN — ATORVASTATIN CALCIUM 20 MG: 20 TABLET, FILM COATED ORAL at 22:43

## 2025-02-16 RX ADMIN — Medication 2 PUFF: at 19:15

## 2025-02-16 RX ADMIN — LISINOPRIL 20 MG: 10 TABLET ORAL at 12:42

## 2025-02-16 RX ADMIN — HYDRALAZINE HYDROCHLORIDE 50 MG: 50 TABLET ORAL at 22:43

## 2025-02-16 RX ADMIN — HYDROCHLOROTHIAZIDE 25 MG: 25 TABLET ORAL at 12:42

## 2025-02-16 RX ADMIN — ENOXAPARIN SODIUM 40 MG: 100 INJECTION SUBCUTANEOUS at 12:42

## 2025-02-16 ASSESSMENT — PAIN SCALES - GENERAL: PAINLEVEL_OUTOF10: 0

## 2025-02-16 ASSESSMENT — PAIN - FUNCTIONAL ASSESSMENT
PAIN_FUNCTIONAL_ASSESSMENT: NONE - DENIES PAIN

## 2025-02-16 NOTE — CONSULTS
Hospitalist Admission Note    NAME: Yessy Puckett   :  1955   MRN:  [id number]     Date/Time:  2025 11:11 AM    Patient PCP: Catherine Strauss MD    ______________________________________________________________________  Given the patient's current clinical presentation in regards to the patient's multiple medical problems, complex decision making was performed, which includes reviewing the patient's available past medical records, laboratory results, and diagnostic studies.       My assessment of this patient's clinical condition and my plan of care is as follows.    Assessment / Plan:  Left thigh pain  Osteoarthritis  --Xray left femur shows degenerative changes without acute abnormalities.  X-ray tib-fib shows severe left knee degenerative changes, no acute abnormalities.  -Tylenol as needed  - PT OT consulted  - Case management consulted  - Ortho referral outpatient    Hypokalemia, resolved  - Potassium on arrival was 2.8, replenished now 3.7.    COPD  Chronic respiratory failure with hypoxia  - Patient has previous prescription for Trelegy, last prescribed 2024.  -Will provide patient with Symbicort while inpatient and albuterol as needed.    History of type 2 diabetes  -Appears in history though not on any current medications for diabetes.  Will obtain A1c and monitor blood sugar.    Hernia  - Patient has history of hernia repair.  No signs or symptoms of obstruction or incarceration.  -Will provide outpatient referral for general surgery follow-up    Hypertension  -Continue lisinopril-hydrochlorothiazide, metoprolol, hydralazine.  Medical Decision Making:   I personally reviewed labs: BMP, CBC  I personally reviewed imaging: Chest x-ray, x-ray left femur, x-ray of tib-fib  Toxic drug monitoring: Multiple blood pressure medications, continue to monitor blood pressure for hypotension  Discussed case with: Attending physician thank you for the consult, we will sign off at this time. Please feel free

## 2025-02-16 NOTE — ED NOTES
Pt unable to advise what meds he is taking.    Skyrizi Counseling: I discussed with the patient the risks of risankizumab-rzaa including but not limited to immunosuppression, and serious infections.  The patient understands that monitoring is required including a PPD at baseline and must alert us or the primary physician if symptoms of infection or other concerning signs are noted.

## 2025-02-17 LAB
EST. AVERAGE GLUCOSE BLD GHB EST-MCNC: 140 MG/DL
HBA1C MFR BLD: 6.5 % (ref 4–5.6)

## 2025-02-17 PROCEDURE — 2700000000 HC OXYGEN THERAPY PER DAY

## 2025-02-17 PROCEDURE — 97530 THERAPEUTIC ACTIVITIES: CPT

## 2025-02-17 PROCEDURE — 6370000000 HC RX 637 (ALT 250 FOR IP)

## 2025-02-17 PROCEDURE — 94761 N-INVAS EAR/PLS OXIMETRY MLT: CPT

## 2025-02-17 PROCEDURE — 94640 AIRWAY INHALATION TREATMENT: CPT

## 2025-02-17 PROCEDURE — 97165 OT EVAL LOW COMPLEX 30 MIN: CPT

## 2025-02-17 PROCEDURE — 97161 PT EVAL LOW COMPLEX 20 MIN: CPT

## 2025-02-17 PROCEDURE — 6360000002 HC RX W HCPCS

## 2025-02-17 PROCEDURE — 96372 THER/PROPH/DIAG INJ SC/IM: CPT

## 2025-02-17 RX ADMIN — ACETAMINOPHEN 650 MG: 325 TABLET ORAL at 05:09

## 2025-02-17 RX ADMIN — ENOXAPARIN SODIUM 40 MG: 100 INJECTION SUBCUTANEOUS at 20:18

## 2025-02-17 RX ADMIN — Medication 2 PUFF: at 08:33

## 2025-02-17 RX ADMIN — HYDRALAZINE HYDROCHLORIDE 50 MG: 50 TABLET ORAL at 20:17

## 2025-02-17 RX ADMIN — ATORVASTATIN CALCIUM 20 MG: 20 TABLET, FILM COATED ORAL at 20:17

## 2025-02-17 RX ADMIN — Medication 2 PUFF: at 20:32

## 2025-02-17 ASSESSMENT — PAIN DESCRIPTION - LOCATION: LOCATION: HEAD

## 2025-02-17 ASSESSMENT — PAIN SCALES - GENERAL
PAINLEVEL_OUTOF10: 0
PAINLEVEL_OUTOF10: 6

## 2025-02-17 NOTE — PLAN OF CARE
PHYSICAL THERAPY EVALUATION  Patient: Yessy Puckett (69 y.o. male)  Date: 2/17/2025  Primary Diagnosis: No admission diagnoses are documented for this encounter.       Precautions: Fall Risk, Contact Precautions                      Recommendations for nursing mobility: Frequent repositioning to prevent skin breakdown, Use of bed/chair alarm for safety, and Assist x2    In place during session: Pulse ox    ASSESSMENT  Pt is a 69 y.o. male admitted on 2/15/2025 for multiple complaints including SOB and L leg pain; PMHx arthritis, COPD, Diabetes, HTN; pt currently being treated for left leg pain. Pt supine upon PT arrival, agreeable to evaluation. Pt A&O x 4.      Based on the objective data described below, the patient currently presents with impaired functional mobility, decreased independence in ADLs, impaired ability to perform high-level IADLs, decreased ROM, impaired strength, decreased activity tolerance, impaired balance, and increased pain levels. (See below for objective details and assist levels).     Overall pt tolerated session fair today with c/o LLE pain 8-10/10 FACES, worsened with weight bearing. Pt required modAx2 at trunk, UE and for BLE support (L>R) for supine > sit in partial long sitting with LLE off the bed. Pt required verbal and demonstrative cues for weight shifting to bring R leg off bed and place both feet on floor. Pt requires manual A to place LLE in knee flexion with foot flat on floor. Pt attempted lateral scoot at EOB with modAx2 with bilateral manual knee blocking and verbal cues for weight shifting, pt only able to clear hips from bed 1-2 inches on first attempt, unable to clear hips from bed on second attempt, pt with 10/10 LLE pain with weight acceptance; further OOB mobility deferred at this time d/t high fall risk. Pt required modAx2 at trunk and BLE support for sit > sup. Pt will benefit from continued skilled PT to address above deficits and return to PLOF. Current PT DC

## 2025-02-17 NOTE — ED NOTES
Pt resting comfortably @ this time. Pt updated on plan of care. PT/OT to work w pt today. Call bell in reach.

## 2025-02-17 NOTE — PROGRESS NOTES
OCCUPATIONAL THERAPY EVALUATION  Patient: Yessy Puckett (69 y.o. male)  Date: 2/17/2025  Primary Diagnosis: No admission diagnoses are documented for this encounter.       Precautions: Fall Risk, Contact Precautions                Recommendations for nursing mobility: Encourage HEP in prep for ADLs/mobility; see handout for details, Frequent repositioning to prevent skin breakdown, Use of bed/chair alarm for safety, and Assist x2    In place during session:Nasal Cannula 3L and Pulse ox  ASSESSMENT  Pt is a 69 y.o. male presenting to [facility] with c/o SOB and LLE pain, admitted 2/15/2025 and currently being treated for left leg pain. Pt w/ PMHx including arthritis, COPD, Diabetes, and HTN. Pt received semi-supine in bed upon arrival, AXO x4, and agreeable to OT evaluation.     Based on current observations, pt presents with decreased  functional mobility, independence in ADLs, high-level IADLs, ROM, strength, body mechanics, activity tolerance, endurance, safety awareness, cognition, coordination, balance, posture, increased pain levels (see below for objective details and assist levels).     Overall, pt tolerates session fair today with SOB upon exertion and c/o 8-10 FACES pain in LLE w/ mobility. Pt was dependent for donning socks at bed level. Once socks were donned, pt required modAx2 sup to sit for BLE and trunk assist. During sup to sit, pain in LLE increased to 8/10 FACES. Once sitting up, pt required mod-maxAx2 for scooting to EOB w/ step by step multimodal cueing for sequencing and safety and assist to reposition BLEs with feet flat on floor. Pt's pain increased to 10/10 FACES with LLE mobility at EOB. During transition to EOB pt reported and presented w/ increased SOB. Pt required upright support for stability and occasional knee blocking for stability and safety while seated EOB. BUE ROM and  strength assessed, minor shoulder extension/internal rotation deficits noted in RUE. While at EOB, pt scooted

## 2025-02-17 NOTE — CARE COORDINATION
2/17/25 [facility] called @ [phone] & informed of pt wanting SNF ( Ref# [id number]) awaiting therapy notes to start auth.

## 2025-02-17 NOTE — CARE COORDINATION
2/17/25 CM informed son ( Angel Gonzalez @ [phone]) awaiting to speak with CM . CM met with son ( angel), his wife, & son had pt's brother ( Chris Iverson @ [phone]) on speaker phone & family requesting rehab center/SNF - son signed Choice Letter & referrals sent via [facility]. Per family pt's home condition is unlivable & pt cannot return/take care of self & they are unable to care for pt themselves. Pt is alert/bed/w/c bound. Pt will need PT/OT/auth. CM to start auth.

## 2025-02-17 NOTE — PROGRESS NOTES
OT eval order received and acknowledged. PT/OT eval attempted at 1440 however pt was soiled and nsing entering room to assist pt w/ cleaning up. Will continue to follow patient and attempt OT eval at a later time. Thank you.

## 2025-02-18 VITALS
DIASTOLIC BLOOD PRESSURE: 78 MMHG | SYSTOLIC BLOOD PRESSURE: 122 MMHG | WEIGHT: 315 LBS | RESPIRATION RATE: 17 BRPM | HEIGHT: 68 IN | TEMPERATURE: 97.6 F | BODY MASS INDEX: 47.74 KG/M2 | HEART RATE: 98 BPM | OXYGEN SATURATION: 97 %

## 2025-02-18 PROCEDURE — 94761 N-INVAS EAR/PLS OXIMETRY MLT: CPT

## 2025-02-18 PROCEDURE — 94640 AIRWAY INHALATION TREATMENT: CPT

## 2025-02-18 PROCEDURE — 2700000000 HC OXYGEN THERAPY PER DAY

## 2025-02-18 PROCEDURE — 6370000000 HC RX 637 (ALT 250 FOR IP)

## 2025-02-18 RX ORDER — ACETAMINOPHEN 500 MG
1000 TABLET ORAL EVERY 6 HOURS PRN
Qty: 540 TABLET | Refills: 1 | OUTPATIENT
Start: 2025-02-18

## 2025-02-18 RX ADMIN — Medication 2 PUFF: at 07:14

## 2025-02-18 NOTE — CARE COORDINATION
2/18/25 Pt informed that insurance does not cover transportation. Pt on phone with brother Bong Lorenz who stated he will pay for transport ( [facility] quote $ 634.84) - brother given transport number to call.

## 2025-02-18 NOTE — ED NOTES
Pt bed linens changed and waffle mattress placed under pt and inflated. Pt wiped down with warm wipes and barrier cream placed in folds of skin. 2 mepilexes placed on pt lower right sacrum due to 2 breaks in the skin. Pt provided toothbrush and toothpaste and brushed his own teeth. Applied deodorant to pt armpits.

## 2025-02-18 NOTE — CARE COORDINATION
2/18/25 Per [facility] still awaiting SNF acceptance to start auth. Pt/[place]/MD updated. Pt's brother ( Luis ) in room this am with pt. Message left for son  Angel Gonzalez @ [phone] to call CM for update.

## 2025-02-18 NOTE — CARE COORDINATION
2/18/25 CM received message from [facility] - auth received # [id number]. Start date: 2/18/25 Next review date: 2/20//25 & CW will be Virginia Desouza . Virgen Kiserab @ [phone] accepted for rm: 202. Unit secretary to set up transport. Pt/everette/MD & pt's son ( Angel Gonzalez @ [phone]) informed of upon info. Facility requesting pt to be medicated for pain upon transport & Rxs for pain meds to be sent with pt.

## 2025-02-18 NOTE — CARE COORDINATION
2/18/25 [facility] called for update; Ref # : [id number] - all documents received from fax via [employer]. Pt still under review.

## 2025-02-18 NOTE — ED NOTES
Report given transport team. Pt, all belongings, and paperwork sent with pt and team. Care assumed @ this time. No further questions.

## 2025-02-18 NOTE — ED PROVIDER NOTES
Patient has been medically cleared for dc to SNF.  Tylenol script provided for pain. Stable for dc     Odalis Dalal MD  02/18/25 1435

## 2025-02-25 ENCOUNTER — APPOINTMENT (OUTPATIENT)
Facility: HOSPITAL | Age: 70
End: 2025-02-25
Payer: MEDICARE

## 2025-02-25 ENCOUNTER — HOSPITAL ENCOUNTER (EMERGENCY)
Facility: HOSPITAL | Age: 70
Discharge: NURSING, REHAB OR LONG-TERM CARE | End: 2025-02-26
Attending: EMERGENCY MEDICINE
Payer: MEDICARE

## 2025-02-25 DIAGNOSIS — R06.02 SHORTNESS OF BREATH: Primary | ICD-10-CM

## 2025-02-25 DIAGNOSIS — J81.0 ACUTE PULMONARY EDEMA (HCC): ICD-10-CM

## 2025-02-25 LAB
ALBUMIN SERPL-MCNC: 2.4 G/DL (ref 3.5–5)
ALBUMIN/GLOB SERPL: 0.5 (ref 1.1–2.2)
ALP SERPL-CCNC: 69 U/L (ref 45–117)
ALT SERPL-CCNC: 41 U/L (ref 12–78)
ANION GAP SERPL CALC-SCNC: 2 MMOL/L (ref 2–12)
AST SERPL W P-5'-P-CCNC: 40 U/L (ref 15–37)
BASOPHILS # BLD: 0.03 K/UL (ref 0–0.1)
BASOPHILS NFR BLD: 0.3 % (ref 0–1)
BILIRUB SERPL-MCNC: 0.3 MG/DL (ref 0.2–1)
BNP SERPL-MCNC: 2982 PG/ML
BUN SERPL-MCNC: 26 MG/DL (ref 6–20)
BUN/CREAT SERPL: 27 (ref 12–20)
CA-I BLD-MCNC: 8.4 MG/DL (ref 8.5–10.1)
CHLORIDE SERPL-SCNC: 96 MMOL/L (ref 97–108)
CO2 SERPL-SCNC: 39 MMOL/L (ref 21–32)
CREAT SERPL-MCNC: 0.98 MG/DL (ref 0.7–1.3)
DIFFERENTIAL METHOD BLD: ABNORMAL
EKG ATRIAL RATE: 375 BPM
EKG DIAGNOSIS: NORMAL
EKG Q-T INTERVAL: 362 MS
EKG QRS DURATION: 100 MS
EKG QTC CALCULATION (BAZETT): 445 MS
EKG R AXIS: -9 DEGREES
EKG T AXIS: 28 DEGREES
EKG VENTRICULAR RATE: 91 BPM
EOSINOPHIL # BLD: 0.31 K/UL (ref 0–0.4)
EOSINOPHIL NFR BLD: 3.4 % (ref 0–7)
ERYTHROCYTE [DISTWIDTH] IN BLOOD BY AUTOMATED COUNT: 15.7 % (ref 11.5–14.5)
GLOBULIN SER CALC-MCNC: 4.8 G/DL (ref 2–4)
GLUCOSE SERPL-MCNC: 112 MG/DL (ref 65–100)
HCT VFR BLD AUTO: 32.1 % (ref 36.6–50.3)
HGB BLD-MCNC: 10.1 G/DL (ref 12.1–17)
IMM GRANULOCYTES # BLD AUTO: 0.03 K/UL (ref 0–0.04)
IMM GRANULOCYTES NFR BLD AUTO: 0.3 % (ref 0–0.5)
LYMPHOCYTES # BLD: 0.98 K/UL (ref 0.8–3.5)
LYMPHOCYTES NFR BLD: 10.9 % (ref 12–49)
MCH RBC QN AUTO: 23.5 PG (ref 26–34)
MCHC RBC AUTO-ENTMCNC: 31.5 G/DL (ref 30–36.5)
MCV RBC AUTO: 74.8 FL (ref 80–99)
MONOCYTES # BLD: 1.06 K/UL (ref 0–1)
MONOCYTES NFR BLD: 11.8 % (ref 5–13)
NEUTS SEG # BLD: 6.61 K/UL (ref 1.8–8)
NEUTS SEG NFR BLD: 73.3 % (ref 32–75)
NRBC # BLD: 0 K/UL (ref 0–0.01)
NRBC BLD-RTO: 0 PER 100 WBC
PLATELET # BLD AUTO: 330 K/UL (ref 150–400)
PMV BLD AUTO: 9.4 FL (ref 8.9–12.9)
POTASSIUM SERPL-SCNC: 3.5 MMOL/L (ref 3.5–5.1)
PROT SERPL-MCNC: 7.2 G/DL (ref 6.4–8.2)
RBC # BLD AUTO: 4.29 M/UL (ref 4.1–5.7)
SODIUM SERPL-SCNC: 137 MMOL/L (ref 136–145)
TROPONIN I SERPL HS-MCNC: 16 NG/L (ref 0–76)
WBC # BLD AUTO: 9 K/UL (ref 4.1–11.1)

## 2025-02-25 PROCEDURE — 96375 TX/PRO/DX INJ NEW DRUG ADDON: CPT

## 2025-02-25 PROCEDURE — 71045 X-RAY EXAM CHEST 1 VIEW: CPT

## 2025-02-25 PROCEDURE — 2700000000 HC OXYGEN THERAPY PER DAY

## 2025-02-25 PROCEDURE — 85025 COMPLETE CBC W/AUTO DIFF WBC: CPT

## 2025-02-25 PROCEDURE — 84484 ASSAY OF TROPONIN QUANT: CPT

## 2025-02-25 PROCEDURE — 80053 COMPREHEN METABOLIC PANEL: CPT

## 2025-02-25 PROCEDURE — 6360000002 HC RX W HCPCS: Performed by: EMERGENCY MEDICINE

## 2025-02-25 PROCEDURE — 6370000000 HC RX 637 (ALT 250 FOR IP): Performed by: EMERGENCY MEDICINE

## 2025-02-25 PROCEDURE — 93005 ELECTROCARDIOGRAM TRACING: CPT | Performed by: EMERGENCY MEDICINE

## 2025-02-25 PROCEDURE — 94640 AIRWAY INHALATION TREATMENT: CPT

## 2025-02-25 PROCEDURE — 94761 N-INVAS EAR/PLS OXIMETRY MLT: CPT

## 2025-02-25 PROCEDURE — 99285 EMERGENCY DEPT VISIT HI MDM: CPT

## 2025-02-25 PROCEDURE — 83880 ASSAY OF NATRIURETIC PEPTIDE: CPT

## 2025-02-25 PROCEDURE — 96374 THER/PROPH/DIAG INJ IV PUSH: CPT

## 2025-02-25 RX ORDER — HYDROXYZINE HYDROCHLORIDE 25 MG/1
25 TABLET, FILM COATED ORAL
Status: DISCONTINUED | OUTPATIENT
Start: 2025-02-25 | End: 2025-02-26 | Stop reason: HOSPADM

## 2025-02-25 RX ORDER — ATORVASTATIN CALCIUM 20 MG/1
20 TABLET, FILM COATED ORAL DAILY
Status: DISCONTINUED | OUTPATIENT
Start: 2025-02-26 | End: 2025-02-26 | Stop reason: HOSPADM

## 2025-02-25 RX ORDER — LISINOPRIL 10 MG/1
20 TABLET ORAL DAILY
Status: DISCONTINUED | OUTPATIENT
Start: 2025-02-26 | End: 2025-02-26 | Stop reason: HOSPADM

## 2025-02-25 RX ORDER — ZIPRASIDONE MESYLATE 20 MG/ML
20 INJECTION, POWDER, LYOPHILIZED, FOR SOLUTION INTRAMUSCULAR ONCE
Status: DISCONTINUED | OUTPATIENT
Start: 2025-02-25 | End: 2025-02-26 | Stop reason: HOSPADM

## 2025-02-25 RX ORDER — HYDRALAZINE HYDROCHLORIDE 50 MG/1
100 TABLET, FILM COATED ORAL 3 TIMES DAILY
COMMUNITY
Start: 2025-02-23

## 2025-02-25 RX ORDER — ISOSORBIDE MONONITRATE 30 MG/1
30 TABLET, EXTENDED RELEASE ORAL DAILY
Status: DISCONTINUED | OUTPATIENT
Start: 2025-02-26 | End: 2025-02-26 | Stop reason: HOSPADM

## 2025-02-25 RX ORDER — BUDESONIDE AND FORMOTEROL FUMARATE DIHYDRATE 160; 4.5 UG/1; UG/1
2 AEROSOL RESPIRATORY (INHALATION)
Status: DISCONTINUED | OUTPATIENT
Start: 2025-02-25 | End: 2025-02-26 | Stop reason: HOSPADM

## 2025-02-25 RX ORDER — FUROSEMIDE 10 MG/ML
60 INJECTION INTRAMUSCULAR; INTRAVENOUS ONCE
Status: COMPLETED | OUTPATIENT
Start: 2025-02-25 | End: 2025-02-25

## 2025-02-25 RX ORDER — METHYLPREDNISOLONE SODIUM SUCCINATE 125 MG/2ML
125 INJECTION INTRAMUSCULAR; INTRAVENOUS ONCE
Status: COMPLETED | OUTPATIENT
Start: 2025-02-25 | End: 2025-02-25

## 2025-02-25 RX ORDER — IPRATROPIUM BROMIDE AND ALBUTEROL SULFATE 2.5; .5 MG/3ML; MG/3ML
1 SOLUTION RESPIRATORY (INHALATION)
Status: COMPLETED | OUTPATIENT
Start: 2025-02-25 | End: 2025-02-25

## 2025-02-25 RX ORDER — FUROSEMIDE 40 MG/1
20 TABLET ORAL DAILY
Status: DISCONTINUED | OUTPATIENT
Start: 2025-02-26 | End: 2025-02-26 | Stop reason: HOSPADM

## 2025-02-25 RX ORDER — ACETAMINOPHEN 325 MG/1
650 TABLET ORAL EVERY 6 HOURS PRN
Status: DISCONTINUED | OUTPATIENT
Start: 2025-02-25 | End: 2025-02-26 | Stop reason: HOSPADM

## 2025-02-25 RX ORDER — QUETIAPINE FUMARATE 25 MG/1
50 TABLET, FILM COATED ORAL
Status: DISCONTINUED | OUTPATIENT
Start: 2025-02-25 | End: 2025-02-26 | Stop reason: HOSPADM

## 2025-02-25 RX ORDER — FUROSEMIDE 20 MG/1
20 TABLET ORAL DAILY
Qty: 5 TABLET | Refills: 0 | Status: SHIPPED | OUTPATIENT
Start: 2025-02-25 | End: 2025-03-02

## 2025-02-25 RX ADMIN — NITROGLYCERIN 1 INCH: 20 OINTMENT TOPICAL at 19:25

## 2025-02-25 RX ADMIN — IPRATROPIUM BROMIDE AND ALBUTEROL SULFATE 1 DOSE: 2.5; .5 SOLUTION RESPIRATORY (INHALATION) at 18:42

## 2025-02-25 RX ADMIN — METHYLPREDNISOLONE SODIUM SUCCINATE 125 MG: 125 INJECTION INTRAMUSCULAR; INTRAVENOUS at 19:20

## 2025-02-25 RX ADMIN — FUROSEMIDE 60 MG: 10 INJECTION, SOLUTION INTRAMUSCULAR; INTRAVENOUS at 19:22

## 2025-02-25 RX ADMIN — Medication 2 PUFF: at 23:46

## 2025-02-25 ASSESSMENT — PAIN - FUNCTIONAL ASSESSMENT: PAIN_FUNCTIONAL_ASSESSMENT: 0-10

## 2025-02-25 ASSESSMENT — PAIN SCALES - GENERAL: PAINLEVEL_OUTOF10: 0

## 2025-02-25 NOTE — ED TRIAGE NOTES
SOB all day and worse over last few hours. Wears 3L O2 baseline, bumped to 15L non rebreather brought from  87%-100. I neb given and says diff breathing went from 10/10-7/10. Flu positive! Bed Bug positive as of 2/19/2025

## 2025-02-25 NOTE — ED PROVIDER NOTES
(DUONEB) nebulizer solution 1 Dose (1 Dose Inhalation Given 2/25/25 1842)   methylPREDNISolone sodium succ (SOLU-MEDROL) injection 125 mg (125 mg IntraVENous Given 2/25/25 1920)   furosemide (LASIX) injection 60 mg (60 mg IntraVENous Given 2/25/25 1922)   nitroglycerin (NITRO-BID) 2 % ointment 1 inch (1 inch Topical Given 2/25/25 1925)       CONSULTS: See ED Course/MDM for further details.  None     Social Determinants affecting Diagnosis/Treatment: None    Smoking Cessation: Not Applicable    PROCEDURES   Unless otherwise noted above, none  Procedures      CRITICAL CARE TIME   Patient does not meet Critical Care Time, 0 minutes    ED IMPRESSION     1. Shortness of breath    2. Acute pulmonary edema (HCC)          DISPOSITION/PLAN   DISPOSITION Decision To Discharge 02/25/2025 08:40:16 PM   DISPOSITION CONDITION Stable        Discharge Note: The patient is stable for discharge home. The signs, symptoms, diagnosis, and discharge instructions have been discussed, understanding conveyed, and agreed upon. The patient is to follow up as recommended or return to ER should their symptoms worsen.      PATIENT REFERRED TO:  Catherine Strauss MD  [address] [place] [address]  [phone]      As needed    Cardiology  about outpatient echo            DISCHARGE MEDICATIONS:     Medication List        START taking these medications      furosemide 20 MG tablet  Commonly known as: Lasix  Take 1 tablet by mouth daily for 5 days            ASK your doctor about these medications      acetaminophen 500 MG tablet  Commonly known as: TYLENOL  Take 2 tablets by mouth every 6 hours as needed for Pain     ammonium lactate 12 % cream  Commonly known as: AMLACTIN  Apply topically as needed to the bilateral feet     atorvastatin 20 MG tablet  Commonly known as: LIPITOR     hydrALAZINE 25 MG tablet  Commonly known as: APRESOLINE     isosorbide mononitrate 30 MG extended release tablet  Commonly known as: IMDUR     lisinopril 20 MG

## 2025-02-26 VITALS
TEMPERATURE: 98.1 F | RESPIRATION RATE: 16 BRPM | WEIGHT: 315 LBS | DIASTOLIC BLOOD PRESSURE: 98 MMHG | SYSTOLIC BLOOD PRESSURE: 165 MMHG | HEART RATE: 100 BPM | BODY MASS INDEX: 47.74 KG/M2 | HEIGHT: 68 IN | OXYGEN SATURATION: 96 %

## 2025-02-26 LAB
GLUCOSE BLD STRIP.AUTO-MCNC: 171 MG/DL (ref 65–100)
PERFORMED BY:: ABNORMAL

## 2025-02-26 PROCEDURE — 82962 GLUCOSE BLOOD TEST: CPT

## 2025-02-26 ASSESSMENT — PAIN - FUNCTIONAL ASSESSMENT: PAIN_FUNCTIONAL_ASSESSMENT: NONE - DENIES PAIN

## 2025-02-26 ASSESSMENT — PAIN SCALES - GENERAL: PAINLEVEL_OUTOF10: 0

## 2025-02-26 NOTE — DISCHARGE INSTRUCTIONS
Thank you for choosing our Emergency Department for your care.  It is our privilege to care for you in your time of need.  In the next several days, you may receive a survey via email or mailed to your home about your experience with our team.  We would greatly appreciate you taking a few minutes to complete the survey, as we use this information to learn what we have done well and what we could be doing better. Thank you for trusting us with your care!    Below you will find a list of your tests from today's visit.   Labs and Radiology Studies  Recent Results (from the past 12 hour(s))   Brain Natriuretic Peptide    Collection Time: 02/25/25  6:12 PM   Result Value Ref Range    NT Pro-BNP 2,982 (H) <125 pg/mL   Troponin    Collection Time: 02/25/25  6:12 PM   Result Value Ref Range    Troponin, High Sensitivity 16 0 - 76 ng/L   Comprehensive Metabolic Panel    Collection Time: 02/25/25  6:12 PM   Result Value Ref Range    Sodium 137 136 - 145 mmol/L    Potassium 3.5 3.5 - 5.1 mmol/L    Chloride 96 (L) 97 - 108 mmol/L    CO2 39 (H) 21 - 32 mmol/L    Anion Gap 2 2 - 12 mmol/L    Glucose 112 (H) 65 - 100 mg/dL    BUN 26 (H) 6 - 20 mg/dL    Creatinine 0.98 0.70 - 1.30 mg/dL    BUN/Creatinine Ratio 27 (H) 12 - 20      Est, Glom Filt Rate 82 >60 ml/min/1.73m2    Calcium 8.4 (L) 8.5 - 10.1 mg/dL    Total Bilirubin 0.3 0.2 - 1.0 mg/dL    AST 40 (H) 15 - 37 U/L    ALT 41 12 - 78 U/L    Alk Phosphatase 69 45 - 117 U/L    Total Protein 7.2 6.4 - 8.2 g/dL    Albumin 2.4 (L) 3.5 - 5.0 g/dL    Globulin 4.8 (H) 2.0 - 4.0 g/dL    Albumin/Globulin Ratio 0.5 (L) 1.1 - 2.2     CBC with Auto Differential    Collection Time: 02/25/25  6:12 PM   Result Value Ref Range    WBC 9.0 4.1 - 11.1 K/uL    RBC 4.29 4.10 - 5.70 M/uL    Hemoglobin 10.1 (L) 12.1 - 17.0 g/dL    Hematocrit 32.1 (L) 36.6 - 50.3 %    MCV 74.8 (L) 80.0 - 99.0 FL    MCH 23.5 (L) 26.0 - 34.0 PG    MCHC 31.5 30.0 - 36.5 g/dL    RDW 15.7 (H) 11.5 - 14.5 %    Platelets

## 2025-02-26 NOTE — ED NOTES
Pt keeps pulling off O2 and monitor leads and pulse ox several times. Attempted to educate pt.   WDL

## 2025-02-26 NOTE — ED NOTES
Writer called and collected quotes and eta from [facility] and [facility]. [facility] $460.00 and a eta of 0730 am. [facility] $416.00 and a eta of 0045. Nursing Supervisor called with information. NS will call back after speaking to the AOC.

## 2025-02-26 NOTE — ED NOTES
Left message for pt brother Chris Iverson who called this RN back and was able to provide a phone number for the POA/other brother, Bong Hernandez, who stated he was not able to pay for transportation out of pocket again as he did it last week/recently. ED MD aware, along w charge nurse and nursing supervisor.

## 2025-02-26 NOTE — ED NOTES
RT found pt up out of bed, this RN began to enter the room, pt had pulled his gown, brief, and O2 off. Leaning onto the bedside table. Stating he is going to fall. Pt positioned to foot of stretcher, until a hospital bed was brought into the room and pt transferred himself on to w the help of staff. ED MD aware of situation. Pt moved to another room, to have a sitter.     Pt placed back on O2 and monitor in ED rm 24, report given to Mary KOWN

## 2025-02-26 NOTE — ED NOTES
Pt continuing to pull oxygen off and pull all monitoring equipment off himself, despite multiple nurses have been in the room to reapply. Pt ripped IV out. Found on the floor along with all cardiac electrodes and NC. Pt continuing to holler out despite having a call light within reach. Pt screaming \"I can't breathe\" several times. O2 placed back on pt and educated him on the importance of being compliant. Pt A&Ox4. Pt agrees to leave oxygen on each time staff places it on. ED MD aware and at bedside to discuss w compliance w pt. Attempting to contact family members regarding transportation means.

## 2025-05-04 ENCOUNTER — HOSPITAL ENCOUNTER (INPATIENT)
Facility: HOSPITAL | Age: 70
LOS: 8 days | Discharge: NURSING, REHAB OR LONG-TERM CARE | DRG: 189 | End: 2025-05-12
Attending: EMERGENCY MEDICINE | Admitting: INTERNAL MEDICINE
Payer: MEDICARE

## 2025-05-04 ENCOUNTER — APPOINTMENT (OUTPATIENT)
Facility: HOSPITAL | Age: 70
DRG: 189 | End: 2025-05-04
Payer: MEDICARE

## 2025-05-04 ENCOUNTER — APPOINTMENT (OUTPATIENT)
Facility: HOSPITAL | Age: 70
DRG: 189 | End: 2025-05-04
Attending: EMERGENCY MEDICINE
Payer: MEDICARE

## 2025-05-04 DIAGNOSIS — E87.29 CO2 RETENTION: ICD-10-CM

## 2025-05-04 DIAGNOSIS — J44.1 COPD EXACERBATION (HCC): Primary | ICD-10-CM

## 2025-05-04 DIAGNOSIS — I50.9 CONGESTIVE HEART FAILURE, UNSPECIFIED HF CHRONICITY, UNSPECIFIED HEART FAILURE TYPE (HCC): ICD-10-CM

## 2025-05-04 LAB
ALBUMIN SERPL-MCNC: 2.8 G/DL (ref 3.5–5)
ALBUMIN/GLOB SERPL: 0.7 (ref 1.1–2.2)
ALP SERPL-CCNC: 83 U/L (ref 45–117)
ALT SERPL-CCNC: 17 U/L (ref 12–78)
ANION GAP SERPL CALC-SCNC: 5 MMOL/L (ref 2–12)
APPEARANCE UR: CLEAR
ARTERIAL PATENCY WRIST A: YES
ARTERIAL PATENCY WRIST A: YES
AST SERPL W P-5'-P-CCNC: 15 U/L (ref 15–37)
BACTERIA URNS QL MICRO: NEGATIVE /HPF
BASE EXCESS BLDA CALC-SCNC: 13.8 MMOL/L (ref 0–3)
BASE EXCESS BLDA CALC-SCNC: 9.2 MMOL/L (ref 0–3)
BASOPHILS # BLD: 0.05 K/UL (ref 0–0.1)
BASOPHILS NFR BLD: 0.4 % (ref 0–1)
BDY SITE: ABNORMAL
BDY SITE: ABNORMAL
BILIRUB SERPL-MCNC: 0.6 MG/DL (ref 0.2–1)
BILIRUB UR QL: NEGATIVE
BNP SERPL-MCNC: 2470 PG/ML
BODY TEMPERATURE: 97.6
BUN SERPL-MCNC: 15 MG/DL (ref 6–20)
BUN/CREAT SERPL: 21 (ref 12–20)
CA-I BLD-MCNC: 1.18 MMOL/L (ref 1.13–1.32)
CA-I BLD-MCNC: 1.18 MMOL/L (ref 1.13–1.32)
CA-I BLD-MCNC: 9.2 MG/DL (ref 8.5–10.1)
CHLORIDE SERPL-SCNC: 97 MMOL/L (ref 97–108)
CO2 SERPL-SCNC: 39 MMOL/L (ref 21–32)
COHGB MFR BLD: 0.8 % (ref 1–2)
COHGB MFR BLD: 1 % (ref 1–2)
COLOR UR: NORMAL
CREAT SERPL-MCNC: 0.7 MG/DL (ref 0.7–1.3)
D DIMER PPP FEU-MCNC: 2.6 UG/ML(FEU)
DIFFERENTIAL METHOD BLD: ABNORMAL
ECHO BSA: 2.8 M2
EKG ATRIAL RATE: 105 BPM
EKG DIAGNOSIS: NORMAL
EKG Q-T INTERVAL: 394 MS
EKG QRS DURATION: 96 MS
EKG QTC CALCULATION (BAZETT): 471 MS
EKG R AXIS: -7 DEGREES
EKG T AXIS: 11 DEGREES
EKG VENTRICULAR RATE: 86 BPM
EOSINOPHIL # BLD: 0.22 K/UL (ref 0–0.4)
EOSINOPHIL NFR BLD: 1.8 % (ref 0–7)
EPITH CASTS URNS QL MICRO: NORMAL /LPF
ERYTHROCYTE [DISTWIDTH] IN BLOOD BY AUTOMATED COUNT: 20.5 % (ref 11.5–14.5)
FIO2 ON VENT: 40 %
FIO2 ON VENT: 50 %
FLUAV RNA SPEC QL NAA+PROBE: NOT DETECTED
FLUBV RNA SPEC QL NAA+PROBE: NOT DETECTED
GAS FLOW.O2 O2 DELIVERY SYS: 5 L/MIN
GAS FLOW.O2 SETTING OXYMISER: 18
GLOBULIN SER CALC-MCNC: 4 G/DL (ref 2–4)
GLUCOSE BLD STRIP.AUTO-MCNC: 123 MG/DL (ref 65–100)
GLUCOSE SERPL-MCNC: 101 MG/DL (ref 65–100)
GLUCOSE UR STRIP.AUTO-MCNC: NEGATIVE MG/DL
HCO3 BLDA-SCNC: 39 MMOL/L (ref 22–26)
HCO3 BLDA-SCNC: 46 MMOL/L (ref 22–26)
HCT VFR BLD AUTO: 34.3 % (ref 36.6–50.3)
HGB BLD-MCNC: 10.8 G/DL (ref 12.1–17)
HGB UR QL STRIP: NEGATIVE
IMM GRANULOCYTES # BLD AUTO: 0.07 K/UL (ref 0–0.04)
IMM GRANULOCYTES NFR BLD AUTO: 0.6 % (ref 0–0.5)
KETONES UR QL STRIP.AUTO: NEGATIVE MG/DL
LEUKOCYTE ESTERASE UR QL STRIP.AUTO: NEGATIVE
LYMPHOCYTES # BLD: 1.22 K/UL (ref 0.8–3.5)
LYMPHOCYTES NFR BLD: 10 % (ref 12–49)
MCH RBC QN AUTO: 23.2 PG (ref 26–34)
MCHC RBC AUTO-ENTMCNC: 31.5 G/DL (ref 30–36.5)
MCV RBC AUTO: 73.8 FL (ref 80–99)
METHGB MFR BLD: 0.2 % (ref 0–1.4)
METHGB MFR BLD: 0.2 % (ref 0–1.4)
MONOCYTES # BLD: 1.3 K/UL (ref 0–1)
MONOCYTES NFR BLD: 10.6 % (ref 5–13)
MRSA DNA SPEC QL NAA+PROBE: NOT DETECTED
MUCOUS THREADS URNS QL MICRO: NORMAL /LPF
NEUTS SEG # BLD: 9.35 K/UL (ref 1.8–8)
NEUTS SEG NFR BLD: 76.6 % (ref 32–75)
NITRITE UR QL STRIP.AUTO: NEGATIVE
NRBC # BLD: 0 K/UL (ref 0–0.01)
NRBC BLD-RTO: 0 PER 100 WBC
OXYHGB MFR BLD: 95 % (ref 95–99)
OXYHGB MFR BLD: 95.1 % (ref 95–99)
PCO2 BLDA: 118 MMHG (ref 35–45)
PCO2 BLDA: 88 MMHG (ref 35–45)
PERFORMED BY:: ABNORMAL
PH BLDA: 7.21 (ref 7.35–7.45)
PH BLDA: 7.26 (ref 7.35–7.45)
PH UR STRIP: 6 (ref 5–8)
PLATELET # BLD AUTO: 329 K/UL (ref 150–400)
PMV BLD AUTO: 9.5 FL (ref 8.9–12.9)
PO2 BLDA: 106 MMHG (ref 80–100)
PO2 BLDA: 98 MMHG (ref 80–100)
POTASSIUM SERPL-SCNC: 4.2 MMOL/L (ref 3.5–5.1)
PRESSURE SUPPORT SETTING VENT: 10
PROT SERPL-MCNC: 6.8 G/DL (ref 6.4–8.2)
PROT UR STRIP-MCNC: NEGATIVE MG/DL
RBC # BLD AUTO: 4.65 M/UL (ref 4.1–5.7)
RBC #/AREA URNS HPF: NORMAL /HPF (ref 0–5)
SAO2 % BLD: 96 % (ref 95–99)
SAO2 % BLD: 96 % (ref 95–99)
SAO2% DEVICE SAO2% SENSOR NAME: ABNORMAL
SAO2% DEVICE SAO2% SENSOR NAME: ABNORMAL
SARS-COV-2 RNA RESP QL NAA+PROBE: NOT DETECTED
SERVICE CMNT-IMP: ABNORMAL
SODIUM SERPL-SCNC: 141 MMOL/L (ref 136–145)
SP GR UR REFRACTOMETRY: 1.01 (ref 1–1.03)
SPECIMEN SITE: ABNORMAL
SPECIMEN SITE: ABNORMAL
TROPONIN I SERPL HS-MCNC: 15 NG/L (ref 0–76)
URATE SERPL-MCNC: 3.5 MG/DL (ref 3.5–7.2)
URINE CULTURE IF INDICATED: NORMAL
UROBILINOGEN UR QL STRIP.AUTO: 0.1 EU/DL (ref 0.1–1)
VENTILATION MODE VENT: ABNORMAL
WBC # BLD AUTO: 12.2 K/UL (ref 4.1–11.1)
WBC URNS QL MICRO: NORMAL /HPF (ref 0–4)

## 2025-05-04 PROCEDURE — 2000000000 HC ICU R&B

## 2025-05-04 PROCEDURE — 82330 ASSAY OF CALCIUM: CPT

## 2025-05-04 PROCEDURE — 99285 EMERGENCY DEPT VISIT HI MDM: CPT

## 2025-05-04 PROCEDURE — 93971 EXTREMITY STUDY: CPT

## 2025-05-04 PROCEDURE — 36600 WITHDRAWAL OF ARTERIAL BLOOD: CPT

## 2025-05-04 PROCEDURE — 71275 CT ANGIOGRAPHY CHEST: CPT

## 2025-05-04 PROCEDURE — 84550 ASSAY OF BLOOD/URIC ACID: CPT

## 2025-05-04 PROCEDURE — 94640 AIRWAY INHALATION TREATMENT: CPT

## 2025-05-04 PROCEDURE — 6360000004 HC RX CONTRAST MEDICATION: Performed by: EMERGENCY MEDICINE

## 2025-05-04 PROCEDURE — 85379 FIBRIN DEGRADATION QUANT: CPT

## 2025-05-04 PROCEDURE — 81001 URINALYSIS AUTO W/SCOPE: CPT

## 2025-05-04 PROCEDURE — 6370000000 HC RX 637 (ALT 250 FOR IP): Performed by: INTERNAL MEDICINE

## 2025-05-04 PROCEDURE — 87641 MR-STAPH DNA AMP PROBE: CPT

## 2025-05-04 PROCEDURE — 80053 COMPREHEN METABOLIC PANEL: CPT

## 2025-05-04 PROCEDURE — 84439 ASSAY OF FREE THYROXINE: CPT

## 2025-05-04 PROCEDURE — 93971 EXTREMITY STUDY: CPT | Performed by: SURGERY

## 2025-05-04 PROCEDURE — 6360000002 HC RX W HCPCS: Performed by: EMERGENCY MEDICINE

## 2025-05-04 PROCEDURE — 36415 COLL VENOUS BLD VENIPUNCTURE: CPT

## 2025-05-04 PROCEDURE — 84484 ASSAY OF TROPONIN QUANT: CPT

## 2025-05-04 PROCEDURE — 2580000003 HC RX 258: Performed by: INTERNAL MEDICINE

## 2025-05-04 PROCEDURE — 96375 TX/PRO/DX INJ NEW DRUG ADDON: CPT

## 2025-05-04 PROCEDURE — 82962 GLUCOSE BLOOD TEST: CPT

## 2025-05-04 PROCEDURE — 94761 N-INVAS EAR/PLS OXIMETRY MLT: CPT

## 2025-05-04 PROCEDURE — 94660 CPAP INITIATION&MGMT: CPT

## 2025-05-04 PROCEDURE — 71045 X-RAY EXAM CHEST 1 VIEW: CPT

## 2025-05-04 PROCEDURE — 82803 BLOOD GASES ANY COMBINATION: CPT

## 2025-05-04 PROCEDURE — 85025 COMPLETE CBC W/AUTO DIFF WBC: CPT

## 2025-05-04 PROCEDURE — 2700000000 HC OXYGEN THERAPY PER DAY

## 2025-05-04 PROCEDURE — 84443 ASSAY THYROID STIM HORMONE: CPT

## 2025-05-04 PROCEDURE — 93005 ELECTROCARDIOGRAM TRACING: CPT | Performed by: EMERGENCY MEDICINE

## 2025-05-04 PROCEDURE — 6370000000 HC RX 637 (ALT 250 FOR IP): Performed by: EMERGENCY MEDICINE

## 2025-05-04 PROCEDURE — 83880 ASSAY OF NATRIURETIC PEPTIDE: CPT

## 2025-05-04 PROCEDURE — 2500000003 HC RX 250 WO HCPCS: Performed by: INTERNAL MEDICINE

## 2025-05-04 PROCEDURE — 96374 THER/PROPH/DIAG INJ IV PUSH: CPT

## 2025-05-04 PROCEDURE — 5A09457 ASSISTANCE WITH RESPIRATORY VENTILATION, 24-96 CONSECUTIVE HOURS, CONTINUOUS POSITIVE AIRWAY PRESSURE: ICD-10-PCS | Performed by: INTERNAL MEDICINE

## 2025-05-04 PROCEDURE — 73562 X-RAY EXAM OF KNEE 3: CPT

## 2025-05-04 PROCEDURE — 6360000002 HC RX W HCPCS: Performed by: INTERNAL MEDICINE

## 2025-05-04 PROCEDURE — 87636 SARSCOV2 & INF A&B AMP PRB: CPT

## 2025-05-04 RX ORDER — MORPHINE SULFATE 4 MG/ML
4 INJECTION, SOLUTION INTRAMUSCULAR; INTRAVENOUS
Refills: 0 | Status: COMPLETED | OUTPATIENT
Start: 2025-05-04 | End: 2025-05-04

## 2025-05-04 RX ORDER — ASPIRIN 325 MG
325 TABLET ORAL
Status: COMPLETED | OUTPATIENT
Start: 2025-05-04 | End: 2025-05-04

## 2025-05-04 RX ORDER — METHYLPREDNISOLONE SODIUM SUCCINATE 125 MG/2ML
125 INJECTION INTRAMUSCULAR; INTRAVENOUS ONCE
Status: COMPLETED | OUTPATIENT
Start: 2025-05-04 | End: 2025-05-04

## 2025-05-04 RX ORDER — LEVOFLOXACIN 750 MG/1
750 TABLET, FILM COATED ORAL DAILY
Status: ON HOLD | COMMUNITY
End: 2025-05-11 | Stop reason: HOSPADM

## 2025-05-04 RX ORDER — IOPAMIDOL 755 MG/ML
100 INJECTION, SOLUTION INTRAVASCULAR
Status: COMPLETED | OUTPATIENT
Start: 2025-05-04 | End: 2025-05-04

## 2025-05-04 RX ORDER — POLYETHYLENE GLYCOL 3350 17 G/17G
17 POWDER, FOR SOLUTION ORAL DAILY PRN
Status: DISCONTINUED | OUTPATIENT
Start: 2025-05-04 | End: 2025-05-12 | Stop reason: HOSPADM

## 2025-05-04 RX ORDER — SACCHAROMYCES BOULARDII 250 MG
250 CAPSULE ORAL 2 TIMES DAILY
COMMUNITY

## 2025-05-04 RX ORDER — ONDANSETRON 2 MG/ML
4 INJECTION INTRAMUSCULAR; INTRAVENOUS ONCE
Status: COMPLETED | OUTPATIENT
Start: 2025-05-04 | End: 2025-05-04

## 2025-05-04 RX ORDER — POTASSIUM CHLORIDE 29.8 MG/ML
20 INJECTION INTRAVENOUS PRN
Status: DISCONTINUED | OUTPATIENT
Start: 2025-05-04 | End: 2025-05-12 | Stop reason: HOSPADM

## 2025-05-04 RX ORDER — LIDOCAINE 4 G/G
1 PATCH TOPICAL DAILY
Status: ON HOLD | COMMUNITY
End: 2025-05-11 | Stop reason: HOSPADM

## 2025-05-04 RX ORDER — ENOXAPARIN SODIUM 100 MG/ML
40 INJECTION SUBCUTANEOUS 2 TIMES DAILY
Status: DISCONTINUED | OUTPATIENT
Start: 2025-05-05 | End: 2025-05-05

## 2025-05-04 RX ORDER — INSULIN LISPRO 100 [IU]/ML
0-4 INJECTION, SOLUTION INTRAVENOUS; SUBCUTANEOUS
Status: DISCONTINUED | OUTPATIENT
Start: 2025-05-04 | End: 2025-05-12 | Stop reason: HOSPADM

## 2025-05-04 RX ORDER — SODIUM CHLORIDE 0.9 % (FLUSH) 0.9 %
5-40 SYRINGE (ML) INJECTION PRN
Status: DISCONTINUED | OUTPATIENT
Start: 2025-05-04 | End: 2025-05-12 | Stop reason: HOSPADM

## 2025-05-04 RX ORDER — ONDANSETRON 4 MG/1
4 TABLET, ORALLY DISINTEGRATING ORAL EVERY 8 HOURS PRN
Status: DISCONTINUED | OUTPATIENT
Start: 2025-05-04 | End: 2025-05-12 | Stop reason: HOSPADM

## 2025-05-04 RX ORDER — FUROSEMIDE 10 MG/ML
40 INJECTION INTRAMUSCULAR; INTRAVENOUS
Status: COMPLETED | OUTPATIENT
Start: 2025-05-04 | End: 2025-05-04

## 2025-05-04 RX ORDER — LIDOCAINE 50 MG/G
1 PATCH TOPICAL DAILY
Status: ON HOLD | COMMUNITY
End: 2025-05-11 | Stop reason: HOSPADM

## 2025-05-04 RX ORDER — FAMOTIDINE 40 MG/1
40 TABLET, FILM COATED ORAL DAILY
COMMUNITY

## 2025-05-04 RX ORDER — METHYLPREDNISOLONE SODIUM SUCCINATE 125 MG/2ML
60 INJECTION INTRAMUSCULAR; INTRAVENOUS EVERY 8 HOURS
Status: DISCONTINUED | OUTPATIENT
Start: 2025-05-04 | End: 2025-05-04

## 2025-05-04 RX ORDER — ONDANSETRON 2 MG/ML
4 INJECTION INTRAMUSCULAR; INTRAVENOUS EVERY 6 HOURS PRN
Status: DISCONTINUED | OUTPATIENT
Start: 2025-05-04 | End: 2025-05-12 | Stop reason: HOSPADM

## 2025-05-04 RX ORDER — SODIUM CHLORIDE 9 MG/ML
INJECTION, SOLUTION INTRAVENOUS PRN
Status: DISCONTINUED | OUTPATIENT
Start: 2025-05-04 | End: 2025-05-12 | Stop reason: HOSPADM

## 2025-05-04 RX ORDER — METHYLPREDNISOLONE SODIUM SUCCINATE 125 MG/2ML
60 INJECTION INTRAMUSCULAR; INTRAVENOUS EVERY 8 HOURS
Status: DISCONTINUED | OUTPATIENT
Start: 2025-05-04 | End: 2025-05-05

## 2025-05-04 RX ORDER — ACETAMINOPHEN 650 MG/1
650 SUPPOSITORY RECTAL EVERY 6 HOURS PRN
Status: DISCONTINUED | OUTPATIENT
Start: 2025-05-04 | End: 2025-05-12 | Stop reason: HOSPADM

## 2025-05-04 RX ORDER — ALBUTEROL SULFATE 0.63 MG/3ML
1 SOLUTION RESPIRATORY (INHALATION) EVERY 6 HOURS PRN
COMMUNITY

## 2025-05-04 RX ORDER — IPRATROPIUM BROMIDE AND ALBUTEROL SULFATE 2.5; .5 MG/3ML; MG/3ML
1 SOLUTION RESPIRATORY (INHALATION)
Status: DISCONTINUED | OUTPATIENT
Start: 2025-05-04 | End: 2025-05-07

## 2025-05-04 RX ORDER — ACETAMINOPHEN 325 MG/1
650 TABLET ORAL EVERY 6 HOURS PRN
Status: DISCONTINUED | OUTPATIENT
Start: 2025-05-04 | End: 2025-05-12 | Stop reason: HOSPADM

## 2025-05-04 RX ORDER — FUROSEMIDE 10 MG/ML
40 INJECTION INTRAMUSCULAR; INTRAVENOUS 2 TIMES DAILY
Status: DISCONTINUED | OUTPATIENT
Start: 2025-05-04 | End: 2025-05-05

## 2025-05-04 RX ORDER — POTASSIUM CHLORIDE 7.45 MG/ML
10 INJECTION INTRAVENOUS PRN
Status: DISCONTINUED | OUTPATIENT
Start: 2025-05-04 | End: 2025-05-12 | Stop reason: HOSPADM

## 2025-05-04 RX ORDER — ATORVASTATIN CALCIUM 20 MG/1
20 TABLET, FILM COATED ORAL NIGHTLY
Status: DISCONTINUED | OUTPATIENT
Start: 2025-05-04 | End: 2025-05-12 | Stop reason: HOSPADM

## 2025-05-04 RX ORDER — MAGNESIUM SULFATE IN WATER 40 MG/ML
2000 INJECTION, SOLUTION INTRAVENOUS PRN
Status: DISCONTINUED | OUTPATIENT
Start: 2025-05-04 | End: 2025-05-12 | Stop reason: HOSPADM

## 2025-05-04 RX ORDER — SODIUM CHLORIDE 0.9 % (FLUSH) 0.9 %
5-40 SYRINGE (ML) INJECTION EVERY 12 HOURS SCHEDULED
Status: DISCONTINUED | OUTPATIENT
Start: 2025-05-04 | End: 2025-05-12 | Stop reason: HOSPADM

## 2025-05-04 RX ADMIN — SODIUM CHLORIDE, PRESERVATIVE FREE 10 ML: 5 INJECTION INTRAVENOUS at 20:37

## 2025-05-04 RX ADMIN — METHYLPREDNISOLONE SODIUM SUCCINATE 60 MG: 125 INJECTION INTRAMUSCULAR; INTRAVENOUS at 20:34

## 2025-05-04 RX ADMIN — IPRATROPIUM BROMIDE AND ALBUTEROL SULFATE 1 DOSE: 2.5; .5 SOLUTION RESPIRATORY (INHALATION) at 17:09

## 2025-05-04 RX ADMIN — FUROSEMIDE 40 MG: 10 INJECTION, SOLUTION INTRAMUSCULAR; INTRAVENOUS at 18:38

## 2025-05-04 RX ADMIN — SODIUM CHLORIDE: 0.9 INJECTION, SOLUTION INTRAVENOUS at 18:40

## 2025-05-04 RX ADMIN — AZITHROMYCIN MONOHYDRATE 500 MG: 500 INJECTION, POWDER, LYOPHILIZED, FOR SOLUTION INTRAVENOUS at 18:41

## 2025-05-04 RX ADMIN — METHYLPREDNISOLONE SODIUM SUCCINATE 125 MG: 125 INJECTION INTRAMUSCULAR; INTRAVENOUS at 09:00

## 2025-05-04 RX ADMIN — IOPAMIDOL 100 ML: 755 INJECTION, SOLUTION INTRAVENOUS at 13:30

## 2025-05-04 RX ADMIN — IPRATROPIUM BROMIDE AND ALBUTEROL SULFATE 1 DOSE: 2.5; .5 SOLUTION RESPIRATORY (INHALATION) at 23:42

## 2025-05-04 RX ADMIN — ONDANSETRON 4 MG: 2 INJECTION, SOLUTION INTRAMUSCULAR; INTRAVENOUS at 09:04

## 2025-05-04 RX ADMIN — IPRATROPIUM BROMIDE AND ALBUTEROL SULFATE 1 DOSE: 2.5; .5 SOLUTION RESPIRATORY (INHALATION) at 20:20

## 2025-05-04 RX ADMIN — FUROSEMIDE 40 MG: 10 INJECTION, SOLUTION INTRAMUSCULAR; INTRAVENOUS at 09:54

## 2025-05-04 RX ADMIN — CEFTRIAXONE SODIUM 2000 MG: 2 INJECTION, POWDER, FOR SOLUTION INTRAMUSCULAR; INTRAVENOUS at 18:37

## 2025-05-04 RX ADMIN — ASPIRIN 325 MG: 325 TABLET ORAL at 09:53

## 2025-05-04 RX ADMIN — MORPHINE SULFATE 4 MG: 4 INJECTION INTRAVENOUS at 09:04

## 2025-05-04 ASSESSMENT — PAIN SCALES - GENERAL
PAINLEVEL_OUTOF10: 0
PAINLEVEL_OUTOF10: 10
PAINLEVEL_OUTOF10: 10
PAINLEVEL_OUTOF10: 6
PAINLEVEL_OUTOF10: 0
PAINLEVEL_OUTOF10: 0

## 2025-05-04 ASSESSMENT — PAIN DESCRIPTION - ORIENTATION
ORIENTATION: RIGHT

## 2025-05-04 ASSESSMENT — PAIN DESCRIPTION - LOCATION
LOCATION: KNEE

## 2025-05-04 ASSESSMENT — PAIN DESCRIPTION - DESCRIPTORS: DESCRIPTORS: SHOOTING

## 2025-05-04 ASSESSMENT — PAIN - FUNCTIONAL ASSESSMENT: PAIN_FUNCTIONAL_ASSESSMENT: 0-10

## 2025-05-04 NOTE — ED PROVIDER NOTES
mouth daily     • nebivolol (BYSTOLIC) 10 MG tablet Take 1 tablet by mouth daily     • atorvastatin (LIPITOR) 20 MG tablet Take 1 tablet by mouth daily     • ammonium lactate (AMLACTIN) 12 % cream Apply topically as needed to the bilateral feet 385 g 4   • hydrALAZINE (APRESOLINE) 25 MG tablet Take 2 tablets by mouth 3 times daily     • isosorbide mononitrate (IMDUR) 30 MG extended release tablet Take 1 tablet by mouth daily     • fluticasone-umeclidin-vilant (TRELEGY ELLIPTA) 100-62.5-25 MCG/ACT AEPB inhaler Inhale 1 puff into the lungs daily     • lisinopril (PRINIVIL;ZESTRIL) 20 MG tablet Take 1 tablet by mouth daily         Social Determinants of Health:   Social Drivers of Health     Tobacco Use: Low Risk  (2/13/2025)    Patient History    • Smoking Tobacco Use: Never    • Smokeless Tobacco Use: Never    • Passive Exposure: Not on file   Alcohol Use: Not At Risk (5/4/2025)    AUDIT-C    • Frequency of Alcohol Consumption: Never    • Average Number of Drinks: Patient does not drink    • Frequency of Binge Drinking: Never   Financial Resource Strain: Not on file   Food Insecurity: No Food Insecurity (9/24/2020)    Received from [facility]  (prior to 6/17/2023), [facility]  (prior to 6/17/2023)    Hunger Vital Sign    • Worried About Running Out of Food in the Last Year: Never true    • Ran Out of Food in the Last Year: Never true   Transportation Needs: No Transportation Needs (9/24/2020)    Received from [facility]  (prior to 6/17/2023), [facility]  (prior to 6/17/2023)    PRAPARE - Transportation    • Lack of Transportation (Medical): No    • Lack of Transportation (Non-Medical): No   Physical Activity: Inactive (9/24/2020)    Received from [facility]  (prior to 6/17/2023), [facility]  (prior to 6/17/2023)    Exercise Vital Sign    • Days of Exercise per Week: 0 days    • Minutes of Exercise per Session: 0 min

## 2025-05-04 NOTE — ED NOTES
Pt changed into clean gown, clean brief applied, manwick applied; pt repositioned on hospital stretcher, given pillow for comfort.

## 2025-05-04 NOTE — H&P
CRITICAL CARE ADMISSION NOTE    Name: Yessy Puckett   : 1955   MRN: [id number]   Date: 2025      Diagnoses/problem list:   Acute hypoxic hypercapnic respiratory failure  COPD exacerbation   Acute encephalopathy due to CO2 retention  Pulmonary edema   CHF exacerbation  Shortness of breath   History of HTN, COPD, NIDDM type 2    HPI   This is a 68 y/o male with HTN, COPD, NIDDM type 2 who presented to ER with worsening shortness of breath. Noted to be lethargic in ER and ABG concerning for severe hypercapnia with pCO2 of 118 and pH of 7.2. He was placed on BiPAP with some improvement. Chest x-ray concerning for pulmonary edema. Lasix was given in ER. He is now being admitted to ICU.    Assessment and plan:     NEUROLOGICAL:    Encephalopathy improving on BiPAP  Monitor closely for airway protection  Avoid sedating medications  Delirium precautions    PULMONOLOGY:   Continue NIV support  Repeat ABG as needed to ensure improvement in pCO2  Pulm edema noted on chest x-ray  Diurese with Lasix  Systemic steroids  Scheduled nebs    CARDIOVASCULAR:   Last echo in 2021 showed normal EF and elevated RVSP  Repeat echocardiogram now  Hold home antihypertensives  Diurese with Lasix    GASTROINTESTINAL:   Keep n.p.o. at this time  Regimen as needed    RENAL/ELECTROLYTE:   Creatinine at baseline now  Diurese with Lasix  Monitor UOP  Correct electrolytes    ENDOCRINE:   Keep -180  Check TSH and free T4  ISS    HEMATOLOGY/ONCOLOGY:   Lovenox for VTE prophylaxis    ID/MICRO:   Empiric ceftriaxone and azithromycin  SARS COV 2 and flu PCR negative  UA negative for infection  Check MRSA PCR    ICU DAILY CHECKLIST     Code Status: Full  DVT Prophylaxis: Lovenox  T/L/D: PIV  SUP: N/A  Diet: N.p.o.  Activity Level: Out of bed as tolerated  ABCDEF Bundle/Checklist Completed: Yes  Disposition: Stay in ICU  Multidisciplinary Rounds Completed: Pending    Past Medical History:      has a past medical history of

## 2025-05-04 NOTE — PLAN OF CARE
Problem: Discharge Planning  Goal: Discharge to home or other facility with appropriate resources  Recent Flowsheet Documentation  Taken 5/4/2025 1809 by Elian Bajwa RN  Discharge to home or other facility with appropriate resources:   Identify barriers to discharge with patient and caregiver   Arrange for interpreters to assist at discharge as needed   Identify discharge learning needs (meds, wound care, etc)   Arrange for needed discharge resources and transportation as appropriate

## 2025-05-05 ENCOUNTER — APPOINTMENT (OUTPATIENT)
Facility: HOSPITAL | Age: 70
DRG: 189 | End: 2025-05-05
Attending: INTERNAL MEDICINE
Payer: MEDICARE

## 2025-05-05 LAB
ANION GAP SERPL CALC-SCNC: 4 MMOL/L (ref 2–12)
ARTERIAL PATENCY WRIST A: YES
BASE EXCESS BLDA CALC-SCNC: 8.8 MMOL/L (ref 0–3)
BASOPHILS # BLD: 0.01 K/UL (ref 0–0.1)
BASOPHILS NFR BLD: 0.1 % (ref 0–1)
BDY SITE: ABNORMAL
BODY TEMPERATURE: 97.6
BUN SERPL-MCNC: 19 MG/DL (ref 6–20)
BUN/CREAT SERPL: 25 (ref 12–20)
CA-I BLD-MCNC: 9 MG/DL (ref 8.5–10.1)
CHLORIDE SERPL-SCNC: 98 MMOL/L (ref 97–108)
CO2 SERPL-SCNC: 39 MMOL/L (ref 21–32)
COHGB MFR BLD: 0.7 % (ref 1–2)
CREAT SERPL-MCNC: 0.77 MG/DL (ref 0.7–1.3)
DIFFERENTIAL METHOD BLD: ABNORMAL
ECHO AO ASC DIAM: 3.5 CM
ECHO AO ASCENDING AORTA INDEX: 1.33 CM/M2
ECHO AO ROOT DIAM: 3.6 CM
ECHO AO ROOT INDEX: 1.36 CM/M2
ECHO AV MEAN GRADIENT: 3 MMHG
ECHO AV MEAN VELOCITY: 0.8 M/S
ECHO AV PEAK GRADIENT: 7 MMHG
ECHO AV PEAK VELOCITY: 1.3 M/S
ECHO AV VELOCITY RATIO: 0.54
ECHO AV VTI: 19.8 CM
ECHO BSA: 2.8 M2
ECHO EST RA PRESSURE: 15 MMHG
ECHO LA AREA 2C: 21.7 CM2
ECHO LA AREA 4C: 29.5 CM2
ECHO LA DIAMETER INDEX: 1.52 CM/M2
ECHO LA DIAMETER: 4 CM
ECHO LA MAJOR AXIS: 7.9 CM
ECHO LA MINOR AXIS: 6.3 CM
ECHO LA TO AORTIC ROOT RATIO: 1.11
ECHO LA VOL MOD A2C: 59 ML (ref 18–58)
ECHO LA VOL MOD A4C: 84 ML (ref 18–58)
ECHO LA VOLUME INDEX MOD A2C: 22 ML/M2 (ref 16–34)
ECHO LA VOLUME INDEX MOD A4C: 32 ML/M2 (ref 16–34)
ECHO LV E' LATERAL VELOCITY: 13.8 CM/S
ECHO LV E' SEPTAL VELOCITY: 7.23 CM/S
ECHO LV EDV A2C: 70 ML
ECHO LV EDV NDEX A2C: 27 ML/M2
ECHO LV EF PHYSICIAN: 42 %
ECHO LV FRACTIONAL SHORTENING: 21 % (ref 28–44)
ECHO LV INTERNAL DIMENSION DIASTOLE INDEX: 1.59 CM/M2
ECHO LV INTERNAL DIMENSION DIASTOLIC: 4.2 CM (ref 4.2–5.9)
ECHO LV INTERNAL DIMENSION SYSTOLIC INDEX: 1.25 CM/M2
ECHO LV INTERNAL DIMENSION SYSTOLIC: 3.3 CM
ECHO LV IVSD: 1.3 CM (ref 0.6–1)
ECHO LV MASS 2D: 212.3 G (ref 88–224)
ECHO LV MASS INDEX 2D: 80.4 G/M2 (ref 49–115)
ECHO LV POSTERIOR WALL DIASTOLIC: 1.4 CM (ref 0.6–1)
ECHO LV RELATIVE WALL THICKNESS RATIO: 0.67
ECHO LVOT AV VTI INDEX: 0.63
ECHO LVOT MEAN GRADIENT: 1 MMHG
ECHO LVOT PEAK GRADIENT: 2 MMHG
ECHO LVOT PEAK VELOCITY: 0.7 M/S
ECHO LVOT VTI: 12.4 CM
ECHO MV A VELOCITY: 1.05 M/S
ECHO MV E DECELERATION TIME (DT): 82 MS
ECHO MV E VELOCITY: 1.51 M/S
ECHO MV E/A RATIO: 1.44
ECHO MV E/E' LATERAL: 10.94
ECHO MV E/E' RATIO (AVERAGED): 15.91
ECHO MV E/E' SEPTAL: 20.89
ECHO MV REGURGITANT PEAK GRADIENT: 61 MMHG
ECHO MV REGURGITANT PEAK VELOCITY: 3.9 M/S
ECHO MV REGURGITANT VTIA: 128 CM
ECHO PV MAX VELOCITY: 0.5 M/S
ECHO PV MEAN GRADIENT: 1 MMHG
ECHO PV MEAN VELOCITY: 0.3 M/S
ECHO PV PEAK GRADIENT: 1 MMHG
ECHO PV VTI: 5.7 CM
ECHO RA AREA 4C: 18.4 CM2
ECHO RA END SYSTOLIC VOLUME APICAL 4 CHAMBER INDEX BSA: 16 ML/M2
ECHO RA VOLUME: 43 ML
ECHO RIGHT VENTRICULAR SYSTOLIC PRESSURE (RVSP): 42 MMHG
ECHO RV BASAL DIMENSION: 4.3 CM
ECHO RV FREE WALL PEAK S': 11.2 CM/S
ECHO RV MID DIMENSION: 4.1 CM
ECHO RV TAPSE: 2.1 CM (ref 1.7–?)
ECHO TV REGURGITANT MAX VELOCITY: 2.59 M/S
ECHO TV REGURGITANT PEAK GRADIENT: 27 MMHG
EOSINOPHIL # BLD: 0 K/UL (ref 0–0.4)
EOSINOPHIL NFR BLD: 0 % (ref 0–7)
ERYTHROCYTE [DISTWIDTH] IN BLOOD BY AUTOMATED COUNT: 20.4 % (ref 11.5–14.5)
FIO2 ON VENT: 50 %
GAS FLOW.O2 SETTING OXYMISER: 18
GLUCOSE BLD STRIP.AUTO-MCNC: 114 MG/DL (ref 65–100)
GLUCOSE BLD STRIP.AUTO-MCNC: 118 MG/DL (ref 65–100)
GLUCOSE BLD STRIP.AUTO-MCNC: 126 MG/DL (ref 65–100)
GLUCOSE BLD STRIP.AUTO-MCNC: 141 MG/DL (ref 65–100)
GLUCOSE SERPL-MCNC: 126 MG/DL (ref 65–100)
HCO3 BLDA-SCNC: 37 MMOL/L (ref 22–26)
HCT VFR BLD AUTO: 32.4 % (ref 36.6–50.3)
HGB BLD-MCNC: 10 G/DL (ref 12.1–17)
IMM GRANULOCYTES # BLD AUTO: 0.03 K/UL (ref 0–0.04)
IMM GRANULOCYTES NFR BLD AUTO: 0.3 % (ref 0–0.5)
IPAP/PIP: 18
LV EF: 40 ML
LYMPHOCYTES # BLD: 0.59 K/UL (ref 0.8–3.5)
LYMPHOCYTES NFR BLD: 5.3 % (ref 12–49)
MAGNESIUM SERPL-MCNC: 1.8 MG/DL (ref 1.6–2.4)
MCH RBC QN AUTO: 22.8 PG (ref 26–34)
MCHC RBC AUTO-ENTMCNC: 30.9 G/DL (ref 30–36.5)
MCV RBC AUTO: 73.8 FL (ref 80–99)
METHGB MFR BLD: 0.1 % (ref 0–1.4)
MONOCYTES # BLD: 0.23 K/UL (ref 0–1)
MONOCYTES NFR BLD: 2 % (ref 5–13)
NEUTS SEG # BLD: 10.37 K/UL (ref 1.8–8)
NEUTS SEG NFR BLD: 92.3 % (ref 32–75)
NRBC # BLD: 0 K/UL (ref 0–0.01)
NRBC BLD-RTO: 0 PER 100 WBC
OXYHGB MFR BLD: 96.4 % (ref 95–99)
PCO2 BLDA: 70 MMHG (ref 35–45)
PEEP RESPIRATORY: 8
PERFORMED BY:: ABNORMAL
PH BLDA: 7.34 (ref 7.35–7.45)
PLATELET # BLD AUTO: 319 K/UL (ref 150–400)
PMV BLD AUTO: 9.7 FL (ref 8.9–12.9)
PO2 BLDA: 109 MMHG (ref 80–100)
POTASSIUM SERPL-SCNC: 4.6 MMOL/L (ref 3.5–5.1)
RBC # BLD AUTO: 4.39 M/UL (ref 4.1–5.7)
SAO2 % BLD: 97 % (ref 95–99)
SAO2% DEVICE SAO2% SENSOR NAME: ABNORMAL
SODIUM SERPL-SCNC: 141 MMOL/L (ref 136–145)
SPECIMEN SITE: ABNORMAL
T4 FREE SERPL-MCNC: 1.1 NG/DL (ref 0.8–1.5)
TSH SERPL DL<=0.05 MIU/L-ACNC: 0.6 UIU/ML (ref 0.36–3.74)
WBC # BLD AUTO: 11.2 K/UL (ref 4.1–11.1)

## 2025-05-05 PROCEDURE — 94640 AIRWAY INHALATION TREATMENT: CPT

## 2025-05-05 PROCEDURE — 2500000003 HC RX 250 WO HCPCS: Performed by: INTERNAL MEDICINE

## 2025-05-05 PROCEDURE — 82803 BLOOD GASES ANY COMBINATION: CPT

## 2025-05-05 PROCEDURE — 36415 COLL VENOUS BLD VENIPUNCTURE: CPT

## 2025-05-05 PROCEDURE — 2580000003 HC RX 258: Performed by: INTERNAL MEDICINE

## 2025-05-05 PROCEDURE — 85025 COMPLETE CBC W/AUTO DIFF WBC: CPT

## 2025-05-05 PROCEDURE — 6360000002 HC RX W HCPCS: Performed by: STUDENT IN AN ORGANIZED HEALTH CARE EDUCATION/TRAINING PROGRAM

## 2025-05-05 PROCEDURE — 83540 ASSAY OF IRON: CPT

## 2025-05-05 PROCEDURE — 2060000000 HC ICU INTERMEDIATE R&B

## 2025-05-05 PROCEDURE — 6360000004 HC RX CONTRAST MEDICATION

## 2025-05-05 PROCEDURE — 2700000000 HC OXYGEN THERAPY PER DAY

## 2025-05-05 PROCEDURE — 6360000002 HC RX W HCPCS: Performed by: INTERNAL MEDICINE

## 2025-05-05 PROCEDURE — 94760 N-INVAS EAR/PLS OXIMETRY 1: CPT

## 2025-05-05 PROCEDURE — 83735 ASSAY OF MAGNESIUM: CPT

## 2025-05-05 PROCEDURE — 36600 WITHDRAWAL OF ARTERIAL BLOOD: CPT

## 2025-05-05 PROCEDURE — 80048 BASIC METABOLIC PNL TOTAL CA: CPT

## 2025-05-05 PROCEDURE — C8929 TTE W OR WO FOL WCON,DOPPLER: HCPCS

## 2025-05-05 PROCEDURE — 6370000000 HC RX 637 (ALT 250 FOR IP): Performed by: INTERNAL MEDICINE

## 2025-05-05 PROCEDURE — 94761 N-INVAS EAR/PLS OXIMETRY MLT: CPT

## 2025-05-05 PROCEDURE — 93005 ELECTROCARDIOGRAM TRACING: CPT | Performed by: STUDENT IN AN ORGANIZED HEALTH CARE EDUCATION/TRAINING PROGRAM

## 2025-05-05 PROCEDURE — 82962 GLUCOSE BLOOD TEST: CPT

## 2025-05-05 RX ORDER — DIGOXIN 0.25 MG/ML
250 INJECTION INTRAMUSCULAR; INTRAVENOUS ONCE
Status: DISCONTINUED | OUTPATIENT
Start: 2025-05-05 | End: 2025-05-07

## 2025-05-05 RX ORDER — METHYLPREDNISOLONE SODIUM SUCCINATE 125 MG/2ML
60 INJECTION INTRAMUSCULAR; INTRAVENOUS DAILY
Status: DISCONTINUED | OUTPATIENT
Start: 2025-05-07 | End: 2025-05-07

## 2025-05-05 RX ORDER — FUROSEMIDE 10 MG/ML
40 INJECTION INTRAMUSCULAR; INTRAVENOUS 2 TIMES DAILY
Status: COMPLETED | OUTPATIENT
Start: 2025-05-05 | End: 2025-05-05

## 2025-05-05 RX ORDER — FUROSEMIDE 10 MG/ML
40 INJECTION INTRAMUSCULAR; INTRAVENOUS DAILY
Status: COMPLETED | OUTPATIENT
Start: 2025-05-06 | End: 2025-05-06

## 2025-05-05 RX ORDER — BUDESONIDE 0.5 MG/2ML
0.5 INHALANT ORAL
Status: DISCONTINUED | OUTPATIENT
Start: 2025-05-05 | End: 2025-05-12 | Stop reason: HOSPADM

## 2025-05-05 RX ORDER — ENOXAPARIN SODIUM 150 MG/ML
120 INJECTION SUBCUTANEOUS ONCE
Status: COMPLETED | OUTPATIENT
Start: 2025-05-05 | End: 2025-05-05

## 2025-05-05 RX ORDER — METHYLPREDNISOLONE SODIUM SUCCINATE 125 MG/2ML
60 INJECTION INTRAMUSCULAR; INTRAVENOUS EVERY 12 HOURS
Status: COMPLETED | OUTPATIENT
Start: 2025-05-05 | End: 2025-05-07

## 2025-05-05 RX ORDER — ENOXAPARIN SODIUM 100 MG/ML
1 INJECTION SUBCUTANEOUS 2 TIMES DAILY
Status: DISCONTINUED | OUTPATIENT
Start: 2025-05-05 | End: 2025-05-11

## 2025-05-05 RX ADMIN — FUROSEMIDE 40 MG: 10 INJECTION, SOLUTION INTRAMUSCULAR; INTRAVENOUS at 17:24

## 2025-05-05 RX ADMIN — METHYLPREDNISOLONE SODIUM SUCCINATE 60 MG: 125 INJECTION INTRAMUSCULAR; INTRAVENOUS at 04:11

## 2025-05-05 RX ADMIN — FUROSEMIDE 40 MG: 10 INJECTION, SOLUTION INTRAMUSCULAR; INTRAVENOUS at 08:32

## 2025-05-05 RX ADMIN — ENOXAPARIN SODIUM 160 MG: 100 INJECTION SUBCUTANEOUS at 20:37

## 2025-05-05 RX ADMIN — SULFUR HEXAFLUORIDE 2 ML: 60.7; .19; .19 INJECTION, POWDER, LYOPHILIZED, FOR SUSPENSION INTRAVENOUS; INTRAVESICAL at 18:48

## 2025-05-05 RX ADMIN — CEFTRIAXONE SODIUM 2000 MG: 2 INJECTION, POWDER, FOR SOLUTION INTRAMUSCULAR; INTRAVENOUS at 16:01

## 2025-05-05 RX ADMIN — IPRATROPIUM BROMIDE AND ALBUTEROL SULFATE 1 DOSE: 2.5; .5 SOLUTION RESPIRATORY (INHALATION) at 04:04

## 2025-05-05 RX ADMIN — BUDESONIDE 500 MCG: 0.5 SUSPENSION RESPIRATORY (INHALATION) at 08:10

## 2025-05-05 RX ADMIN — IPRATROPIUM BROMIDE AND ALBUTEROL SULFATE 1 DOSE: 2.5; .5 SOLUTION RESPIRATORY (INHALATION) at 13:40

## 2025-05-05 RX ADMIN — SODIUM CHLORIDE, PRESERVATIVE FREE 10 ML: 5 INJECTION INTRAVENOUS at 08:32

## 2025-05-05 RX ADMIN — AZITHROMYCIN MONOHYDRATE 500 MG: 500 INJECTION, POWDER, LYOPHILIZED, FOR SOLUTION INTRAVENOUS at 16:03

## 2025-05-05 RX ADMIN — IPRATROPIUM BROMIDE AND ALBUTEROL SULFATE 1 DOSE: 2.5; .5 SOLUTION RESPIRATORY (INHALATION) at 16:52

## 2025-05-05 RX ADMIN — IPRATROPIUM BROMIDE AND ALBUTEROL SULFATE 1 DOSE: 2.5; .5 SOLUTION RESPIRATORY (INHALATION) at 20:06

## 2025-05-05 RX ADMIN — BUDESONIDE 500 MCG: 0.5 SUSPENSION RESPIRATORY (INHALATION) at 20:06

## 2025-05-05 RX ADMIN — METHYLPREDNISOLONE SODIUM SUCCINATE 60 MG: 125 INJECTION INTRAMUSCULAR; INTRAVENOUS at 16:01

## 2025-05-05 RX ADMIN — ENOXAPARIN SODIUM 40 MG: 100 INJECTION SUBCUTANEOUS at 08:31

## 2025-05-05 RX ADMIN — ENOXAPARIN SODIUM 120 MG: 150 INJECTION SUBCUTANEOUS at 11:35

## 2025-05-05 RX ADMIN — SODIUM CHLORIDE, PRESERVATIVE FREE 10 ML: 5 INJECTION INTRAVENOUS at 20:36

## 2025-05-05 RX ADMIN — IPRATROPIUM BROMIDE AND ALBUTEROL SULFATE 1 DOSE: 2.5; .5 SOLUTION RESPIRATORY (INHALATION) at 08:10

## 2025-05-05 ASSESSMENT — PAIN SCALES - GENERAL
PAINLEVEL_OUTOF10: 0

## 2025-05-05 NOTE — PLAN OF CARE
Problem: Chronic Conditions and Co-morbidities  Goal: Patient's chronic conditions and co-morbidity symptoms are monitored and maintained or improved  5/5/2025 0922 by Pau Laughlin RN  Outcome: Progressing  Flowsheets (Taken 5/5/2025 0922)  Care Plan - Patient's Chronic Conditions and Co-Morbidity Symptoms are Monitored and Maintained or Improved:   Monitor and assess patient's chronic conditions and comorbid symptoms for stability, deterioration, or improvement   Collaborate with multidisciplinary team to address chronic and comorbid conditions and prevent exacerbation or deterioration  5/4/2025 2214 by Rosette Vance RN  Outcome: Progressing     Problem: Discharge Planning  Goal: Discharge to home or other facility with appropriate resources  5/5/2025 0922 by Pau Laughlin RN  Outcome: Progressing  Flowsheets (Taken 5/5/2025 0922)  Discharge to home or other facility with appropriate resources: Identify barriers to discharge with patient and caregiver  5/4/2025 2214 by Rosette Vance RN  Outcome: Progressing     Problem: Pain  Goal: Verbalizes/displays adequate comfort level or baseline comfort level  5/4/2025 2214 by Rosette Vance RN  Outcome: Progressing     Problem: Skin/Tissue Integrity  Goal: Skin integrity remains intact  Description: 1.  Monitor for areas of redness and/or skin breakdown2.  Assess vascular access sites hourly3.  Every 4-6 hours minimum:  Change oxygen saturation probe site4.  Every 4-6 hours:  If on nasal continuous positive airway pressure, respiratory therapy assess nares and determine need for appliance change or resting period  5/5/2025 0922 by Pau Laughlin RN  Outcome: Progressing  Flowsheets (Taken 5/5/2025 0922)  Skin Integrity Remains Intact:   Monitor for areas of redness and/or skin breakdown   Positioning devices  5/4/2025 2214 by Rosette Vance RN  Outcome: Progressing     Problem: Safety - Adult  Goal: Free from fall injury  5/5/2025 0922 by Pau Laughlin,

## 2025-05-05 NOTE — CARE COORDINATION
05/05/25 0928   Service Assessment   Patient Orientation Alert and Oriented   Cognition Alert   History Provided By Patient   Primary Caregiver Self   Support Systems Children;Family Members   Patient's Healthcare Decision Maker is: Legal Next of Kin   PCP Verified by CM Yes   Last Visit to PCP Within last 6 months   Prior Functional Level Independent in ADLs/IADLs   Current Functional Level Other (see comment)  (TBD)   Can patient return to prior living arrangement Unknown at present   Ability to make needs known: Good   Family able to assist with home care needs: Yes   Would you like for me to discuss the discharge plan with any other family members/significant others, and if so, who? No   Financial Resources None   Community Resources None   Social/Functional History   Lives With Alone   Services At/After Discharge   Mode of Transport at Discharge Other (see comment)   Confirm Follow Up Transport Family     CM reviewed Pt medicals, Pt comes [facility], Pt is a LTC Resident at [facility].     No HH, has a cane and aids assist Pt with ADL.     Transportation TBD    CM dispo: Return to [facility].     Advance Care Planning     General Advance Care Planning (ACP) Conversation    Date of Conversation: 5/5/2025       Healthcare Decision Maker:   Primary Decision Maker: nAgel Gonzalez - Child - [phone]       Content/Action Overview:    Reviewed DNR/DNI and patient elects Full Code (Attempt Resuscitation)

## 2025-05-05 NOTE — CARE COORDINATION
Per IDR    Pt admitted for CHF exacerbation.     Pt comes from [facility].     CM will send  a referral to [facility] to check if he is a LTC Resident..     11:55    [facility] stated: \"Yes, he is our LTC resident, He was admitted in mid-February as a skilled patient and went to long term care late March. His baseline is alert and oriented times 2\"    CM called Pt son, Angel, left a  requesting a return call.     CM will send updated medicals.

## 2025-05-06 ENCOUNTER — APPOINTMENT (OUTPATIENT)
Facility: HOSPITAL | Age: 70
DRG: 189 | End: 2025-05-06
Payer: MEDICARE

## 2025-05-06 LAB
AMMONIA PLAS-SCNC: 19 UMOL/L
ANION GAP SERPL CALC-SCNC: 3 MMOL/L (ref 2–12)
BASOPHILS # BLD: 0.02 K/UL (ref 0–0.1)
BASOPHILS NFR BLD: 0.1 % (ref 0–1)
BUN SERPL-MCNC: 26 MG/DL (ref 6–20)
BUN/CREAT SERPL: 38 (ref 12–20)
CA-I BLD-MCNC: 9.2 MG/DL (ref 8.5–10.1)
CHLORIDE SERPL-SCNC: 97 MMOL/L (ref 97–108)
CHOLEST SERPL-MCNC: 95 MG/DL
CK SERPL-CCNC: 53 U/L (ref 39–308)
CO2 SERPL-SCNC: 41 MMOL/L (ref 21–32)
CREAT SERPL-MCNC: 0.68 MG/DL (ref 0.7–1.3)
CRP SERPL-MCNC: 1.28 MG/DL (ref 0–0.3)
DIFFERENTIAL METHOD BLD: ABNORMAL
EKG ATRIAL RATE: 441 BPM
EKG DIAGNOSIS: NORMAL
EKG Q-T INTERVAL: 386 MS
EKG QRS DURATION: 98 MS
EKG QTC CALCULATION (BAZETT): 464 MS
EKG R AXIS: 33 DEGREES
EKG T AXIS: 68 DEGREES
EKG VENTRICULAR RATE: 87 BPM
EOSINOPHIL # BLD: 0 K/UL (ref 0–0.4)
EOSINOPHIL NFR BLD: 0 % (ref 0–7)
ERYTHROCYTE [DISTWIDTH] IN BLOOD BY AUTOMATED COUNT: 20.1 % (ref 11.5–14.5)
ERYTHROCYTE [SEDIMENTATION RATE] IN BLOOD: 47 MM/HR (ref 0–20)
GLUCOSE BLD STRIP.AUTO-MCNC: 118 MG/DL (ref 65–100)
GLUCOSE BLD STRIP.AUTO-MCNC: 124 MG/DL (ref 65–100)
GLUCOSE BLD STRIP.AUTO-MCNC: 140 MG/DL (ref 65–100)
GLUCOSE BLD STRIP.AUTO-MCNC: 143 MG/DL (ref 65–100)
GLUCOSE BLD STRIP.AUTO-MCNC: 158 MG/DL (ref 65–100)
GLUCOSE SERPL-MCNC: 114 MG/DL (ref 65–100)
HCT VFR BLD AUTO: 31.5 % (ref 36.6–50.3)
HDLC SERPL-MCNC: 42 MG/DL
HDLC SERPL: 2.3 (ref 0–5)
HGB BLD-MCNC: 9.9 G/DL (ref 12.1–17)
IMM GRANULOCYTES # BLD AUTO: 0.08 K/UL (ref 0–0.04)
IMM GRANULOCYTES NFR BLD AUTO: 0.5 % (ref 0–0.5)
IRON SATN MFR SERPL: 8 % (ref 20–50)
IRON SERPL-MCNC: 28 UG/DL (ref 35–150)
LDH SERPL L TO P-CCNC: 220 U/L (ref 85–241)
LDLC SERPL CALC-MCNC: 41.2 MG/DL (ref 0–100)
LIPID PANEL: NORMAL
LYMPHOCYTES # BLD: 0.96 K/UL (ref 0.8–3.5)
LYMPHOCYTES NFR BLD: 5.9 % (ref 12–49)
MCH RBC QN AUTO: 23 PG (ref 26–34)
MCHC RBC AUTO-ENTMCNC: 31.4 G/DL (ref 30–36.5)
MCV RBC AUTO: 73.3 FL (ref 80–99)
MONOCYTES # BLD: 1.35 K/UL (ref 0–1)
MONOCYTES NFR BLD: 8.3 % (ref 5–13)
NEUTS SEG # BLD: 13.76 K/UL (ref 1.8–8)
NEUTS SEG NFR BLD: 85.2 % (ref 32–75)
NRBC # BLD: 0 K/UL (ref 0–0.01)
NRBC BLD-RTO: 0 PER 100 WBC
PERFORMED BY:: ABNORMAL
PLATELET # BLD AUTO: 313 K/UL (ref 150–400)
PMV BLD AUTO: 9.7 FL (ref 8.9–12.9)
POTASSIUM SERPL-SCNC: 3.7 MMOL/L (ref 3.5–5.1)
PROCALCITONIN SERPL-MCNC: <0.05 NG/ML
RBC # BLD AUTO: 4.3 M/UL (ref 4.1–5.7)
SODIUM SERPL-SCNC: 141 MMOL/L (ref 136–145)
TIBC SERPL-MCNC: 340 UG/DL (ref 250–450)
TRIGL SERPL-MCNC: 59 MG/DL
VLDLC SERPL CALC-MCNC: 11.8 MG/DL
WBC # BLD AUTO: 16.2 K/UL (ref 4.1–11.1)

## 2025-05-06 PROCEDURE — 86140 C-REACTIVE PROTEIN: CPT

## 2025-05-06 PROCEDURE — 6360000002 HC RX W HCPCS: Performed by: STUDENT IN AN ORGANIZED HEALTH CARE EDUCATION/TRAINING PROGRAM

## 2025-05-06 PROCEDURE — 2580000003 HC RX 258: Performed by: INTERNAL MEDICINE

## 2025-05-06 PROCEDURE — 82140 ASSAY OF AMMONIA: CPT

## 2025-05-06 PROCEDURE — 2500000003 HC RX 250 WO HCPCS: Performed by: INTERNAL MEDICINE

## 2025-05-06 PROCEDURE — 6360000002 HC RX W HCPCS

## 2025-05-06 PROCEDURE — 70450 CT HEAD/BRAIN W/O DYE: CPT

## 2025-05-06 PROCEDURE — 6370000000 HC RX 637 (ALT 250 FOR IP)

## 2025-05-06 PROCEDURE — 6370000000 HC RX 637 (ALT 250 FOR IP): Performed by: INTERNAL MEDICINE

## 2025-05-06 PROCEDURE — 82550 ASSAY OF CK (CPK): CPT

## 2025-05-06 PROCEDURE — 82962 GLUCOSE BLOOD TEST: CPT

## 2025-05-06 PROCEDURE — 71045 X-RAY EXAM CHEST 1 VIEW: CPT

## 2025-05-06 PROCEDURE — 80061 LIPID PANEL: CPT

## 2025-05-06 PROCEDURE — 94761 N-INVAS EAR/PLS OXIMETRY MLT: CPT

## 2025-05-06 PROCEDURE — 94640 AIRWAY INHALATION TREATMENT: CPT

## 2025-05-06 PROCEDURE — 82746 ASSAY OF FOLIC ACID SERUM: CPT

## 2025-05-06 PROCEDURE — 36415 COLL VENOUS BLD VENIPUNCTURE: CPT

## 2025-05-06 PROCEDURE — 1100000000 HC RM PRIVATE

## 2025-05-06 PROCEDURE — 80048 BASIC METABOLIC PNL TOTAL CA: CPT

## 2025-05-06 PROCEDURE — 94760 N-INVAS EAR/PLS OXIMETRY 1: CPT

## 2025-05-06 PROCEDURE — 92610 EVALUATE SWALLOWING FUNCTION: CPT

## 2025-05-06 PROCEDURE — 2700000000 HC OXYGEN THERAPY PER DAY

## 2025-05-06 PROCEDURE — 82607 VITAMIN B-12: CPT

## 2025-05-06 PROCEDURE — 83615 LACTATE (LD) (LDH) ENZYME: CPT

## 2025-05-06 PROCEDURE — 85652 RBC SED RATE AUTOMATED: CPT

## 2025-05-06 PROCEDURE — 6360000002 HC RX W HCPCS: Performed by: INTERNAL MEDICINE

## 2025-05-06 PROCEDURE — 85025 COMPLETE CBC W/AUTO DIFF WBC: CPT

## 2025-05-06 PROCEDURE — 84145 PROCALCITONIN (PCT): CPT

## 2025-05-06 RX ORDER — SPIRONOLACTONE 25 MG/1
25 TABLET ORAL DAILY
Status: DISCONTINUED | OUTPATIENT
Start: 2025-05-06 | End: 2025-05-12 | Stop reason: HOSPADM

## 2025-05-06 RX ORDER — CARVEDILOL 3.12 MG/1
6.25 TABLET ORAL 2 TIMES DAILY WITH MEALS
Status: DISCONTINUED | OUTPATIENT
Start: 2025-05-06 | End: 2025-05-11

## 2025-05-06 RX ORDER — ACETAZOLAMIDE 500 MG/5ML
250 INJECTION, POWDER, LYOPHILIZED, FOR SOLUTION INTRAVENOUS ONCE
Status: COMPLETED | OUTPATIENT
Start: 2025-05-06 | End: 2025-05-06

## 2025-05-06 RX ORDER — HYDRALAZINE HYDROCHLORIDE 50 MG/1
50 TABLET, FILM COATED ORAL 3 TIMES DAILY
Status: DISCONTINUED | OUTPATIENT
Start: 2025-05-06 | End: 2025-05-11

## 2025-05-06 RX ORDER — LABETALOL HYDROCHLORIDE 5 MG/ML
10 INJECTION, SOLUTION INTRAVENOUS EVERY 6 HOURS PRN
Status: DISCONTINUED | OUTPATIENT
Start: 2025-05-06 | End: 2025-05-12 | Stop reason: HOSPADM

## 2025-05-06 RX ORDER — HYDRALAZINE HYDROCHLORIDE 50 MG/1
100 TABLET, FILM COATED ORAL 3 TIMES DAILY
Status: DISCONTINUED | OUTPATIENT
Start: 2025-05-06 | End: 2025-05-06

## 2025-05-06 RX ADMIN — CEFTRIAXONE SODIUM 2000 MG: 2 INJECTION, POWDER, FOR SOLUTION INTRAMUSCULAR; INTRAVENOUS at 15:47

## 2025-05-06 RX ADMIN — IPRATROPIUM BROMIDE AND ALBUTEROL SULFATE 1 DOSE: 2.5; .5 SOLUTION RESPIRATORY (INHALATION) at 01:23

## 2025-05-06 RX ADMIN — BUDESONIDE 500 MCG: 0.5 SUSPENSION RESPIRATORY (INHALATION) at 07:52

## 2025-05-06 RX ADMIN — SPIRONOLACTONE 25 MG: 25 TABLET ORAL at 16:02

## 2025-05-06 RX ADMIN — HYDRALAZINE HYDROCHLORIDE 50 MG: 50 TABLET ORAL at 11:04

## 2025-05-06 RX ADMIN — HYDRALAZINE HYDROCHLORIDE 50 MG: 50 TABLET ORAL at 15:45

## 2025-05-06 RX ADMIN — ENOXAPARIN SODIUM 160 MG: 100 INJECTION SUBCUTANEOUS at 21:45

## 2025-05-06 RX ADMIN — SODIUM CHLORIDE, PRESERVATIVE FREE 10 ML: 5 INJECTION INTRAVENOUS at 09:11

## 2025-05-06 RX ADMIN — METHYLPREDNISOLONE SODIUM SUCCINATE 60 MG: 125 INJECTION INTRAMUSCULAR; INTRAVENOUS at 15:45

## 2025-05-06 RX ADMIN — SODIUM CHLORIDE, PRESERVATIVE FREE 10 ML: 5 INJECTION INTRAVENOUS at 21:45

## 2025-05-06 RX ADMIN — ENOXAPARIN SODIUM 160 MG: 100 INJECTION SUBCUTANEOUS at 11:04

## 2025-05-06 RX ADMIN — CARVEDILOL 6.25 MG: 3.12 TABLET, FILM COATED ORAL at 15:45

## 2025-05-06 RX ADMIN — IPRATROPIUM BROMIDE AND ALBUTEROL SULFATE 1 DOSE: 2.5; .5 SOLUTION RESPIRATORY (INHALATION) at 11:52

## 2025-05-06 RX ADMIN — ACETAMINOPHEN 650 MG: 325 TABLET ORAL at 16:05

## 2025-05-06 RX ADMIN — FUROSEMIDE 40 MG: 10 INJECTION, SOLUTION INTRAMUSCULAR; INTRAVENOUS at 11:04

## 2025-05-06 RX ADMIN — IPRATROPIUM BROMIDE AND ALBUTEROL SULFATE 1 DOSE: 2.5; .5 SOLUTION RESPIRATORY (INHALATION) at 07:52

## 2025-05-06 RX ADMIN — METHYLPREDNISOLONE SODIUM SUCCINATE 60 MG: 125 INJECTION INTRAMUSCULAR; INTRAVENOUS at 04:21

## 2025-05-06 RX ADMIN — ATORVASTATIN CALCIUM 20 MG: 20 TABLET, FILM COATED ORAL at 21:45

## 2025-05-06 RX ADMIN — IPRATROPIUM BROMIDE AND ALBUTEROL SULFATE 1 DOSE: 2.5; .5 SOLUTION RESPIRATORY (INHALATION) at 20:19

## 2025-05-06 RX ADMIN — HYDRALAZINE HYDROCHLORIDE 50 MG: 50 TABLET ORAL at 21:45

## 2025-05-06 RX ADMIN — AZITHROMYCIN MONOHYDRATE 500 MG: 500 INJECTION, POWDER, LYOPHILIZED, FOR SOLUTION INTRAVENOUS at 15:51

## 2025-05-06 RX ADMIN — BUDESONIDE 500 MCG: 0.5 SUSPENSION RESPIRATORY (INHALATION) at 20:19

## 2025-05-06 RX ADMIN — ACETAZOLAMIDE SODIUM 250 MG: 500 INJECTION, POWDER, LYOPHILIZED, FOR SOLUTION INTRAVENOUS at 11:04

## 2025-05-06 RX ADMIN — IPRATROPIUM BROMIDE AND ALBUTEROL SULFATE 1 DOSE: 2.5; .5 SOLUTION RESPIRATORY (INHALATION) at 23:59

## 2025-05-06 ASSESSMENT — PAIN DESCRIPTION - DESCRIPTORS: DESCRIPTORS: ACHING

## 2025-05-06 ASSESSMENT — PAIN SCALES - GENERAL
PAINLEVEL_OUTOF10: 6
PAINLEVEL_OUTOF10: 0
PAINLEVEL_OUTOF10: 2

## 2025-05-06 NOTE — PLAN OF CARE
Speech LAnguage Pathology Dysphagia EVALUATION    Patient: Yessy Puckett (69 y.o. male)  Date: 5/6/2025  Primary Diagnosis: CO2 retention [E87.29]  COPD exacerbation ([facility]) [J44.1]  Congestive heart failure, unspecified HF chronicity, unspecified heart failure type ([facility]) [I50.9]       Precautions: Aspiration, GERD                    Time In: 1038  Time Out: 1055    DIET RECOMMENDATIONS: Soft and bite sized and mildly thick liquids    SWALLOW SAFETY PRECAUTIONS: Meds whole with thin liquid as tolerated. Small bites/sips. Slow rate of intake. Alternate bites/sips.  6 small snacks/meals. Upright 1h after PO. Do not eat 3h before bedtime.     ASSESSMENT :    Oropharyngeal dysphagia, mild-moderate    Consult received to evaluate oropharyngeal swallowing abilities.  68 y/o female admitted w/ c/o SOB, lethargy; per chart review, findings include acute hypercarbic respiratory failure, COPD exacerbation, leukocytosis likely stress related, DM II, sleep apnea. PMH notable for arthritis.     CXR \"IMPRESSION: [...]  Stable mild to moderate edema pattern. Stable small bilateral pleural effusions.\"    No previous encounter w/ SLP per chart.      Awake/alert. Upright in bed. Pleasant, cooperative. Confused. Answers some simple questions. Reduced simple command following. Frequently echolalic. Stated that he had difficulty swallowing \"a few years ago.\" Unable to elaborate. Currently NPO.    Thin liquid, mildly thick liquid, puree, soft and bite-sized solid, regular texture solid  Unable to self-feed w/o assistance.  Bolus retrieval: reduced lip rounding to retreive and contain liquid. Very impulsive rate and sip size--consumed 4 oz of liquid within several seconds. Not significantly improved with cues nor SLP pinching straw.  Anterior loss: thin--mild  Bolus prep: suspect reduced bolus formation/cohesion, timely mastication  Oral transit: variable delay  Oral clearance: mildly reduced, sbs clears with x2 swallows  Swallow elicit:

## 2025-05-06 NOTE — CONSULTS
IMPRESSION:   Acute hypoxic respiratory failure  Acute on chronic hypercapnic respiratory failure  Acute exacerbation of COPD  Pulmonary edema  Obstructive sleep apnea  Morbid obesity  Paroxysmal atrial fibrillation  Hypertension and type 2 diabetes mellitus   Additional workup outlined below  Pt is at high risk of sudden decline and decompensation with life threatening consequenses and continued end organ dysfunction and failure  Pt is critically ill. Time spent with pt and staff actively rendering care, managing pt and coordinating care as stated below; 55  minutes, exclusive of any procedures      RECOMMENDATIONS/PLAN:     69-year-old male came because of shortness of breath and dyspnea he is morbidly obese history of COPD hypertension he was very lethargic drowsy arterial blood gas in the emergency room shows pCO2 of 118 pH of 7.2 he was put on noninvasive ventilator BiPAP machine and chest x-ray shows pulmonary edema admitted to ICU he is not currently on oxygen during the daytime and BiPAP at night hypercapnia improved but is still very groggy no chest pain difficult to understand as he seems to be exhausted  BIPAP for non invasive ventilatory life support to avoid worsening respiratory acidosis, hypercacarbic or hypoxic respiratory arrest initial arterial blood gases pCO2 was 118 ABG done yesterday 5/5 shows pCO2 of 70, he qualifies for noninvasive  Patient has had worsening respiratory failure and is at risk for serious harm or death without appropriate therapy at home which include noninvasive ventilator while patient has an underlying condition of FELICITAS it is not the primary indication for need for NIV patient has previously tried CPAP and BiPAP and has failed to improve his hypercapnia and saturation level.  Since chronic/acute respiratory failure can be prevented with the use of positive pressure ventilation and due to the nature of the patient deteriorating condition a portable volume ventilator has 
(SOLU-MEDROL) injection 60 mg, 60 mg, IntraVENous, Q12H **FOLLOWED BY** [START ON 5/7/2025] methylPREDNISolone sodium succ (SOLU-MEDROL) injection 60 mg, 60 mg, IntraVENous, Daily, Remington Kapaida DO    furosemide (LASIX) injection 40 mg, 40 mg, IntraVENous, BID **FOLLOWED BY** [START ON 5/6/2025] furosemide (LASIX) injection 40 mg, 40 mg, IntraVENous, Daily, Remington Kapadia DO    sodium chloride flush 0.9 % injection 5-40 mL, 5-40 mL, IntraVENous, 2 times per day, Susan Bruner MD, 10 mL at 05/05/25 0832    sodium chloride flush 0.9 % injection 5-40 mL, 5-40 mL, IntraVENous, PRN, Susan Bruner MD    0.9 % sodium chloride infusion, , IntraVENous, PRN, Susan Bruner MD, Last Rate: 5 mL/hr at 05/04/25 1840, New Bag at 05/04/25 1840    potassium chloride 20 mEq/50 mL IVPB (Central Line), 20 mEq, IntraVENous, PRN **OR** potassium chloride 10 mEq/100 mL IVPB (Peripheral Line), 10 mEq, IntraVENous, PRN, Susan Bruner MD    magnesium sulfate 2000 mg in 50 mL IVPB premix, 2,000 mg, IntraVENous, PRN, Susan Bruner MD    ondansetron (ZOFRAN-ODT) disintegrating tablet 4 mg, 4 mg, Oral, Q8H PRN **OR** ondansetron (ZOFRAN) injection 4 mg, 4 mg, IntraVENous, Q6H PRN, Susan Bruner MD    polyethylene glycol (GLYCOLAX) packet 17 g, 17 g, Oral, Daily PRN, Susan Bruner MD    acetaminophen (TYLENOL) tablet 650 mg, 650 mg, Oral, Q6H PRN **OR** acetaminophen (TYLENOL) suppository 650 mg, 650 mg, Rectal, Q6H PRN, Susan Bruner MD    cefTRIAXone (ROCEPHIN) 2,000 mg in sterile water 20 mL IV syringe, 2,000 mg, IntraVENous, Q24H, Susan Bruner MD, 2,000 mg at 05/04/25 1837    azithromycin (ZITHROMAX) 500 mg in sodium chloride 0.9 % 250 mL IVPB (Moww2Exv), 500 mg, IntraVENous, Q24H, Susan Bruner MD, Stopped at 05/04/25 1941    ipratropium 0.5 mg-albuterol 2.5 mg (DUONEB) nebulizer solution 1 Dose, 1 Dose, Inhalation, Q4H RT, Susan Bruner MD, 1 Dose at 05/05/25

## 2025-05-07 ENCOUNTER — APPOINTMENT (OUTPATIENT)
Facility: HOSPITAL | Age: 70
DRG: 189 | End: 2025-05-07
Payer: MEDICARE

## 2025-05-07 LAB
ANION GAP SERPL CALC-SCNC: 6 MMOL/L (ref 2–12)
ARTERIAL PATENCY WRIST A: YES
BASE EXCESS BLDA CALC-SCNC: 8.7 MMOL/L (ref 0–3)
BASOPHILS # BLD: 0.01 K/UL (ref 0–0.1)
BASOPHILS NFR BLD: 0.1 % (ref 0–1)
BDY SITE: ABNORMAL
BODY TEMPERATURE: 97.9
BUN SERPL-MCNC: 26 MG/DL (ref 6–20)
BUN/CREAT SERPL: 36 (ref 12–20)
CA-I BLD-MCNC: 9.1 MG/DL (ref 8.5–10.1)
CHLORIDE SERPL-SCNC: 99 MMOL/L (ref 97–108)
CO2 SERPL-SCNC: 34 MMOL/L (ref 21–32)
COHGB MFR BLD: 0.5 % (ref 1–2)
CREAT SERPL-MCNC: 0.72 MG/DL (ref 0.7–1.3)
DIFFERENTIAL METHOD BLD: ABNORMAL
EOSINOPHIL # BLD: 0 K/UL (ref 0–0.4)
EOSINOPHIL NFR BLD: 0 % (ref 0–7)
ERYTHROCYTE [DISTWIDTH] IN BLOOD BY AUTOMATED COUNT: 20.1 % (ref 11.5–14.5)
FIO2 ON VENT: 28 %
FOLATE SERPL-MCNC: 17.7 NG/ML (ref 5–21)
GAS FLOW.O2 O2 DELIVERY SYS: 2 L/MIN
GLUCOSE BLD STRIP.AUTO-MCNC: 124 MG/DL (ref 65–100)
GLUCOSE BLD STRIP.AUTO-MCNC: 157 MG/DL (ref 65–100)
GLUCOSE BLD STRIP.AUTO-MCNC: 167 MG/DL (ref 65–100)
GLUCOSE BLD STRIP.AUTO-MCNC: 173 MG/DL (ref 65–100)
GLUCOSE SERPL-MCNC: 115 MG/DL (ref 65–100)
HCO3 BLDA-SCNC: 33 MMOL/L (ref 22–26)
HCT VFR BLD AUTO: 32.2 % (ref 36.6–50.3)
HGB BLD-MCNC: 10.1 G/DL (ref 12.1–17)
IMM GRANULOCYTES # BLD AUTO: 0.05 K/UL (ref 0–0.04)
IMM GRANULOCYTES NFR BLD AUTO: 0.4 % (ref 0–0.5)
LYMPHOCYTES # BLD: 0.73 K/UL (ref 0.8–3.5)
LYMPHOCYTES NFR BLD: 6.4 % (ref 12–49)
MCH RBC QN AUTO: 22.5 PG (ref 26–34)
MCHC RBC AUTO-ENTMCNC: 31.4 G/DL (ref 30–36.5)
MCV RBC AUTO: 71.7 FL (ref 80–99)
METHGB MFR BLD: 0.1 % (ref 0–1.4)
MONOCYTES # BLD: 1.02 K/UL (ref 0–1)
MONOCYTES NFR BLD: 8.9 % (ref 5–13)
NEUTS SEG # BLD: 9.66 K/UL (ref 1.8–8)
NEUTS SEG NFR BLD: 84.2 % (ref 32–75)
NRBC # BLD: 0 K/UL (ref 0–0.01)
NRBC BLD-RTO: 0 PER 100 WBC
OXYHGB MFR BLD: 96.5 % (ref 95–99)
PCO2 BLDA: 46 MMHG (ref 35–45)
PERFORMED BY:: ABNORMAL
PH BLDA: 7.48 (ref 7.35–7.45)
PLATELET # BLD AUTO: 339 K/UL (ref 150–400)
PMV BLD AUTO: 10.1 FL (ref 8.9–12.9)
PO2 BLDA: 100 MMHG (ref 80–100)
POTASSIUM SERPL-SCNC: 3.4 MMOL/L (ref 3.5–5.1)
PROCALCITONIN SERPL-MCNC: <0.05 NG/ML
RBC # BLD AUTO: 4.49 M/UL (ref 4.1–5.7)
SAO2 % BLD: 97 % (ref 95–99)
SAO2% DEVICE SAO2% SENSOR NAME: ABNORMAL
SODIUM SERPL-SCNC: 139 MMOL/L (ref 136–145)
SPECIMEN SITE: ABNORMAL
VIT B12 SERPL-MCNC: 500 PG/ML (ref 193–986)
WBC # BLD AUTO: 11.5 K/UL (ref 4.1–11.1)

## 2025-05-07 PROCEDURE — 6370000000 HC RX 637 (ALT 250 FOR IP)

## 2025-05-07 PROCEDURE — 6360000002 HC RX W HCPCS: Performed by: STUDENT IN AN ORGANIZED HEALTH CARE EDUCATION/TRAINING PROGRAM

## 2025-05-07 PROCEDURE — 2500000003 HC RX 250 WO HCPCS: Performed by: INTERNAL MEDICINE

## 2025-05-07 PROCEDURE — 6370000000 HC RX 637 (ALT 250 FOR IP): Performed by: INTERNAL MEDICINE

## 2025-05-07 PROCEDURE — 80048 BASIC METABOLIC PNL TOTAL CA: CPT

## 2025-05-07 PROCEDURE — 6360000002 HC RX W HCPCS

## 2025-05-07 PROCEDURE — 71045 X-RAY EXAM CHEST 1 VIEW: CPT

## 2025-05-07 PROCEDURE — 2580000003 HC RX 258: Performed by: INTERNAL MEDICINE

## 2025-05-07 PROCEDURE — 36600 WITHDRAWAL OF ARTERIAL BLOOD: CPT

## 2025-05-07 PROCEDURE — 82803 BLOOD GASES ANY COMBINATION: CPT

## 2025-05-07 PROCEDURE — 36415 COLL VENOUS BLD VENIPUNCTURE: CPT

## 2025-05-07 PROCEDURE — 85025 COMPLETE CBC W/AUTO DIFF WBC: CPT

## 2025-05-07 PROCEDURE — 2700000000 HC OXYGEN THERAPY PER DAY

## 2025-05-07 PROCEDURE — 6360000002 HC RX W HCPCS: Performed by: INTERNAL MEDICINE

## 2025-05-07 PROCEDURE — 94761 N-INVAS EAR/PLS OXIMETRY MLT: CPT

## 2025-05-07 PROCEDURE — 82962 GLUCOSE BLOOD TEST: CPT

## 2025-05-07 PROCEDURE — 94640 AIRWAY INHALATION TREATMENT: CPT

## 2025-05-07 PROCEDURE — 84145 PROCALCITONIN (PCT): CPT

## 2025-05-07 PROCEDURE — 1100000000 HC RM PRIVATE

## 2025-05-07 RX ORDER — IPRATROPIUM BROMIDE AND ALBUTEROL SULFATE 2.5; .5 MG/3ML; MG/3ML
1 SOLUTION RESPIRATORY (INHALATION)
Status: DISCONTINUED | OUTPATIENT
Start: 2025-05-07 | End: 2025-05-08

## 2025-05-07 RX ORDER — PREDNISONE 5 MG/1
20 TABLET ORAL 2 TIMES DAILY
Status: DISCONTINUED | OUTPATIENT
Start: 2025-05-07 | End: 2025-05-09

## 2025-05-07 RX ORDER — POTASSIUM CHLORIDE 1500 MG/1
40 TABLET, EXTENDED RELEASE ORAL ONCE
Status: COMPLETED | OUTPATIENT
Start: 2025-05-07 | End: 2025-05-07

## 2025-05-07 RX ORDER — FUROSEMIDE 10 MG/ML
40 INJECTION INTRAMUSCULAR; INTRAVENOUS 2 TIMES DAILY
Status: DISCONTINUED | OUTPATIENT
Start: 2025-05-07 | End: 2025-05-08

## 2025-05-07 RX ADMIN — FUROSEMIDE 40 MG: 10 INJECTION, SOLUTION INTRAMUSCULAR; INTRAVENOUS at 18:14

## 2025-05-07 RX ADMIN — ATORVASTATIN CALCIUM 20 MG: 20 TABLET, FILM COATED ORAL at 21:41

## 2025-05-07 RX ADMIN — METHYLPREDNISOLONE SODIUM SUCCINATE 60 MG: 125 INJECTION INTRAMUSCULAR; INTRAVENOUS at 04:06

## 2025-05-07 RX ADMIN — ENOXAPARIN SODIUM 160 MG: 100 INJECTION SUBCUTANEOUS at 09:18

## 2025-05-07 RX ADMIN — BUDESONIDE 500 MCG: 0.5 SUSPENSION RESPIRATORY (INHALATION) at 20:41

## 2025-05-07 RX ADMIN — AZITHROMYCIN MONOHYDRATE 500 MG: 500 INJECTION, POWDER, LYOPHILIZED, FOR SOLUTION INTRAVENOUS at 18:19

## 2025-05-07 RX ADMIN — FUROSEMIDE 40 MG: 10 INJECTION, SOLUTION INTRAMUSCULAR; INTRAVENOUS at 13:10

## 2025-05-07 RX ADMIN — SPIRONOLACTONE 25 MG: 25 TABLET ORAL at 09:18

## 2025-05-07 RX ADMIN — CARVEDILOL 6.25 MG: 3.12 TABLET, FILM COATED ORAL at 09:18

## 2025-05-07 RX ADMIN — ENOXAPARIN SODIUM 160 MG: 100 INJECTION SUBCUTANEOUS at 21:40

## 2025-05-07 RX ADMIN — BUDESONIDE 500 MCG: 0.5 SUSPENSION RESPIRATORY (INHALATION) at 09:41

## 2025-05-07 RX ADMIN — IPRATROPIUM BROMIDE AND ALBUTEROL SULFATE 1 DOSE: 2.5; .5 SOLUTION RESPIRATORY (INHALATION) at 14:56

## 2025-05-07 RX ADMIN — PREDNISONE 20 MG: 5 TABLET ORAL at 13:10

## 2025-05-07 RX ADMIN — HYDRALAZINE HYDROCHLORIDE 50 MG: 50 TABLET ORAL at 21:40

## 2025-05-07 RX ADMIN — HYDRALAZINE HYDROCHLORIDE 50 MG: 50 TABLET ORAL at 13:10

## 2025-05-07 RX ADMIN — POTASSIUM CHLORIDE 40 MEQ: 1500 TABLET, EXTENDED RELEASE ORAL at 13:10

## 2025-05-07 RX ADMIN — HYDRALAZINE HYDROCHLORIDE 50 MG: 50 TABLET ORAL at 09:19

## 2025-05-07 RX ADMIN — SODIUM CHLORIDE, PRESERVATIVE FREE 10 ML: 5 INJECTION INTRAVENOUS at 21:40

## 2025-05-07 RX ADMIN — IPRATROPIUM BROMIDE AND ALBUTEROL SULFATE 1 DOSE: 2.5; .5 SOLUTION RESPIRATORY (INHALATION) at 04:02

## 2025-05-07 RX ADMIN — IPRATROPIUM BROMIDE AND ALBUTEROL SULFATE 1 DOSE: 2.5; .5 SOLUTION RESPIRATORY (INHALATION) at 20:41

## 2025-05-07 RX ADMIN — PREDNISONE 20 MG: 5 TABLET ORAL at 21:40

## 2025-05-07 RX ADMIN — CEFTRIAXONE SODIUM 2000 MG: 2 INJECTION, POWDER, FOR SOLUTION INTRAMUSCULAR; INTRAVENOUS at 18:14

## 2025-05-07 RX ADMIN — IPRATROPIUM BROMIDE AND ALBUTEROL SULFATE 1 DOSE: 2.5; .5 SOLUTION RESPIRATORY (INHALATION) at 09:41

## 2025-05-07 RX ADMIN — SODIUM CHLORIDE, PRESERVATIVE FREE 10 ML: 5 INJECTION INTRAVENOUS at 09:27

## 2025-05-07 RX ADMIN — CARVEDILOL 6.25 MG: 3.12 TABLET, FILM COATED ORAL at 18:27

## 2025-05-07 NOTE — CARE COORDINATION
0710: Chart reviewed.    Per notes; patient is on supplemental oxygen, IV ABX and IV Steroids followed via cardiology, pulmonology and speech.    When medically stable, current plan is for patient to return to [facility] where he is a long term care resident.    Updates attached in [facility]Port.    1135: CM consult for NIV/BiPAP machine noted.    Referral sent to [facility] with NIV order and clinicals.    1440: NIV/BiPap updated order being faxed to CM office and pulmonology will sign in the morning.

## 2025-05-07 NOTE — PLAN OF CARE
Problem: Safety - Medical Restraint  Goal: Remains free of injury from restraints (Restraint for Interference with Medical Device)  Description: INTERVENTIONS:1. Determine that other, less restrictive measures have been tried or would not be effective before applying the restraint2. Evaluate the patient's condition at the time of restraint application3. Inform patient/family regarding the reason for restraint4. Q2H: Monitor safety, psychosocial status, comfort, nutrition and hydration  Outcome: Progressing  Flowsheets (Taken 5/7/2025 0451)  Remains free of injury from restraints (restraint for interference with medical device):   Determine that other, less restrictive measures have been tried or would not be effective before applying the restraint   Evaluate the patient's condition at the time of restraint application   Every 2 hours: Monitor safety, psychosocial status, comfort, nutrition and hydration     Problem: Respiratory - Adult  Goal: Achieves optimal ventilation and oxygenation  Outcome: Progressing  Flowsheets  Taken 5/7/2025 0451 by Jeff Reid RN  Achieves optimal ventilation and oxygenation:   Assess for changes in respiratory status   Position to facilitate oxygenation and minimize respiratory effort  Taken 5/6/2025 1600 by Rafiq Herrmann RN  Achieves optimal ventilation and oxygenation: Assess for changes in respiratory status     Problem: Skin/Tissue Integrity  Goal: Skin integrity remains intact  Description: 1.  Monitor for areas of redness and/or skin breakdown2.  Assess vascular access sites hourly3.  Every 4-6 hours minimum:  Change oxygen saturation probe site4.  Every 4-6 hours:  If on nasal continuous positive airway pressure, respiratory therapy assess nares and determine need for appliance change or resting period  Outcome: Progressing  Flowsheets (Taken 5/6/2025 1600 by Rafiq Herrmann RN)  Skin Integrity Remains Intact: Monitor for areas of redness and/or skin

## 2025-05-07 NOTE — PLAN OF CARE
Problem: Pain  Goal: Verbalizes/displays adequate comfort level or baseline comfort level  Outcome: Progressing     Problem: Skin/Tissue Integrity  Goal: Skin integrity remains intact  Description: 1.  Monitor for areas of redness and/or skin breakdown2.  Assess vascular access sites hourly3.  Every 4-6 hours minimum:  Change oxygen saturation probe site4.  Every 4-6 hours:  If on nasal continuous positive airway pressure, respiratory therapy assess nares and determine need for appliance change or resting period  Outcome: Progressing     Problem: Safety - Adult  Goal: Free from fall injury  Outcome: Progressing

## 2025-05-08 LAB
ANION GAP SERPL CALC-SCNC: 4 MMOL/L (ref 2–12)
BACTERIA SPEC CULT: NORMAL
BASOPHILS # BLD: 0.01 K/UL (ref 0–0.1)
BASOPHILS NFR BLD: 0.1 % (ref 0–1)
BUN SERPL-MCNC: 27 MG/DL (ref 6–20)
BUN/CREAT SERPL: 35 (ref 12–20)
CA-I BLD-MCNC: 9 MG/DL (ref 8.5–10.1)
CHLORIDE SERPL-SCNC: 100 MMOL/L (ref 97–108)
CO2 SERPL-SCNC: 34 MMOL/L (ref 21–32)
CREAT SERPL-MCNC: 0.77 MG/DL (ref 0.7–1.3)
DIFFERENTIAL METHOD BLD: ABNORMAL
EOSINOPHIL # BLD: 0 K/UL (ref 0–0.4)
EOSINOPHIL NFR BLD: 0 % (ref 0–7)
ERYTHROCYTE [DISTWIDTH] IN BLOOD BY AUTOMATED COUNT: 20.5 % (ref 11.5–14.5)
GLUCOSE BLD STRIP.AUTO-MCNC: 124 MG/DL (ref 65–100)
GLUCOSE BLD STRIP.AUTO-MCNC: 127 MG/DL (ref 65–100)
GLUCOSE BLD STRIP.AUTO-MCNC: 130 MG/DL (ref 65–100)
GLUCOSE BLD STRIP.AUTO-MCNC: 161 MG/DL (ref 65–100)
GLUCOSE SERPL-MCNC: 98 MG/DL (ref 65–100)
HCT VFR BLD AUTO: 32.7 % (ref 36.6–50.3)
HGB BLD-MCNC: 10.3 G/DL (ref 12.1–17)
IMM GRANULOCYTES # BLD AUTO: 0.06 K/UL (ref 0–0.04)
IMM GRANULOCYTES NFR BLD AUTO: 0.5 % (ref 0–0.5)
LYMPHOCYTES # BLD: 1.25 K/UL (ref 0.8–3.5)
LYMPHOCYTES NFR BLD: 9.4 % (ref 12–49)
Lab: NORMAL
MCH RBC QN AUTO: 22.4 PG (ref 26–34)
MCHC RBC AUTO-ENTMCNC: 31.5 G/DL (ref 30–36.5)
MCV RBC AUTO: 71.2 FL (ref 80–99)
MONOCYTES # BLD: 1.79 K/UL (ref 0–1)
MONOCYTES NFR BLD: 13.4 % (ref 5–13)
NEUTS SEG # BLD: 10.22 K/UL (ref 1.8–8)
NEUTS SEG NFR BLD: 76.6 % (ref 32–75)
NRBC # BLD: 0.02 K/UL (ref 0–0.01)
NRBC BLD-RTO: 0.2 PER 100 WBC
PERFORMED BY:: ABNORMAL
PLATELET # BLD AUTO: 325 K/UL (ref 150–400)
PMV BLD AUTO: 10.2 FL (ref 8.9–12.9)
POTASSIUM SERPL-SCNC: 3.5 MMOL/L (ref 3.5–5.1)
PROCALCITONIN SERPL-MCNC: <0.05 NG/ML
RBC # BLD AUTO: 4.59 M/UL (ref 4.1–5.7)
SODIUM SERPL-SCNC: 138 MMOL/L (ref 136–145)
WBC # BLD AUTO: 13.3 K/UL (ref 4.1–11.1)

## 2025-05-08 PROCEDURE — 84145 PROCALCITONIN (PCT): CPT

## 2025-05-08 PROCEDURE — 97161 PT EVAL LOW COMPLEX 20 MIN: CPT

## 2025-05-08 PROCEDURE — 6370000000 HC RX 637 (ALT 250 FOR IP)

## 2025-05-08 PROCEDURE — 6360000002 HC RX W HCPCS: Performed by: STUDENT IN AN ORGANIZED HEALTH CARE EDUCATION/TRAINING PROGRAM

## 2025-05-08 PROCEDURE — 6360000002 HC RX W HCPCS: Performed by: INTERNAL MEDICINE

## 2025-05-08 PROCEDURE — 2580000003 HC RX 258: Performed by: INTERNAL MEDICINE

## 2025-05-08 PROCEDURE — 6370000000 HC RX 637 (ALT 250 FOR IP): Performed by: INTERNAL MEDICINE

## 2025-05-08 PROCEDURE — 85025 COMPLETE CBC W/AUTO DIFF WBC: CPT

## 2025-05-08 PROCEDURE — 2700000000 HC OXYGEN THERAPY PER DAY

## 2025-05-08 PROCEDURE — 94640 AIRWAY INHALATION TREATMENT: CPT

## 2025-05-08 PROCEDURE — 1100000000 HC RM PRIVATE

## 2025-05-08 PROCEDURE — 97165 OT EVAL LOW COMPLEX 30 MIN: CPT

## 2025-05-08 PROCEDURE — 6360000002 HC RX W HCPCS

## 2025-05-08 PROCEDURE — 80048 BASIC METABOLIC PNL TOTAL CA: CPT

## 2025-05-08 PROCEDURE — 97530 THERAPEUTIC ACTIVITIES: CPT

## 2025-05-08 PROCEDURE — 82962 GLUCOSE BLOOD TEST: CPT

## 2025-05-08 PROCEDURE — 36415 COLL VENOUS BLD VENIPUNCTURE: CPT

## 2025-05-08 PROCEDURE — 92526 ORAL FUNCTION THERAPY: CPT

## 2025-05-08 PROCEDURE — 2500000003 HC RX 250 WO HCPCS: Performed by: INTERNAL MEDICINE

## 2025-05-08 PROCEDURE — 94761 N-INVAS EAR/PLS OXIMETRY MLT: CPT

## 2025-05-08 RX ORDER — FUROSEMIDE 10 MG/ML
80 INJECTION INTRAMUSCULAR; INTRAVENOUS 2 TIMES DAILY
Status: DISCONTINUED | OUTPATIENT
Start: 2025-05-08 | End: 2025-05-10

## 2025-05-08 RX ORDER — FUROSEMIDE 10 MG/ML
40 INJECTION INTRAMUSCULAR; INTRAVENOUS ONCE
Status: DISCONTINUED | OUTPATIENT
Start: 2025-05-08 | End: 2025-05-09

## 2025-05-08 RX ORDER — IPRATROPIUM BROMIDE AND ALBUTEROL SULFATE 2.5; .5 MG/3ML; MG/3ML
1 SOLUTION RESPIRATORY (INHALATION)
Status: DISCONTINUED | OUTPATIENT
Start: 2025-05-08 | End: 2025-05-12 | Stop reason: HOSPADM

## 2025-05-08 RX ADMIN — SODIUM CHLORIDE, PRESERVATIVE FREE 10 ML: 5 INJECTION INTRAVENOUS at 20:40

## 2025-05-08 RX ADMIN — PREDNISONE 20 MG: 5 TABLET ORAL at 20:29

## 2025-05-08 RX ADMIN — SPIRONOLACTONE 25 MG: 25 TABLET ORAL at 09:25

## 2025-05-08 RX ADMIN — CEFTRIAXONE SODIUM 2000 MG: 2 INJECTION, POWDER, FOR SOLUTION INTRAMUSCULAR; INTRAVENOUS at 17:19

## 2025-05-08 RX ADMIN — HYDRALAZINE HYDROCHLORIDE 50 MG: 50 TABLET ORAL at 09:25

## 2025-05-08 RX ADMIN — CARVEDILOL 6.25 MG: 3.12 TABLET, FILM COATED ORAL at 17:20

## 2025-05-08 RX ADMIN — PREDNISONE 20 MG: 5 TABLET ORAL at 09:25

## 2025-05-08 RX ADMIN — HYDRALAZINE HYDROCHLORIDE 50 MG: 50 TABLET ORAL at 13:41

## 2025-05-08 RX ADMIN — BUDESONIDE 500 MCG: 0.5 SUSPENSION RESPIRATORY (INHALATION) at 07:31

## 2025-05-08 RX ADMIN — FUROSEMIDE 40 MG: 10 INJECTION, SOLUTION INTRAMUSCULAR; INTRAVENOUS at 09:25

## 2025-05-08 RX ADMIN — HYDRALAZINE HYDROCHLORIDE 50 MG: 50 TABLET ORAL at 20:29

## 2025-05-08 RX ADMIN — ENOXAPARIN SODIUM 160 MG: 100 INJECTION SUBCUTANEOUS at 09:25

## 2025-05-08 RX ADMIN — ACETAMINOPHEN 650 MG: 325 TABLET ORAL at 09:40

## 2025-05-08 RX ADMIN — SODIUM CHLORIDE, PRESERVATIVE FREE 10 ML: 5 INJECTION INTRAVENOUS at 09:25

## 2025-05-08 RX ADMIN — ATORVASTATIN CALCIUM 20 MG: 20 TABLET, FILM COATED ORAL at 20:29

## 2025-05-08 RX ADMIN — ENOXAPARIN SODIUM 160 MG: 100 INJECTION SUBCUTANEOUS at 20:31

## 2025-05-08 RX ADMIN — FUROSEMIDE 80 MG: 10 INJECTION, SOLUTION INTRAMUSCULAR; INTRAVENOUS at 17:17

## 2025-05-08 RX ADMIN — CARVEDILOL 6.25 MG: 3.12 TABLET, FILM COATED ORAL at 09:25

## 2025-05-08 RX ADMIN — AZITHROMYCIN MONOHYDRATE 500 MG: 500 INJECTION, POWDER, LYOPHILIZED, FOR SOLUTION INTRAVENOUS at 17:15

## 2025-05-08 RX ADMIN — IPRATROPIUM BROMIDE AND ALBUTEROL SULFATE 1 DOSE: 2.5; .5 SOLUTION RESPIRATORY (INHALATION) at 19:30

## 2025-05-08 RX ADMIN — BUDESONIDE 500 MCG: 0.5 SUSPENSION RESPIRATORY (INHALATION) at 19:30

## 2025-05-08 RX ADMIN — IPRATROPIUM BROMIDE AND ALBUTEROL SULFATE 1 DOSE: 2.5; .5 SOLUTION RESPIRATORY (INHALATION) at 07:31

## 2025-05-08 ASSESSMENT — PAIN SCALES - GENERAL
PAINLEVEL_OUTOF10: 8
PAINLEVEL_OUTOF10: 0

## 2025-05-08 ASSESSMENT — PAIN SCALES - WONG BAKER: WONGBAKER_NUMERICALRESPONSE: HURTS A LITTLE BIT

## 2025-05-08 NOTE — CARE COORDINATION
NIV prescription signed and filled out by physician. Faxed and emailed back to [facility] for processing.       1450: NIV approved, waiting delivery either to hospital vs SNF [facility].

## 2025-05-08 NOTE — PLAN OF CARE
Speech LAnguage Pathology Dysphagia TREATMENT    Patient: Yessy Puckett (69 y.o. male)  Date: 5/8/2025  Primary Diagnosis: CO2 retention [E87.29]  COPD exacerbation ([facility]) [J44.1]  Congestive heart failure, unspecified HF chronicity, unspecified heart failure type ([facility]) [I50.9]       Precautions: Aspiration, GERD, Bed Alarm, Fall Risk                  Time In: 1458  Time Out: 1509    DIET RECOMMENDATIONS: Regular and thin liquids    SWALLOW SAFETY PRECAUTIONS: meds whole with thin liquid as tolerated. Small bites/sips. Slow rate of intake. Alternate bites/sips.  6 small snacks/meals. Upright 1h after PO. Do not eat 3h before bedtime.     ASSESSMENT :    Oropharyngeal dysphagia, mild    RN reports no concerns for diet tolerance. Admitted for COPD exacerbation.   Awake and alert. Very confused. Repeatedly stated that he thought he was at home despite reorientation. Tangential and often somewhat nonsensical.     Mildly thick liquid, thin liquid, pureed solid, regular texture solid  Unable to self-feed w/o assistance.  Bolus retrieval: timely, consistently impulsive taking very large sequential sips (4-5 per 4 oz cup) requiring mod cues and SLP removing straw to regulate for small sips.   Anterior loss: none  Oral prep: w/ regular --timely, grossly functional  Oral transit: w/thin perhaps mildly rapid at times  Oral clearance: functional  Swallow elicit: possible inconsistent delay w/ large sips of thin  Globus sensation: none reported  S/s of aspiration: none      Rec diet upgrade from soft and bite-sized / mildly thick liquids to regular texture solids / thin liquids.   SLP to follow to ensure diet tolerance.       Patient will benefit from skilled intervention to address the above impairments.  GOALS:    Problem: SLP Adult - Impaired Swallowing  Goal: By Discharge: Advance to least restrictive diet without signs or symptoms of aspiration for planned discharge setting.  See evaluation for individualized

## 2025-05-08 NOTE — PLAN OF CARE
PHYSICAL THERAPY EVALUATION  Patient: Yessy Puckett (69 y.o. male)  Date: 5/8/2025  Primary Diagnosis: CO2 retention [E87.29]  COPD exacerbation ([facility]) [J44.1]  Congestive heart failure, unspecified HF chronicity, unspecified heart failure type ([facility]) [I50.9]       Precautions: Bed Alarm, Fall Risk                      Recommendations for nursing mobility: Encourage HEP in prep for ADLs/mobility; see handout for details, Frequent repositioning to prevent skin breakdown, Use of bed/chair alarm for safety, and LE elevation for management of edema    In place during session: Peripheral IV, External Catheter, and EKG/telemetry     ASSESSMENT  Pt is a 69 y.o. male admitted on 5/4/2025 for c/o worsening shortness of breath, ; pt currently being treated for acute hypercapnic respiratory failure, COPD exacerbation, pulmonary edema, sleep apnea, paroxysmal A-fib, acute decompensated heart failure with systolic dysfunction, AMS, leukocytosis, HTN, T2DM, anemia. . Pt semi supine upon PT/OT arrival, agreeable to evaluation. Pt A&O x 1.      Based on the objective data described below, the patient currently presents with impaired ability to perform high-level IADLs, decreased ROM, impaired strength, poor body mechanics, poor safety awareness, impaired cognition, impaired balance, and impaired posture. (See below for objective details and assist levels).     Overall pt tolerated session poor/fair today with therapy,LLE pain. Pt required mod/max Ax2 for bed mobility andmultimodal cueing to roll onto side then transfer from lying to sitting at EOB.;Pt requiring cues for BUE support on bed to support sitting balance. Pt initially requiring mod A to maintain sitting balance, progressing to min A. Pt requiring max A for sock management sitting at EOB with OT with assist for balance . Pt requesting to return back to semi supine -- assisted back with max A x2. Pt scooted superiorly with x2 assist. Pt re-oriented to being in hospital

## 2025-05-08 NOTE — PLAN OF CARE
Problem: Pain  Goal: Verbalizes/displays adequate comfort level or baseline comfort level  5/7/2025 2339 by Genesis Flowers RN  Outcome: Progressing  5/7/2025 1855 by Kimberly Ramsey RN  Outcome: Progressing     Problem: Skin/Tissue Integrity  Goal: Skin integrity remains intact  Description: 1.  Monitor for areas of redness and/or skin breakdown2.  Assess vascular access sites hourly3.  Every 4-6 hours minimum:  Change oxygen saturation probe site4.  Every 4-6 hours:  If on nasal continuous positive airway pressure, respiratory therapy assess nares and determine need for appliance change or resting period  5/7/2025 2339 by Genesis Flowers RN  Outcome: Progressing  Flowsheets (Taken 5/7/2025 1945)  Skin Integrity Remains Intact: Monitor for areas of redness and/or skin breakdown  5/7/2025 1855 by Kimberly Ramsey RN  Outcome: Progressing       Problem: Chronic Conditions and Co-morbidities  Goal: Patient's chronic conditions and co-morbidity symptoms are monitored and maintained or improved  Outcome: Progressing     Problem: Safety - Adult  Goal: Free from fall injury  5/7/2025 2339 by Genesis Flowers RN  Outcome: Progressing  5/7/2025 1855 by Kimberly Ramsey RN  Outcome: Progressing     Problem: Respiratory - Adult  Goal: Achieves optimal ventilation and oxygenation  Outcome: Progressing     Problem: Cardiovascular - Adult  Goal: Maintains optimal cardiac output and hemodynamic stability  Outcome: Progressing  Goal: Absence of cardiac dysrhythmias or at baseline  Outcome: Progressing     Problem: Musculoskeletal - Adult  Goal: Return mobility to safest level of function  Outcome: Progressing  Goal: Return ADL status to a safe level of function  Outcome: Progressing     Problem: Safety - Medical Restraint  Goal: Remains free of injury from restraints (Restraint for Interference with Medical Device)  Description: INTERVENTIONS:1. Determine that other, less restrictive measures have been tried or would not

## 2025-05-08 NOTE — PLAN OF CARE
Problem: Chronic Conditions and Co-morbidities  Goal: Patient's chronic conditions and co-morbidity symptoms are monitored and maintained or improved  5/8/2025 0815 by Rafiq Herrmann RN  Outcome: Progressing  5/7/2025 2339 by Genesis Flowers RN  Outcome: Progressing     Problem: Discharge Planning  Goal: Discharge to home or other facility with appropriate resources  5/8/2025 0815 by Rafiq Herrmann RN  Outcome: Progressing  5/7/2025 2339 by Genesis Flowers RN  Outcome: Progressing  Flowsheets (Taken 5/7/2025 1945)  Discharge to home or other facility with appropriate resources: Identify barriers to discharge with patient and caregiver     Problem: Pain  Goal: Verbalizes/displays adequate comfort level or baseline comfort level  5/8/2025 0815 by Rafiq Herrmann RN  Outcome: Progressing  5/7/2025 2339 by Genesis Flowers RN  Outcome: Progressing  5/7/2025 1855 by Kimberly Ramsey RN  Outcome: Progressing     Problem: Skin/Tissue Integrity  Goal: Skin integrity remains intact  Description: 1.  Monitor for areas of redness and/or skin breakdown2.  Assess vascular access sites hourly3.  Every 4-6 hours minimum:  Change oxygen saturation probe site4.  Every 4-6 hours:  If on nasal continuous positive airway pressure, respiratory therapy assess nares and determine need for appliance change or resting period  5/8/2025 0815 by Rafiq Herrmann RN  Outcome: Progressing  5/7/2025 2339 by Genesis Flowers RN  Outcome: Progressing  Flowsheets (Taken 5/7/2025 1945)  Skin Integrity Remains Intact: Monitor for areas of redness and/or skin breakdown  5/7/2025 1855 by Kimberly Ramsey RN  Outcome: Progressing     Problem: Neurosensory - Adult  Goal: Achieves stable or improved neurological status  5/7/2025 2339 by Genesis Flowers RN  Outcome: Progressing     Problem: Neurosensory - Adult  Goal: Achieves maximal functionality and self care  5/7/2025 2339 by Genesis Flowers RN  Outcome: Progressing     Problem:

## 2025-05-08 NOTE — PLAN OF CARE
OCCUPATIONAL THERAPY EVALUATION  Patient: Yessy Puckett (69 y.o. male)  Date: 5/8/2025  Primary Diagnosis: CO2 retention [E87.29]  COPD exacerbation ([facility]) [J44.1]  Congestive heart failure, unspecified HF chronicity, unspecified heart failure type ([facility]) [I50.9]       Precautions: Bed Alarm, Fall Risk                Recommendations for nursing mobility: Encourage HEP in prep for ADLs/mobility; see handout for details, Frequent repositioning to prevent skin breakdown, Use of bed/chair alarm for safety, and Assist x2    In place during session:Peripheral IV, External Catheter, and EKG/telemetry   ASSESSMENT  Pt is a 69 y.o. male presenting to [facility] with c/o worsening shortness of breath, admitted 5/4/25 and currently being treated for acute hypercapnic respiratory failure, COPD exacerbation, pulmonary edema, sleep apnea, paroxysmal A-fib, acute decompensated heart failure with systolic dysfunction, AMS, leukocytosis, HTN, T2DM, anemia. Pt received semi-supine in bed upon arrival, AXO x1, and agreeable to OT evaluation.     Based on current observations, pt presents with decreased  functional mobility, independence in ADLs, high-level IADLs, ROM, strength, activity tolerance, endurance, safety awareness, cognition, attention/concentration, balance, increased pain levels (see below for objective details and assist levels).     Overall, pt tolerates session fair with c/o some pain to LLE. Pt requiring mod/max A and multimodal cueing to roll onto side then transfer from lying to sitting at EOB. Pt requiring cues for BUE support on bed to support sitting balance. Pt initially requiring mod A to maintain sitting balance, progressing to min A. Pt requiring max A for sock management sitting at EOB. Pt at EOB stating he \"doesn't feel right\". Pt requesting to return back to semi supine -- assisted back with max A x2. Pt scooted superiorly with x2 assist. Pt re-oriented to being in hospital multiple times t/o session, as pt

## 2025-05-09 LAB
ANION GAP SERPL CALC-SCNC: 3 MMOL/L (ref 2–12)
BASOPHILS # BLD: 0.01 K/UL (ref 0–0.1)
BASOPHILS NFR BLD: 0.1 % (ref 0–1)
BUN SERPL-MCNC: 26 MG/DL (ref 6–20)
BUN/CREAT SERPL: 38 (ref 12–20)
CA-I BLD-MCNC: 8.7 MG/DL (ref 8.5–10.1)
CHLORIDE SERPL-SCNC: 101 MMOL/L (ref 97–108)
CO2 SERPL-SCNC: 34 MMOL/L (ref 21–32)
CREAT SERPL-MCNC: 0.68 MG/DL (ref 0.7–1.3)
DIFFERENTIAL METHOD BLD: ABNORMAL
EOSINOPHIL # BLD: 0 K/UL (ref 0–0.4)
EOSINOPHIL NFR BLD: 0 % (ref 0–7)
ERYTHROCYTE [DISTWIDTH] IN BLOOD BY AUTOMATED COUNT: 20.5 % (ref 11.5–14.5)
GLUCOSE BLD STRIP.AUTO-MCNC: 145 MG/DL (ref 65–100)
GLUCOSE BLD STRIP.AUTO-MCNC: 153 MG/DL (ref 65–100)
GLUCOSE BLD STRIP.AUTO-MCNC: 156 MG/DL (ref 65–100)
GLUCOSE BLD STRIP.AUTO-MCNC: 161 MG/DL (ref 65–100)
GLUCOSE SERPL-MCNC: 110 MG/DL (ref 65–100)
HCT VFR BLD AUTO: 33.7 % (ref 36.6–50.3)
HGB BLD-MCNC: 10.8 G/DL (ref 12.1–17)
IMM GRANULOCYTES # BLD AUTO: 0.08 K/UL (ref 0–0.04)
IMM GRANULOCYTES NFR BLD AUTO: 0.5 % (ref 0–0.5)
LYMPHOCYTES # BLD: 1.23 K/UL (ref 0.8–3.5)
LYMPHOCYTES NFR BLD: 8.1 % (ref 12–49)
MCH RBC QN AUTO: 22.7 PG (ref 26–34)
MCHC RBC AUTO-ENTMCNC: 32 G/DL (ref 30–36.5)
MCV RBC AUTO: 70.9 FL (ref 80–99)
MONOCYTES # BLD: 1.37 K/UL (ref 0–1)
MONOCYTES NFR BLD: 9 % (ref 5–13)
NEUTS SEG # BLD: 12.55 K/UL (ref 1.8–8)
NEUTS SEG NFR BLD: 82.3 % (ref 32–75)
NRBC # BLD: 0 K/UL (ref 0–0.01)
NRBC BLD-RTO: 0 PER 100 WBC
PERFORMED BY:: ABNORMAL
PLATELET # BLD AUTO: 313 K/UL (ref 150–400)
PMV BLD AUTO: 9.9 FL (ref 8.9–12.9)
POTASSIUM SERPL-SCNC: 3.6 MMOL/L (ref 3.5–5.1)
PROCALCITONIN SERPL-MCNC: <0.05 NG/ML
RBC # BLD AUTO: 4.75 M/UL (ref 4.1–5.7)
SODIUM SERPL-SCNC: 138 MMOL/L (ref 136–145)
WBC # BLD AUTO: 15.2 K/UL (ref 4.1–11.1)

## 2025-05-09 PROCEDURE — 2500000003 HC RX 250 WO HCPCS: Performed by: INTERNAL MEDICINE

## 2025-05-09 PROCEDURE — 6370000000 HC RX 637 (ALT 250 FOR IP)

## 2025-05-09 PROCEDURE — 94761 N-INVAS EAR/PLS OXIMETRY MLT: CPT

## 2025-05-09 PROCEDURE — 6370000000 HC RX 637 (ALT 250 FOR IP): Performed by: INTERNAL MEDICINE

## 2025-05-09 PROCEDURE — 80048 BASIC METABOLIC PNL TOTAL CA: CPT

## 2025-05-09 PROCEDURE — 94640 AIRWAY INHALATION TREATMENT: CPT

## 2025-05-09 PROCEDURE — 85025 COMPLETE CBC W/AUTO DIFF WBC: CPT

## 2025-05-09 PROCEDURE — 6360000002 HC RX W HCPCS: Performed by: STUDENT IN AN ORGANIZED HEALTH CARE EDUCATION/TRAINING PROGRAM

## 2025-05-09 PROCEDURE — 82962 GLUCOSE BLOOD TEST: CPT

## 2025-05-09 PROCEDURE — 1100000000 HC RM PRIVATE

## 2025-05-09 PROCEDURE — 84145 PROCALCITONIN (PCT): CPT

## 2025-05-09 PROCEDURE — 6360000002 HC RX W HCPCS

## 2025-05-09 PROCEDURE — 2700000000 HC OXYGEN THERAPY PER DAY

## 2025-05-09 PROCEDURE — 36415 COLL VENOUS BLD VENIPUNCTURE: CPT

## 2025-05-09 RX ORDER — PREDNISONE 20 MG/1
20 TABLET ORAL DAILY
Status: DISCONTINUED | OUTPATIENT
Start: 2025-05-09 | End: 2025-05-12 | Stop reason: HOSPADM

## 2025-05-09 RX ORDER — QUETIAPINE FUMARATE 25 MG/1
25 TABLET, FILM COATED ORAL 2 TIMES DAILY
Status: DISCONTINUED | OUTPATIENT
Start: 2025-05-09 | End: 2025-05-12 | Stop reason: HOSPADM

## 2025-05-09 RX ORDER — POTASSIUM CHLORIDE 1500 MG/1
40 TABLET, EXTENDED RELEASE ORAL ONCE
Status: COMPLETED | OUTPATIENT
Start: 2025-05-09 | End: 2025-05-09

## 2025-05-09 RX ADMIN — PREDNISONE 20 MG: 20 TABLET ORAL at 09:59

## 2025-05-09 RX ADMIN — ENOXAPARIN SODIUM 160 MG: 100 INJECTION SUBCUTANEOUS at 09:54

## 2025-05-09 RX ADMIN — HYDRALAZINE HYDROCHLORIDE 50 MG: 50 TABLET ORAL at 22:11

## 2025-05-09 RX ADMIN — CARVEDILOL 6.25 MG: 3.12 TABLET, FILM COATED ORAL at 10:00

## 2025-05-09 RX ADMIN — FUROSEMIDE 80 MG: 10 INJECTION, SOLUTION INTRAMUSCULAR; INTRAVENOUS at 10:00

## 2025-05-09 RX ADMIN — SODIUM CHLORIDE, PRESERVATIVE FREE 10 ML: 5 INJECTION INTRAVENOUS at 10:14

## 2025-05-09 RX ADMIN — ENOXAPARIN SODIUM 160 MG: 100 INJECTION SUBCUTANEOUS at 22:11

## 2025-05-09 RX ADMIN — QUETIAPINE FUMARATE 25 MG: 25 TABLET ORAL at 22:12

## 2025-05-09 RX ADMIN — FUROSEMIDE 80 MG: 10 INJECTION, SOLUTION INTRAMUSCULAR; INTRAVENOUS at 17:13

## 2025-05-09 RX ADMIN — IPRATROPIUM BROMIDE AND ALBUTEROL SULFATE 1 DOSE: 2.5; .5 SOLUTION RESPIRATORY (INHALATION) at 20:12

## 2025-05-09 RX ADMIN — QUETIAPINE FUMARATE 25 MG: 25 TABLET ORAL at 13:46

## 2025-05-09 RX ADMIN — BUDESONIDE 500 MCG: 0.5 SUSPENSION RESPIRATORY (INHALATION) at 08:18

## 2025-05-09 RX ADMIN — SODIUM CHLORIDE, PRESERVATIVE FREE 10 ML: 5 INJECTION INTRAVENOUS at 22:12

## 2025-05-09 RX ADMIN — ATORVASTATIN CALCIUM 20 MG: 20 TABLET, FILM COATED ORAL at 22:12

## 2025-05-09 RX ADMIN — IPRATROPIUM BROMIDE AND ALBUTEROL SULFATE 1 DOSE: 2.5; .5 SOLUTION RESPIRATORY (INHALATION) at 08:18

## 2025-05-09 RX ADMIN — CARVEDILOL 6.25 MG: 3.12 TABLET, FILM COATED ORAL at 17:13

## 2025-05-09 RX ADMIN — BUDESONIDE 500 MCG: 0.5 SUSPENSION RESPIRATORY (INHALATION) at 20:12

## 2025-05-09 RX ADMIN — HYDRALAZINE HYDROCHLORIDE 50 MG: 50 TABLET ORAL at 10:00

## 2025-05-09 RX ADMIN — SPIRONOLACTONE 25 MG: 25 TABLET ORAL at 10:00

## 2025-05-09 RX ADMIN — POTASSIUM CHLORIDE 40 MEQ: 1500 TABLET, EXTENDED RELEASE ORAL at 10:00

## 2025-05-09 ASSESSMENT — PAIN SCALES - GENERAL: PAINLEVEL_OUTOF10: 0

## 2025-05-09 NOTE — FLOWSHEET NOTE
05/09/25 1009   Treatment Team Notification   Reason for Communication Abnormal vitals   Type of Critical Result POC test   Critical Lab Information DI = 53   Critical Lab Result Type Other (comment)  (DI=53)   Critical POC Result Type Other (comment)  (DI= 53)   Name of Team Member Notified Tre Vázquez MD   Treatment Team Role Attending Provider   Method of Communication Secure Message   Response Waiting for response   Notification Time 1012   Deterioration Index RN Interventions Performed  Provider Notified of Clinical Concerns

## 2025-05-09 NOTE — PLAN OF CARE
Problem: Discharge Planning  Goal: Discharge to home or other facility with appropriate resources  5/8/2025 2131 by REBEKAH Ware RN  Outcome: Progressing  5/8/2025 0815 by Rafiq Herrmann RN  Outcome: Progressing  Flowsheets (Taken 5/8/2025 0800)  Discharge to home or other facility with appropriate resources: Identify barriers to discharge with patient and caregiver     Problem: Pain  Goal: Verbalizes/displays adequate comfort level or baseline comfort level  5/8/2025 2131 by REBEKAH Ware RN  Outcome: Progressing  5/8/2025 0815 by Rafiq Herrmann RN  Outcome: Progressing     Problem: Skin/Tissue Integrity  Goal: Skin integrity remains intact  Description: 1.  Monitor for areas of redness and/or skin breakdown2.  Assess vascular access sites hourly3.  Every 4-6 hours minimum:  Change oxygen saturation probe site4.  Every 4-6 hours:  If on nasal continuous positive airway pressure, respiratory therapy assess nares and determine need for appliance change or resting period  5/8/2025 2131 by REBEKAH Ware RN  Outcome: Progressing  5/8/2025 0815 by Rafiq Herrmann RN  Outcome: Progressing  Flowsheets (Taken 5/8/2025 0800)  Skin Integrity Remains Intact:   Monitor for areas of redness and/or skin breakdown   Assess vascular access sites hourly     Problem: Safety - Adult  Goal: Free from fall injury  5/8/2025 2131 by REBEKAH Ware RN  Outcome: Progressing  5/8/2025 0815 by Rafiq Herrmann RN  Outcome: Progressing     Problem: Physical Therapy - Adult  Goal: By Discharge: Performs mobility at highest level of function for planned discharge setting.  See evaluation for individualized goals.  Description: FUNCTIONAL STATUS PRIOR TO ADMISSION: The patient  required maximum assistance for basic and instrumental ADLs.    HOME SUPPORT PRIOR TO ADMISSION: Patient is from LTC and required max assist for transfers.    Physical Therapy Goals  Initiated 5/8/2025  Pt stated goal: Unstated  Pt will

## 2025-05-09 NOTE — FLOWSHEET NOTE
05/09/25 1145   Treatment Team Notification   Reason for Communication Patient/Family request  (Son is at bedside and pt is having hallucinations talking to his dead wife; son states this is not normal for him and would like to speak with the doctor.)   Name of Team Member Notified Tre Vázquez MD   Treatment Team Role Attending Provider   Method of Communication Secure Message   Response Waiting for response   Notification Time 1146     The son is requesting to be called by the provider:      Son:  Angel [phone]

## 2025-05-09 NOTE — CARE COORDINATION
CM reviewed chart. Per [facility], they need to get a contract with [place] to allow equipment. They stated they are working on that now.    Once clinically stable AND NIV is setup at facility, patient will go back to [facility].

## 2025-05-10 LAB
ANION GAP SERPL CALC-SCNC: 6 MMOL/L (ref 2–12)
BASOPHILS # BLD: 0.02 K/UL (ref 0–0.1)
BASOPHILS NFR BLD: 0.1 % (ref 0–1)
BUN SERPL-MCNC: 22 MG/DL (ref 6–20)
BUN/CREAT SERPL: 34 (ref 12–20)
CA-I BLD-MCNC: 8.3 MG/DL (ref 8.5–10.1)
CHLORIDE SERPL-SCNC: 100 MMOL/L (ref 97–108)
CO2 SERPL-SCNC: 33 MMOL/L (ref 21–32)
CREAT SERPL-MCNC: 0.64 MG/DL (ref 0.7–1.3)
DIFFERENTIAL METHOD BLD: ABNORMAL
EOSINOPHIL # BLD: 0.08 K/UL (ref 0–0.4)
EOSINOPHIL NFR BLD: 0.5 % (ref 0–7)
ERYTHROCYTE [DISTWIDTH] IN BLOOD BY AUTOMATED COUNT: 21 % (ref 11.5–14.5)
GLUCOSE BLD STRIP.AUTO-MCNC: 114 MG/DL (ref 65–100)
GLUCOSE BLD STRIP.AUTO-MCNC: 123 MG/DL (ref 65–100)
GLUCOSE BLD STRIP.AUTO-MCNC: 140 MG/DL (ref 65–100)
GLUCOSE BLD STRIP.AUTO-MCNC: 154 MG/DL (ref 65–100)
GLUCOSE SERPL-MCNC: 103 MG/DL (ref 65–100)
HCT VFR BLD AUTO: 34.3 % (ref 36.6–50.3)
HGB BLD-MCNC: 11.4 G/DL (ref 12.1–17)
IMM GRANULOCYTES # BLD AUTO: 0.09 K/UL (ref 0–0.04)
IMM GRANULOCYTES NFR BLD AUTO: 0.5 % (ref 0–0.5)
LYMPHOCYTES # BLD: 2.33 K/UL (ref 0.8–3.5)
LYMPHOCYTES NFR BLD: 14 % (ref 12–49)
MCH RBC QN AUTO: 23.6 PG (ref 26–34)
MCHC RBC AUTO-ENTMCNC: 33.2 G/DL (ref 30–36.5)
MCV RBC AUTO: 71 FL (ref 80–99)
MONOCYTES # BLD: 1.77 K/UL (ref 0–1)
MONOCYTES NFR BLD: 10.7 % (ref 5–13)
NEUTS SEG # BLD: 12.31 K/UL (ref 1.8–8)
NEUTS SEG NFR BLD: 74.2 % (ref 32–75)
NRBC # BLD: 0 K/UL (ref 0–0.01)
NRBC BLD-RTO: 0 PER 100 WBC
PERFORMED BY:: ABNORMAL
PLATELET # BLD AUTO: 325 K/UL (ref 150–400)
PMV BLD AUTO: 9.3 FL (ref 8.9–12.9)
POTASSIUM SERPL-SCNC: 3.2 MMOL/L (ref 3.5–5.1)
POTASSIUM SERPL-SCNC: 3.4 MMOL/L (ref 3.5–5.1)
RBC # BLD AUTO: 4.83 M/UL (ref 4.1–5.7)
SODIUM SERPL-SCNC: 139 MMOL/L (ref 136–145)
WBC # BLD AUTO: 16.6 K/UL (ref 4.1–11.1)

## 2025-05-10 PROCEDURE — 6370000000 HC RX 637 (ALT 250 FOR IP): Performed by: HOSPITALIST

## 2025-05-10 PROCEDURE — 2580000003 HC RX 258: Performed by: INTERNAL MEDICINE

## 2025-05-10 PROCEDURE — 6370000000 HC RX 637 (ALT 250 FOR IP)

## 2025-05-10 PROCEDURE — 6360000002 HC RX W HCPCS: Performed by: STUDENT IN AN ORGANIZED HEALTH CARE EDUCATION/TRAINING PROGRAM

## 2025-05-10 PROCEDURE — 94640 AIRWAY INHALATION TREATMENT: CPT

## 2025-05-10 PROCEDURE — 1100000000 HC RM PRIVATE

## 2025-05-10 PROCEDURE — 6370000000 HC RX 637 (ALT 250 FOR IP): Performed by: INTERNAL MEDICINE

## 2025-05-10 PROCEDURE — 85025 COMPLETE CBC W/AUTO DIFF WBC: CPT

## 2025-05-10 PROCEDURE — 80048 BASIC METABOLIC PNL TOTAL CA: CPT

## 2025-05-10 PROCEDURE — 2700000000 HC OXYGEN THERAPY PER DAY

## 2025-05-10 PROCEDURE — 84132 ASSAY OF SERUM POTASSIUM: CPT

## 2025-05-10 PROCEDURE — 36415 COLL VENOUS BLD VENIPUNCTURE: CPT

## 2025-05-10 PROCEDURE — 6360000002 HC RX W HCPCS: Performed by: INTERNAL MEDICINE

## 2025-05-10 PROCEDURE — 94761 N-INVAS EAR/PLS OXIMETRY MLT: CPT

## 2025-05-10 PROCEDURE — 82962 GLUCOSE BLOOD TEST: CPT

## 2025-05-10 PROCEDURE — 2500000003 HC RX 250 WO HCPCS: Performed by: INTERNAL MEDICINE

## 2025-05-10 RX ORDER — TORSEMIDE 10 MG/1
40 TABLET ORAL DAILY
Status: DISCONTINUED | OUTPATIENT
Start: 2025-05-10 | End: 2025-05-12 | Stop reason: HOSPADM

## 2025-05-10 RX ORDER — POTASSIUM CHLORIDE 1500 MG/1
40 TABLET, EXTENDED RELEASE ORAL
Status: COMPLETED | OUTPATIENT
Start: 2025-05-10 | End: 2025-05-10

## 2025-05-10 RX ADMIN — TORSEMIDE 40 MG: 10 TABLET ORAL at 11:05

## 2025-05-10 RX ADMIN — ATORVASTATIN CALCIUM 20 MG: 20 TABLET, FILM COATED ORAL at 20:46

## 2025-05-10 RX ADMIN — IPRATROPIUM BROMIDE AND ALBUTEROL SULFATE 1 DOSE: 2.5; .5 SOLUTION RESPIRATORY (INHALATION) at 19:23

## 2025-05-10 RX ADMIN — SPIRONOLACTONE 25 MG: 25 TABLET ORAL at 10:57

## 2025-05-10 RX ADMIN — POTASSIUM CHLORIDE 10 MEQ: 7.46 INJECTION, SOLUTION INTRAVENOUS at 11:22

## 2025-05-10 RX ADMIN — POTASSIUM CHLORIDE 40 MEQ: 1500 TABLET, EXTENDED RELEASE ORAL at 11:04

## 2025-05-10 RX ADMIN — QUETIAPINE FUMARATE 25 MG: 25 TABLET ORAL at 10:58

## 2025-05-10 RX ADMIN — HYDRALAZINE HYDROCHLORIDE 50 MG: 50 TABLET ORAL at 10:58

## 2025-05-10 RX ADMIN — POTASSIUM CHLORIDE 10 MEQ: 7.46 INJECTION, SOLUTION INTRAVENOUS at 11:31

## 2025-05-10 RX ADMIN — BUDESONIDE 500 MCG: 0.5 SUSPENSION RESPIRATORY (INHALATION) at 08:56

## 2025-05-10 RX ADMIN — SODIUM CHLORIDE, PRESERVATIVE FREE 10 ML: 5 INJECTION INTRAVENOUS at 10:59

## 2025-05-10 RX ADMIN — SODIUM CHLORIDE, PRESERVATIVE FREE 10 ML: 5 INJECTION INTRAVENOUS at 20:47

## 2025-05-10 RX ADMIN — BUDESONIDE 500 MCG: 0.5 SUSPENSION RESPIRATORY (INHALATION) at 19:23

## 2025-05-10 RX ADMIN — ENOXAPARIN SODIUM 160 MG: 100 INJECTION SUBCUTANEOUS at 20:46

## 2025-05-10 RX ADMIN — CARVEDILOL 6.25 MG: 3.12 TABLET, FILM COATED ORAL at 10:51

## 2025-05-10 RX ADMIN — IPRATROPIUM BROMIDE AND ALBUTEROL SULFATE 1 DOSE: 2.5; .5 SOLUTION RESPIRATORY (INHALATION) at 08:56

## 2025-05-10 RX ADMIN — SODIUM CHLORIDE: 0.9 INJECTION, SOLUTION INTRAVENOUS at 11:49

## 2025-05-10 RX ADMIN — QUETIAPINE FUMARATE 25 MG: 25 TABLET ORAL at 20:46

## 2025-05-10 RX ADMIN — ENOXAPARIN SODIUM 160 MG: 100 INJECTION SUBCUTANEOUS at 10:51

## 2025-05-10 RX ADMIN — PREDNISONE 20 MG: 20 TABLET ORAL at 10:57

## 2025-05-10 RX ADMIN — POTASSIUM CHLORIDE 10 MEQ: 7.46 INJECTION, SOLUTION INTRAVENOUS at 11:32

## 2025-05-10 RX ADMIN — POTASSIUM CHLORIDE 10 MEQ: 7.46 INJECTION, SOLUTION INTRAVENOUS at 11:29

## 2025-05-10 RX ADMIN — POTASSIUM CHLORIDE 40 MEQ: 1500 TABLET, EXTENDED RELEASE ORAL at 13:54

## 2025-05-10 ASSESSMENT — PAIN SCALES - GENERAL: PAINLEVEL_OUTOF10: 0

## 2025-05-10 NOTE — PLAN OF CARE
Care plan reviewed; pt progressing as documented.   Area M Indication Text: Tumors in this location are included in Area M (cheek, forehead, scalp, neck, jawline and pretibial skin).  Mohs surgery is indicated for tumors in these anatomic locations.

## 2025-05-11 LAB
GLUCOSE BLD STRIP.AUTO-MCNC: 140 MG/DL (ref 65–100)
GLUCOSE BLD STRIP.AUTO-MCNC: 149 MG/DL (ref 65–100)
GLUCOSE BLD STRIP.AUTO-MCNC: 223 MG/DL (ref 65–100)
GLUCOSE BLD STRIP.AUTO-MCNC: 94 MG/DL (ref 65–100)
PERFORMED BY:: ABNORMAL
PERFORMED BY:: NORMAL

## 2025-05-11 PROCEDURE — 6370000000 HC RX 637 (ALT 250 FOR IP): Performed by: INTERNAL MEDICINE

## 2025-05-11 PROCEDURE — 94640 AIRWAY INHALATION TREATMENT: CPT

## 2025-05-11 PROCEDURE — 6370000000 HC RX 637 (ALT 250 FOR IP)

## 2025-05-11 PROCEDURE — 2500000003 HC RX 250 WO HCPCS: Performed by: INTERNAL MEDICINE

## 2025-05-11 PROCEDURE — 6360000002 HC RX W HCPCS: Performed by: STUDENT IN AN ORGANIZED HEALTH CARE EDUCATION/TRAINING PROGRAM

## 2025-05-11 PROCEDURE — 2700000000 HC OXYGEN THERAPY PER DAY

## 2025-05-11 PROCEDURE — 1100000000 HC RM PRIVATE

## 2025-05-11 PROCEDURE — 94761 N-INVAS EAR/PLS OXIMETRY MLT: CPT

## 2025-05-11 PROCEDURE — 6370000000 HC RX 637 (ALT 250 FOR IP): Performed by: HOSPITALIST

## 2025-05-11 PROCEDURE — 82962 GLUCOSE BLOOD TEST: CPT

## 2025-05-11 RX ORDER — METHYLPREDNISOLONE SODIUM SUCCINATE 40 MG/ML
40 INJECTION INTRAMUSCULAR; INTRAVENOUS ONCE
Status: COMPLETED | OUTPATIENT
Start: 2025-05-11 | End: 2025-05-12

## 2025-05-11 RX ORDER — PREDNISONE 20 MG/1
20 TABLET ORAL DAILY
Qty: 10 TABLET | Refills: 0 | Status: SHIPPED | OUTPATIENT
Start: 2025-05-12 | End: 2025-05-22

## 2025-05-11 RX ORDER — INSULIN LISPRO 100 [IU]/ML
0-4 INJECTION, SOLUTION INTRAVENOUS; SUBCUTANEOUS
Qty: 10 ML | Refills: 0 | Status: SHIPPED | OUTPATIENT
Start: 2025-05-11

## 2025-05-11 RX ORDER — HYDRALAZINE HYDROCHLORIDE 25 MG/1
50 TABLET, FILM COATED ORAL 3 TIMES DAILY
Qty: 90 TABLET | Refills: 0 | Status: SHIPPED | OUTPATIENT
Start: 2025-05-11

## 2025-05-11 RX ORDER — HYDRALAZINE HYDROCHLORIDE 50 MG/1
25 TABLET, FILM COATED ORAL 3 TIMES DAILY
Status: DISCONTINUED | OUTPATIENT
Start: 2025-05-11 | End: 2025-05-12 | Stop reason: HOSPADM

## 2025-05-11 RX ORDER — POLYETHYLENE GLYCOL 3350 17 G/17G
17 POWDER, FOR SOLUTION ORAL DAILY PRN
Qty: 527 G | Refills: 1 | Status: SHIPPED | OUTPATIENT
Start: 2025-05-11 | End: 2025-07-12

## 2025-05-11 RX ORDER — METOPROLOL TARTRATE 50 MG
50 TABLET ORAL 2 TIMES DAILY
Status: DISCONTINUED | OUTPATIENT
Start: 2025-05-11 | End: 2025-05-12 | Stop reason: HOSPADM

## 2025-05-11 RX ORDER — QUETIAPINE FUMARATE 25 MG/1
25 TABLET, FILM COATED ORAL 2 TIMES DAILY
Qty: 10 TABLET | Refills: 0 | Status: SHIPPED | OUTPATIENT
Start: 2025-05-11

## 2025-05-11 RX ORDER — SPIRONOLACTONE 25 MG/1
25 TABLET ORAL DAILY
Qty: 30 TABLET | Refills: 3 | Status: SHIPPED | OUTPATIENT
Start: 2025-05-12

## 2025-05-11 RX ORDER — BUDESONIDE 0.5 MG/2ML
0.5 INHALANT ORAL
Qty: 60 EACH | Refills: 3 | Status: SHIPPED | OUTPATIENT
Start: 2025-05-11

## 2025-05-11 RX ORDER — ONDANSETRON 4 MG/1
4 TABLET, ORALLY DISINTEGRATING ORAL EVERY 8 HOURS PRN
Qty: 21 TABLET | Refills: 0 | Status: SHIPPED | OUTPATIENT
Start: 2025-05-11

## 2025-05-11 RX ORDER — CARVEDILOL 6.25 MG/1
6.25 TABLET ORAL 2 TIMES DAILY WITH MEALS
Qty: 60 TABLET | Refills: 3 | Status: SHIPPED | OUTPATIENT
Start: 2025-05-11 | End: 2025-05-12 | Stop reason: HOSPADM

## 2025-05-11 RX ADMIN — QUETIAPINE FUMARATE 25 MG: 25 TABLET ORAL at 09:55

## 2025-05-11 RX ADMIN — APIXABAN 5 MG: 5 TABLET, FILM COATED ORAL at 12:30

## 2025-05-11 RX ADMIN — SPIRONOLACTONE 25 MG: 25 TABLET ORAL at 09:55

## 2025-05-11 RX ADMIN — BUDESONIDE 500 MCG: 0.5 SUSPENSION RESPIRATORY (INHALATION) at 19:00

## 2025-05-11 RX ADMIN — QUETIAPINE FUMARATE 25 MG: 25 TABLET ORAL at 20:54

## 2025-05-11 RX ADMIN — IPRATROPIUM BROMIDE AND ALBUTEROL SULFATE 1 DOSE: 2.5; .5 SOLUTION RESPIRATORY (INHALATION) at 07:51

## 2025-05-11 RX ADMIN — BUDESONIDE 500 MCG: 0.5 SUSPENSION RESPIRATORY (INHALATION) at 07:51

## 2025-05-11 RX ADMIN — CARVEDILOL 6.25 MG: 3.12 TABLET, FILM COATED ORAL at 09:55

## 2025-05-11 RX ADMIN — TORSEMIDE 40 MG: 10 TABLET ORAL at 09:55

## 2025-05-11 RX ADMIN — CARVEDILOL 6.25 MG: 3.12 TABLET, FILM COATED ORAL at 17:18

## 2025-05-11 RX ADMIN — HYDRALAZINE HYDROCHLORIDE 50 MG: 50 TABLET ORAL at 09:55

## 2025-05-11 RX ADMIN — METOPROLOL TARTRATE 50 MG: 50 TABLET, FILM COATED ORAL at 18:04

## 2025-05-11 RX ADMIN — SODIUM CHLORIDE, PRESERVATIVE FREE 10 ML: 5 INJECTION INTRAVENOUS at 10:01

## 2025-05-11 RX ADMIN — ATORVASTATIN CALCIUM 20 MG: 20 TABLET, FILM COATED ORAL at 20:54

## 2025-05-11 RX ADMIN — INSULIN LISPRO 1 UNITS: 100 INJECTION, SOLUTION INTRAVENOUS; SUBCUTANEOUS at 12:31

## 2025-05-11 RX ADMIN — IPRATROPIUM BROMIDE AND ALBUTEROL SULFATE 1 DOSE: 2.5; .5 SOLUTION RESPIRATORY (INHALATION) at 19:00

## 2025-05-11 RX ADMIN — PREDNISONE 20 MG: 20 TABLET ORAL at 09:55

## 2025-05-11 RX ADMIN — APIXABAN 5 MG: 5 TABLET, FILM COATED ORAL at 20:53

## 2025-05-11 RX ADMIN — HYDRALAZINE HYDROCHLORIDE 25 MG: 50 TABLET ORAL at 12:30

## 2025-05-11 NOTE — DISCHARGE SUMMARY
Hospitalist Discharge Summary     Patient ID:  Yessy Puckett  [id number]  69 y.o.  1955 5/4/2025    PCP on record: Catherine Strauss MD    Admit date: 5/4/2025  Discharge date and time: 5/11/2025    DISCHARGE DIAGNOSIS:    Acute hypercapnic respiratory failure  COPD exacerbation  Pulmonary edema  Sleep apnea  Paroxysmal A-fib   Acute decompensated heart failure with systolic dysfunction   Altered mental status  Metabolic encephalopathy  Leukocytosis  Hypertension  Hyperglycemia type 2 diabetes  Anemia    CONSULTATIONS:  IP CONSULT TO CARDIOLOGY  IP CONSULT TO PULMONOLOGY  IP CONSULT TO CASE MANAGEMENT    Excerpted HPI from H&P of Susan Bruner MD:  This is a 70 y/o male with HTN, COPD, NIDDM type 2 who presented to ER with worsening shortness of breath. Noted to be lethargic in ER and ABG concerning for severe hypercapnia with pCO2 of 118 and pH of 7.2. He was placed on BiPAP with some improvement. Chest x-ray concerning for pulmonary edema. Lasix was given in ER. He is now being admitted to ICU.     ______________________________________________________________________  DISCHARGE SUMMARY/HOSPITAL COURSE:  for full details see H&P, daily progress notes, labs, consult notes.     Hospital Course:  Yessy Puckett is a 69 y.o.  male with PMH of COPD, HTN, T2DM who presented to ED for worsening shortness of breath.  Per EMS, hypoxic on room air requiring 6 L nasal cannula. Noted to be lethargic in ER and ABG concerning for severe hypercapnia with pCO2 of 118 and pH of 7.2.  He was placed on BiPAP with some improvement.  CXR concerning for pulmonary edema. He was admitted to the ICU.  Patient was eventually able to wean off of BiPAP and stable with nocturnal BiPAP use/while asleep. Currently receiving IV steroids, de-escalating throughout admission.  Also receiving IV diuresis for pulmonary edema and empiric IV antibiotics for COPD exacerbation.  Continues to have elevated bicarb, given 1 dose IV Diamox

## 2025-05-11 NOTE — CARE COORDINATION
DC order noted.  Chart reviewed.  The pts DC is pending confirmation of [facility]'s contract with [facility] for equipment needs-NIV.  At this time, confirmation is not verified.  CM will follow and update when additional information becomes available.

## 2025-05-11 NOTE — PLAN OF CARE
Problem: Chronic Conditions and Co-morbidities  Goal: Patient's chronic conditions and co-morbidity symptoms are monitored and maintained or improved  Outcome: Progressing     Problem: Skin/Tissue Integrity  Goal: Skin integrity remains intact  Description: 1.  Monitor for areas of redness and/or skin breakdown2.  Assess vascular access sites hourly3.  Every 4-6 hours minimum:  Change oxygen saturation probe site4.  Every 4-6 hours:  If on nasal continuous positive airway pressure, respiratory therapy assess nares and determine need for appliance change or resting period  5/11/2025 1023 by John Lemus RN  Outcome: Progressing  5/11/2025 0024 by REBEKAH Lewis RN  Outcome: Progressing     Problem: Safety - Adult  Goal: Free from fall injury  5/11/2025 1023 by John Lemus RN  Outcome: Progressing  5/11/2025 0024 by REBEKAH Lewis, CHER  Outcome: Progressing

## 2025-05-11 NOTE — PLAN OF CARE
Problem: Pain  Goal: Verbalizes/displays adequate comfort level or baseline comfort level  Outcome: Progressing     Problem: Skin/Tissue Integrity  Goal: Skin integrity remains intact  Description: 1.  Monitor for areas of redness and/or skin breakdown2.  Assess vascular access sites hourly3.  Every 4-6 hours minimum:  Change oxygen saturation probe site4.  Every 4-6 hours:  If on nasal continuous positive airway pressure, respiratory therapy assess nares and determine need for appliance change or resting period  Outcome: Progressing     Problem: Safety - Adult  Goal: Free from fall injury  Outcome: Progressing     Problem: Neurosensory - Adult  Goal: Achieves stable or improved neurological status  Outcome: Progressing     Problem: Skin/Tissue Integrity - Adult  Goal: Skin integrity remains intact  Description: 1.  Monitor for areas of redness and/or skin breakdown2.  Assess vascular access sites hourly3.  Every 4-6 hours minimum:  Change oxygen saturation probe site4.  Every 4-6 hours:  If on nasal continuous positive airway pressure, respiratory therapy assess nares and determine need for appliance change or resting period  Outcome: Progressing

## 2025-05-12 VITALS
HEIGHT: 68 IN | SYSTOLIC BLOOD PRESSURE: 102 MMHG | DIASTOLIC BLOOD PRESSURE: 62 MMHG | TEMPERATURE: 99 F | OXYGEN SATURATION: 94 % | WEIGHT: 315 LBS | HEART RATE: 63 BPM | BODY MASS INDEX: 47.74 KG/M2 | RESPIRATION RATE: 18 BRPM

## 2025-05-12 LAB
ANION GAP SERPL CALC-SCNC: 4 MMOL/L (ref 2–12)
BASOPHILS # BLD: 0.02 K/UL (ref 0–0.1)
BASOPHILS NFR BLD: 0.1 % (ref 0–1)
BUN SERPL-MCNC: 23 MG/DL (ref 6–20)
BUN/CREAT SERPL: 32 (ref 12–20)
CA-I BLD-MCNC: 9 MG/DL (ref 8.5–10.1)
CHLORIDE SERPL-SCNC: 97 MMOL/L (ref 97–108)
CO2 SERPL-SCNC: 35 MMOL/L (ref 21–32)
CREAT SERPL-MCNC: 0.73 MG/DL (ref 0.7–1.3)
DIFFERENTIAL METHOD BLD: ABNORMAL
EOSINOPHIL # BLD: 0.16 K/UL (ref 0–0.4)
EOSINOPHIL NFR BLD: 0.8 % (ref 0–7)
ERYTHROCYTE [DISTWIDTH] IN BLOOD BY AUTOMATED COUNT: 21.6 % (ref 11.5–14.5)
GLUCOSE BLD STRIP.AUTO-MCNC: 103 MG/DL (ref 65–100)
GLUCOSE BLD STRIP.AUTO-MCNC: 146 MG/DL (ref 65–100)
GLUCOSE SERPL-MCNC: 88 MG/DL (ref 65–100)
HCT VFR BLD AUTO: 36.7 % (ref 36.6–50.3)
HGB BLD-MCNC: 11.9 G/DL (ref 12.1–17)
IMM GRANULOCYTES # BLD AUTO: 0.1 K/UL (ref 0–0.04)
IMM GRANULOCYTES NFR BLD AUTO: 0.5 % (ref 0–0.5)
LYMPHOCYTES # BLD: 2.87 K/UL (ref 0.8–3.5)
LYMPHOCYTES NFR BLD: 14.4 % (ref 12–49)
MCH RBC QN AUTO: 23.2 PG (ref 26–34)
MCHC RBC AUTO-ENTMCNC: 32.4 G/DL (ref 30–36.5)
MCV RBC AUTO: 71.7 FL (ref 80–99)
MONOCYTES # BLD: 2.11 K/UL (ref 0–1)
MONOCYTES NFR BLD: 10.6 % (ref 5–13)
NEUTS SEG # BLD: 14.64 K/UL (ref 1.8–8)
NEUTS SEG NFR BLD: 73.6 % (ref 32–75)
NRBC # BLD: 0 K/UL (ref 0–0.01)
NRBC BLD-RTO: 0 PER 100 WBC
PERFORMED BY:: ABNORMAL
PERFORMED BY:: ABNORMAL
PLATELET # BLD AUTO: 334 K/UL (ref 150–400)
PMV BLD AUTO: 10.1 FL (ref 8.9–12.9)
POTASSIUM SERPL-SCNC: 3.6 MMOL/L (ref 3.5–5.1)
RBC # BLD AUTO: 5.12 M/UL (ref 4.1–5.7)
RBC MORPH BLD: ABNORMAL
RBC MORPH BLD: ABNORMAL
SODIUM SERPL-SCNC: 136 MMOL/L (ref 136–145)
WBC # BLD AUTO: 19.9 K/UL (ref 4.1–11.1)

## 2025-05-12 PROCEDURE — 2500000003 HC RX 250 WO HCPCS: Performed by: INTERNAL MEDICINE

## 2025-05-12 PROCEDURE — 6360000002 HC RX W HCPCS: Performed by: STUDENT IN AN ORGANIZED HEALTH CARE EDUCATION/TRAINING PROGRAM

## 2025-05-12 PROCEDURE — 85025 COMPLETE CBC W/AUTO DIFF WBC: CPT

## 2025-05-12 PROCEDURE — 6370000000 HC RX 637 (ALT 250 FOR IP): Performed by: HOSPITALIST

## 2025-05-12 PROCEDURE — 6370000000 HC RX 637 (ALT 250 FOR IP): Performed by: INTERNAL MEDICINE

## 2025-05-12 PROCEDURE — 2700000000 HC OXYGEN THERAPY PER DAY

## 2025-05-12 PROCEDURE — 82962 GLUCOSE BLOOD TEST: CPT

## 2025-05-12 PROCEDURE — 6370000000 HC RX 637 (ALT 250 FOR IP)

## 2025-05-12 PROCEDURE — 94640 AIRWAY INHALATION TREATMENT: CPT

## 2025-05-12 PROCEDURE — 94761 N-INVAS EAR/PLS OXIMETRY MLT: CPT

## 2025-05-12 PROCEDURE — 6360000002 HC RX W HCPCS: Performed by: INTERNAL MEDICINE

## 2025-05-12 PROCEDURE — 36415 COLL VENOUS BLD VENIPUNCTURE: CPT

## 2025-05-12 PROCEDURE — 80048 BASIC METABOLIC PNL TOTAL CA: CPT

## 2025-05-12 RX ORDER — METOPROLOL TARTRATE 50 MG
50 TABLET ORAL 2 TIMES DAILY
Qty: 60 TABLET | Refills: 3 | Status: SHIPPED | OUTPATIENT
Start: 2025-05-12

## 2025-05-12 RX ORDER — POTASSIUM CHLORIDE 1500 MG/1
40 TABLET, EXTENDED RELEASE ORAL ONCE
Status: COMPLETED | OUTPATIENT
Start: 2025-05-12 | End: 2025-05-12

## 2025-05-12 RX ADMIN — IPRATROPIUM BROMIDE AND ALBUTEROL SULFATE 1 DOSE: 2.5; .5 SOLUTION RESPIRATORY (INHALATION) at 08:36

## 2025-05-12 RX ADMIN — APIXABAN 5 MG: 5 TABLET, FILM COATED ORAL at 09:50

## 2025-05-12 RX ADMIN — PREDNISONE 20 MG: 20 TABLET ORAL at 15:30

## 2025-05-12 RX ADMIN — METHYLPREDNISOLONE SODIUM SUCCINATE 40 MG: 40 INJECTION INTRAMUSCULAR; INTRAVENOUS at 01:13

## 2025-05-12 RX ADMIN — QUETIAPINE FUMARATE 25 MG: 25 TABLET ORAL at 09:50

## 2025-05-12 RX ADMIN — METOPROLOL TARTRATE 50 MG: 50 TABLET, FILM COATED ORAL at 09:50

## 2025-05-12 RX ADMIN — SODIUM CHLORIDE, PRESERVATIVE FREE 10 ML: 5 INJECTION INTRAVENOUS at 09:52

## 2025-05-12 RX ADMIN — POTASSIUM CHLORIDE 40 MEQ: 1500 TABLET, EXTENDED RELEASE ORAL at 09:50

## 2025-05-12 RX ADMIN — HYDRALAZINE HYDROCHLORIDE 25 MG: 50 TABLET ORAL at 09:49

## 2025-05-12 RX ADMIN — SPIRONOLACTONE 25 MG: 25 TABLET ORAL at 09:50

## 2025-05-12 RX ADMIN — TORSEMIDE 40 MG: 10 TABLET ORAL at 09:54

## 2025-05-12 RX ADMIN — ACETAMINOPHEN 650 MG: 325 TABLET ORAL at 15:30

## 2025-05-12 RX ADMIN — BUDESONIDE 500 MCG: 0.5 SUSPENSION RESPIRATORY (INHALATION) at 08:36

## 2025-05-12 RX ADMIN — HYDRALAZINE HYDROCHLORIDE 25 MG: 50 TABLET ORAL at 15:30

## 2025-05-12 ASSESSMENT — PAIN SCALES - GENERAL: PAINLEVEL_OUTOF10: 3

## 2025-05-12 NOTE — CARE COORDINATION
Patient cleared to Discharge. [place] has [facility] setup for patient and room ready. Patient will go to room 310. Patient aware. Call made to patient son, no answer, message left about discharge back to [place]. Request return call.     Transport set with Medicaid, trip number [phone].    Nurse to call report at [phone]       Transition of Care Plan:    RUR: 19%  Prior Level of Functioning: SNF/LTC  Disposition: SNF/LTC  MENDEZ: today  If SNF or IPR: Date FOC offered: 05/04  Date FOC received: 05/04  Accepting facility: [place]  Date authorization started with reference number: na  Date authorization received and expires: na  Follow up appointments:   DME needed: na  Transportation at discharge: Medicaid stretcher  IM/IMM Medicare/ letter given: 5/  Is patient a  and connected with [facility]? na  If yes, was  transfer form completed and [facility] notified? na  Caregiver Contact: call made, message left  Discharge Caregiver contacted prior to discharge? Call made, message left  Care Conference needed? na  Barriers to discharge: .na

## 2025-05-12 NOTE — PLAN OF CARE
Problem: Chronic Conditions and Co-morbidities  Goal: Patient's chronic conditions and co-morbidity symptoms are monitored and maintained or improved  Outcome: Progressing     Problem: Pain  Goal: Verbalizes/displays adequate comfort level or baseline comfort level  Outcome: Progressing     Problem: Skin/Tissue Integrity  Goal: Skin integrity remains intact  Description: 1.  Monitor for areas of redness and/or skin breakdown2.  Assess vascular access sites hourly3.  Every 4-6 hours minimum:  Change oxygen saturation probe site4.  Every 4-6 hours:  If on nasal continuous positive airway pressure, respiratory therapy assess nares and determine need for appliance change or resting period  Outcome: Progressing     Problem: Safety - Adult  Goal: Free from fall injury  Outcome: Progressing     Problem: Skin/Tissue Integrity - Adult  Goal: Skin integrity remains intact  Description: 1.  Monitor for areas of redness and/or skin breakdown2.  Assess vascular access sites hourly3.  Every 4-6 hours minimum:  Change oxygen saturation probe site4.  Every 4-6 hours:  If on nasal continuous positive airway pressure, respiratory therapy assess nares and determine need for appliance change or resting period  Outcome: Progressing

## 2025-05-12 NOTE — CARE COORDINATION
CM called son Angel Gonzalez to advise that patient is discharging today to [facility]. Left voicemail with phone number.     Transportation to be set up via Medicaid transport.     1550: also attempted to call brother Bong to advise of discharge per patient's request, unable to reach, unable to leave message.

## 2025-05-12 NOTE — DISCHARGE SUMMARY
Hospitalist Discharge Summary     Patient ID:  Yessy Puckett  [id number]  69 y.o.  1955 5/4/2025    PCP on record: Catherine Strauss MD    Admit date: 5/4/2025  Discharge date and time: 5/12/2025    DISCHARGE DIAGNOSIS:    Acute hypercapnic respiratory failure  COPD exacerbation  Pulmonary edema  Sleep apnea  Paroxysmal A-fib   Acute decompensated heart failure with systolic dysfunction   Altered mental status  Metabolic encephalopathy  Leukocytosis  Hypertension  Hyperglycemia type 2 diabetes  Anemia    CONSULTATIONS:  IP CONSULT TO CARDIOLOGY  IP CONSULT TO PULMONOLOGY  IP CONSULT TO CASE MANAGEMENT    Excerpted HPI from H&P of Susan Bruner MD:  This is a 68 y/o male with HTN, COPD, NIDDM type 2 who presented to ER with worsening shortness of breath. Noted to be lethargic in ER and ABG concerning for severe hypercapnia with pCO2 of 118 and pH of 7.2. He was placed on BiPAP with some improvement. Chest x-ray concerning for pulmonary edema. Lasix was given in ER. He is now being admitted to ICU.     ______________________________________________________________________  DISCHARGE SUMMARY/HOSPITAL COURSE:  for full details see H&P, daily progress notes, labs, consult notes.     Hospital Course:  Yessy Puckett is a 69 y.o.  male with PMH of COPD, HTN, T2DM who presented to ED for worsening shortness of breath.  Per EMS, hypoxic on room air requiring 6 L nasal cannula. Noted to be lethargic in ER and ABG concerning for severe hypercapnia with pCO2 of 118 and pH of 7.2.  He was placed on BiPAP with some improvement.  CXR concerning for pulmonary edema. He was admitted to the ICU.  Patient was eventually able to wean off of BiPAP and stable with nocturnal BiPAP use/while asleep. Currently receiving IV steroids, de-escalating throughout admission.  Also receiving IV diuresis for pulmonary edema and empiric IV antibiotics for COPD exacerbation.  Continues to have elevated bicarb, given 1 dose IV Diamox

## 2025-05-12 NOTE — PROGRESS NOTES
[facility] ICU Progress Note        Yessy Puckett  1955  [id number]  5/5/2025      Brief HPI / Hospital Course:  Mr. Puckett 68 y/o M w/ PMH HTN, COPD, DM2, presented for SOB. Workup on presentation concerning for pulmonary edema and hypercapnic respiratory failure. Admitted to ICU for further evaluation and management concerning for hypercapnic respiratory failure due to COPD exacerbation and/or pulmonary edema with potential cardiac etiology.     5/5: weaned off bipap, stable with nocturnal bipap and NC during day. Will deescalate steroid regimen. Downgrade out of ICU.      Assessment and plan:  Neuro  No acute issues    CV  - continue home atorvastatin    Pulm  Acute hypercapnic respiratory failure  COPD exacerbation   Pulmonary Edema  - weaned off bipap; can continue nocturnal bipap  - breathing well on NC  - diuresis w/ lasix 40 mg iv bid, deescalate to 40 mg iv qd  - weaning solumedrol, ends 5/8/24   - diuresis for pulmonary edema  - f/u repeat CXR    Renal  - diuresis for pulmonary edema, as above    GI  No acute issues    Endocrine  DM2  - SSI w/ accuchecks    ID  - continue empirc ceftriaxone and azithro  - f/u culture data    ICU DAILY CHECKLIST     Code Status:  Full Code  DVT Prophylaxis: LWMH 40 mg q12h  T/L/D: PIV  GI ppx: n/a  Diet:  Diet NPO  Activity Level:  Activity as tolerated  ABCDEF Bundle/Checklist Completed: Yes  Disposition: downgrade to general medicine floors  Multidisciplinary Rounds Completed: Yes  Goals of Care Discussion/Palliative:  Yes  Patient/Family Updated: Yes      OBJECTIVE  Vitals:    05/05/25 0343 05/05/25 0400 05/05/25 0500 05/05/25 0600   BP:  111/66 121/72 134/80   Pulse:  73 86 74   Resp:  22 15 16   Temp: (!) 96.7 °F (35.9 °C)      TempSrc: Axillary      SpO2:  97% 100%    Weight:       Height:           EXAM:   Physical Exam  Vitals reviewed.   Constitutional:       Appearance: He is obese. He is not ill-appearing or diaphoretic.   HENT:      Head: 
       Predictive Model Details               53 (Warning)   Factor Value     Calculated 5/9/2025 10:08 22% Age 69 years old     Deterioration Index Model 21% Supplemental oxygen Nasal cannula       17% Neurological exam X       12% [place] coma scale 14       7% Cardiac rhythm Atrial fib       6% WBC count abnormal (15.2 K/uL)       4% Respiratory rate 18       2% Blood pH abnormal (7.48  )       2% BUN abnormal (26 mg/dL)       2% Systolic 125       1% Sodium 138 mmol/L       1% Potassium 3.6 mmol/L       1% Pulse 89       1% Hematocrit abnormal (33.7 %)       0% Temperature 97.3 °F (36.3 °C)       0% Pulse oximetry 95 %      
      Hospitalist Progress Note    NAME:   Yessy Puckett   : 1955   MRN: [id number]     Date/Time: 2025 10:39 AM  Patient PCP: Catherine Strauss MD    Estimated discharge date: 24 hours to facility  Barriers: IV diuresis, cardiology clearance    Hospital Course:  Yessy Puckett is a 69 y.o.  male with PMH of COPD, HTN, T2DM who presented to ED for worsening shortness of breath.  Per EMS, hypoxic on room air requiring 6 L nasal cannula. Noted to be lethargic in ER and ABG concerning for severe hypercapnia with pCO2 of 118 and pH of 7.2.  He was placed on BiPAP with some improvement.  CXR concerning for pulmonary edema. He was admitted to the ICU.  Patient was eventually able to wean off of BiPAP and stable with nocturnal BiPAP use/while asleep. Currently receiving IV steroids, de-escalating throughout admission.  Also receiving IV diuresis for pulmonary edema and empiric IV antibiotics for COPD exacerbation.  Continues to have elevated bicarb, given 1 dose IV Diamox on .  Pulmonology consulted, recommends patient have NIV at home given worsening respiratory failure and hypercapnia.  Serial CXR remain stable.  Patient also noted to be in atrial fibrillation, with well-controlled rates without medication.  Cardiology following, atrial fibrillation likely driven by hypoxia.  CTA negative for PE.  Echo pending.  Patient does have [id number] of at least 3 and will need oral anticoagulation at discharge but will remain on therapeutic Lovenox while inpatient.    Patient AO x 2, which per long-term care is his baseline.  SLP evaluated, recommend soft and bite-size solids with mildly thick liquids due to mild oral pharyngeal dysphagia and impulsiveness.  Agitation and impulsiveness overnight  requiring soft mitt restraints.     patient seen and evaluated bedside, overnight events reviewed, of note patient is back on baseline oxygen, anticipate patient will be stable for discharge to facility in 24 hours once 
      Hospitalist Progress Note    NAME:   Yessy Puckett   : 1955   MRN: [id number]     Date/Time: 2025 9:17 AM  Patient PCP: Catherine Strauss MD    Estimated discharge date: 48h  Barriers:  Wean O2, on IV steroids, IV diuresis, IV antibiotics, echo pending, pulm consult, cardio clearance    Hospital Course:  Yessy Puckett is a 69 y.o.  male with PMH of COPD, HTN, T2DM who presented to ED for worsening shortness of breath.  Per EMS, hypoxic on room air requiring 6 L nasal cannula. Noted to be lethargic in ER and ABG concerning for severe hypercapnia with pCO2 of 118 and pH of 7.2.  He was placed on BiPAP with some improvement.  CXR concerning for pulmonary edema. He was admitted to the ICU.  Patient was eventually able to wean off of BiPAP and stable with nocturnal BiPAP use/while asleep. Currently receiving IV steroids, de-escalating throughout admission.  Also receiving IV diuresis for pulmonary edema and empiric IV antibiotics for COPD exacerbation.  Continues to have elevated bicarb, given 1 dose IV Diamox on .  Pulmonology consulted, recommends patient have NIV at home given worsening respiratory failure and hypercapnia.  Serial CXR remain stable.  Patient also noted to be in atrial fibrillation, with well-controlled rates without medication.  Cardiology following, atrial fibrillation likely driven by hypoxia.  CTA negative for PE.  Echo pending.  Patient does have [id number] of at least 3 and will need oral anticoagulation at discharge but will remain on therapeutic Lovenox while inpatient.    Patient AO x 2, which per long-term care is his baseline.  SLP evaluated, recommend soft and bite-size solids with mildly thick liquids due to mild oral pharyngeal dysphagia and impulsiveness.  Agitation and impulsiveness overnight  requiring soft mitt restraints.      Assessment / Plan:  Acute hypercapnic respiratory failure  COPD exacerbation  Pulmonary edema  Sleep apnea  CTA chest no PE, no aortic 
      Hospitalist Progress Note    NAME:   Yessy Puckett   : 1955   MRN: [id number]     Date/Time: 2025 11:05 AM  Patient PCP: Catherine Strauss MD    Estimated discharge date: 24 hours, to [facility] and rehab, long-term care resident  Barriers: IV diuresis, cardiology clearance, arrangement of NiV at facility, pulmonology clearance    Hospital Course:  Yessy Puckett is a 69 y.o.  male with PMH of COPD, HTN, T2DM who presented to ED for worsening shortness of breath.  Per EMS, hypoxic on room air requiring 6 L nasal cannula. Noted to be lethargic in ER and ABG concerning for severe hypercapnia with pCO2 of 118 and pH of 7.2.  He was placed on BiPAP with some improvement.  CXR concerning for pulmonary edema. He was admitted to the ICU.  Patient was eventually able to wean off of BiPAP and stable with nocturnal BiPAP use/while asleep. Currently receiving IV steroids, de-escalating throughout admission.  Also receiving IV diuresis for pulmonary edema and empiric IV antibiotics for COPD exacerbation.  Continues to have elevated bicarb, given 1 dose IV Diamox on .  Pulmonology consulted, recommends patient have NIV at home given worsening respiratory failure and hypercapnia.  Serial CXR remain stable.  Patient also noted to be in atrial fibrillation, with well-controlled rates without medication.  Cardiology following, atrial fibrillation likely driven by hypoxia.  CTA negative for PE.  Echo pending.  Patient does have [id number] of at least 3 and will need oral anticoagulation at discharge but will remain on therapeutic Lovenox while inpatient.    Patient AO x 2, which per long-term care is his baseline.  SLP evaluated, recommend soft and bite-size solids with mildly thick liquids due to mild oral pharyngeal dysphagia and impulsiveness.  Agitation and impulsiveness overnight  requiring soft mitt restraints.     patient seen and evaluated bedside, overnight events reviewed, of note patient is back 
    CARDIOLOGY PROGRESS NOTE      Patient Name: Yessy Puckett  Age: 69 y.o.  Gender:male  :1955  MRN: [id number]    Patient seen and examined. This is a patient with a history of HTN, COPD, FELICITAS, NIDDM type 2 who presented with shortness of breath now being followed for atrial fibrillation. More alert, awake today. Denies chest pain. Rates well controlled currently. Well documented diuresis so far, -2.6 L yesterday. No other complaints reported.    Telemetry reviewed.    Pertinent review of systems items noted above, all other systems are negative. Current medications reviewed.    Physical Examination    No Known Allergies  Vitals:    25 1400   BP: (!) 160/91   Pulse: 99   Resp: 19   Temp:    SpO2: 93%     Vital signs are stable  No apparent distress.  Heart has an irregularly irregular rhythm.  No murmur  Lungs are diminished  Abdomen is soft, nontender, normal bowel sounds.  Extremities have edema  Skin is dry and warm.  Normal affect    Labs reviewed:  Recent Results (from the past 12 hours)   CBC with Auto Differential    Collection Time: 25  4:00 AM   Result Value Ref Range    WBC 16.2 (H) 4.1 - 11.1 K/uL    RBC 4.30 4.10 - 5.70 M/uL    Hemoglobin 9.9 (L) 12.1 - 17.0 g/dL    Hematocrit 31.5 (L) 36.6 - 50.3 %    MCV 73.3 (L) 80.0 - 99.0 FL    MCH 23.0 (L) 26.0 - 34.0 PG    MCHC 31.4 30.0 - 36.5 g/dL    RDW 20.1 (H) 11.5 - 14.5 %    Platelets 313 150 - 400 K/uL    MPV 9.7 8.9 - 12.9 FL    Nucleated RBCs 0.0 0.0  WBC    nRBC 0.00 0.00 - 0.01 K/uL    Neutrophils % 85.2 (H) 32.0 - 75.0 %    Lymphocytes % 5.9 (L) 12.0 - 49.0 %    Monocytes % 8.3 5.0 - 13.0 %    Eosinophils % 0.0 0.0 - 7.0 %    Basophils % 0.1 0.0 - 1.0 %    Immature Granulocytes % 0.5 0 - 0.5 %    Neutrophils Absolute 13.76 (H) 1.80 - 8.00 K/UL    Lymphocytes Absolute 0.96 0.80 - 3.50 K/UL    Monocytes Absolute 1.35 (H) 0.00 - 1.00 K/UL    Eosinophils Absolute 0.00 0.00 - 0.40 K/UL    Basophils Absolute 0.02 0.00 - 0.10 K/UL 
    CARDIOLOGY PROGRESS NOTE      Patient Name: Yessy Puckett  Age: 69 y.o.  Gender:male  :1955  MRN: [id number]    Patient seen and examined. This is a patient with a history of HTN, COPD, FELICITAS, NIDDM type 2 who presented with shortness of breath now being followed for atrial fibrillation. Resting in bed, family at the bedside.  Good UOP.  Continues with shortness of breath. Denies chest pain. Rates well controlled currently. Will continue to need strict I&Os. No other complaints reported.    Telemetry reviewed -- Rc Afib    No overnight issues. No acute complains    Physical Examination    No Known Allergies  Vitals:    25 1455   BP: 102/62   Pulse: 63   Resp:    Temp: 99 °F (37.2 °C)   SpO2: 94%     Vital signs are stable  No apparent distress.  Heart has an irregularly irregular rhythm.  No murmur  Lungs are diminished  Abdomen is soft, nontender, normal bowel sounds.  Extremities have trace to mild edema  Skin is dry and warm.  Normal affect    Labs reviewed:  Recent Results (from the past 12 hours)   Basic Metabolic Panel    Collection Time: 25  6:12 AM   Result Value Ref Range    Sodium 136 136 - 145 mmol/L    Potassium 3.6 3.5 - 5.1 mmol/L    Chloride 97 97 - 108 mmol/L    CO2 35 (H) 21 - 32 mmol/L    Anion Gap 4 2 - 12 mmol/L    Glucose 88 65 - 100 mg/dL    BUN 23 (H) 6 - 20 mg/dL    Creatinine 0.73 0.70 - 1.30 mg/dL    BUN/Creatinine Ratio 32 (H) 12 - 20      Est, Glom Filt Rate >90 >60 ml/min/1.73m2    Calcium 9.0 8.5 - 10.1 mg/dL   CBC with Auto Differential    Collection Time: 25  6:12 AM   Result Value Ref Range    WBC 19.9 (H) 4.1 - 11.1 K/uL    RBC 5.12 4.10 - 5.70 M/uL    Hemoglobin 11.9 (L) 12.1 - 17.0 g/dL    Hematocrit 36.7 36.6 - 50.3 %    MCV 71.7 (L) 80.0 - 99.0 FL    MCH 23.2 (L) 26.0 - 34.0 PG    MCHC 32.4 30.0 - 36.5 g/dL    RDW 21.6 (H) 11.5 - 14.5 %    Platelets 334 150 - 400 K/uL    MPV 10.1 8.9 - 12.9 FL    Nucleated RBCs 0.0 0.0  WBC    nRBC 0.00 0.00 - 
    CARDIOLOGY PROGRESS NOTE      Patient Name: Yessy Puckett  Age: 69 y.o.  Gender:male  :1955  MRN: [id number]    Patient seen and examined. This is a patient with a history of HTN, COPD, FELICITAS, NIDDM type 2 who presented with shortness of breath now being followed for atrial fibrillation. Seen today, resting comfortably in bed. Denies chest pain. Rates well controlled currently. Will continue to need strict I&Os. No other complaints reported.    Telemetry reviewed.    Pertinent review of systems items noted above, all other systems are negative. Current medications reviewed.    Physical Examination    No Known Allergies  Vitals:    25 1413   BP: 114/74   Pulse: 100   Resp: 17   Temp: 98.1 °F (36.7 °C)   SpO2: 92%     Vital signs are stable  No apparent distress.  Heart has an irregularly irregular rhythm.  No murmur  Lungs are diminished  Abdomen is soft, nontender, normal bowel sounds.  Extremities have edema  Skin is dry and warm.  Normal affect    Labs reviewed:  Recent Results (from the past 12 hours)   Procalcitonin    Collection Time: 25  6:59 AM   Result Value Ref Range    Procalcitonin <0.05 (H) 0 ng/mL   Basic Metabolic Panel    Collection Time: 25  6:59 AM   Result Value Ref Range    Sodium 138 136 - 145 mmol/L    Potassium 3.5 3.5 - 5.1 mmol/L    Chloride 100 97 - 108 mmol/L    CO2 34 (H) 21 - 32 mmol/L    Anion Gap 4 2 - 12 mmol/L    Glucose 98 65 - 100 mg/dL    BUN 27 (H) 6 - 20 mg/dL    Creatinine 0.77 0.70 - 1.30 mg/dL    BUN/Creatinine Ratio 35 (H) 12 - 20      Est, Glom Filt Rate >90 >60 ml/min/1.73m2    Calcium 9.0 8.5 - 10.1 mg/dL   CBC with Auto Differential    Collection Time: 25  6:59 AM   Result Value Ref Range    WBC 13.3 (H) 4.1 - 11.1 K/uL    RBC 4.59 4.10 - 5.70 M/uL    Hemoglobin 10.3 (L) 12.1 - 17.0 g/dL    Hematocrit 32.7 (L) 36.6 - 50.3 %    MCV 71.2 (L) 80.0 - 99.0 FL    MCH 22.4 (L) 26.0 - 34.0 PG    MCHC 31.5 30.0 - 36.5 g/dL    RDW 20.5 (H) 11.5 - 
    CARDIOLOGY PROGRESS NOTE      Patient Name: Yessy Puckett  Age: 69 y.o.  Gender:male  :1955  MRN: [id number]    Patient seen and examined. This is a patient with a history of HTN, COPD, FELICITAS, NIDDM type 2 who presented with shortness of breath now being followed for atrial fibrillation. Resting in bed, family at the bedside.  Good UOP.  Continues with shortness of breath. Denies chest pain. Rates well controlled currently. Will continue to need strict I&Os. No other complaints reported.    Telemetry reviewed -- Rc Afib    No overnight issues    Physical Examination    No Known Allergies  Vitals:    05/10/25 1058   BP: 115/66   Pulse:    Resp:    Temp:    SpO2:      Vital signs are stable  No apparent distress.  Heart has an irregularly irregular rhythm.  No murmur  Lungs are diminished  Abdomen is soft, nontender, normal bowel sounds.  Extremities have trace to mild edema  Skin is dry and warm.  Normal affect    Labs reviewed:  Recent Results (from the past 12 hours)   Basic Metabolic Panel    Collection Time: 05/10/25  5:06 AM   Result Value Ref Range    Sodium 139 136 - 145 mmol/L    Potassium 3.2 (L) 3.5 - 5.1 mmol/L    Chloride 100 97 - 108 mmol/L    CO2 33 (H) 21 - 32 mmol/L    Anion Gap 6 2 - 12 mmol/L    Glucose 103 (H) 65 - 100 mg/dL    BUN 22 (H) 6 - 20 mg/dL    Creatinine 0.64 (L) 0.70 - 1.30 mg/dL    BUN/Creatinine Ratio 34 (H) 12 - 20      Est, Glom Filt Rate >90 >60 ml/min/1.73m2    Calcium 8.3 (L) 8.5 - 10.1 mg/dL   CBC with Auto Differential    Collection Time: 05/10/25  5:06 AM   Result Value Ref Range    WBC 16.6 (H) 4.1 - 11.1 K/uL    RBC 4.83 4.10 - 5.70 M/uL    Hemoglobin 11.4 (L) 12.1 - 17.0 g/dL    Hematocrit 34.3 (L) 36.6 - 50.3 %    MCV 71.0 (L) 80.0 - 99.0 FL    MCH 23.6 (L) 26.0 - 34.0 PG    MCHC 33.2 30.0 - 36.5 g/dL    RDW 21.0 (H) 11.5 - 14.5 %    Platelets 325 150 - 400 K/uL    MPV 9.3 8.9 - 12.9 FL    Nucleated RBCs 0.0 0.0  WBC    nRBC 0.00 0.00 - 0.01 K/uL    
    CARDIOLOGY PROGRESS NOTE      Patient Name: Yessy Puckett  Age: 69 y.o.  Gender:male  :1955  MRN: [id number]    Patient seen and examined. This is a patient with a history of HTN, COPD, FELICITAS, NIDDM type 2 who presented with shortness of breath now being followed for atrial fibrillation. Transferred out of ICU overnight. Seen today, resting comfortably in bed.  Denies chest pain. Rates well controlled currently. Will continue to need strict I&Os, -2.3 L yesterday. No other complaints reported.    Telemetry reviewed.    Pertinent review of systems items noted above, all other systems are negative. Current medications reviewed.    Physical Examination    No Known Allergies  Vitals:    25 0945   BP:    Pulse:    Resp:    Temp:    SpO2: 96%     Vital signs are stable  No apparent distress.  Heart has an irregularly irregular rhythm.  No murmur  Lungs are diminished  Abdomen is soft, nontender, normal bowel sounds.  Extremities have edema  Skin is dry and warm.  Normal affect    Labs reviewed:  Recent Results (from the past 12 hours)   Blood Gas, Arterial    Collection Time: 25  4:21 AM   Result Value Ref Range    pH, Arterial 7.48 (H) 7.35 - 7.45      pCO2, Arterial 46 (H) 35 - 45 mmHg    pO2, Arterial 100 80 - 100 mmHg    O2 Sat, Arterial 97 95 - 99 %    HCO3, Arterial 33 (H) 22 - 26 mmol/L    Base Excess, Arterial 8.7 (H) 0 - 3 mmol/L    O2 Method Nasal Cannula      O2 Flow Rate 2.00 L/min    FIO2 Arterial 28 %    Source Arterial      Site Right Radial      Ruben Test YES      Carboxyhgb, Arterial 0.5 (L) 1 - 2 %    Methemoglobin, Arterial 0.1 0 - 1.4 %    Oxyhemoglobin 96.5 95 - 99 %    Performed by: Rodger Carter     Temperature 97.9     Basic Metabolic Panel    Collection Time: 25  6:40 AM   Result Value Ref Range    Sodium 139 136 - 145 mmol/L    Potassium 3.4 (L) 3.5 - 5.1 mmol/L    Chloride 99 97 - 108 mmol/L    CO2 34 (H) 21 - 32 mmol/L    Anion Gap 6 2 - 12 mmol/L    Glucose 115 (H) 65 
    CARDIOLOGY PROGRESS NOTE      Patient Name: Yessy Puckett  Age: 69 y.o.  Gender:male  :1955  MRN: [id number]    Patient seen and examined. This is a patient with a history of HTN, COPD, FELICITAS, NIDDM type 2 who presented with shortness of breath now being followed for atrial fibrillation. Resting in bed, family at the bedside.  Good UOP.  Continues with shortness of breath. Denies chest pain. Rates well controlled currently. Will continue to need strict I&Os. No other complaints reported.    Telemetry reviewed.    Pertinent review of systems items noted above, all other systems are negative. Current medications reviewed.    Physical Examination    No Known Allergies  Vitals:    25 1000   BP: (!) 125/92   Pulse: 89   Resp: 18   Temp: 97.3 °F (36.3 °C)   SpO2: 95%     Vital signs are stable  No apparent distress.  Heart has an irregularly irregular rhythm.  No murmur  Lungs are diminished  Abdomen is soft, nontender, normal bowel sounds.  Extremities have trace to mild edema  Skin is dry and warm.  Normal affect    Labs reviewed:  Recent Results (from the past 12 hours)   Procalcitonin    Collection Time: 25  5:11 AM   Result Value Ref Range    Procalcitonin <0.05 (H) 0 ng/mL   Basic Metabolic Panel    Collection Time: 25  5:11 AM   Result Value Ref Range    Sodium 138 136 - 145 mmol/L    Potassium 3.6 3.5 - 5.1 mmol/L    Chloride 101 97 - 108 mmol/L    CO2 34 (H) 21 - 32 mmol/L    Anion Gap 3 2 - 12 mmol/L    Glucose 110 (H) 65 - 100 mg/dL    BUN 26 (H) 6 - 20 mg/dL    Creatinine 0.68 (L) 0.70 - 1.30 mg/dL    BUN/Creatinine Ratio 38 (H) 12 - 20      Est, Glom Filt Rate >90 >60 ml/min/1.73m2    Calcium 8.7 8.5 - 10.1 mg/dL   CBC with Auto Differential    Collection Time: 25  5:11 AM   Result Value Ref Range    WBC 15.2 (H) 4.1 - 11.1 K/uL    RBC 4.75 4.10 - 5.70 M/uL    Hemoglobin 10.8 (L) 12.1 - 17.0 g/dL    Hematocrit 33.7 (L) 36.6 - 50.3 %    MCV 70.9 (L) 80.0 - 99.0 FL    MCH 22.7 
    Hospitalist Consult Note    NAME: Yessy Puckett   :  1955   MRN:  [id number]     Date/Time:  2025 6:25 PM    Patient PCP: Catherine Strauss MD    ______________________________________________________________________  Given the patient's current clinical presentation in regards to the patient's multiple medical problems, complex decision making was performed, which includes reviewing the patient's available past medical records, laboratory results, and diagnostic studies.       My assessment of this patient's clinical condition and my plan of care is as follows.    Assessment / Plan:  Acute hypercapnic Respiratory failure  COPD exacerbation  Pulmonary edema  Sleep apnea  CTA chest no PE, no aortic aneurysm, small-mod effusions and interstitial edema  Has weaned off BiPAP, continue nocturnal BiPAP  Some agitation noted, possible hypercapnia  Repeat ABG  with improved respiratory acidosis  Diuresis with IV Lasix 40 mg BID > QD  Weaning IV Solu-Medrol, last dose   Continue Pulmicort, DuoNebs  Repeat AM CXR  SLP eval in AM    Paroxysmal A-fib  Currently rate controlled without medication  Cardiology following, prior EKGs in  noted A-fib  Suspected secondary to hypoxia/stress related  TSH 0.6, T4 1.1  TTE  EF 55-60%, mild concentric hypertrophy, LV diastolic dysfunction  Repeat echo pending  Plan for NOAC upon discharge, therapeutic Lovenox while inpatient    Leukocytosis  Likely stress related  Downtrending  Empiric ceftriaxone and azithromycin  Urinalysis negative negative, sputum culture pending    T2DM  Type 2 Diabetes Mellitus  HbA1c 6.5 in February  Continue low-dose SSI lispro  Hypoglycemia protocol and Accu-cheks    HTN  BP mildly elevated  Prescribed multiple antihypertensives, stated not taking  Restart hydralazine as warranted    Anemia  Hgb 10.0, appears stable, baseline approximately 10  continue to monitor and transfuse if <7  Iron panel ordered      Code Status: Full  DVT 
  IMPRESSION:   Acute hypoxic respiratory failure  Acute on chronic hypercapnic respiratory failure  Acute exacerbation of COPD  Pulmonary edema  Obstructive sleep apnea  Morbid obesity  Paroxysmal atrial fibrillation  Hypertension and type 2 diabetes mellitus   Additional workup outlined below  Pt is at high risk of sudden decline and decompensation with life threatening consequenses and continued end organ dysfunction and failure  Pt is critically ill. Time spent with pt and staff actively rendering care, managing pt and coordinating care as stated below; 30 minutes, exclusive of any procedures      RECOMMENDATIONS/PLAN:     69-year-old male came because of shortness of breath and dyspnea he is morbidly obese history of COPD hypertension he was very lethargic drowsy arterial blood gas in the emergency room shows pCO2 of 118 pH of 7.2 he was put on noninvasive ventilator BiPAP machine and chest x-ray shows pulmonary edema admitted to ICU he is not currently on oxygen during the daytime and BiPAP at night hypercapnia improved but is still very groggy no chest pain difficult to understand as he seems to be exhausted  BIPAP for non invasive ventilatory life support to avoid worsening respiratory acidosis, hypercacarbic or hypoxic respiratory arrest initial arterial blood gases pCO2 was 118 ABG done on 5/5 shows pCO2 of 70, he qualifies for noninvasive  Patient has had worsening respiratory failure and is at risk for serious harm or death without appropriate therapy at home which include noninvasive ventilator while patient has an underlying condition of FELICITAS it is not the primary indication for need for NIV patient has previously tried CPAP and BiPAP and has failed to improve his hypercapnia and saturation level.  Since chronic/acute respiratory failure can be prevented with the use of positive pressure ventilation and due to the nature of the patient deteriorating condition a portable volume ventilator has been 
  IMPRESSION:   Acute hypoxic respiratory failure  Acute on chronic hypercapnic respiratory failure  Acute exacerbation of COPD  Pulmonary edema  Obstructive sleep apnea  Morbid obesity  Paroxysmal atrial fibrillation  Hypertension and type 2 diabetes mellitus   Additional workup outlined below  Pt is at high risk of sudden decline and decompensation with life threatening consequenses and continued end organ dysfunction and failure  Pt is critically ill. Time spent with pt and staff actively rendering care, managing pt and coordinating care as stated below; 30 minutes, exclusive of any procedures      RECOMMENDATIONS/PLAN:     69-year-old male came because of shortness of breath and dyspnea he is morbidly obese history of COPD hypertension he was very lethargic drowsy arterial blood gas in the emergency room shows pCO2 of 118 pH of 7.2 he was put on noninvasive ventilator BiPAP machine and chest x-ray shows pulmonary edema admitted to ICU he is not currently on oxygen during the daytime and BiPAP at night hypercapnia improved but is still very groggy no chest pain difficult to understand as he seems to be exhausted  BIPAP for non invasive ventilatory life support to avoid worsening respiratory acidosis, hypercacarbic or hypoxic respiratory arrest initial arterial blood gases pCO2 was 118 ABG done yesterday 5/5 shows pCO2 of 70, he qualifies for noninvasive  Patient has had worsening respiratory failure and is at risk for serious harm or death without appropriate therapy at home which include noninvasive ventilator while patient has an underlying condition of FELICITAS it is not the primary indication for need for NIV patient has previously tried CPAP and BiPAP and has failed to improve his hypercapnia and saturation level.  Since chronic/acute respiratory failure can be prevented with the use of positive pressure ventilation and due to the nature of the patient deteriorating condition a portable volume ventilator has been 
  IMPRESSION:   Acute hypoxic respiratory failure  Acute on chronic hypercapnic respiratory failure  Acute exacerbation of COPD  Pulmonary edema  Obstructive sleep apnea  Morbid obesity  Paroxysmal atrial fibrillation  Hypertension and type 2 diabetes mellitus   Additional workup outlined below  Pt is at high risk of sudden decline and decompensation with life threatening consequenses and continued end organ dysfunction and failure  Pt is critically ill. Time spent with pt and staff actively rendering care, managing pt and coordinating care as stated below; 35 minutes, exclusive of any procedures      RECOMMENDATIONS/PLAN:     69-year-old male came because of shortness of breath and dyspnea he is morbidly obese history of COPD hypertension he was very lethargic drowsy arterial blood gas in the emergency room shows pCO2 of 118 pH of 7.2 he was put on noninvasive ventilator BiPAP machine and chest x-ray shows pulmonary edema admitted to ICU he is not currently on oxygen during the daytime and BiPAP at night hypercapnia improved but is still very groggy no chest pain difficult to understand as he seems to be exhausted  BIPAP for non invasive ventilatory life support to avoid worsening respiratory acidosis, hypercacarbic or hypoxic respiratory arrest initial arterial blood gases pCO2 was 118 ABG done yesterday 5/5 shows pCO2 of 70, he qualifies for noninvasive  Patient has had worsening respiratory failure and is at risk for serious harm or death without appropriate therapy at home which include noninvasive ventilator while patient has an underlying condition of FELICITAS it is not the primary indication for need for NIV patient has previously tried CPAP and BiPAP and has failed to improve his hypercapnia and saturation level.  Since chronic/acute respiratory failure can be prevented with the use of positive pressure ventilation and due to the nature of the patient deteriorating condition a portable volume ventilator has been 
  IMPRESSION:   Acute hypoxic respiratory failure  Acute on chronic hypercapnic respiratory failure  Acute exacerbation of COPD  Pulmonary edema  Obstructive sleep apnea  Morbid obesity  Paroxysmal atrial fibrillation  Hypertension and type 2 diabetes mellitus   Pt is critically ill. Time spent with pt and staff actively rendering care, managing pt and coordinating care as stated below; 30 minutes, exclusive of any procedures      RECOMMENDATIONS/PLAN:     69-year-old male came because of shortness of breath and dyspnea he is morbidly obese history of COPD hypertension he was very lethargic drowsy arterial blood gas in the emergency room shows pCO2 of 118 pH of 7.2 he was put on noninvasive ventilator BiPAP machine and chest x-ray shows pulmonary edema admitted to ICU he is not currently on oxygen during the daytime and BiPAP at night hypercapnia improved but is still very groggy no chest pain difficult to understand as he seems to be exhausted  BIPAP for non invasive ventilatory life support to avoid worsening respiratory acidosis, hypercacarbic or hypoxic respiratory arrest initial arterial blood gases pCO2 was 118 ABG done on 5/5 shows pCO2 of 70, he qualifies for noninvasive  Patient can be on BiPAP setting of 14/7   On prednisone and nebulizer treatment  Continue with the Lasix  2D echo shows ejection fraction by 55 to 60% with diastolic dysfunction  On oxygen 2 L nasal Cannula oxygen as salvage oxygen delivery device to provide high concentration of oxygen to overcome refractory hypoxia;      5/7  Alert awake more talkative this morning on oxygen via nasal cannula discussed with him and his family members at bedside he is at [facility] facility will try to give him noninvasive ventilator if get approved he is not on any CPAP or BiPAP machine at the facility he uses oxygen 24/7, arterial blood gases done on 5 5 shows pCO2 of 70 today blood gases shows pCO2 of 46.54 it was 88 he will benefit from either 
  Physician Progress Note      PATIENT:               KILO ALFARO  CSN #:                  [id number]  :                       1955  ADMIT DATE:       2025 8:18 AM  DISCH DATE:  RESPONDING  PROVIDER #:        Tre Vázquez MD          QUERY TEXT:    Encephalopathy is documented in the medical record per H&P.  Please specify   type:    The clinical indicators include:  Patient clinical indicators include:    -SOB  -Acute encephalopathy due to CO2 retention  -RN reports intermittent hallucinations and agitation  -arterial blood gases pCO2 was 118, pH of 7.2  -CXR shows pulmonary edema  -BIPAP for non-invasive ventilatory life support  Options provided:  -- Hypoxic  -- Metabolic  -- Other - I will add my own diagnosis  -- Disagree - Not applicable / Not valid  -- Disagree - Clinically unable to determine / Unknown  -- Refer to Clinical Documentation Reviewer    PROVIDER RESPONSE TEXT:    The patient has metabolic encephalopathy.    Query created by: Akilah Can on 2025 3:02 PM      Electronically signed by:  Tre Vázquez MD 5/10/2025 7:41 AM          
0700 Received report from [facility] Dinero. Patient stayed on BiPap all night and has been lethargic but responds to voice. Patient is in A fib, no known history.     0730 Spoke to attending wants EKG. EKG showed A Fib with R BBB. Respiratory got ABG     0831 Based off ABG results switched patient to baseline O2 at 4L NC. Sats 92. Will continue to monitor.     0956 Bedside swallow eval passed when swallowing water and pudding. Set patient up with breakfast tray and started to cough. HR elvated and Sats dropped. Speech eval placed. Let Dr. Kapadia know HR was 120-130's A. Fib. Ordered digoxin and increased lovenox dose.     1013 HR 90's discussed with attending and holding digoxin for now and if HR goes up and stays elevated to give. Cardiology consult in and called.   NPO until speech eval and treat.    1435 Patient irritable and pulling pulse ox and leads off. Placed back on BiPap.   
4 Eyes Skin Assessment     NAME:  Yessy Puckett  YOB: 1955  MEDICAL RECORD NUMBER:  [id number]    The patient is being assessed for  Other routine skin assessment    I agree that at least one RN has performed a thorough Head to Toe Skin Assessment on the patient. ALL assessment sites listed below have been assessed.      Areas assessed by both nurses:    Head, Face, Ears, Shoulders, Back, Chest, Arms, Elbows, Hands, Sacrum. Buttock, Coccyx, Ischium, Legs. Feet and Heels, and Under Medical Devices         Does the Patient have a Wound? No noted wound(s)       Nirmal Prevention initiated by RN: No  Wound Care Orders initiated by RN: No    Pressure Injury (Stage 3,4, Unstageable, DTI, NWPT, and Complex wounds) if present, place Wound referral order by RN under : No    New Ostomies, if present place, Ostomy referral order under : No     Nurse 1 eSignature: Electronically signed by Jeff Reid RN on 5/7/25 at 5:02 AM EDT    **SHARE this note so that the co-signing nurse can place an eSignature**    Nurse 2 eSignature: Electronically signed by REBEKAH Lewis RN on 5/7/25 at 5:04 AM EDT    
BIPAP contraindicated, patient in restraints.  
NIV on hold. Patient is retrained. NIV is contraindicated at this time.   
Patient was discharge this shift. Patient was discharge back to [facility]. Patient was notified of discharge from nurse and case management. Patient's IV was removed and was assisted with placing a new gown on.  The nurse called the facility and spoke with Vonda NUNES giving her report, which she stated understanding. Patient was assisted onto a stretcher and transported to the facility via ground ambulance.   
Received Order for Telemetry     Yessy Puckett   1955   [id number]   CO2 retention [E87.29]  COPD exacerbation (HCC) [J44.1]  Congestive heart failure, unspecified HF chronicity, unspecified heart failure type (HCC) [I50.9]   Carleen Ramirez MD     Tele Box # [phone] placed on patient at  1718 pm  ER Room # ICU  Admitting to Room 509  Transferring Nurse SERINE  Verified with Primary Nurse THERESA at  1719 pm    
Received Order for Telemetry     Yessy Puckett   1955   [id number]   CO2 retention [E87.29]  COPD exacerbation (HCC) [J44.1]  Congestive heart failure, unspecified HF chronicity, unspecified heart failure type (HCC) [I50.9]   Carleen Ramirez MD     Tele Box # 33 placed on patient at  1718 pm  ER Room # ICU  Admitting to Room 509  Transferring Nurse SERINE  Verified with Primary Nurse THERESA at  1718 pm   
Spiritual Health History and Assessment/Progress Note  [facility]    Attempted Encounter,  ,  ,      Name: Yessy Puckett MRN: [id number]    Age: 69 y.o.     Sex: male   Language: English   Tenriism: Buddhist   COPD exacerbation (HCC)     Date: 5/6/2025            Total Time Calculated: 9 min              Spiritual Assessment began in [facility] ICU            Encounter Overview/Reason: Attempted Encounter  Service Provided For: Patient not available (Patient asleep)    Maia, Belief, Meaning:   Patient unable to assess at this time  Family/Friends No family/friends present      Importance and Influence:  Patient unable to assess at this time  Family/Friends No family/friends present    Community:  Patient Other: unable to assess at this time  Family/Friends No family/friends present    Assessment and Plan of Care:     Patient Interventions include: Other: n/a  Family/Friends Interventions include: No family/friends present    Patient Plan of Care: Spiritual Care available upon further referral  Family/Friends Plan of Care: No family/friends present    I attempted to check in with patient for a routine visit. He was asleep at the time. Spiritual assessment incomplete at this time. Further  support available upon request.    Electronically signed by  Carlos Duke MDiv   Chaplain Resident   on 5/6/2025 at 2:30 PM    
Spiritual Health History and Assessment/Progress Note  [facility]    Spiritual/Emotional Needs,  , Life Adjustments,      Name: Yessy Puckett MRN: [id number]    Age: 69 y.o.     Sex: male   Language: English   Evangelical: Latter day   COPD exacerbation (HCC)     Date: 5/9/2025            Total Time Calculated: 45 min              Spiritual Assessment continued in [facility] SURGICAL        Referral/Consult From: Patient   Encounter Overview/Reason: Spiritual/Emotional Needs  Service Provided For: Patient    Maia, Belief, Meaning:   Patient identifies as spiritual, is connected with a maia tradition or spiritual practice, and has beliefs or practices that help with coping during difficult times  Family/Friends No family/friends present      Importance and Influence:  Patient has spiritual/personal beliefs that influence decisions regarding their health  Family/Friends No family/friends present    Community:  Patient is connected with a spiritual community and feels well-supported. Support system includes: Children, Maia Community, and Extended family  Family/Friends No family/friends present    Assessment and Plan of Care:     Patient Interventions include: Facilitated expression of thoughts and feelings, Explored spiritual coping/struggle/distress, Engaged in theological reflection, Affirmed coping skills/support systems, Facilitated life review and/ or legacy, and Other: Ministry of presence, active listening, contacted local Latter-day/ for support.   Family/Friends Interventions include: No family/friends present    Patient Plan of Care: Spiritual Care available upon further referral  Family/Friends Plan of Care: No family/friends present    Patient called out from his room in 509 seeking assistance while  was making rounds on the floor.  was unable to review Chart before going in to respond to patient's needs. He requested for \"something to drink\". His PCT notified  that she 
back on baseline oxygen, anticipate patient will be stable for discharge to facility in 24 hours once cleared by cardiology and on oral diuretics    5/8 patient seen and evaluated bedside, overnight events reviewed, of note patient is back on baseline oxygen, anticipate patient will be stable for discharge to facility in 24 hours once cleared by cardiology and on oral diuretics, currently BiPAP being arranged by case management    5/9 patient seen and evaluated bedside, patient's son was present at bedside, patient currently doing well remained stable respiratory standpoint, Lasix dosage increased to 80 mg IV twice daily by cardiology, went over plan of care as well as need for IV diuresis with patient's son, all questions answered, discussed with RN      Assessment / Plan:  Acute hypercapnic respiratory failure  COPD exacerbation  Pulmonary edema  Sleep apnea  CTA chest no PE, no aortic aneurysm, small-mod effusions and interstitial edema  Has weaned off BiPAP, continue nocturnal BiPAP  Some agitation noted, possible hypercapnia  Repeat ABG 5/5 with improved respiratory acidosis  Diuresis with IV Lasix 40 QD  Discontinue Solu-Medrol, transition to oral steroids  Continue Pulmicort, DuoNebs  Give 1 dose IV Diamox  Repeat AM CXR - stable mild to moderate edema pattern with small bilateral pleural effusions  SLP rec soft/bite-sized and thick liquids to prevent aspiration  Of note patient will need noninvasive ventilator at facility, prescription provided by pulmonology, case management currently working on arranging     Paroxysmal A-fib  Currently rate controlled  Prior EKGs in 2021 noted A-fib  Suspected secondary to hypoxia/stress related  TSH/T4 WNL  TTE 2021 EF 55-60%, mild concentric hypertrophy, LV diastolic dysfunction  Echo this admission with EF 40-45%, NWM, grade III diastolic dysfunction   [id number] score of 3, plan for NOAC upon discharge, therapeutic Lovenox while inpatient  Cardiology following, 
chol 95, LDL C 41, trig 59   Diabetes, per primary team       Please do not hesitate to call if additional questions arise.    Sherman Lucas MD  5/11/2025  
medical records for all procedures/Xrays and details which were not copied into this note but were reviewed prior to creation of Plan.      LABS:  I reviewed today's most current labs and imaging studies.  Pertinent labs include:  Recent Labs     05/06/25  0400 05/07/25  0640 05/08/25  0659   WBC 16.2* 11.5* 13.3*   HGB 9.9* 10.1* 10.3*   HCT 31.5* 32.2* 32.7*    339 325     Recent Labs     05/06/25  0400 05/07/25  0640 05/08/25  0659    139 138   K 3.7 3.4* 3.5   CL 97 99 100   CO2 41* 34* 34*   BUN 26* 26* 27*       Signed: Tre Vázquez MD   
TECHNIQUE: CTA of the chest with  IV contrast , Isovue-370 is performed. Axial images from the thoracic inlet to the level of the upper abdomen are obtained. Manual post-processing of the images and coronal reformatting is also performed. CT dose reduction was achieved through use of a standardized protocol tailored for this examination and automatic exposure control for dose modulation. Multiplanar reformatted imaging was performed. Sagittal and coronal reformatting. 3-D Postprocessing of imaging was performed. 3-D MIP reconstructed images were obtained in the coronal plane.  FINDINGS: PE: This is a good quality study for the evaluation of pulmonary embolism to the subsegmental arterial level. There is no pulmonary embolism to this level. CORONARY VASCULAR CALCIFICATIONS: Present There is no aortic aneurysm. Cranially, intralobular septal thickening. Small to moderate bilateral pleural effusions. Hepatic steatosis. Anasarca. Large ventral abdominal wall hernia. There is no mediastinal, axillary or hilar lymphadenopathy. The proximal pulmonary arteries are without evidence of filling defects. No acute fracture is identified. The remainder of the upper abdomen visualized is unremarkable.     There is no pulmonary embolism. There is no aortic aneurysm. Cardiomegaly, small to moderate effusions and interstitial edema. Large ventral abdominal hernia contains loops of large bowel. Hepatic steatosis and anasarca.  Incidental findings are as described above. Electronically signed by TONI JENKINS    Vascular duplex lower extremity venous right  Result Date: 5/4/2025    No evidence of deep vein and superficial thrombosis in the right lower extremity.   For comparison purposes, the left common femoral vein was briefly interrogated. The vein demonstrates normal color filling and compressibility. Doppler flow was phasic and spontaneous. This is vascular lab report on Yessy Puckett, patient's YOB: 1955. 
leg deep vein system shows spontaneous, phasic, competent venous flow with augmentation.        This care involved high complexity decision making which includes independently reviewing the patient's past medical records, current laboratory results, medication profiles that were immediately available to me and actual Xray images at the bedside in order to assess, support vital system function, and to treat this degree of vital organ system failure, and to prevent further life threatening deterioration of the patient’s condition. I was in direct communication with the nursing staff throughout this time.

## 2025-05-12 NOTE — CARE COORDINATION
CM reviewed chart. Issues with setting up NIV for SNF. Issues with NIV at SNF. Discussed with Pulmonology. Order for Bipap received and sent to facility. Waiting for ability to accept back.     1150: [facility] working on setting up Bipap.    1305: per [facility], Bipap will be delivered to facility tomorrow. Once Bipap is delivered patient is able to admit to [facility].

## 2025-05-12 NOTE — PLAN OF CARE
Problem: Chronic Conditions and Co-morbidities  Goal: Patient's chronic conditions and co-morbidity symptoms are monitored and maintained or improved  5/12/2025 1254 by Chandler Thompson RN  Outcome: Progressing  5/12/2025 0318 by REBEKAH Lewis RN  Outcome: Progressing     Problem: Discharge Planning  Goal: Discharge to home or other facility with appropriate resources  Outcome: Progressing     Problem: Pain  Goal: Verbalizes/displays adequate comfort level or baseline comfort level  5/12/2025 1254 by Chandler Thompson RN  Outcome: Progressing  5/12/2025 0318 by REBEKAH Lewis RN  Outcome: Progressing     Problem: Skin/Tissue Integrity  Goal: Skin integrity remains intact  Description: 1.  Monitor for areas of redness and/or skin breakdown2.  Assess vascular access sites hourly3.  Every 4-6 hours minimum:  Change oxygen saturation probe site4.  Every 4-6 hours:  If on nasal continuous positive airway pressure, respiratory therapy assess nares and determine need for appliance change or resting period  5/12/2025 1254 by Chandler Thompson RN  Outcome: Progressing  5/12/2025 0318 by REBEKAH Lewis RN  Outcome: Progressing     Problem: Safety - Adult  Goal: Free from fall injury  5/12/2025 1254 by Chandler Thompson RN  Outcome: Progressing  5/12/2025 0318 by REBEKAH Lewis RN  Outcome: Progressing     Problem: Neurosensory - Adult  Goal: Achieves stable or improved neurological status  Outcome: Progressing  Goal: Achieves maximal functionality and self care  Outcome: Progressing     Problem: Respiratory - Adult  Goal: Achieves optimal ventilation and oxygenation  Outcome: Progressing     Problem: Cardiovascular - Adult  Goal: Maintains optimal cardiac output and hemodynamic stability  Outcome: Progressing  Goal: Absence of cardiac dysrhythmias or at baseline  Outcome: Progressing     Problem: Musculoskeletal - Adult  Goal: Return mobility to safest level of function  Outcome: Progressing  Goal: Return ADL

## 2025-05-12 NOTE — PLAN OF CARE
Problem: Chronic Conditions and Co-morbidities  Goal: Patient's chronic conditions and co-morbidity symptoms are monitored and maintained or improved  5/12/2025 1750 by Chandler Thompson RN  Outcome: Adequate for Discharge  5/12/2025 1254 by Chandler Thompson RN  Outcome: Progressing     Problem: Discharge Planning  Goal: Discharge to home or other facility with appropriate resources  5/12/2025 1750 by Chandler Thompson RN  Outcome: Adequate for Discharge  5/12/2025 1254 by Chandler Thompson RN  Outcome: Progressing     Problem: Pain  Goal: Verbalizes/displays adequate comfort level or baseline comfort level  5/12/2025 1750 by Chandler Thompson RN  Outcome: Adequate for Discharge  5/12/2025 1254 by Chandler Thompson RN  Outcome: Progressing     Problem: Skin/Tissue Integrity  Goal: Skin integrity remains intact  Description: 1.  Monitor for areas of redness and/or skin breakdown2.  Assess vascular access sites hourly3.  Every 4-6 hours minimum:  Change oxygen saturation probe site4.  Every 4-6 hours:  If on nasal continuous positive airway pressure, respiratory therapy assess nares and determine need for appliance change or resting period  5/12/2025 1750 by Chandler Thompson RN  Outcome: Adequate for Discharge  5/12/2025 1254 by Chandler Thompson RN  Outcome: Progressing     Problem: Safety - Adult  Goal: Free from fall injury  5/12/2025 1750 by Chandler Thompson RN  Outcome: Adequate for Discharge  5/12/2025 1254 by Chandler Thompson RN  Outcome: Progressing     Problem: Neurosensory - Adult  Goal: Achieves stable or improved neurological status  5/12/2025 1750 by Chandler Thompson RN  Outcome: Adequate for Discharge  5/12/2025 1254 by Chandler Thompson RN  Outcome: Progressing  Goal: Achieves maximal functionality and self care  5/12/2025 1750 by Chandler Thompson RN  Outcome: Adequate for Discharge  5/12/2025 1254 by Chandler Thompson RN  Outcome: Progressing     Problem: Respiratory - Adult  Goal: Achieves optimal

## (undated) DEVICE — CATHETER ANGIO 5FR L100CM GRY S STL NYL JL3.5 3 SEG BRAID L

## (undated) DEVICE — TR BAND RADIAL ARTERY COMPRESSION DEVICE: Brand: TR BAND

## (undated) DEVICE — CATHETER ANGIO PIG 145 0.045 INX5 FRX110 CM THRULUMEN EXPO

## (undated) DEVICE — SYRINGE MED 10ML RED POLYCARB BRL FIX M LUER CONN FLAT GRP

## (undated) DEVICE — SURGICAL PROCEDURE TRAY CRD CATH 3 PRT

## (undated) DEVICE — GUIDEWIRE VASC L260CM DIA0.035IN TIP L3MM STD EXCHG PTFE J

## (undated) DEVICE — SYRINGE MED 10ML PUR GAM COMPATIBLE POLYCARB FIX M LUER CONN

## (undated) DEVICE — Device

## (undated) DEVICE — WASTEBAG DRIP/ADAPTER: Brand: MEDLINE INDUSTRIES, INC.

## (undated) DEVICE — GLIDESHEATH SLENDER ACCESS KIT: Brand: GLIDESHEATH SLENDER

## (undated) DEVICE — PERCUTANEOUS ENTRY THINWALL NEEDLE  ONE-PART: Brand: COOK

## (undated) DEVICE — LULU RADIAL/FEMORAL ANGIOGRAPHY SHEET: Brand: CONVERTORS

## (undated) DEVICE — COPE MANDRIL WIRE GUIDE STAINLESS STEEL: Brand: COPE

## (undated) DEVICE — CATHETER ANGIO 5FR L100CM GRY S STL NYL JR4 3 SEG BRAID L

## (undated) DEVICE — SURSITE 4 X 4.8  MED MSC2705Z

## (undated) DEVICE — 3M™ STERI-DRAPE™ SMALL DRAPE WITH ADHESIVE APERTURE 1092 25/BX,4/CS&#X20;: Brand: STERI-DRAPE™